# Patient Record
Sex: FEMALE | Race: WHITE | NOT HISPANIC OR LATINO | Employment: OTHER | URBAN - METROPOLITAN AREA
[De-identification: names, ages, dates, MRNs, and addresses within clinical notes are randomized per-mention and may not be internally consistent; named-entity substitution may affect disease eponyms.]

---

## 2018-01-01 ENCOUNTER — APPOINTMENT (OUTPATIENT)
Dept: RADIOLOGY | Facility: MEDICAL CENTER | Age: 78
DRG: 207 | End: 2018-01-01
Attending: INTERNAL MEDICINE
Payer: MEDICARE

## 2018-01-01 ENCOUNTER — PATIENT OUTREACH (OUTPATIENT)
Dept: HEALTH INFORMATION MANAGEMENT | Facility: OTHER | Age: 78
End: 2018-01-01

## 2018-01-01 ENCOUNTER — APPOINTMENT (OUTPATIENT)
Dept: RADIOLOGY | Facility: MEDICAL CENTER | Age: 78
DRG: 207 | End: 2018-01-01
Attending: HOSPITALIST
Payer: MEDICARE

## 2018-01-01 ENCOUNTER — HOSPITAL ENCOUNTER (INPATIENT)
Facility: MEDICAL CENTER | Age: 78
LOS: 4 days | DRG: 987 | End: 2019-01-05
Attending: EMERGENCY MEDICINE | Admitting: HOSPITALIST
Payer: MEDICARE

## 2018-01-01 ENCOUNTER — APPOINTMENT (OUTPATIENT)
Dept: CARDIOLOGY | Facility: MEDICAL CENTER | Age: 78
DRG: 207 | End: 2018-01-01
Attending: INTERNAL MEDICINE
Payer: MEDICARE

## 2018-01-01 ENCOUNTER — APPOINTMENT (OUTPATIENT)
Dept: CARDIOLOGY | Facility: MEDICAL CENTER | Age: 78
DRG: 207 | End: 2018-01-01
Attending: STUDENT IN AN ORGANIZED HEALTH CARE EDUCATION/TRAINING PROGRAM
Payer: MEDICARE

## 2018-01-01 ENCOUNTER — APPOINTMENT (OUTPATIENT)
Dept: CARDIOLOGY | Facility: MEDICAL CENTER | Age: 78
DRG: 207 | End: 2018-01-01
Attending: HOSPITALIST
Payer: MEDICARE

## 2018-01-01 ENCOUNTER — APPOINTMENT (OUTPATIENT)
Dept: RADIOLOGY | Facility: MEDICAL CENTER | Age: 78
DRG: 987 | End: 2018-01-01
Attending: EMERGENCY MEDICINE
Payer: MEDICARE

## 2018-01-01 ENCOUNTER — APPOINTMENT (OUTPATIENT)
Dept: RADIOLOGY | Facility: MEDICAL CENTER | Age: 78
DRG: 207 | End: 2018-01-01
Attending: STUDENT IN AN ORGANIZED HEALTH CARE EDUCATION/TRAINING PROGRAM
Payer: MEDICARE

## 2018-01-01 ENCOUNTER — HOSPITAL ENCOUNTER (OUTPATIENT)
Dept: RADIOLOGY | Facility: MEDICAL CENTER | Age: 78
End: 2018-09-28

## 2018-01-01 ENCOUNTER — APPOINTMENT (OUTPATIENT)
Dept: RADIOLOGY | Facility: MEDICAL CENTER | Age: 78
DRG: 207 | End: 2018-01-01
Attending: RADIOLOGY
Payer: MEDICARE

## 2018-01-01 ENCOUNTER — HOSPITAL ENCOUNTER (INPATIENT)
Facility: MEDICAL CENTER | Age: 78
LOS: 31 days | DRG: 207 | End: 2018-10-29
Attending: EMERGENCY MEDICINE | Admitting: HOSPITALIST
Payer: MEDICARE

## 2018-01-01 VITALS
RESPIRATION RATE: 20 BRPM | HEIGHT: 62 IN | TEMPERATURE: 98 F | SYSTOLIC BLOOD PRESSURE: 120 MMHG | HEART RATE: 90 BPM | OXYGEN SATURATION: 100 % | DIASTOLIC BLOOD PRESSURE: 71 MMHG | BODY MASS INDEX: 21.1 KG/M2 | WEIGHT: 114.64 LBS

## 2018-01-01 DIAGNOSIS — R16.0 LIVER MASSES: ICD-10-CM

## 2018-01-01 DIAGNOSIS — C85.90 RECURRENT LYMPHOMA (HCC): ICD-10-CM

## 2018-01-01 DIAGNOSIS — J90 PLEURAL EFFUSION: ICD-10-CM

## 2018-01-01 DIAGNOSIS — I95.89 OTHER SPECIFIED HYPOTENSION: ICD-10-CM

## 2018-01-01 DIAGNOSIS — I31.39 PERICARDIAL EFFUSION: ICD-10-CM

## 2018-01-01 DIAGNOSIS — R79.89 ELEVATED TROPONIN: ICD-10-CM

## 2018-01-01 DIAGNOSIS — G93.40 ACUTE ENCEPHALOPATHY: ICD-10-CM

## 2018-01-01 DIAGNOSIS — J96.01 ACUTE HYPOXEMIC RESPIRATORY FAILURE (HCC): ICD-10-CM

## 2018-01-01 DIAGNOSIS — J81.0 ACUTE PULMONARY EDEMA (HCC): ICD-10-CM

## 2018-01-01 DIAGNOSIS — R74.01 TRANSAMINITIS: ICD-10-CM

## 2018-01-01 DIAGNOSIS — A41.9 SEPSIS, DUE TO UNSPECIFIED ORGANISM: ICD-10-CM

## 2018-01-01 DIAGNOSIS — J81.1 CHRONIC PULMONARY EDEMA: ICD-10-CM

## 2018-01-01 DIAGNOSIS — J44.1 ACUTE EXACERBATION OF CHRONIC OBSTRUCTIVE PULMONARY DISEASE (COPD) (HCC): ICD-10-CM

## 2018-01-01 DIAGNOSIS — I95.9 HYPOTENSION, UNSPECIFIED HYPOTENSION TYPE: ICD-10-CM

## 2018-01-01 LAB
ABO GROUP BLD: NORMAL
ABO GROUP BLD: NORMAL
ACTION RANGE TRIGGERED IACRT: NO
ACTION RANGE TRIGGERED IACRT: YES
ALBUMIN SERPL BCP-MCNC: 2.7 G/DL (ref 3.2–4.9)
ALBUMIN SERPL BCP-MCNC: 2.8 G/DL (ref 3.2–4.9)
ALBUMIN SERPL BCP-MCNC: 2.8 G/DL (ref 3.2–4.9)
ALBUMIN SERPL BCP-MCNC: 2.9 G/DL (ref 3.2–4.9)
ALBUMIN SERPL BCP-MCNC: 3.1 G/DL (ref 3.2–4.9)
ALBUMIN SERPL BCP-MCNC: 3.3 G/DL (ref 3.2–4.9)
ALBUMIN SERPL BCP-MCNC: 3.4 G/DL (ref 3.2–4.9)
ALBUMIN SERPL BCP-MCNC: 3.8 G/DL (ref 3.2–4.9)
ALBUMIN SERPL-MCNC: 2.14 G/DL (ref 3.75–5.01)
ALBUMIN/GLOB SERPL: 0.9 G/DL
ALBUMIN/GLOB SERPL: 1 G/DL
ALBUMIN/GLOB SERPL: 1.1 G/DL
ALBUMIN/GLOB SERPL: 1.2 G/DL
ALBUMIN/GLOB SERPL: 1.3 G/DL
ALBUMIN/GLOB SERPL: 1.3 G/DL
ALP SERPL-CCNC: 157 U/L (ref 30–99)
ALP SERPL-CCNC: 55 U/L (ref 30–99)
ALP SERPL-CCNC: 56 U/L (ref 30–99)
ALP SERPL-CCNC: 57 U/L (ref 30–99)
ALP SERPL-CCNC: 59 U/L (ref 30–99)
ALP SERPL-CCNC: 62 U/L (ref 30–99)
ALP SERPL-CCNC: 71 U/L (ref 30–99)
ALP SERPL-CCNC: 72 U/L (ref 30–99)
ALPHA1 GLOB SERPL ELPH-MCNC: 0.54 G/DL (ref 0.19–0.46)
ALPHA2 GLOB SERPL ELPH-MCNC: 0.76 G/DL (ref 0.48–1.05)
ALT SERPL-CCNC: 17 U/L (ref 2–50)
ALT SERPL-CCNC: 19 U/L (ref 2–50)
ALT SERPL-CCNC: 32 U/L (ref 2–50)
ALT SERPL-CCNC: 34 U/L (ref 2–50)
ALT SERPL-CCNC: 42 U/L (ref 2–50)
ALT SERPL-CCNC: 59 U/L (ref 2–50)
ALT SERPL-CCNC: 73 U/L (ref 2–50)
ALT SERPL-CCNC: 982 U/L (ref 2–50)
AMMONIA PLAS-SCNC: 51 UMOL/L (ref 11–45)
AMORPH CRY #/AREA URNS HPF: PRESENT /HPF
AMYLASE FLD-CCNC: <10 U/L
ANION GAP SERPL CALC-SCNC: 10 MMOL/L (ref 0–11.9)
ANION GAP SERPL CALC-SCNC: 18 MMOL/L (ref 0–11.9)
ANION GAP SERPL CALC-SCNC: 2 MMOL/L (ref 0–11.9)
ANION GAP SERPL CALC-SCNC: 4 MMOL/L (ref 0–11.9)
ANION GAP SERPL CALC-SCNC: 5 MMOL/L (ref 0–11.9)
ANION GAP SERPL CALC-SCNC: 6 MMOL/L (ref 0–11.9)
ANION GAP SERPL CALC-SCNC: 7 MMOL/L (ref 0–11.9)
ANION GAP SERPL CALC-SCNC: 8 MMOL/L (ref 0–11.9)
ANION GAP SERPL CALC-SCNC: 8 MMOL/L (ref 0–11.9)
ANION GAP SERPL CALC-SCNC: 9 MMOL/L (ref 0–11.9)
ANISOCYTOSIS BLD QL SMEAR: ABNORMAL
APPEARANCE FLD: NORMAL
APPEARANCE FLD: NORMAL
APPEARANCE UR: ABNORMAL
APTT PPP: 25.4 SEC (ref 24.7–36)
APTT PPP: 25.9 SEC (ref 24.7–36)
AST SERPL-CCNC: 1420 U/L (ref 12–45)
AST SERPL-CCNC: 15 U/L (ref 12–45)
AST SERPL-CCNC: 16 U/L (ref 12–45)
AST SERPL-CCNC: 16 U/L (ref 12–45)
AST SERPL-CCNC: 21 U/L (ref 12–45)
AST SERPL-CCNC: 31 U/L (ref 12–45)
AST SERPL-CCNC: 53 U/L (ref 12–45)
AST SERPL-CCNC: 57 U/L (ref 12–45)
B-GLOBULIN SERPL ELPH-MCNC: 0.56 G/DL (ref 0.48–1.1)
BACTERIA #/AREA URNS HPF: NEGATIVE /HPF
BACTERIA BLD CULT: NORMAL
BACTERIA BLD CULT: NORMAL
BACTERIA FLD AEROBE CULT: ABNORMAL
BACTERIA FLD AEROBE CULT: ABNORMAL
BACTERIA FLD AEROBE CULT: NORMAL
BACTERIA FLD AEROBE CULT: NORMAL
BACTERIA SPEC RESP CULT: NORMAL
BACTERIA WND AEROBE CULT: NORMAL
BASE EXCESS BLDA CALC-SCNC: 11 MMOL/L (ref -4–3)
BASE EXCESS BLDA CALC-SCNC: 14 MMOL/L (ref -4–3)
BASE EXCESS BLDA CALC-SCNC: 16 MMOL/L (ref -4–3)
BASE EXCESS BLDA CALC-SCNC: 18 MMOL/L (ref -4–3)
BASE EXCESS BLDA CALC-SCNC: 8 MMOL/L (ref -4–3)
BASE EXCESS BLDA CALC-SCNC: 8 MMOL/L (ref -4–3)
BASE EXCESS BLDA CALC-SCNC: 9 MMOL/L (ref -4–3)
BASE EXCESS BLDA CALC-SCNC: 9 MMOL/L (ref -4–3)
BASOPHILS # BLD AUTO: 0 % (ref 0–1.8)
BASOPHILS # BLD AUTO: 0.1 % (ref 0–1.8)
BASOPHILS # BLD AUTO: 0.2 % (ref 0–1.8)
BASOPHILS # BLD AUTO: 0.3 % (ref 0–1.8)
BASOPHILS # BLD AUTO: 0.3 % (ref 0–1.8)
BASOPHILS # BLD AUTO: 0.6 % (ref 0–1.8)
BASOPHILS # BLD AUTO: 0.9 % (ref 0–1.8)
BASOPHILS # BLD AUTO: 0.9 % (ref 0–1.8)
BASOPHILS # BLD: 0 K/UL (ref 0–0.12)
BASOPHILS # BLD: 0.01 K/UL (ref 0–0.12)
BASOPHILS # BLD: 0.02 K/UL (ref 0–0.12)
BASOPHILS # BLD: 0.02 K/UL (ref 0–0.12)
BASOPHILS # BLD: 0.03 K/UL (ref 0–0.12)
BASOPHILS # BLD: 0.05 K/UL (ref 0–0.12)
BASOPHILS # BLD: 0.06 K/UL (ref 0–0.12)
BASOPHILS # BLD: 0.08 K/UL (ref 0–0.12)
BASOPHILS # BLD: 0.08 K/UL (ref 0–0.12)
BASOPHILS # BLD: 0.11 K/UL (ref 0–0.12)
BILIRUB SERPL-MCNC: 0.3 MG/DL (ref 0.1–1.5)
BILIRUB SERPL-MCNC: 0.4 MG/DL (ref 0.1–1.5)
BILIRUB SERPL-MCNC: 0.5 MG/DL (ref 0.1–1.5)
BILIRUB SERPL-MCNC: 0.5 MG/DL (ref 0.1–1.5)
BILIRUB SERPL-MCNC: 0.9 MG/DL (ref 0.1–1.5)
BILIRUB SERPL-MCNC: 1.1 MG/DL (ref 0.1–1.5)
BILIRUB UR QL STRIP.AUTO: NEGATIVE
BLD GP AB SCN SERPL QL: NORMAL
BNP SERPL-MCNC: 61 PG/ML (ref 0–100)
BNP SERPL-MCNC: 73 PG/ML (ref 0–100)
BODY FLD TYPE: NORMAL
BODY TEMPERATURE: ABNORMAL CENTIGRADE
BODY TEMPERATURE: ABNORMAL DEGREES
BUN SERPL-MCNC: 12 MG/DL (ref 8–22)
BUN SERPL-MCNC: 17 MG/DL (ref 8–22)
BUN SERPL-MCNC: 17 MG/DL (ref 8–22)
BUN SERPL-MCNC: 18 MG/DL (ref 8–22)
BUN SERPL-MCNC: 19 MG/DL (ref 8–22)
BUN SERPL-MCNC: 20 MG/DL (ref 8–22)
BUN SERPL-MCNC: 23 MG/DL (ref 8–22)
BUN SERPL-MCNC: 23 MG/DL (ref 8–22)
BUN SERPL-MCNC: 25 MG/DL (ref 8–22)
BUN SERPL-MCNC: 26 MG/DL (ref 8–22)
BUN SERPL-MCNC: 26 MG/DL (ref 8–22)
BUN SERPL-MCNC: 28 MG/DL (ref 8–22)
BUN SERPL-MCNC: 28 MG/DL (ref 8–22)
BUN SERPL-MCNC: 30 MG/DL (ref 8–22)
BUN SERPL-MCNC: 30 MG/DL (ref 8–22)
BUN SERPL-MCNC: 34 MG/DL (ref 8–22)
BUN SERPL-MCNC: 34 MG/DL (ref 8–22)
BUN SERPL-MCNC: 35 MG/DL (ref 8–22)
BUN SERPL-MCNC: 36 MG/DL (ref 8–22)
BUN SERPL-MCNC: 43 MG/DL (ref 8–22)
BURR CELLS BLD QL SMEAR: NORMAL
CALCIUM SERPL-MCNC: 7.8 MG/DL (ref 8.5–10.5)
CALCIUM SERPL-MCNC: 7.8 MG/DL (ref 8.5–10.5)
CALCIUM SERPL-MCNC: 7.9 MG/DL (ref 8.5–10.5)
CALCIUM SERPL-MCNC: 7.9 MG/DL (ref 8.5–10.5)
CALCIUM SERPL-MCNC: 8 MG/DL (ref 8.5–10.5)
CALCIUM SERPL-MCNC: 8 MG/DL (ref 8.5–10.5)
CALCIUM SERPL-MCNC: 8.1 MG/DL (ref 8.5–10.5)
CALCIUM SERPL-MCNC: 8.1 MG/DL (ref 8.5–10.5)
CALCIUM SERPL-MCNC: 8.2 MG/DL (ref 8.5–10.5)
CALCIUM SERPL-MCNC: 8.4 MG/DL (ref 8.5–10.5)
CALCIUM SERPL-MCNC: 8.4 MG/DL (ref 8.5–10.5)
CALCIUM SERPL-MCNC: 8.7 MG/DL (ref 8.5–10.5)
CALCIUM SERPL-MCNC: 8.8 MG/DL (ref 8.5–10.5)
CALCIUM SERPL-MCNC: 8.8 MG/DL (ref 8.5–10.5)
CALCIUM SERPL-MCNC: 8.9 MG/DL (ref 8.5–10.5)
CALCIUM SERPL-MCNC: 9 MG/DL (ref 8.5–10.5)
CALCIUM SERPL-MCNC: 9.1 MG/DL (ref 8.5–10.5)
CALCIUM SERPL-MCNC: 9.2 MG/DL (ref 8.5–10.5)
CALCIUM SERPL-MCNC: 9.3 MG/DL (ref 8.5–10.5)
CALCIUM SERPL-MCNC: 9.3 MG/DL (ref 8.5–10.5)
CALCIUM SERPL-MCNC: 9.4 MG/DL (ref 8.5–10.5)
CALCIUM SERPL-MCNC: 9.4 MG/DL (ref 8.5–10.5)
CALCIUM SERPL-MCNC: 9.6 MG/DL (ref 8.5–10.5)
CALCIUM SERPL-MCNC: 9.7 MG/DL (ref 8.5–10.5)
CCP IGG SERPL-ACNC: 2 UNITS (ref 0–19)
CHLORIDE SERPL-SCNC: 100 MMOL/L (ref 96–112)
CHLORIDE SERPL-SCNC: 102 MMOL/L (ref 96–112)
CHLORIDE SERPL-SCNC: 102 MMOL/L (ref 96–112)
CHLORIDE SERPL-SCNC: 103 MMOL/L (ref 96–112)
CHLORIDE SERPL-SCNC: 104 MMOL/L (ref 96–112)
CHLORIDE SERPL-SCNC: 105 MMOL/L (ref 96–112)
CHLORIDE SERPL-SCNC: 106 MMOL/L (ref 96–112)
CHLORIDE SERPL-SCNC: 107 MMOL/L (ref 96–112)
CHLORIDE SERPL-SCNC: 108 MMOL/L (ref 96–112)
CHLORIDE SERPL-SCNC: 91 MMOL/L (ref 96–112)
CHLORIDE SERPL-SCNC: 99 MMOL/L (ref 96–112)
CHLORIDE SERPL-SCNC: 99 MMOL/L (ref 96–112)
CHOLEST SERPL-MCNC: 126 MG/DL (ref 100–199)
CK SERPL-CCNC: 20 U/L (ref 0–154)
CK SERPL-CCNC: 62 U/L (ref 0–154)
CO2 BLDA-SCNC: 34 MMOL/L (ref 20–33)
CO2 BLDA-SCNC: 35 MMOL/L (ref 20–33)
CO2 BLDA-SCNC: 36 MMOL/L (ref 20–33)
CO2 BLDA-SCNC: 42 MMOL/L (ref 20–33)
CO2 BLDA-SCNC: 42 MMOL/L (ref 20–33)
CO2 BLDA-SCNC: 47 MMOL/L (ref 20–33)
CO2 SERPL-SCNC: 25 MMOL/L (ref 20–33)
CO2 SERPL-SCNC: 29 MMOL/L (ref 20–33)
CO2 SERPL-SCNC: 30 MMOL/L (ref 20–33)
CO2 SERPL-SCNC: 31 MMOL/L (ref 20–33)
CO2 SERPL-SCNC: 31 MMOL/L (ref 20–33)
CO2 SERPL-SCNC: 32 MMOL/L (ref 20–33)
CO2 SERPL-SCNC: 33 MMOL/L (ref 20–33)
CO2 SERPL-SCNC: 33 MMOL/L (ref 20–33)
CO2 SERPL-SCNC: 34 MMOL/L (ref 20–33)
CO2 SERPL-SCNC: 34 MMOL/L (ref 20–33)
CO2 SERPL-SCNC: 35 MMOL/L (ref 20–33)
CO2 SERPL-SCNC: 36 MMOL/L (ref 20–33)
CO2 SERPL-SCNC: 37 MMOL/L (ref 20–33)
CO2 SERPL-SCNC: 37 MMOL/L (ref 20–33)
CO2 SERPL-SCNC: 38 MMOL/L (ref 20–33)
CO2 SERPL-SCNC: 39 MMOL/L (ref 20–33)
CO2 SERPL-SCNC: 42 MMOL/L (ref 20–33)
COLOR FLD: YELLOW
COLOR FLD: YELLOW
COLOR UR: ABNORMAL
COMMENT 1642: NORMAL
CORTIS SERPL-MCNC: 14.8 UG/DL (ref 0–23)
CREAT SERPL-MCNC: 0.3 MG/DL (ref 0.5–1.4)
CREAT SERPL-MCNC: 0.32 MG/DL (ref 0.5–1.4)
CREAT SERPL-MCNC: 0.33 MG/DL (ref 0.5–1.4)
CREAT SERPL-MCNC: 0.34 MG/DL (ref 0.5–1.4)
CREAT SERPL-MCNC: 0.35 MG/DL (ref 0.5–1.4)
CREAT SERPL-MCNC: 0.36 MG/DL (ref 0.5–1.4)
CREAT SERPL-MCNC: 0.37 MG/DL (ref 0.5–1.4)
CREAT SERPL-MCNC: 0.37 MG/DL (ref 0.5–1.4)
CREAT SERPL-MCNC: 0.38 MG/DL (ref 0.5–1.4)
CREAT SERPL-MCNC: 0.39 MG/DL (ref 0.5–1.4)
CREAT SERPL-MCNC: 0.4 MG/DL (ref 0.5–1.4)
CREAT SERPL-MCNC: 0.41 MG/DL (ref 0.5–1.4)
CREAT SERPL-MCNC: 0.42 MG/DL (ref 0.5–1.4)
CREAT SERPL-MCNC: 0.44 MG/DL (ref 0.5–1.4)
CREAT SERPL-MCNC: 0.49 MG/DL (ref 0.5–1.4)
CREAT SERPL-MCNC: 0.52 MG/DL (ref 0.5–1.4)
CREAT SERPL-MCNC: 0.58 MG/DL (ref 0.5–1.4)
CREAT SERPL-MCNC: 0.62 MG/DL (ref 0.5–1.4)
CREAT SERPL-MCNC: 0.63 MG/DL (ref 0.5–1.4)
CREAT SERPL-MCNC: 0.65 MG/DL (ref 0.5–1.4)
CREAT SERPL-MCNC: 0.72 MG/DL (ref 0.5–1.4)
CREAT SERPL-MCNC: 1.21 MG/DL (ref 0.5–1.4)
CRP SERPL HS-MCNC: 0.7 MG/DL (ref 0–0.75)
CRP SERPL HS-MCNC: 1.76 MG/DL (ref 0–0.75)
CRP SERPL HS-MCNC: 121.4 MG/L (ref 0–7.5)
CRP SERPL HS-MCNC: 47.5 MG/L (ref 0–7.5)
CRYOGLOB SER QL 3D COLD INC: NORMAL
CSF COMMENTS 1658: NORMAL
CSF COMMENTS 1658: NORMAL
CYTOLOGY REG CYTOL: NORMAL
EKG IMPRESSION: NORMAL
EOSINOPHIL # BLD AUTO: 0 K/UL (ref 0–0.51)
EOSINOPHIL # BLD AUTO: 0.01 K/UL (ref 0–0.51)
EOSINOPHIL # BLD AUTO: 0.02 K/UL (ref 0–0.51)
EOSINOPHIL # BLD AUTO: 0.05 K/UL (ref 0–0.51)
EOSINOPHIL # BLD AUTO: 0.06 K/UL (ref 0–0.51)
EOSINOPHIL # BLD AUTO: 0.12 K/UL (ref 0–0.51)
EOSINOPHIL # BLD AUTO: 0.19 K/UL (ref 0–0.51)
EOSINOPHIL # BLD AUTO: 0.21 K/UL (ref 0–0.51)
EOSINOPHIL NFR BLD: 0 % (ref 0–6.9)
EOSINOPHIL NFR BLD: 0.1 % (ref 0–6.9)
EOSINOPHIL NFR BLD: 0.1 % (ref 0–6.9)
EOSINOPHIL NFR BLD: 0.3 % (ref 0–6.9)
EOSINOPHIL NFR BLD: 0.4 % (ref 0–6.9)
EOSINOPHIL NFR BLD: 0.9 % (ref 0–6.9)
EOSINOPHIL NFR BLD: 1.4 % (ref 0–6.9)
EOSINOPHIL NFR BLD: 1.7 % (ref 0–6.9)
EPI CELLS #/AREA URNS HPF: ABNORMAL /HPF
ERYTHROCYTE [DISTWIDTH] IN BLOOD BY AUTOMATED COUNT: 54 FL (ref 35.9–50)
ERYTHROCYTE [DISTWIDTH] IN BLOOD BY AUTOMATED COUNT: 64.6 FL (ref 35.9–50)
ERYTHROCYTE [DISTWIDTH] IN BLOOD BY AUTOMATED COUNT: 65.4 FL (ref 35.9–50)
ERYTHROCYTE [DISTWIDTH] IN BLOOD BY AUTOMATED COUNT: 65.7 FL (ref 35.9–50)
ERYTHROCYTE [DISTWIDTH] IN BLOOD BY AUTOMATED COUNT: 65.7 FL (ref 35.9–50)
ERYTHROCYTE [DISTWIDTH] IN BLOOD BY AUTOMATED COUNT: 65.8 FL (ref 35.9–50)
ERYTHROCYTE [DISTWIDTH] IN BLOOD BY AUTOMATED COUNT: 66.1 FL (ref 35.9–50)
ERYTHROCYTE [DISTWIDTH] IN BLOOD BY AUTOMATED COUNT: 66.5 FL (ref 35.9–50)
ERYTHROCYTE [DISTWIDTH] IN BLOOD BY AUTOMATED COUNT: 66.8 FL (ref 35.9–50)
ERYTHROCYTE [DISTWIDTH] IN BLOOD BY AUTOMATED COUNT: 67 FL (ref 35.9–50)
ERYTHROCYTE [DISTWIDTH] IN BLOOD BY AUTOMATED COUNT: 67.2 FL (ref 35.9–50)
ERYTHROCYTE [DISTWIDTH] IN BLOOD BY AUTOMATED COUNT: 67.3 FL (ref 35.9–50)
ERYTHROCYTE [DISTWIDTH] IN BLOOD BY AUTOMATED COUNT: 67.3 FL (ref 35.9–50)
ERYTHROCYTE [DISTWIDTH] IN BLOOD BY AUTOMATED COUNT: 67.5 FL (ref 35.9–50)
ERYTHROCYTE [DISTWIDTH] IN BLOOD BY AUTOMATED COUNT: 67.7 FL (ref 35.9–50)
ERYTHROCYTE [DISTWIDTH] IN BLOOD BY AUTOMATED COUNT: 67.9 FL (ref 35.9–50)
ERYTHROCYTE [DISTWIDTH] IN BLOOD BY AUTOMATED COUNT: 68.2 FL (ref 35.9–50)
ERYTHROCYTE [DISTWIDTH] IN BLOOD BY AUTOMATED COUNT: 68.7 FL (ref 35.9–50)
ERYTHROCYTE [SEDIMENTATION RATE] IN BLOOD BY WESTERGREN METHOD: 1 MM/HOUR (ref 0–30)
FUNGUS SPEC CULT: NORMAL
FUNGUS SPEC CULT: NORMAL
GAMMA GLOB SERPL ELPH-MCNC: 0.7 G/DL (ref 0.62–1.51)
GLOBULIN SER CALC-MCNC: 2.4 G/DL (ref 1.9–3.5)
GLOBULIN SER CALC-MCNC: 2.5 G/DL (ref 1.9–3.5)
GLOBULIN SER CALC-MCNC: 2.6 G/DL (ref 1.9–3.5)
GLOBULIN SER CALC-MCNC: 2.7 G/DL (ref 1.9–3.5)
GLOBULIN SER CALC-MCNC: 2.8 G/DL (ref 1.9–3.5)
GLOBULIN SER CALC-MCNC: 2.9 G/DL (ref 1.9–3.5)
GLOBULIN SER CALC-MCNC: 2.9 G/DL (ref 1.9–3.5)
GLOBULIN SER CALC-MCNC: 3.3 G/DL (ref 1.9–3.5)
GLUCOSE BLD-MCNC: 100 MG/DL (ref 65–99)
GLUCOSE BLD-MCNC: 102 MG/DL (ref 65–99)
GLUCOSE BLD-MCNC: 102 MG/DL (ref 65–99)
GLUCOSE BLD-MCNC: 103 MG/DL (ref 65–99)
GLUCOSE BLD-MCNC: 106 MG/DL (ref 65–99)
GLUCOSE BLD-MCNC: 109 MG/DL (ref 65–99)
GLUCOSE BLD-MCNC: 112 MG/DL (ref 65–99)
GLUCOSE BLD-MCNC: 114 MG/DL (ref 65–99)
GLUCOSE BLD-MCNC: 115 MG/DL (ref 65–99)
GLUCOSE BLD-MCNC: 116 MG/DL (ref 65–99)
GLUCOSE BLD-MCNC: 117 MG/DL (ref 65–99)
GLUCOSE BLD-MCNC: 117 MG/DL (ref 65–99)
GLUCOSE BLD-MCNC: 120 MG/DL (ref 65–99)
GLUCOSE BLD-MCNC: 121 MG/DL (ref 65–99)
GLUCOSE BLD-MCNC: 122 MG/DL (ref 65–99)
GLUCOSE BLD-MCNC: 124 MG/DL (ref 65–99)
GLUCOSE BLD-MCNC: 128 MG/DL (ref 65–99)
GLUCOSE BLD-MCNC: 128 MG/DL (ref 65–99)
GLUCOSE BLD-MCNC: 129 MG/DL (ref 65–99)
GLUCOSE BLD-MCNC: 130 MG/DL (ref 65–99)
GLUCOSE BLD-MCNC: 130 MG/DL (ref 65–99)
GLUCOSE BLD-MCNC: 131 MG/DL (ref 65–99)
GLUCOSE BLD-MCNC: 132 MG/DL (ref 65–99)
GLUCOSE BLD-MCNC: 135 MG/DL (ref 65–99)
GLUCOSE BLD-MCNC: 135 MG/DL (ref 65–99)
GLUCOSE BLD-MCNC: 138 MG/DL (ref 65–99)
GLUCOSE BLD-MCNC: 148 MG/DL (ref 65–99)
GLUCOSE BLD-MCNC: 152 MG/DL (ref 65–99)
GLUCOSE BLD-MCNC: 155 MG/DL (ref 65–99)
GLUCOSE BLD-MCNC: 155 MG/DL (ref 65–99)
GLUCOSE BLD-MCNC: 157 MG/DL (ref 65–99)
GLUCOSE BLD-MCNC: 159 MG/DL (ref 65–99)
GLUCOSE BLD-MCNC: 160 MG/DL (ref 65–99)
GLUCOSE BLD-MCNC: 160 MG/DL (ref 65–99)
GLUCOSE BLD-MCNC: 178 MG/DL (ref 65–99)
GLUCOSE BLD-MCNC: 72 MG/DL (ref 65–99)
GLUCOSE BLD-MCNC: 84 MG/DL (ref 65–99)
GLUCOSE BLD-MCNC: 88 MG/DL (ref 65–99)
GLUCOSE BLD-MCNC: 95 MG/DL (ref 65–99)
GLUCOSE BLD-MCNC: 97 MG/DL (ref 65–99)
GLUCOSE FLD-MCNC: 110 MG/DL
GLUCOSE FLD-MCNC: 136 MG/DL
GLUCOSE SERPL-MCNC: 103 MG/DL (ref 65–99)
GLUCOSE SERPL-MCNC: 108 MG/DL (ref 65–99)
GLUCOSE SERPL-MCNC: 111 MG/DL (ref 65–99)
GLUCOSE SERPL-MCNC: 115 MG/DL (ref 65–99)
GLUCOSE SERPL-MCNC: 119 MG/DL (ref 65–99)
GLUCOSE SERPL-MCNC: 119 MG/DL (ref 65–99)
GLUCOSE SERPL-MCNC: 120 MG/DL (ref 65–99)
GLUCOSE SERPL-MCNC: 122 MG/DL (ref 65–99)
GLUCOSE SERPL-MCNC: 126 MG/DL (ref 65–99)
GLUCOSE SERPL-MCNC: 127 MG/DL (ref 65–99)
GLUCOSE SERPL-MCNC: 132 MG/DL (ref 65–99)
GLUCOSE SERPL-MCNC: 148 MG/DL (ref 65–99)
GLUCOSE SERPL-MCNC: 151 MG/DL (ref 65–99)
GLUCOSE SERPL-MCNC: 152 MG/DL (ref 65–99)
GLUCOSE SERPL-MCNC: 155 MG/DL (ref 65–99)
GLUCOSE SERPL-MCNC: 158 MG/DL (ref 65–99)
GLUCOSE SERPL-MCNC: 158 MG/DL (ref 65–99)
GLUCOSE SERPL-MCNC: 161 MG/DL (ref 65–99)
GLUCOSE SERPL-MCNC: 172 MG/DL (ref 65–99)
GLUCOSE SERPL-MCNC: 173 MG/DL (ref 65–99)
GLUCOSE SERPL-MCNC: 177 MG/DL (ref 65–99)
GLUCOSE SERPL-MCNC: 196 MG/DL (ref 65–99)
GLUCOSE SERPL-MCNC: 210 MG/DL (ref 65–99)
GLUCOSE SERPL-MCNC: 217 MG/DL (ref 65–99)
GLUCOSE SERPL-MCNC: 86 MG/DL (ref 65–99)
GLUCOSE SERPL-MCNC: 89 MG/DL (ref 65–99)
GLUCOSE UR STRIP.AUTO-MCNC: NEGATIVE MG/DL
GRAM STN SPEC: ABNORMAL
GRAM STN SPEC: NORMAL
HAV IGM SERPL QL IA: NEGATIVE
HBV CORE IGM SER QL: NEGATIVE
HBV SURFACE AG SER QL: NEGATIVE
HCO3 BLDA-SCNC: 32.7 MMOL/L (ref 17–25)
HCO3 BLDA-SCNC: 32.7 MMOL/L (ref 17–25)
HCO3 BLDA-SCNC: 32.8 MMOL/L (ref 17–25)
HCO3 BLDA-SCNC: 32.8 MMOL/L (ref 17–25)
HCO3 BLDA-SCNC: 34 MMOL/L (ref 17–25)
HCO3 BLDA-SCNC: 34.5 MMOL/L (ref 17–25)
HCO3 BLDA-SCNC: 39 MMOL/L (ref 17–25)
HCO3 BLDA-SCNC: 40.2 MMOL/L (ref 17–25)
HCO3 BLDA-SCNC: 41 MMOL/L (ref 17–25)
HCO3 BLDA-SCNC: 45.4 MMOL/L (ref 17–25)
HCT VFR BLD AUTO: 34 % (ref 37–47)
HCT VFR BLD AUTO: 34.8 % (ref 37–47)
HCT VFR BLD AUTO: 36.3 % (ref 37–47)
HCT VFR BLD AUTO: 37.1 % (ref 37–47)
HCT VFR BLD AUTO: 37.5 % (ref 37–47)
HCT VFR BLD AUTO: 37.7 % (ref 37–47)
HCT VFR BLD AUTO: 38.1 % (ref 37–47)
HCT VFR BLD AUTO: 38.4 % (ref 37–47)
HCT VFR BLD AUTO: 38.8 % (ref 37–47)
HCT VFR BLD AUTO: 38.9 % (ref 37–47)
HCT VFR BLD AUTO: 39.3 % (ref 37–47)
HCT VFR BLD AUTO: 39.6 % (ref 37–47)
HCT VFR BLD AUTO: 40 % (ref 37–47)
HCT VFR BLD AUTO: 40.6 % (ref 37–47)
HCT VFR BLD AUTO: 40.7 % (ref 37–47)
HCT VFR BLD AUTO: 41 % (ref 37–47)
HCT VFR BLD AUTO: 41.2 % (ref 37–47)
HCT VFR BLD AUTO: 45.1 % (ref 37–47)
HCV AB SER QL: NEGATIVE
HDLC SERPL-MCNC: 43 MG/DL
HGB BLD-MCNC: 10.5 G/DL (ref 12–16)
HGB BLD-MCNC: 10.9 G/DL (ref 12–16)
HGB BLD-MCNC: 11.4 G/DL (ref 12–16)
HGB BLD-MCNC: 11.5 G/DL (ref 12–16)
HGB BLD-MCNC: 11.7 G/DL (ref 12–16)
HGB BLD-MCNC: 11.7 G/DL (ref 12–16)
HGB BLD-MCNC: 11.9 G/DL (ref 12–16)
HGB BLD-MCNC: 12 G/DL (ref 12–16)
HGB BLD-MCNC: 12 G/DL (ref 12–16)
HGB BLD-MCNC: 12.1 G/DL (ref 12–16)
HGB BLD-MCNC: 12.2 G/DL (ref 12–16)
HGB BLD-MCNC: 12.3 G/DL (ref 12–16)
HGB BLD-MCNC: 12.3 G/DL (ref 12–16)
HGB BLD-MCNC: 12.4 G/DL (ref 12–16)
HGB BLD-MCNC: 12.6 G/DL (ref 12–16)
HGB BLD-MCNC: 12.8 G/DL (ref 12–16)
HGB BLD-MCNC: 13 G/DL (ref 12–16)
HGB BLD-MCNC: 14 G/DL (ref 12–16)
HISTIOCYTES NFR FLD: 14 %
HISTIOCYTES NFR FLD: 35 %
HIV 1+2 AB+HIV1 P24 AG SERPL QL IA: NON REACTIVE
HOROWITZ INDEX BLDA+IHG-RTO: 150 MM[HG]
HOROWITZ INDEX BLDA+IHG-RTO: 183 MM[HG]
HOROWITZ INDEX BLDA+IHG-RTO: 198 MM[HG]
HOROWITZ INDEX BLDA+IHG-RTO: 203 MM[HG]
HOROWITZ INDEX BLDA+IHG-RTO: 207 MM[HG]
HOROWITZ INDEX BLDA+IHG-RTO: 217 MM[HG]
HOROWITZ INDEX BLDA+IHG-RTO: 227 MM[HG]
HOROWITZ INDEX BLDA+IHG-RTO: 41 MM[HG]
HOROWITZ INDEX BLDA+IHG-RTO: 56 MM[HG]
HYALINE CASTS #/AREA URNS LPF: ABNORMAL /LPF
IMM GRANULOCYTES # BLD AUTO: 0.06 K/UL (ref 0–0.11)
IMM GRANULOCYTES # BLD AUTO: 0.08 K/UL (ref 0–0.11)
IMM GRANULOCYTES # BLD AUTO: 0.08 K/UL (ref 0–0.11)
IMM GRANULOCYTES # BLD AUTO: 0.11 K/UL (ref 0–0.11)
IMM GRANULOCYTES # BLD AUTO: 0.13 K/UL (ref 0–0.11)
IMM GRANULOCYTES # BLD AUTO: 0.15 K/UL (ref 0–0.11)
IMM GRANULOCYTES # BLD AUTO: 0.27 K/UL (ref 0–0.11)
IMM GRANULOCYTES # BLD AUTO: 0.31 K/UL (ref 0–0.11)
IMM GRANULOCYTES # BLD AUTO: 0.42 K/UL (ref 0–0.11)
IMM GRANULOCYTES # BLD AUTO: 0.61 K/UL (ref 0–0.11)
IMM GRANULOCYTES NFR BLD AUTO: 0.6 % (ref 0–0.9)
IMM GRANULOCYTES NFR BLD AUTO: 0.8 % (ref 0–0.9)
IMM GRANULOCYTES NFR BLD AUTO: 1 % (ref 0–0.9)
IMM GRANULOCYTES NFR BLD AUTO: 1.2 % (ref 0–0.9)
IMM GRANULOCYTES NFR BLD AUTO: 1.9 % (ref 0–0.9)
IMM GRANULOCYTES NFR BLD AUTO: 1.9 % (ref 0–0.9)
IMM GRANULOCYTES NFR BLD AUTO: 2.4 % (ref 0–0.9)
IMM GRANULOCYTES NFR BLD AUTO: 4.9 % (ref 0–0.9)
INR PPP: 1.4 (ref 0.87–1.13)
INR PPP: 1.99 (ref 0.87–1.13)
INST. QUALIFIED PATIENT IIQPT: YES
INTERPRETATION SERPL IFE-IMP: ABNORMAL
KETONES UR STRIP.AUTO-MCNC: ABNORMAL MG/DL
LACTATE BLD-SCNC: 4.5 MMOL/L (ref 0.5–2)
LDH FLD L TO P-CCNC: 122 U/L
LDH FLD L TO P-CCNC: 82 U/L
LDLC SERPL CALC-MCNC: 69 MG/DL
LEUKOCYTE ESTERASE UR QL STRIP.AUTO: NEGATIVE
LV EJECT FRACT  99904: 55
LV EJECT FRACT  99904: 55
LV EJECT FRACT  99904: 60
LV EJECT FRACT  99904: 70
LV EJECT FRACT MOD 2C 99903: 53.11
LV EJECT FRACT MOD 4C 99902: 61.34
LV EJECT FRACT MOD 4C 99902: 75.66
LV EJECT FRACT MOD BP 99901: 57.65
LYMPHOCYTES # BLD AUTO: 0.65 K/UL (ref 1–4.8)
LYMPHOCYTES # BLD AUTO: 0.66 K/UL (ref 1–4.8)
LYMPHOCYTES # BLD AUTO: 0.69 K/UL (ref 1–4.8)
LYMPHOCYTES # BLD AUTO: 0.88 K/UL (ref 1–4.8)
LYMPHOCYTES # BLD AUTO: 0.93 K/UL (ref 1–4.8)
LYMPHOCYTES # BLD AUTO: 1.04 K/UL (ref 1–4.8)
LYMPHOCYTES # BLD AUTO: 1.06 K/UL (ref 1–4.8)
LYMPHOCYTES # BLD AUTO: 1.1 K/UL (ref 1–4.8)
LYMPHOCYTES # BLD AUTO: 1.13 K/UL (ref 1–4.8)
LYMPHOCYTES # BLD AUTO: 1.22 K/UL (ref 1–4.8)
LYMPHOCYTES # BLD AUTO: 1.22 K/UL (ref 1–4.8)
LYMPHOCYTES # BLD AUTO: 1.29 K/UL (ref 1–4.8)
LYMPHOCYTES # BLD AUTO: 1.37 K/UL (ref 1–4.8)
LYMPHOCYTES # BLD AUTO: 1.76 K/UL (ref 1–4.8)
LYMPHOCYTES NFR BLD: 10.4 % (ref 22–41)
LYMPHOCYTES NFR BLD: 10.4 % (ref 22–41)
LYMPHOCYTES NFR BLD: 13 % (ref 22–41)
LYMPHOCYTES NFR BLD: 4.9 % (ref 22–41)
LYMPHOCYTES NFR BLD: 5.3 % (ref 22–41)
LYMPHOCYTES NFR BLD: 6.7 % (ref 22–41)
LYMPHOCYTES NFR BLD: 6.7 % (ref 22–41)
LYMPHOCYTES NFR BLD: 6.9 % (ref 22–41)
LYMPHOCYTES NFR BLD: 7.9 % (ref 22–41)
LYMPHOCYTES NFR BLD: 8 % (ref 22–41)
LYMPHOCYTES NFR BLD: 8.4 % (ref 22–41)
LYMPHOCYTES NFR BLD: 8.6 % (ref 22–41)
LYMPHOCYTES NFR BLD: 9.5 % (ref 22–41)
LYMPHOCYTES NFR BLD: 9.6 % (ref 22–41)
LYMPHOCYTES NFR FLD: 26 %
LYMPHOCYTES NFR FLD: 43 %
MACROCYTES BLD QL SMEAR: ABNORMAL
MAGNESIUM SERPL-MCNC: 1.7 MG/DL (ref 1.5–2.5)
MAGNESIUM SERPL-MCNC: 1.8 MG/DL (ref 1.5–2.5)
MAGNESIUM SERPL-MCNC: 1.9 MG/DL (ref 1.5–2.5)
MAGNESIUM SERPL-MCNC: 1.9 MG/DL (ref 1.5–2.5)
MAGNESIUM SERPL-MCNC: 2 MG/DL (ref 1.5–2.5)
MAGNESIUM SERPL-MCNC: 2.1 MG/DL (ref 1.5–2.5)
MAGNESIUM SERPL-MCNC: 2.1 MG/DL (ref 1.5–2.5)
MANUAL DIFF BLD: NORMAL
MCH RBC QN AUTO: 27.7 PG (ref 27–33)
MCH RBC QN AUTO: 27.7 PG (ref 27–33)
MCH RBC QN AUTO: 27.9 PG (ref 27–33)
MCH RBC QN AUTO: 27.9 PG (ref 27–33)
MCH RBC QN AUTO: 28 PG (ref 27–33)
MCH RBC QN AUTO: 28 PG (ref 27–33)
MCH RBC QN AUTO: 28.1 PG (ref 27–33)
MCH RBC QN AUTO: 28.1 PG (ref 27–33)
MCH RBC QN AUTO: 28.2 PG (ref 27–33)
MCH RBC QN AUTO: 28.4 PG (ref 27–33)
MCH RBC QN AUTO: 28.6 PG (ref 27–33)
MCH RBC QN AUTO: 28.9 PG (ref 27–33)
MCH RBC QN AUTO: 29 PG (ref 27–33)
MCH RBC QN AUTO: 29.4 PG (ref 27–33)
MCH RBC QN AUTO: 29.7 PG (ref 27–33)
MCH RBC QN AUTO: 30.2 PG (ref 27–33)
MCHC RBC AUTO-ENTMCNC: 29.6 G/DL (ref 33.6–35)
MCHC RBC AUTO-ENTMCNC: 30 G/DL (ref 33.6–35)
MCHC RBC AUTO-ENTMCNC: 30.2 G/DL (ref 33.6–35)
MCHC RBC AUTO-ENTMCNC: 30.4 G/DL (ref 33.6–35)
MCHC RBC AUTO-ENTMCNC: 30.5 G/DL (ref 33.6–35)
MCHC RBC AUTO-ENTMCNC: 30.8 G/DL (ref 33.6–35)
MCHC RBC AUTO-ENTMCNC: 30.9 G/DL (ref 33.6–35)
MCHC RBC AUTO-ENTMCNC: 31 G/DL (ref 33.6–35)
MCHC RBC AUTO-ENTMCNC: 31 G/DL (ref 33.6–35)
MCHC RBC AUTO-ENTMCNC: 31.1 G/DL (ref 33.6–35)
MCHC RBC AUTO-ENTMCNC: 31.2 G/DL (ref 33.6–35)
MCHC RBC AUTO-ENTMCNC: 31.3 G/DL (ref 33.6–35)
MCHC RBC AUTO-ENTMCNC: 31.5 G/DL (ref 33.6–35)
MCHC RBC AUTO-ENTMCNC: 31.6 G/DL (ref 33.6–35)
MCHC RBC AUTO-ENTMCNC: 31.7 G/DL (ref 33.6–35)
MCHC RBC AUTO-ENTMCNC: 32.2 G/DL (ref 33.6–35)
MCHC RBC AUTO-ENTMCNC: 32.3 G/DL (ref 33.6–35)
MCHC RBC AUTO-ENTMCNC: 32.3 G/DL (ref 33.6–35)
MCV RBC AUTO: 88.5 FL (ref 81.4–97.8)
MCV RBC AUTO: 88.9 FL (ref 81.4–97.8)
MCV RBC AUTO: 89.6 FL (ref 81.4–97.8)
MCV RBC AUTO: 89.7 FL (ref 81.4–97.8)
MCV RBC AUTO: 89.9 FL (ref 81.4–97.8)
MCV RBC AUTO: 90.2 FL (ref 81.4–97.8)
MCV RBC AUTO: 90.4 FL (ref 81.4–97.8)
MCV RBC AUTO: 90.4 FL (ref 81.4–97.8)
MCV RBC AUTO: 90.9 FL (ref 81.4–97.8)
MCV RBC AUTO: 91.2 FL (ref 81.4–97.8)
MCV RBC AUTO: 91.6 FL (ref 81.4–97.8)
MCV RBC AUTO: 91.9 FL (ref 81.4–97.8)
MCV RBC AUTO: 92.8 FL (ref 81.4–97.8)
MCV RBC AUTO: 93 FL (ref 81.4–97.8)
MCV RBC AUTO: 93.3 FL (ref 81.4–97.8)
MCV RBC AUTO: 93.8 FL (ref 81.4–97.8)
MCV RBC AUTO: 93.8 FL (ref 81.4–97.8)
MCV RBC AUTO: 97.6 FL (ref 81.4–97.8)
MESOTHL CELL NFR FLD: 10 %
MESOTHL CELL NFR FLD: 2 %
METAMYELOCYTES NFR BLD MANUAL: 0.9 %
MICRO URNS: ABNORMAL
MICROCYTES BLD QL SMEAR: ABNORMAL
MONOCLON BAND OBS SERPL: ABNORMAL
MONOCYTES # BLD AUTO: 0.15 K/UL (ref 0–0.85)
MONOCYTES # BLD AUTO: 0.21 K/UL (ref 0–0.85)
MONOCYTES # BLD AUTO: 0.33 K/UL (ref 0–0.85)
MONOCYTES # BLD AUTO: 0.67 K/UL (ref 0–0.85)
MONOCYTES # BLD AUTO: 0.81 K/UL (ref 0–0.85)
MONOCYTES # BLD AUTO: 0.92 K/UL (ref 0–0.85)
MONOCYTES # BLD AUTO: 0.94 K/UL (ref 0–0.85)
MONOCYTES # BLD AUTO: 0.99 K/UL (ref 0–0.85)
MONOCYTES # BLD AUTO: 1.02 K/UL (ref 0–0.85)
MONOCYTES # BLD AUTO: 1.03 K/UL (ref 0–0.85)
MONOCYTES # BLD AUTO: 1.07 K/UL (ref 0–0.85)
MONOCYTES # BLD AUTO: 1.33 K/UL (ref 0–0.85)
MONOCYTES # BLD AUTO: 1.43 K/UL (ref 0–0.85)
MONOCYTES # BLD AUTO: 1.74 K/UL (ref 0–0.85)
MONOCYTES NFR BLD AUTO: 0.9 % (ref 0–13.4)
MONOCYTES NFR BLD AUTO: 1.7 % (ref 0–13.4)
MONOCYTES NFR BLD AUTO: 10.5 % (ref 0–13.4)
MONOCYTES NFR BLD AUTO: 10.6 % (ref 0–13.4)
MONOCYTES NFR BLD AUTO: 3.5 % (ref 0–13.4)
MONOCYTES NFR BLD AUTO: 3.9 % (ref 0–13.4)
MONOCYTES NFR BLD AUTO: 7 % (ref 0–13.4)
MONOCYTES NFR BLD AUTO: 7.6 % (ref 0–13.4)
MONOCYTES NFR BLD AUTO: 7.8 % (ref 0–13.4)
MONOCYTES NFR BLD AUTO: 8.6 % (ref 0–13.4)
MONOCYTES NFR BLD AUTO: 8.7 % (ref 0–13.4)
MONOCYTES NFR BLD AUTO: 8.8 % (ref 0–13.4)
MONOCYTES NFR BLD AUTO: 9.1 % (ref 0–13.4)
MONOCYTES NFR BLD AUTO: 9.6 % (ref 0–13.4)
MONONUC CELLS NFR FLD: 11 %
MONONUC CELLS NFR FLD: 4 %
MORPHOLOGY BLD-IMP: NORMAL
MUCOUS THREADS #/AREA URNS HPF: ABNORMAL /HPF
MYCOBACTERIUM SPEC CULT: NORMAL
MYCOBACTERIUM SPEC CULT: NORMAL
MYELOCYTES NFR BLD MANUAL: 0.9 %
MYELOCYTES NFR BLD MANUAL: 1.7 %
NEUTROPHILS # BLD AUTO: 10.06 K/UL (ref 2–7.15)
NEUTROPHILS # BLD AUTO: 10.43 K/UL (ref 2–7.15)
NEUTROPHILS # BLD AUTO: 10.53 K/UL (ref 2–7.15)
NEUTROPHILS # BLD AUTO: 10.75 K/UL (ref 2–7.15)
NEUTROPHILS # BLD AUTO: 11.08 K/UL (ref 2–7.15)
NEUTROPHILS # BLD AUTO: 11.28 K/UL (ref 2–7.15)
NEUTROPHILS # BLD AUTO: 11.33 K/UL (ref 2–7.15)
NEUTROPHILS # BLD AUTO: 13.2 K/UL (ref 2–7.15)
NEUTROPHILS # BLD AUTO: 14.72 K/UL (ref 2–7.15)
NEUTROPHILS # BLD AUTO: 14.99 K/UL (ref 2–7.15)
NEUTROPHILS # BLD AUTO: 7.51 K/UL (ref 2–7.15)
NEUTROPHILS # BLD AUTO: 7.68 K/UL (ref 2–7.15)
NEUTROPHILS # BLD AUTO: 8.63 K/UL (ref 2–7.15)
NEUTROPHILS # BLD AUTO: 9.27 K/UL (ref 2–7.15)
NEUTROPHILS NFR BLD: 77.6 % (ref 44–72)
NEUTROPHILS NFR BLD: 78.3 % (ref 44–72)
NEUTROPHILS NFR BLD: 80.2 % (ref 44–72)
NEUTROPHILS NFR BLD: 80.6 % (ref 44–72)
NEUTROPHILS NFR BLD: 81.5 % (ref 44–72)
NEUTROPHILS NFR BLD: 81.6 % (ref 44–72)
NEUTROPHILS NFR BLD: 81.7 % (ref 44–72)
NEUTROPHILS NFR BLD: 82.3 % (ref 44–72)
NEUTROPHILS NFR BLD: 83.5 % (ref 44–72)
NEUTROPHILS NFR BLD: 83.8 % (ref 44–72)
NEUTROPHILS NFR BLD: 85.2 % (ref 44–72)
NEUTROPHILS NFR BLD: 85.3 % (ref 44–72)
NEUTROPHILS NFR BLD: 86.1 % (ref 44–72)
NEUTROPHILS NFR BLD: 88.7 % (ref 44–72)
NEUTROPHILS NFR FLD: 46 %
NEUTROPHILS NFR FLD: 9 %
NEUTS BAND NFR BLD MANUAL: 0.9 % (ref 0–10)
NEUTS BAND NFR BLD MANUAL: 0.9 % (ref 0–10)
NITRITE UR QL STRIP.AUTO: NEGATIVE
NRBC # BLD AUTO: 0 K/UL
NRBC # BLD AUTO: 0.03 K/UL
NRBC BLD-RTO: 0 /100 WBC
NRBC BLD-RTO: 0.2 /100 WBC
NUCLEAR IGG SER QL IA: NORMAL
O2/TOTAL GAS SETTING VFR VENT: 100 %
O2/TOTAL GAS SETTING VFR VENT: 30 %
O2/TOTAL GAS SETTING VFR VENT: 40 %
O2/TOTAL GAS SETTING VFR VENT: 40 %
O2/TOTAL GAS SETTING VFR VENT: 70 %
OVALOCYTES BLD QL SMEAR: NORMAL
PATH REV: NORMAL
PATH REV: NORMAL
PATHOLOGY STUDY: ABNORMAL
PCO2 BLDA: 31.8 MMHG (ref 26–37)
PCO2 BLDA: 38.9 MMHG (ref 26–37)
PCO2 BLDA: 39.9 MMHG (ref 26–37)
PCO2 BLDA: 40.1 MMHG (ref 26–37)
PCO2 BLDA: 42.2 MMHG (ref 26–37)
PCO2 BLDA: 43.5 MMHG (ref 26–37)
PCO2 BLDA: 45.3 MMHG (ref 26–37)
PCO2 BLDA: 61.4 MMHG (ref 26–37)
PCO2 BLDA: 61.6 MMHG (ref 26–37)
PCO2 BLDA: 87.6 MMHG (ref 26–37)
PCO2 TEMP ADJ BLDA: 31.7 MMHG (ref 26–37)
PCO2 TEMP ADJ BLDA: 38.1 MMHG (ref 26–37)
PCO2 TEMP ADJ BLDA: 39.4 MMHG (ref 26–37)
PCO2 TEMP ADJ BLDA: 40.2 MMHG (ref 26–37)
PCO2 TEMP ADJ BLDA: 42.1 MMHG (ref 26–37)
PCO2 TEMP ADJ BLDA: 44 MMHG (ref 26–37)
PCO2 TEMP ADJ BLDA: 46.7 MMHG (ref 26–37)
PCO2 TEMP ADJ BLDA: 59 MMHG (ref 26–37)
PCO2 TEMP ADJ BLDA: 84.9 MMHG (ref 26–37)
PH BLDA: 7.26 [PH] (ref 7.4–7.5)
PH BLDA: 7.44 [PH] (ref 7.4–7.5)
PH BLDA: 7.48 [PH] (ref 7.4–7.5)
PH BLDA: 7.49 [PH] (ref 7.4–7.5)
PH BLDA: 7.5 [PH] (ref 7.4–7.5)
PH BLDA: 7.52 [PH] (ref 7.4–7.5)
PH BLDA: 7.54 [PH] (ref 7.4–7.5)
PH BLDA: 7.55 [PH] (ref 7.4–7.5)
PH BLDA: 7.56 [PH] (ref 7.4–7.5)
PH BLDA: 7.62 [PH] (ref 7.4–7.5)
PH FLD: 8 [PH]
PH FLD: 8 [PH]
PH TEMP ADJ BLDA: 7.27 [PH] (ref 7.4–7.5)
PH TEMP ADJ BLDA: 7.48 [PH] (ref 7.4–7.5)
PH TEMP ADJ BLDA: 7.49 [PH] (ref 7.4–7.5)
PH TEMP ADJ BLDA: 7.5 [PH] (ref 7.4–7.5)
PH TEMP ADJ BLDA: 7.53 [PH] (ref 7.4–7.5)
PH TEMP ADJ BLDA: 7.54 [PH] (ref 7.4–7.5)
PH TEMP ADJ BLDA: 7.54 [PH] (ref 7.4–7.5)
PH TEMP ADJ BLDA: 7.56 [PH] (ref 7.4–7.5)
PH TEMP ADJ BLDA: 7.62 [PH] (ref 7.4–7.5)
PH UR STRIP.AUTO: 5 [PH]
PHOSPHATE SERPL-MCNC: 2.3 MG/DL (ref 2.5–4.5)
PHOSPHATE SERPL-MCNC: 2.3 MG/DL (ref 2.5–4.5)
PHOSPHATE SERPL-MCNC: 2.4 MG/DL (ref 2.5–4.5)
PHOSPHATE SERPL-MCNC: 2.5 MG/DL (ref 2.5–4.5)
PHOSPHATE SERPL-MCNC: 3 MG/DL (ref 2.5–4.5)
PHOSPHATE SERPL-MCNC: 3.3 MG/DL (ref 2.5–4.5)
PHOSPHATE SERPL-MCNC: 3.4 MG/DL (ref 2.5–4.5)
PHOSPHATE SERPL-MCNC: 3.4 MG/DL (ref 2.5–4.5)
PHOSPHATE SERPL-MCNC: <1 MG/DL (ref 2.5–4.5)
PLATELET # BLD AUTO: 189 K/UL (ref 164–446)
PLATELET # BLD AUTO: 195 K/UL (ref 164–446)
PLATELET # BLD AUTO: 196 K/UL (ref 164–446)
PLATELET # BLD AUTO: 197 K/UL (ref 164–446)
PLATELET # BLD AUTO: 213 K/UL (ref 164–446)
PLATELET # BLD AUTO: 236 K/UL (ref 164–446)
PLATELET # BLD AUTO: 237 K/UL (ref 164–446)
PLATELET # BLD AUTO: 243 K/UL (ref 164–446)
PLATELET # BLD AUTO: 243 K/UL (ref 164–446)
PLATELET # BLD AUTO: 258 K/UL (ref 164–446)
PLATELET # BLD AUTO: 298 K/UL (ref 164–446)
PLATELET # BLD AUTO: 339 K/UL (ref 164–446)
PLATELET # BLD AUTO: 339 K/UL (ref 164–446)
PLATELET # BLD AUTO: 380 K/UL (ref 164–446)
PLATELET # BLD AUTO: 382 K/UL (ref 164–446)
PLATELET # BLD AUTO: 415 K/UL (ref 164–446)
PLATELET # BLD AUTO: 448 K/UL (ref 164–446)
PLATELET # BLD AUTO: 462 K/UL (ref 164–446)
PLATELET BLD QL SMEAR: NORMAL
PMV BLD AUTO: 10.8 FL (ref 9–12.9)
PMV BLD AUTO: 10.9 FL (ref 9–12.9)
PMV BLD AUTO: 10.9 FL (ref 9–12.9)
PMV BLD AUTO: 11.1 FL (ref 9–12.9)
PMV BLD AUTO: 11.1 FL (ref 9–12.9)
PMV BLD AUTO: 11.2 FL (ref 9–12.9)
PMV BLD AUTO: 11.2 FL (ref 9–12.9)
PMV BLD AUTO: 11.3 FL (ref 9–12.9)
PMV BLD AUTO: 11.5 FL (ref 9–12.9)
PMV BLD AUTO: 11.5 FL (ref 9–12.9)
PMV BLD AUTO: 11.6 FL (ref 9–12.9)
PMV BLD AUTO: 11.6 FL (ref 9–12.9)
PMV BLD AUTO: 11.7 FL (ref 9–12.9)
PMV BLD AUTO: 11.8 FL (ref 9–12.9)
PMV BLD AUTO: 12.1 FL (ref 9–12.9)
PO2 BLDA: 39 MMHG (ref 64–87)
PO2 BLDA: 41 MMHG (ref 64–87)
PO2 BLDA: 55 MMHG (ref 64–87)
PO2 BLDA: 60 MMHG (ref 64–87)
PO2 BLDA: 61 MMHG (ref 64–87)
PO2 BLDA: 62 MMHG (ref 64–87)
PO2 BLDA: 65 MMHG (ref 64–87)
PO2 BLDA: 65.5 MMHG (ref 64–87)
PO2 BLDA: 68 MMHG (ref 64–87)
PO2 BLDA: 79 MMHG (ref 64–87)
PO2 TEMP ADJ BLDA: 36 MMHG (ref 64–87)
PO2 TEMP ADJ BLDA: 43 MMHG (ref 64–87)
PO2 TEMP ADJ BLDA: 54 MMHG (ref 64–87)
PO2 TEMP ADJ BLDA: 58 MMHG (ref 64–87)
PO2 TEMP ADJ BLDA: 60 MMHG (ref 64–87)
PO2 TEMP ADJ BLDA: 63 MMHG (ref 64–87)
PO2 TEMP ADJ BLDA: 63 MMHG (ref 64–87)
PO2 TEMP ADJ BLDA: 68 MMHG (ref 64–87)
PO2 TEMP ADJ BLDA: 81 MMHG (ref 64–87)
POIKILOCYTOSIS BLD QL SMEAR: NORMAL
POLYCHROMASIA BLD QL SMEAR: NORMAL
POTASSIUM SERPL-SCNC: 3.1 MMOL/L (ref 3.6–5.5)
POTASSIUM SERPL-SCNC: 3.2 MMOL/L (ref 3.6–5.5)
POTASSIUM SERPL-SCNC: 3.2 MMOL/L (ref 3.6–5.5)
POTASSIUM SERPL-SCNC: 3.3 MMOL/L (ref 3.6–5.5)
POTASSIUM SERPL-SCNC: 3.3 MMOL/L (ref 3.6–5.5)
POTASSIUM SERPL-SCNC: 3.4 MMOL/L (ref 3.6–5.5)
POTASSIUM SERPL-SCNC: 3.5 MMOL/L (ref 3.6–5.5)
POTASSIUM SERPL-SCNC: 3.6 MMOL/L (ref 3.6–5.5)
POTASSIUM SERPL-SCNC: 3.7 MMOL/L (ref 3.6–5.5)
POTASSIUM SERPL-SCNC: 3.7 MMOL/L (ref 3.6–5.5)
POTASSIUM SERPL-SCNC: 3.9 MMOL/L (ref 3.6–5.5)
POTASSIUM SERPL-SCNC: 3.9 MMOL/L (ref 3.6–5.5)
POTASSIUM SERPL-SCNC: 4 MMOL/L (ref 3.6–5.5)
POTASSIUM SERPL-SCNC: 4 MMOL/L (ref 3.6–5.5)
POTASSIUM SERPL-SCNC: 4.1 MMOL/L (ref 3.6–5.5)
POTASSIUM SERPL-SCNC: 4.3 MMOL/L (ref 3.6–5.5)
POTASSIUM SERPL-SCNC: 4.4 MMOL/L (ref 3.6–5.5)
POTASSIUM SERPL-SCNC: 4.5 MMOL/L (ref 3.6–5.5)
POTASSIUM SERPL-SCNC: 5.1 MMOL/L (ref 3.6–5.5)
PREALB SERPL-MCNC: 13 MG/DL (ref 18–38)
PREALB SERPL-MCNC: 17 MG/DL (ref 18–38)
PROCALCITONIN SERPL-MCNC: <0.05 NG/ML
PROT FLD-MCNC: 1.7 G/DL
PROT FLD-MCNC: 6 G/DL
PROT SERPL-MCNC: 4.7 G/DL (ref 6–8.3)
PROT SERPL-MCNC: 5.2 G/DL (ref 6–8.2)
PROT SERPL-MCNC: 5.4 G/DL (ref 6–8.2)
PROT SERPL-MCNC: 5.6 G/DL (ref 6–8.2)
PROT SERPL-MCNC: 5.7 G/DL (ref 6–8.2)
PROT SERPL-MCNC: 5.9 G/DL (ref 6–8.2)
PROT SERPL-MCNC: 5.9 G/DL (ref 6–8.2)
PROT SERPL-MCNC: 6.1 G/DL (ref 6–8.2)
PROT SERPL-MCNC: 7.1 G/DL (ref 6–8.2)
PROT UR QL STRIP: 300 MG/DL
PROTHROMBIN TIME: 17.3 SEC (ref 12–14.6)
PROTHROMBIN TIME: 22.7 SEC (ref 12–14.6)
RBC # BLD AUTO: 3.79 M/UL (ref 4.2–5.4)
RBC # BLD AUTO: 3.87 M/UL (ref 4.2–5.4)
RBC # BLD AUTO: 3.87 M/UL (ref 4.2–5.4)
RBC # BLD AUTO: 4.02 M/UL (ref 4.2–5.4)
RBC # BLD AUTO: 4.11 M/UL (ref 4.2–5.4)
RBC # BLD AUTO: 4.14 M/UL (ref 4.2–5.4)
RBC # BLD AUTO: 4.16 M/UL (ref 4.2–5.4)
RBC # BLD AUTO: 4.18 M/UL (ref 4.2–5.4)
RBC # BLD AUTO: 4.19 M/UL (ref 4.2–5.4)
RBC # BLD AUTO: 4.19 M/UL (ref 4.2–5.4)
RBC # BLD AUTO: 4.22 M/UL (ref 4.2–5.4)
RBC # BLD AUTO: 4.29 M/UL (ref 4.2–5.4)
RBC # BLD AUTO: 4.3 M/UL (ref 4.2–5.4)
RBC # BLD AUTO: 4.38 M/UL (ref 4.2–5.4)
RBC # BLD AUTO: 4.5 M/UL (ref 4.2–5.4)
RBC # BLD AUTO: 4.59 M/UL (ref 4.2–5.4)
RBC # BLD AUTO: 4.61 M/UL (ref 4.2–5.4)
RBC # BLD AUTO: 5 M/UL (ref 4.2–5.4)
RBC # FLD: 2000 CELLS/UL
RBC # FLD: <2000 CELLS/UL
RBC # URNS HPF: ABNORMAL /HPF
RBC BLD AUTO: PRESENT
RBC UR QL AUTO: NEGATIVE
RH BLD: NORMAL
RH BLD: NORMAL
RHEUMATOID FACT SER NEPH-ACNC: 64 IU/ML (ref 0–14)
RHODAMINE-AURAMINE STN SPEC: NORMAL
SAO2 % BLDA: 75 % (ref 93–99)
SAO2 % BLDA: 82 % (ref 93–99)
SAO2 % BLDA: 85 % (ref 93–99)
SAO2 % BLDA: 91.1 % (ref 93–99)
SAO2 % BLDA: 92 % (ref 93–99)
SAO2 % BLDA: 94 % (ref 93–99)
SAO2 % BLDA: 94 % (ref 93–99)
SAO2 % BLDA: 95 % (ref 93–99)
SAO2 % BLDA: 95 % (ref 93–99)
SAO2 % BLDA: 96 % (ref 93–99)
SCHISTOCYTES BLD QL SMEAR: NORMAL
SIGNIFICANT IND 70042: ABNORMAL
SIGNIFICANT IND 70042: NORMAL
SITE SITE: ABNORMAL
SITE SITE: NORMAL
SODIUM SERPL-SCNC: 134 MMOL/L (ref 135–145)
SODIUM SERPL-SCNC: 141 MMOL/L (ref 135–145)
SODIUM SERPL-SCNC: 142 MMOL/L (ref 135–145)
SODIUM SERPL-SCNC: 142 MMOL/L (ref 135–145)
SODIUM SERPL-SCNC: 143 MMOL/L (ref 135–145)
SODIUM SERPL-SCNC: 144 MMOL/L (ref 135–145)
SODIUM SERPL-SCNC: 145 MMOL/L (ref 135–145)
SODIUM SERPL-SCNC: 146 MMOL/L (ref 135–145)
SODIUM SERPL-SCNC: 147 MMOL/L (ref 135–145)
SODIUM SERPL-SCNC: 147 MMOL/L (ref 135–145)
SODIUM SERPL-SCNC: 148 MMOL/L (ref 135–145)
SODIUM SERPL-SCNC: 148 MMOL/L (ref 135–145)
SODIUM SERPL-SCNC: 150 MMOL/L (ref 135–145)
SOURCE SOURCE: ABNORMAL
SOURCE SOURCE: NORMAL
SP GR UR STRIP.AUTO: 1.03
SPECIMEN DRAWN FROM PATIENT: ABNORMAL
TARGETS BLD QL SMEAR: NORMAL
TARGETS BLD QL SMEAR: NORMAL
TRIGL SERPL-MCNC: 70 MG/DL (ref 0–149)
TRIGL SERPL-MCNC: 76 MG/DL (ref 0–149)
TRIGL SERPL-MCNC: 80 MG/DL (ref 0–149)
TROPONIN I SERPL-MCNC: 0.02 NG/ML (ref 0–0.04)
TROPONIN I SERPL-MCNC: 0.04 NG/ML (ref 0–0.04)
TROPONIN I SERPL-MCNC: 0.17 NG/ML (ref 0–0.04)
TSH SERPL DL<=0.005 MIU/L-ACNC: 2.59 UIU/ML (ref 0.38–5.33)
UROBILINOGEN UR STRIP.AUTO-MCNC: 1 MG/DL
WBC # BLD AUTO: 10.3 K/UL (ref 4.8–10.8)
WBC # BLD AUTO: 11.7 K/UL (ref 4.8–10.8)
WBC # BLD AUTO: 11.8 K/UL (ref 4.8–10.8)
WBC # BLD AUTO: 12.1 K/UL (ref 4.8–10.8)
WBC # BLD AUTO: 12.5 K/UL (ref 4.8–10.8)
WBC # BLD AUTO: 13.1 K/UL (ref 4.8–10.8)
WBC # BLD AUTO: 13.2 K/UL (ref 4.8–10.8)
WBC # BLD AUTO: 13.2 K/UL (ref 4.8–10.8)
WBC # BLD AUTO: 13.6 K/UL (ref 4.8–10.8)
WBC # BLD AUTO: 13.9 K/UL (ref 4.8–10.8)
WBC # BLD AUTO: 16.5 K/UL (ref 4.8–10.8)
WBC # BLD AUTO: 16.9 K/UL (ref 4.8–10.8)
WBC # BLD AUTO: 17.3 K/UL (ref 4.8–10.8)
WBC # BLD AUTO: 9.2 K/UL (ref 4.8–10.8)
WBC # BLD AUTO: 9.2 K/UL (ref 4.8–10.8)
WBC # BLD AUTO: 9.3 K/UL (ref 4.8–10.8)
WBC # BLD AUTO: 9.4 K/UL (ref 4.8–10.8)
WBC # BLD AUTO: 9.7 K/UL (ref 4.8–10.8)
WBC # FLD: 436 CELLS/UL
WBC # FLD: 803 CELLS/UL
WBC #/AREA URNS HPF: ABNORMAL /HPF

## 2018-01-01 PROCEDURE — A9270 NON-COVERED ITEM OR SERVICE: HCPCS | Performed by: INTERNAL MEDICINE

## 2018-01-01 PROCEDURE — 84478 ASSAY OF TRIGLYCERIDES: CPT

## 2018-01-01 PROCEDURE — 82550 ASSAY OF CK (CPK): CPT

## 2018-01-01 PROCEDURE — 99232 SBSQ HOSP IP/OBS MODERATE 35: CPT | Performed by: INTERNAL MEDICINE

## 2018-01-01 PROCEDURE — 770020 HCHG ROOM/CARE - TELE (206)

## 2018-01-01 PROCEDURE — 97535 SELF CARE MNGMENT TRAINING: CPT

## 2018-01-01 PROCEDURE — 97530 THERAPEUTIC ACTIVITIES: CPT

## 2018-01-01 PROCEDURE — 70491 CT SOFT TISSUE NECK W/DYE: CPT

## 2018-01-01 PROCEDURE — 700101 HCHG RX REV CODE 250: Performed by: INTERNAL MEDICINE

## 2018-01-01 PROCEDURE — 700105 HCHG RX REV CODE 258: Performed by: EMERGENCY MEDICINE

## 2018-01-01 PROCEDURE — A9270 NON-COVERED ITEM OR SERVICE: HCPCS | Performed by: HOSPITALIST

## 2018-01-01 PROCEDURE — 32555 ASPIRATE PLEURA W/ IMAGING: CPT | Mod: LT

## 2018-01-01 PROCEDURE — 83735 ASSAY OF MAGNESIUM: CPT

## 2018-01-01 PROCEDURE — 700111 HCHG RX REV CODE 636 W/ 250 OVERRIDE (IP): Performed by: HOSPITALIST

## 2018-01-01 PROCEDURE — 92612 ENDOSCOPY SWALLOW (FEES) VID: CPT

## 2018-01-01 PROCEDURE — 93308 TTE F-UP OR LMTD: CPT

## 2018-01-01 PROCEDURE — 36600 WITHDRAWAL OF ARTERIAL BLOOD: CPT

## 2018-01-01 PROCEDURE — 80048 BASIC METABOLIC PNL TOTAL CA: CPT

## 2018-01-01 PROCEDURE — 99285 EMERGENCY DEPT VISIT HI MDM: CPT

## 2018-01-01 PROCEDURE — 94640 AIRWAY INHALATION TREATMENT: CPT

## 2018-01-01 PROCEDURE — 84443 ASSAY THYROID STIM HORMONE: CPT

## 2018-01-01 PROCEDURE — 99233 SBSQ HOSP IP/OBS HIGH 50: CPT | Performed by: HOSPITALIST

## 2018-01-01 PROCEDURE — 87086 URINE CULTURE/COLONY COUNT: CPT

## 2018-01-01 PROCEDURE — 36415 COLL VENOUS BLD VENIPUNCTURE: CPT

## 2018-01-01 PROCEDURE — 99152 MOD SED SAME PHYS/QHP 5/>YRS: CPT

## 2018-01-01 PROCEDURE — 700102 HCHG RX REV CODE 250 W/ 637 OVERRIDE(OP): Performed by: HOSPITALIST

## 2018-01-01 PROCEDURE — 85025 COMPLETE CBC W/AUTO DIFF WBC: CPT

## 2018-01-01 PROCEDURE — 71045 X-RAY EXAM CHEST 1 VIEW: CPT

## 2018-01-01 PROCEDURE — 770001 HCHG ROOM/CARE - MED/SURG/GYN PRIV*

## 2018-01-01 PROCEDURE — 99153 MOD SED SAME PHYS/QHP EA: CPT

## 2018-01-01 PROCEDURE — 700111 HCHG RX REV CODE 636 W/ 250 OVERRIDE (IP): Performed by: INTERNAL MEDICINE

## 2018-01-01 PROCEDURE — 87015 SPECIMEN INFECT AGNT CONCNTJ: CPT

## 2018-01-01 PROCEDURE — 37799 UNLISTED PX VASCULAR SURGERY: CPT

## 2018-01-01 PROCEDURE — 87205 SMEAR GRAM STAIN: CPT

## 2018-01-01 PROCEDURE — 99239 HOSP IP/OBS DSCHRG MGMT >30: CPT | Performed by: INTERNAL MEDICINE

## 2018-01-01 PROCEDURE — C1894 INTRO/SHEATH, NON-LASER: HCPCS

## 2018-01-01 PROCEDURE — 87070 CULTURE OTHR SPECIMN AEROBIC: CPT

## 2018-01-01 PROCEDURE — G8979 MOBILITY GOAL STATUS: HCPCS | Mod: CI

## 2018-01-01 PROCEDURE — C1729 CATH, DRAINAGE: HCPCS

## 2018-01-01 PROCEDURE — 700117 HCHG RX CONTRAST REV CODE 255: Performed by: INTERNAL MEDICINE

## 2018-01-01 PROCEDURE — 99291 CRITICAL CARE FIRST HOUR: CPT | Performed by: INTERNAL MEDICINE

## 2018-01-01 PROCEDURE — 700102 HCHG RX REV CODE 250 W/ 637 OVERRIDE(OP): Performed by: INTERNAL MEDICINE

## 2018-01-01 PROCEDURE — 700111 HCHG RX REV CODE 636 W/ 250 OVERRIDE (IP): Performed by: FAMILY MEDICINE

## 2018-01-01 PROCEDURE — 0B9F8ZX DRAINAGE OF RIGHT LOWER LUNG LOBE, VIA NATURAL OR ARTIFICIAL OPENING ENDOSCOPIC, DIAGNOSTIC: ICD-10-PCS | Performed by: INTERNAL MEDICINE

## 2018-01-01 PROCEDURE — 84100 ASSAY OF PHOSPHORUS: CPT

## 2018-01-01 PROCEDURE — 94150 VITAL CAPACITY TEST: CPT

## 2018-01-01 PROCEDURE — 82945 GLUCOSE OTHER FLUID: CPT

## 2018-01-01 PROCEDURE — 93308 TTE F-UP OR LMTD: CPT | Mod: 26 | Performed by: INTERNAL MEDICINE

## 2018-01-01 PROCEDURE — 86141 C-REACTIVE PROTEIN HS: CPT

## 2018-01-01 PROCEDURE — 700111 HCHG RX REV CODE 636 W/ 250 OVERRIDE (IP): Performed by: EMERGENCY MEDICINE

## 2018-01-01 PROCEDURE — 99291 CRITICAL CARE FIRST HOUR: CPT | Mod: 25 | Performed by: INTERNAL MEDICINE

## 2018-01-01 PROCEDURE — 93325 DOPPLER ECHO COLOR FLOW MAPG: CPT

## 2018-01-01 PROCEDURE — 31645 BRNCHSC W/THER ASPIR 1ST: CPT | Mod: 59 | Performed by: INTERNAL MEDICINE

## 2018-01-01 PROCEDURE — 82803 BLOOD GASES ANY COMBINATION: CPT

## 2018-01-01 PROCEDURE — 85027 COMPLETE CBC AUTOMATED: CPT

## 2018-01-01 PROCEDURE — 82962 GLUCOSE BLOOD TEST: CPT

## 2018-01-01 PROCEDURE — 82962 GLUCOSE BLOOD TEST: CPT | Mod: 91

## 2018-01-01 PROCEDURE — 74018 RADEX ABDOMEN 1 VIEW: CPT

## 2018-01-01 PROCEDURE — 700105 HCHG RX REV CODE 258: Performed by: INTERNAL MEDICINE

## 2018-01-01 PROCEDURE — 700117 HCHG RX CONTRAST REV CODE 255: Performed by: HOSPITALIST

## 2018-01-01 PROCEDURE — 84160 ASSAY OF PROTEIN ANY SOURCE: CPT

## 2018-01-01 PROCEDURE — 0BH17EZ INSERTION OF ENDOTRACHEAL AIRWAY INTO TRACHEA, VIA NATURAL OR ARTIFICIAL OPENING: ICD-10-PCS | Performed by: INTERNAL MEDICINE

## 2018-01-01 PROCEDURE — 82140 ASSAY OF AMMONIA: CPT

## 2018-01-01 PROCEDURE — 770006 HCHG ROOM/CARE - MED/SURG/GYN SEMI*

## 2018-01-01 PROCEDURE — 0W993ZZ DRAINAGE OF RIGHT PLEURAL CAVITY, PERCUTANEOUS APPROACH: ICD-10-PCS | Performed by: RADIOLOGY

## 2018-01-01 PROCEDURE — 700101 HCHG RX REV CODE 250: Performed by: HOSPITALIST

## 2018-01-01 PROCEDURE — 94003 VENT MGMT INPAT SUBQ DAY: CPT

## 2018-01-01 PROCEDURE — 770022 HCHG ROOM/CARE - ICU (200)

## 2018-01-01 PROCEDURE — 87206 SMEAR FLUORESCENT/ACID STAI: CPT

## 2018-01-01 PROCEDURE — 85007 BL SMEAR W/DIFF WBC COUNT: CPT

## 2018-01-01 PROCEDURE — 80074 ACUTE HEPATITIS PANEL: CPT

## 2018-01-01 PROCEDURE — 4410569 US-THORACENTESIS PUNCTURE

## 2018-01-01 PROCEDURE — 87116 MYCOBACTERIA CULTURE: CPT

## 2018-01-01 PROCEDURE — 92526 ORAL FUNCTION THERAPY: CPT

## 2018-01-01 PROCEDURE — 32555 ASPIRATE PLEURA W/ IMAGING: CPT | Mod: RT

## 2018-01-01 PROCEDURE — 700101 HCHG RX REV CODE 250

## 2018-01-01 PROCEDURE — 99232 SBSQ HOSP IP/OBS MODERATE 35: CPT | Mod: GC | Performed by: INTERNAL MEDICINE

## 2018-01-01 PROCEDURE — 03HY32Z INSERTION OF MONITORING DEVICE INTO UPPER ARTERY, PERCUTANEOUS APPROACH: ICD-10-PCS | Performed by: INTERNAL MEDICINE

## 2018-01-01 PROCEDURE — 99292 CRITICAL CARE ADDL 30 MIN: CPT | Performed by: INTERNAL MEDICINE

## 2018-01-01 PROCEDURE — 700111 HCHG RX REV CODE 636 W/ 250 OVERRIDE (IP)

## 2018-01-01 PROCEDURE — 304952 HCHG R 2 PADS

## 2018-01-01 PROCEDURE — 3E02340 INTRODUCTION OF INFLUENZA VACCINE INTO MUSCLE, PERCUTANEOUS APPROACH: ICD-10-PCS | Performed by: HOSPITALIST

## 2018-01-01 PROCEDURE — 80500 HCHG CLINICAL PATH CONSULT-LIMITED: CPT

## 2018-01-01 PROCEDURE — G8997 SWALLOW GOAL STATUS: HCPCS | Mod: CK

## 2018-01-01 PROCEDURE — 85652 RBC SED RATE AUTOMATED: CPT

## 2018-01-01 PROCEDURE — 96368 THER/DIAG CONCURRENT INF: CPT

## 2018-01-01 PROCEDURE — 93010 ELECTROCARDIOGRAM REPORT: CPT | Performed by: INTERNAL MEDICINE

## 2018-01-01 PROCEDURE — 85730 THROMBOPLASTIN TIME PARTIAL: CPT

## 2018-01-01 PROCEDURE — 89051 BODY FLUID CELL COUNT: CPT

## 2018-01-01 PROCEDURE — 33010 HCHG PERICARDIOCENTESIS,INITIAL: CPT

## 2018-01-01 PROCEDURE — 83615 LACTATE (LD) (LDH) ENZYME: CPT

## 2018-01-01 PROCEDURE — 700102 HCHG RX REV CODE 250 W/ 637 OVERRIDE(OP)

## 2018-01-01 PROCEDURE — 99221 1ST HOSP IP/OBS SF/LOW 40: CPT | Mod: GC | Performed by: INTERNAL MEDICINE

## 2018-01-01 PROCEDURE — 92610 EVALUATE SWALLOWING FUNCTION: CPT

## 2018-01-01 PROCEDURE — 80053 COMPREHEN METABOLIC PANEL: CPT

## 2018-01-01 PROCEDURE — 97116 GAIT TRAINING THERAPY: CPT

## 2018-01-01 PROCEDURE — C1751 CATH, INF, PER/CENT/MIDLINE: HCPCS

## 2018-01-01 PROCEDURE — 302146: Performed by: HOSPITALIST

## 2018-01-01 PROCEDURE — 82533 TOTAL CORTISOL: CPT

## 2018-01-01 PROCEDURE — 93321 DOPPLER ECHO F-UP/LMTD STD: CPT | Mod: 26 | Performed by: INTERNAL MEDICINE

## 2018-01-01 PROCEDURE — 96375 TX/PRO/DX INJ NEW DRUG ADDON: CPT

## 2018-01-01 PROCEDURE — 700105 HCHG RX REV CODE 258

## 2018-01-01 PROCEDURE — 99232 SBSQ HOSP IP/OBS MODERATE 35: CPT | Performed by: HOSPITALIST

## 2018-01-01 PROCEDURE — 99233 SBSQ HOSP IP/OBS HIGH 50: CPT | Performed by: INTERNAL MEDICINE

## 2018-01-01 PROCEDURE — 82595 ASSAY OF CRYOGLOBULIN: CPT

## 2018-01-01 PROCEDURE — 90471 IMMUNIZATION ADMIN: CPT

## 2018-01-01 PROCEDURE — C1894 INTRO/SHEATH, NON-LASER: HCPCS | Performed by: HOSPITALIST

## 2018-01-01 PROCEDURE — 96374 THER/PROPH/DIAG INJ IV PUSH: CPT

## 2018-01-01 PROCEDURE — 88305 TISSUE EXAM BY PATHOLOGIST: CPT

## 2018-01-01 PROCEDURE — 36620 INSERTION CATHETER ARTERY: CPT | Performed by: INTERNAL MEDICINE

## 2018-01-01 PROCEDURE — 02H633Z INSERTION OF INFUSION DEVICE INTO RIGHT ATRIUM, PERCUTANEOUS APPROACH: ICD-10-PCS | Performed by: INTERNAL MEDICINE

## 2018-01-01 PROCEDURE — 84134 ASSAY OF PREALBUMIN: CPT

## 2018-01-01 PROCEDURE — 99292 CRITICAL CARE ADDL 30 MIN: CPT | Mod: 25 | Performed by: INTERNAL MEDICINE

## 2018-01-01 PROCEDURE — 31624 DX BRONCHOSCOPE/LAVAGE: CPT | Performed by: INTERNAL MEDICINE

## 2018-01-01 PROCEDURE — 36620 INSERTION CATHETER ARTERY: CPT

## 2018-01-01 PROCEDURE — 90662 IIV NO PRSV INCREASED AG IM: CPT | Performed by: HOSPITALIST

## 2018-01-01 PROCEDURE — 94760 N-INVAS EAR/PLS OXIMETRY 1: CPT

## 2018-01-01 PROCEDURE — 87102 FUNGUS ISOLATION CULTURE: CPT

## 2018-01-01 PROCEDURE — 71260 CT THORAX DX C+: CPT

## 2018-01-01 PROCEDURE — 85610 PROTHROMBIN TIME: CPT

## 2018-01-01 PROCEDURE — 86038 ANTINUCLEAR ANTIBODIES: CPT

## 2018-01-01 PROCEDURE — 99233 SBSQ HOSP IP/OBS HIGH 50: CPT | Mod: GC | Performed by: INTERNAL MEDICINE

## 2018-01-01 PROCEDURE — G8996 SWALLOW CURRENT STATUS: HCPCS | Mod: CL

## 2018-01-01 PROCEDURE — G8996 SWALLOW CURRENT STATUS: HCPCS | Mod: CJ

## 2018-01-01 PROCEDURE — 94667 MNPJ CHEST WALL 1ST: CPT

## 2018-01-01 PROCEDURE — 80061 LIPID PANEL: CPT

## 2018-01-01 PROCEDURE — G8978 MOBILITY CURRENT STATUS: HCPCS | Mod: CL

## 2018-01-01 PROCEDURE — 84484 ASSAY OF TROPONIN QUANT: CPT

## 2018-01-01 PROCEDURE — 700111 HCHG RX REV CODE 636 W/ 250 OVERRIDE (IP): Performed by: STUDENT IN AN ORGANIZED HEALTH CARE EDUCATION/TRAINING PROGRAM

## 2018-01-01 PROCEDURE — G0475 HIV COMBINATION ASSAY: HCPCS

## 2018-01-01 PROCEDURE — 302255 BARRIER CREAM MOISTURE BAZA PROTECT (ZINC) 5OZ: Performed by: HOSPITALIST

## 2018-01-01 PROCEDURE — 86140 C-REACTIVE PROTEIN: CPT

## 2018-01-01 PROCEDURE — G8987 SELF CARE CURRENT STATUS: HCPCS | Mod: CJ

## 2018-01-01 PROCEDURE — G8997 SWALLOW GOAL STATUS: HCPCS | Mod: CH

## 2018-01-01 PROCEDURE — 83986 ASSAY PH BODY FLUID NOS: CPT

## 2018-01-01 PROCEDURE — 92950 HEART/LUNG RESUSCITATION CPR: CPT

## 2018-01-01 PROCEDURE — 302082: Performed by: INTERNAL MEDICINE

## 2018-01-01 PROCEDURE — 99221 1ST HOSP IP/OBS SF/LOW 40: CPT | Performed by: PSYCHIATRY & NEUROLOGY

## 2018-01-01 PROCEDURE — 84157 ASSAY OF PROTEIN OTHER: CPT

## 2018-01-01 PROCEDURE — 94668 MNPJ CHEST WALL SBSQ: CPT

## 2018-01-01 PROCEDURE — 71046 X-RAY EXAM CHEST 2 VIEWS: CPT

## 2018-01-01 PROCEDURE — 83880 ASSAY OF NATRIURETIC PEPTIDE: CPT

## 2018-01-01 PROCEDURE — 86850 RBC ANTIBODY SCREEN: CPT

## 2018-01-01 PROCEDURE — G8996 SWALLOW CURRENT STATUS: HCPCS | Mod: CM

## 2018-01-01 PROCEDURE — 93005 ELECTROCARDIOGRAM TRACING: CPT

## 2018-01-01 PROCEDURE — 81001 URINALYSIS AUTO W/SCOPE: CPT

## 2018-01-01 PROCEDURE — 82150 ASSAY OF AMYLASE: CPT

## 2018-01-01 PROCEDURE — 0W9D30Z DRAINAGE OF PERICARDIAL CAVITY WITH DRAINAGE DEVICE, PERCUTANEOUS APPROACH: ICD-10-PCS | Performed by: INTERNAL MEDICINE

## 2018-01-01 PROCEDURE — 304561 HCHG STAT O2

## 2018-01-01 PROCEDURE — 302136 NUTRITION PUMP: Performed by: INTERNAL MEDICINE

## 2018-01-01 PROCEDURE — 83605 ASSAY OF LACTIC ACID: CPT

## 2018-01-01 PROCEDURE — 36556 INSERT NON-TUNNEL CV CATH: CPT | Performed by: INTERNAL MEDICINE

## 2018-01-01 PROCEDURE — 33015 PR INCIS HEART SAC TUBE: CPT | Performed by: INTERNAL MEDICINE

## 2018-01-01 PROCEDURE — 86200 CCP ANTIBODY: CPT

## 2018-01-01 PROCEDURE — 99407 BEHAV CHNG SMOKING > 10 MIN: CPT

## 2018-01-01 PROCEDURE — 99406 BEHAV CHNG SMOKING 3-10 MIN: CPT | Mod: 25 | Performed by: HOSPITALIST

## 2018-01-01 PROCEDURE — 86900 BLOOD TYPING SEROLOGIC ABO: CPT

## 2018-01-01 PROCEDURE — 97110 THERAPEUTIC EXERCISES: CPT

## 2018-01-01 PROCEDURE — 700101 HCHG RX REV CODE 250: Performed by: EMERGENCY MEDICINE

## 2018-01-01 PROCEDURE — 86901 BLOOD TYPING SEROLOGIC RH(D): CPT

## 2018-01-01 PROCEDURE — 88112 CYTOPATH CELL ENHANCE TECH: CPT

## 2018-01-01 PROCEDURE — 93005 ELECTROCARDIOGRAM TRACING: CPT | Performed by: EMERGENCY MEDICINE

## 2018-01-01 PROCEDURE — 93321 DOPPLER ECHO F-UP/LMTD STD: CPT

## 2018-01-01 PROCEDURE — 93306 TTE W/DOPPLER COMPLETE: CPT

## 2018-01-01 PROCEDURE — 97162 PT EVAL MOD COMPLEX 30 MIN: CPT

## 2018-01-01 PROCEDURE — 99223 1ST HOSP IP/OBS HIGH 75: CPT | Mod: 25 | Performed by: HOSPITALIST

## 2018-01-01 PROCEDURE — 302978 HCHG BRONCHOSCOPY-DIAGNOSTIC

## 2018-01-01 PROCEDURE — 32551 INSERTION OF CHEST TUBE: CPT

## 2018-01-01 PROCEDURE — 31500 INSERT EMERGENCY AIRWAY: CPT | Performed by: INTERNAL MEDICINE

## 2018-01-01 PROCEDURE — G8997 SWALLOW GOAL STATUS: HCPCS | Mod: CJ

## 2018-01-01 PROCEDURE — 93005 ELECTROCARDIOGRAM TRACING: CPT | Performed by: HOSPITALIST

## 2018-01-01 PROCEDURE — 93306 TTE W/DOPPLER COMPLETE: CPT | Mod: 26 | Performed by: INTERNAL MEDICINE

## 2018-01-01 PROCEDURE — 93005 ELECTROCARDIOGRAM TRACING: CPT | Performed by: STUDENT IN AN ORGANIZED HEALTH CARE EDUCATION/TRAINING PROGRAM

## 2018-01-01 PROCEDURE — 0W9B3ZZ DRAINAGE OF LEFT PLEURAL CAVITY, PERCUTANEOUS APPROACH: ICD-10-PCS | Performed by: RADIOLOGY

## 2018-01-01 PROCEDURE — 84165 PROTEIN E-PHORESIS SERUM: CPT

## 2018-01-01 PROCEDURE — 93325 DOPPLER ECHO COLOR FLOW MAPG: CPT | Mod: 26 | Performed by: INTERNAL MEDICINE

## 2018-01-01 PROCEDURE — 700105 HCHG RX REV CODE 258: Performed by: HOSPITALIST

## 2018-01-01 PROCEDURE — 99233 SBSQ HOSP IP/OBS HIGH 50: CPT | Performed by: PSYCHIATRY & NEUROLOGY

## 2018-01-01 PROCEDURE — 94002 VENT MGMT INPAT INIT DAY: CPT

## 2018-01-01 PROCEDURE — 31720 CLEARANCE OF AIRWAYS: CPT

## 2018-01-01 PROCEDURE — G8988 SELF CARE GOAL STATUS: HCPCS | Mod: CI

## 2018-01-01 PROCEDURE — B244ZZZ ULTRASONOGRAPHY OF RIGHT HEART: ICD-10-PCS | Performed by: INTERNAL MEDICINE

## 2018-01-01 PROCEDURE — 74177 CT ABD & PELVIS W/CONTRAST: CPT

## 2018-01-01 PROCEDURE — 700117 HCHG RX CONTRAST REV CODE 255: Performed by: EMERGENCY MEDICINE

## 2018-01-01 PROCEDURE — 87040 BLOOD CULTURE FOR BACTERIA: CPT

## 2018-01-01 PROCEDURE — 96365 THER/PROPH/DIAG IV INF INIT: CPT

## 2018-01-01 PROCEDURE — 82803 BLOOD GASES ANY COMBINATION: CPT | Mod: 91

## 2018-01-01 PROCEDURE — 93308 TTE F-UP OR LMTD: CPT | Mod: 26,59 | Performed by: INTERNAL MEDICINE

## 2018-01-01 PROCEDURE — 84145 PROCALCITONIN (PCT): CPT

## 2018-01-01 PROCEDURE — 86431 RHEUMATOID FACTOR QUANT: CPT

## 2018-01-01 PROCEDURE — 5A1955Z RESPIRATORY VENTILATION, GREATER THAN 96 CONSECUTIVE HOURS: ICD-10-PCS | Performed by: INTERNAL MEDICINE

## 2018-01-01 PROCEDURE — 97165 OT EVAL LOW COMPLEX 30 MIN: CPT

## 2018-01-01 PROCEDURE — 36556 INSERT NON-TUNNEL CV CATH: CPT

## 2018-01-01 RX ORDER — MAGNESIUM SULFATE HEPTAHYDRATE 40 MG/ML
2 INJECTION, SOLUTION INTRAVENOUS ONCE
Status: COMPLETED | OUTPATIENT
Start: 2018-01-01 | End: 2018-01-01

## 2018-01-01 RX ORDER — GABAPENTIN 300 MG/1
300 CAPSULE ORAL 2 TIMES DAILY
Status: DISCONTINUED | OUTPATIENT
Start: 2018-01-01 | End: 2018-01-01 | Stop reason: HOSPADM

## 2018-01-01 RX ORDER — FUROSEMIDE 10 MG/ML
40 INJECTION INTRAMUSCULAR; INTRAVENOUS
Status: DISCONTINUED | OUTPATIENT
Start: 2018-01-01 | End: 2018-01-01

## 2018-01-01 RX ORDER — SODIUM CHLORIDE 9 MG/ML
INJECTION, SOLUTION INTRAVENOUS
Status: COMPLETED
Start: 2018-01-01 | End: 2018-01-01

## 2018-01-01 RX ORDER — OXYCODONE HYDROCHLORIDE 5 MG/1
2.5 TABLET ORAL EVERY 4 HOURS PRN
Status: DISCONTINUED | OUTPATIENT
Start: 2018-01-01 | End: 2018-01-01

## 2018-01-01 RX ORDER — POTASSIUM CHLORIDE 20 MEQ/1
20 TABLET, EXTENDED RELEASE ORAL 3 TIMES DAILY
Status: DISCONTINUED | OUTPATIENT
Start: 2018-01-01 | End: 2018-01-01

## 2018-01-01 RX ORDER — OXYCODONE HYDROCHLORIDE 5 MG/1
5 TABLET ORAL EVERY 4 HOURS PRN
Status: DISCONTINUED | OUTPATIENT
Start: 2018-01-01 | End: 2018-01-01

## 2018-01-01 RX ORDER — POTASSIUM CHLORIDE 20 MEQ/1
20 TABLET, EXTENDED RELEASE ORAL 2 TIMES DAILY
Status: DISCONTINUED | OUTPATIENT
Start: 2018-01-01 | End: 2018-01-01

## 2018-01-01 RX ORDER — SODIUM CHLORIDE 9 MG/ML
INJECTION, SOLUTION INTRAVENOUS ONCE
Status: DISCONTINUED | OUTPATIENT
Start: 2018-01-01 | End: 2018-01-01

## 2018-01-01 RX ORDER — SIMVASTATIN 20 MG
20 TABLET ORAL EVERY EVENING
Status: DISCONTINUED | OUTPATIENT
Start: 2018-01-01 | End: 2018-01-01 | Stop reason: HOSPADM

## 2018-01-01 RX ORDER — DEXAMETHASONE 4 MG/1
4 TABLET ORAL EVERY 6 HOURS
Status: DISCONTINUED | OUTPATIENT
Start: 2018-01-01 | End: 2018-01-01

## 2018-01-01 RX ORDER — SODIUM CHLORIDE 450 MG/100ML
INJECTION, SOLUTION INTRAVENOUS CONTINUOUS
Status: DISCONTINUED | OUTPATIENT
Start: 2018-01-01 | End: 2018-01-01

## 2018-01-01 RX ORDER — DEXAMETHASONE 4 MG/1
4 TABLET ORAL EVERY 12 HOURS
Status: DISCONTINUED | OUTPATIENT
Start: 2018-01-01 | End: 2018-01-01

## 2018-01-01 RX ORDER — METHYLPREDNISOLONE SODIUM SUCCINATE 125 MG/2ML
62.5 INJECTION, POWDER, LYOPHILIZED, FOR SOLUTION INTRAMUSCULAR; INTRAVENOUS ONCE
Status: COMPLETED | OUTPATIENT
Start: 2018-01-01 | End: 2018-01-01

## 2018-01-01 RX ORDER — POTASSIUM CHLORIDE 20 MEQ/1
40 TABLET, EXTENDED RELEASE ORAL ONCE
Status: COMPLETED | OUTPATIENT
Start: 2018-01-01 | End: 2018-01-01

## 2018-01-01 RX ORDER — FUROSEMIDE 10 MG/ML
20 INJECTION INTRAMUSCULAR; INTRAVENOUS
Status: DISCONTINUED | OUTPATIENT
Start: 2018-01-01 | End: 2018-01-01

## 2018-01-01 RX ORDER — ACETYLCYSTEINE 200 MG/ML
3 SOLUTION ORAL; RESPIRATORY (INHALATION)
Status: DISCONTINUED | OUTPATIENT
Start: 2018-01-01 | End: 2018-01-01 | Stop reason: ALTCHOICE

## 2018-01-01 RX ORDER — NICOTINE 21 MG/24HR
14 PATCH, TRANSDERMAL 24 HOURS TRANSDERMAL
Status: DISCONTINUED | OUTPATIENT
Start: 2018-01-01 | End: 2018-01-01 | Stop reason: HOSPADM

## 2018-01-01 RX ORDER — TRAZODONE HYDROCHLORIDE 50 MG/1
50 TABLET ORAL
Status: DISCONTINUED | OUTPATIENT
Start: 2018-01-01 | End: 2018-01-01

## 2018-01-01 RX ORDER — POLYVINYL ALCOHOL 14 MG/ML
1 SOLUTION/ DROPS OPHTHALMIC
Status: DISCONTINUED | OUTPATIENT
Start: 2018-01-01 | End: 2018-01-01 | Stop reason: HOSPADM

## 2018-01-01 RX ORDER — AMOXICILLIN 250 MG
2 CAPSULE ORAL 2 TIMES DAILY
Status: DISCONTINUED | OUTPATIENT
Start: 2018-01-01 | End: 2018-01-01

## 2018-01-01 RX ORDER — AMOXICILLIN 250 MG
2 CAPSULE ORAL 2 TIMES DAILY
Status: DISCONTINUED | OUTPATIENT
Start: 2018-01-01 | End: 2018-01-01 | Stop reason: HOSPADM

## 2018-01-01 RX ORDER — POLYETHYLENE GLYCOL 3350 17 G/17G
1 POWDER, FOR SOLUTION ORAL
Status: DISCONTINUED | OUTPATIENT
Start: 2018-01-01 | End: 2018-01-01

## 2018-01-01 RX ORDER — LORAZEPAM 2 MG/ML
2 INJECTION INTRAMUSCULAR ONCE
Status: COMPLETED | OUTPATIENT
Start: 2018-01-01 | End: 2018-01-01

## 2018-01-01 RX ORDER — LEVOTHYROXINE SODIUM 88 UG/1
88 TABLET ORAL
Status: DISCONTINUED | OUTPATIENT
Start: 2018-01-01 | End: 2018-01-01 | Stop reason: HOSPADM

## 2018-01-01 RX ORDER — DEXAMETHASONE SODIUM PHOSPHATE 4 MG/ML
12 INJECTION, SOLUTION INTRA-ARTICULAR; INTRALESIONAL; INTRAMUSCULAR; INTRAVENOUS; SOFT TISSUE EVERY 8 HOURS
Status: COMPLETED | OUTPATIENT
Start: 2018-01-01 | End: 2018-01-01

## 2018-01-01 RX ORDER — BISACODYL 10 MG
10 SUPPOSITORY, RECTAL RECTAL
Status: DISCONTINUED | OUTPATIENT
Start: 2018-01-01 | End: 2018-01-01

## 2018-01-01 RX ORDER — LORAZEPAM 2 MG/ML
0.5 INJECTION INTRAMUSCULAR ONCE
Status: COMPLETED | OUTPATIENT
Start: 2018-01-01 | End: 2018-01-01

## 2018-01-01 RX ORDER — HEPARIN SODIUM 5000 [USP'U]/ML
5000 INJECTION, SOLUTION INTRAVENOUS; SUBCUTANEOUS EVERY 8 HOURS
Status: DISCONTINUED | OUTPATIENT
Start: 2018-01-01 | End: 2018-01-01 | Stop reason: HOSPADM

## 2018-01-01 RX ORDER — FUROSEMIDE 10 MG/ML
20 INJECTION INTRAMUSCULAR; INTRAVENOUS ONCE
Status: COMPLETED | OUTPATIENT
Start: 2018-01-01 | End: 2018-01-01

## 2018-01-01 RX ORDER — IPRATROPIUM BROMIDE AND ALBUTEROL SULFATE 2.5; .5 MG/3ML; MG/3ML
3 SOLUTION RESPIRATORY (INHALATION)
Status: DISCONTINUED | OUTPATIENT
Start: 2018-01-01 | End: 2018-01-01

## 2018-01-01 RX ORDER — FAMOTIDINE 20 MG/1
20 TABLET, FILM COATED ORAL EVERY 12 HOURS
Status: DISCONTINUED | OUTPATIENT
Start: 2018-01-01 | End: 2018-01-01

## 2018-01-01 RX ORDER — PROPOFOL 10 MG/ML
100 INJECTION, EMULSION INTRAVENOUS ONCE
Status: COMPLETED | OUTPATIENT
Start: 2018-01-01 | End: 2018-01-01

## 2018-01-01 RX ORDER — OXYCODONE HYDROCHLORIDE 5 MG/1
5 TABLET ORAL EVERY 4 HOURS PRN
Status: DISCONTINUED | OUTPATIENT
Start: 2018-01-01 | End: 2018-01-01 | Stop reason: HOSPADM

## 2018-01-01 RX ORDER — LIDOCAINE HYDROCHLORIDE 10 MG/ML
INJECTION, SOLUTION EPIDURAL; INFILTRATION; INTRACAUDAL; PERINEURAL
Status: COMPLETED
Start: 2018-01-01 | End: 2018-01-01

## 2018-01-01 RX ORDER — FUROSEMIDE 10 MG/ML
40 INJECTION INTRAMUSCULAR; INTRAVENOUS ONCE
Status: COMPLETED | OUTPATIENT
Start: 2018-01-01 | End: 2018-01-01

## 2018-01-01 RX ORDER — SODIUM CHLORIDE, SODIUM LACTATE, POTASSIUM CHLORIDE, CALCIUM CHLORIDE 600; 310; 30; 20 MG/100ML; MG/100ML; MG/100ML; MG/100ML
INJECTION, SOLUTION INTRAVENOUS
Status: COMPLETED
Start: 2018-01-01 | End: 2018-01-01

## 2018-01-01 RX ORDER — MIDAZOLAM HYDROCHLORIDE 1 MG/ML
.5-2 INJECTION INTRAMUSCULAR; INTRAVENOUS PRN
Status: ACTIVE | OUTPATIENT
Start: 2018-01-01 | End: 2018-01-01

## 2018-01-01 RX ORDER — OXYCODONE HYDROCHLORIDE 5 MG/1
2.5 TABLET ORAL EVERY 4 HOURS PRN
Status: DISCONTINUED | OUTPATIENT
Start: 2018-01-01 | End: 2018-01-01 | Stop reason: HOSPADM

## 2018-01-01 RX ORDER — DEXAMETHASONE SODIUM PHOSPHATE 4 MG/ML
4 INJECTION, SOLUTION INTRA-ARTICULAR; INTRALESIONAL; INTRAMUSCULAR; INTRAVENOUS; SOFT TISSUE EVERY 6 HOURS
Status: DISCONTINUED | OUTPATIENT
Start: 2018-01-01 | End: 2018-01-01

## 2018-01-01 RX ORDER — FUROSEMIDE 20 MG/1
20 TABLET ORAL
Status: DISCONTINUED | OUTPATIENT
Start: 2018-01-01 | End: 2018-01-01

## 2018-01-01 RX ORDER — SODIUM CHLORIDE 9 MG/ML
INJECTION, SOLUTION INTRAVENOUS CONTINUOUS
Status: DISCONTINUED | OUTPATIENT
Start: 2018-01-01 | End: 2018-01-01

## 2018-01-01 RX ORDER — HALOPERIDOL 5 MG/ML
1 INJECTION INTRAMUSCULAR ONCE
Status: COMPLETED | OUTPATIENT
Start: 2018-01-01 | End: 2018-01-01

## 2018-01-01 RX ORDER — LISINOPRIL 5 MG/1
5 TABLET ORAL DAILY
Status: DISCONTINUED | OUTPATIENT
Start: 2018-01-01 | End: 2018-01-01

## 2018-01-01 RX ORDER — POTASSIUM CHLORIDE 20 MEQ/1
20 TABLET, EXTENDED RELEASE ORAL 3 TIMES DAILY
Status: COMPLETED | OUTPATIENT
Start: 2018-01-01 | End: 2018-01-01

## 2018-01-01 RX ORDER — AZITHROMYCIN 500 MG/1
500 INJECTION, POWDER, LYOPHILIZED, FOR SOLUTION INTRAVENOUS ONCE
Status: COMPLETED | OUTPATIENT
Start: 2018-01-01 | End: 2018-01-01

## 2018-01-01 RX ORDER — DEXTROSE MONOHYDRATE 25 G/50ML
25 INJECTION, SOLUTION INTRAVENOUS
Status: DISCONTINUED | OUTPATIENT
Start: 2018-01-01 | End: 2018-01-01

## 2018-01-01 RX ORDER — PHENYLEPHRINE HCL IN 0.9% NACL 0.5 MG/5ML
200 SYRINGE (ML) INTRAVENOUS ONCE
Status: COMPLETED | OUTPATIENT
Start: 2018-01-01 | End: 2018-01-01

## 2018-01-01 RX ORDER — AMLODIPINE BESYLATE 5 MG/1
5 TABLET ORAL DAILY
COMMUNITY

## 2018-01-01 RX ORDER — SODIUM CHLORIDE 9 MG/ML
1000 INJECTION, SOLUTION INTRAVENOUS ONCE
Status: COMPLETED | OUTPATIENT
Start: 2018-01-01 | End: 2018-01-01

## 2018-01-01 RX ORDER — LEVOTHYROXINE SODIUM 88 UG/1
88 TABLET ORAL
Status: DISCONTINUED | OUTPATIENT
Start: 2018-01-01 | End: 2018-01-01

## 2018-01-01 RX ORDER — ACETAMINOPHEN 650 MG/1
650 SUPPOSITORY RECTAL EVERY 4 HOURS PRN
Status: DISCONTINUED | OUTPATIENT
Start: 2018-01-01 | End: 2018-01-01

## 2018-01-01 RX ORDER — POTASSIUM CHLORIDE 20 MEQ/1
20 TABLET, EXTENDED RELEASE ORAL ONCE
Status: COMPLETED | OUTPATIENT
Start: 2018-01-01 | End: 2018-01-01

## 2018-01-01 RX ORDER — CARVEDILOL 3.12 MG/1
3.12 TABLET ORAL 2 TIMES DAILY WITH MEALS
Status: DISCONTINUED | OUTPATIENT
Start: 2018-01-01 | End: 2018-01-01

## 2018-01-01 RX ORDER — POTASSIUM CHLORIDE 7.45 MG/ML
10 INJECTION INTRAVENOUS
Status: COMPLETED | OUTPATIENT
Start: 2018-01-01 | End: 2018-01-01

## 2018-01-01 RX ORDER — ONDANSETRON 2 MG/ML
4 INJECTION INTRAMUSCULAR; INTRAVENOUS EVERY 4 HOURS PRN
Status: DISCONTINUED | OUTPATIENT
Start: 2018-01-01 | End: 2018-01-01 | Stop reason: HOSPADM

## 2018-01-01 RX ORDER — DEXMEDETOMIDINE HYDROCHLORIDE 4 UG/ML
.1-1.5 INJECTION, SOLUTION INTRAVENOUS CONTINUOUS
Status: DISCONTINUED | OUTPATIENT
Start: 2018-01-01 | End: 2018-01-01

## 2018-01-01 RX ORDER — RANITIDINE 150 MG/1
150 TABLET ORAL 2 TIMES DAILY
COMMUNITY

## 2018-01-01 RX ORDER — SODIUM CHLORIDE AND POTASSIUM CHLORIDE 150; 900 MG/100ML; MG/100ML
INJECTION, SOLUTION INTRAVENOUS CONTINUOUS
Status: DISCONTINUED | OUTPATIENT
Start: 2018-01-01 | End: 2018-01-01

## 2018-01-01 RX ORDER — IPRATROPIUM BROMIDE AND ALBUTEROL SULFATE 2.5; .5 MG/3ML; MG/3ML
3 SOLUTION RESPIRATORY (INHALATION)
Status: DISCONTINUED | OUTPATIENT
Start: 2018-01-01 | End: 2018-01-01 | Stop reason: ALTCHOICE

## 2018-01-01 RX ORDER — SIMVASTATIN 20 MG
20 TABLET ORAL EVERY EVENING
COMMUNITY

## 2018-01-01 RX ORDER — SODIUM CHLORIDE, SODIUM LACTATE, POTASSIUM CHLORIDE, CALCIUM CHLORIDE 600; 310; 30; 20 MG/100ML; MG/100ML; MG/100ML; MG/100ML
1000 INJECTION, SOLUTION INTRAVENOUS ONCE
Status: COMPLETED | OUTPATIENT
Start: 2018-01-01 | End: 2018-01-01

## 2018-01-01 RX ORDER — ONDANSETRON 2 MG/ML
4 INJECTION INTRAMUSCULAR; INTRAVENOUS PRN
Status: ACTIVE | OUTPATIENT
Start: 2018-01-01 | End: 2018-01-01

## 2018-01-01 RX ORDER — DIPHENHYDRAMINE HCL 25 MG
25 TABLET ORAL NIGHTLY PRN
Status: DISCONTINUED | OUTPATIENT
Start: 2018-01-01 | End: 2018-01-01

## 2018-01-01 RX ORDER — ALBUTEROL SULFATE 90 UG/1
2 AEROSOL, METERED RESPIRATORY (INHALATION) EVERY 4 HOURS PRN
Status: DISCONTINUED | OUTPATIENT
Start: 2018-01-01 | End: 2018-01-01 | Stop reason: HOSPADM

## 2018-01-01 RX ORDER — ACETAMINOPHEN 325 MG/1
650 TABLET ORAL EVERY 4 HOURS PRN
Status: DISCONTINUED | OUTPATIENT
Start: 2018-01-01 | End: 2018-01-01

## 2018-01-01 RX ORDER — LABETALOL HYDROCHLORIDE 5 MG/ML
10 INJECTION, SOLUTION INTRAVENOUS
Status: DISCONTINUED | OUTPATIENT
Start: 2018-01-01 | End: 2018-01-01 | Stop reason: HOSPADM

## 2018-01-01 RX ORDER — MIDAZOLAM HYDROCHLORIDE 1 MG/ML
5 INJECTION INTRAMUSCULAR; INTRAVENOUS ONCE
Status: COMPLETED | OUTPATIENT
Start: 2018-01-01 | End: 2018-01-01

## 2018-01-01 RX ORDER — POLYETHYLENE GLYCOL 3350 17 G/17G
1 POWDER, FOR SOLUTION ORAL
Status: DISCONTINUED | OUTPATIENT
Start: 2018-01-01 | End: 2018-01-01 | Stop reason: HOSPADM

## 2018-01-01 RX ORDER — HALOPERIDOL 5 MG/ML
1 INJECTION INTRAMUSCULAR EVERY 6 HOURS PRN
Status: DISCONTINUED | OUTPATIENT
Start: 2018-01-01 | End: 2018-01-01 | Stop reason: HOSPADM

## 2018-01-01 RX ORDER — GABAPENTIN 300 MG/1
300 CAPSULE ORAL 2 TIMES DAILY
COMMUNITY

## 2018-01-01 RX ORDER — DIPHENHYDRAMINE HCL 25 MG
25 TABLET ORAL NIGHTLY PRN
Status: DISCONTINUED | OUTPATIENT
Start: 2018-01-01 | End: 2018-01-01 | Stop reason: HOSPADM

## 2018-01-01 RX ORDER — HYDROCHLOROTHIAZIDE 25 MG/1
12.5 TABLET ORAL DAILY
COMMUNITY

## 2018-01-01 RX ORDER — GUAIFENESIN 600 MG/1
600 TABLET, EXTENDED RELEASE ORAL EVERY 12 HOURS
Status: DISCONTINUED | OUTPATIENT
Start: 2018-01-01 | End: 2018-01-01

## 2018-01-01 RX ORDER — LIDOCAINE HYDROCHLORIDE AND EPINEPHRINE BITARTRATE 20; .01 MG/ML; MG/ML
INJECTION, SOLUTION SUBCUTANEOUS
Status: DISPENSED
Start: 2018-01-01 | End: 2018-01-01

## 2018-01-01 RX ORDER — ALBUTEROL SULFATE 90 UG/1
2 AEROSOL, METERED RESPIRATORY (INHALATION)
Status: DISCONTINUED | OUTPATIENT
Start: 2018-01-01 | End: 2018-01-01

## 2018-01-01 RX ORDER — SODIUM CHLORIDE 9 MG/ML
30 INJECTION, SOLUTION INTRAVENOUS
Status: COMPLETED | OUTPATIENT
Start: 2018-01-01 | End: 2018-01-01

## 2018-01-01 RX ORDER — GABAPENTIN 300 MG/1
300 CAPSULE ORAL 2 TIMES DAILY
Status: DISCONTINUED | OUTPATIENT
Start: 2018-01-01 | End: 2018-01-01

## 2018-01-01 RX ORDER — ALBUTEROL SULFATE 90 UG/1
2 AEROSOL, METERED RESPIRATORY (INHALATION) 4 TIMES DAILY
COMMUNITY

## 2018-01-01 RX ORDER — BISACODYL 10 MG
10 SUPPOSITORY, RECTAL RECTAL
Status: DISCONTINUED | OUTPATIENT
Start: 2018-01-01 | End: 2018-01-01 | Stop reason: HOSPADM

## 2018-01-01 RX ORDER — SODIUM CHLORIDE 9 MG/ML
INJECTION, SOLUTION INTRAVENOUS
Status: ACTIVE
Start: 2018-01-01 | End: 2018-01-01

## 2018-01-01 RX ORDER — SIMVASTATIN 20 MG
20 TABLET ORAL EVERY EVENING
Status: DISCONTINUED | OUTPATIENT
Start: 2018-01-01 | End: 2018-01-01

## 2018-01-01 RX ORDER — BUPIVACAINE HYDROCHLORIDE 5 MG/ML
INJECTION, SOLUTION EPIDURAL; INTRACAUDAL
Status: DISPENSED
Start: 2018-01-01 | End: 2018-01-01

## 2018-01-01 RX ORDER — ACETAMINOPHEN 650 MG/1
650 SUPPOSITORY RECTAL EVERY 4 HOURS PRN
Status: DISCONTINUED | OUTPATIENT
Start: 2018-01-01 | End: 2018-01-01 | Stop reason: HOSPADM

## 2018-01-01 RX ORDER — POTASSIUM CHLORIDE 7.45 MG/ML
10 INJECTION INTRAVENOUS
Status: DISPENSED | OUTPATIENT
Start: 2018-01-01 | End: 2018-01-01

## 2018-01-01 RX ORDER — LEVOTHYROXINE SODIUM 88 UG/1
88 TABLET ORAL
COMMUNITY

## 2018-01-01 RX ORDER — METHYLPREDNISOLONE SODIUM SUCCINATE 125 MG/2ML
62.5 INJECTION, POWDER, LYOPHILIZED, FOR SOLUTION INTRAMUSCULAR; INTRAVENOUS EVERY 6 HOURS
Status: DISCONTINUED | OUTPATIENT
Start: 2018-01-01 | End: 2018-01-01

## 2018-01-01 RX ORDER — FLUMAZENIL 0.1 MG/ML
INJECTION INTRAVENOUS
Status: ACTIVE
Start: 2018-01-01 | End: 2018-01-01

## 2018-01-01 RX ORDER — ALBUTEROL SULFATE 90 UG/1
2 AEROSOL, METERED RESPIRATORY (INHALATION) EVERY 4 HOURS PRN
Status: DISCONTINUED | OUTPATIENT
Start: 2018-01-01 | End: 2018-01-01

## 2018-01-01 RX ORDER — METHYLPREDNISOLONE SODIUM SUCCINATE 40 MG/ML
40 INJECTION, POWDER, LYOPHILIZED, FOR SOLUTION INTRAMUSCULAR; INTRAVENOUS ONCE
Status: COMPLETED | OUTPATIENT
Start: 2018-01-01 | End: 2018-01-01

## 2018-01-01 RX ORDER — ROCURONIUM BROMIDE 10 MG/ML
20 INJECTION, SOLUTION INTRAVENOUS ONCE
Status: COMPLETED | OUTPATIENT
Start: 2018-01-01 | End: 2018-01-01

## 2018-01-01 RX ORDER — PHENYLEPHRINE HCL IN 0.9% NACL 0.5 MG/5ML
SYRINGE (ML) INTRAVENOUS
Status: DISCONTINUED
Start: 2018-01-01 | End: 2018-01-01

## 2018-01-01 RX ORDER — HEPARIN SODIUM 5000 [USP'U]/ML
5000 INJECTION, SOLUTION INTRAVENOUS; SUBCUTANEOUS EVERY 8 HOURS
Status: DISCONTINUED | OUTPATIENT
Start: 2018-01-01 | End: 2018-01-01

## 2018-01-01 RX ORDER — MIDODRINE HYDROCHLORIDE 5 MG/1
5 TABLET ORAL EVERY 8 HOURS
Status: DISCONTINUED | OUTPATIENT
Start: 2018-01-01 | End: 2018-01-01

## 2018-01-01 RX ORDER — MIDODRINE HYDROCHLORIDE 5 MG/1
5 TABLET ORAL
Status: DISCONTINUED | OUTPATIENT
Start: 2018-01-01 | End: 2018-01-01

## 2018-01-01 RX ORDER — IPRATROPIUM BROMIDE AND ALBUTEROL SULFATE 2.5; .5 MG/3ML; MG/3ML
3 SOLUTION RESPIRATORY (INHALATION)
Status: DISCONTINUED | OUTPATIENT
Start: 2018-01-01 | End: 2018-01-01 | Stop reason: HOSPADM

## 2018-01-01 RX ORDER — MORPHINE SULFATE 4 MG/ML
4 INJECTION, SOLUTION INTRAMUSCULAR; INTRAVENOUS ONCE
Status: COMPLETED | OUTPATIENT
Start: 2018-01-01 | End: 2018-01-01

## 2018-01-01 RX ORDER — MIDAZOLAM HYDROCHLORIDE 1 MG/ML
INJECTION INTRAMUSCULAR; INTRAVENOUS
Status: COMPLETED
Start: 2018-01-01 | End: 2018-01-01

## 2018-01-01 RX ORDER — ACETAMINOPHEN 325 MG/1
650 TABLET ORAL EVERY 4 HOURS PRN
Status: DISCONTINUED | OUTPATIENT
Start: 2018-01-01 | End: 2018-01-01 | Stop reason: HOSPADM

## 2018-01-01 RX ORDER — POTASSIUM CHLORIDE 29.8 MG/ML
40 INJECTION INTRAVENOUS ONCE
Status: COMPLETED | OUTPATIENT
Start: 2018-01-01 | End: 2018-01-01

## 2018-01-01 RX ORDER — POTASSIUM CHLORIDE 20 MEQ/1
20 TABLET, EXTENDED RELEASE ORAL DAILY
Status: DISCONTINUED | OUTPATIENT
Start: 2018-01-01 | End: 2018-01-01

## 2018-01-01 RX ORDER — ETOMIDATE 2 MG/ML
20 INJECTION INTRAVENOUS ONCE
Status: COMPLETED | OUTPATIENT
Start: 2018-01-01 | End: 2018-01-01

## 2018-01-01 RX ORDER — HEPARIN SODIUM,PORCINE 1000/ML
VIAL (ML) INJECTION
Status: COMPLETED
Start: 2018-01-01 | End: 2018-01-01

## 2018-01-01 RX ORDER — FLUMAZENIL 0.1 MG/ML
0.2 INJECTION INTRAVENOUS ONCE
Status: COMPLETED | OUTPATIENT
Start: 2018-01-01 | End: 2018-01-01

## 2018-01-01 RX ORDER — SODIUM CHLORIDE 9 MG/ML
500 INJECTION, SOLUTION INTRAVENOUS
Status: ACTIVE | OUTPATIENT
Start: 2018-01-01 | End: 2018-01-01

## 2018-01-01 RX ADMIN — HEPARIN SODIUM 5000 UNITS: 5000 INJECTION, SOLUTION INTRAVENOUS; SUBCUTANEOUS at 20:53

## 2018-01-01 RX ADMIN — SIMVASTATIN 20 MG: 20 TABLET, FILM COATED ORAL at 17:51

## 2018-01-01 RX ADMIN — IOHEXOL 100 ML: 350 INJECTION, SOLUTION INTRAVENOUS at 16:10

## 2018-01-01 RX ADMIN — FAMOTIDINE 20 MG: 20 TABLET ORAL at 17:07

## 2018-01-01 RX ADMIN — DEXAMETHASONE 4 MG: 4 TABLET ORAL at 18:04

## 2018-01-01 RX ADMIN — LEVOTHYROXINE SODIUM 88 MCG: 88 TABLET ORAL at 06:02

## 2018-01-01 RX ADMIN — FENTANYL CITRATE 50 MCG: 50 INJECTION, SOLUTION INTRAMUSCULAR; INTRAVENOUS at 16:50

## 2018-01-01 RX ADMIN — FUROSEMIDE 20 MG: 20 TABLET ORAL at 05:10

## 2018-01-01 RX ADMIN — GUAIFENESIN 200 MG: 100 SOLUTION ORAL at 04:59

## 2018-01-01 RX ADMIN — POLYETHYLENE GLYCOL 3350 1 PACKET: 17 POWDER, FOR SOLUTION ORAL at 08:52

## 2018-01-01 RX ADMIN — MIDAZOLAM HYDROCHLORIDE 0.5 MG: 1 INJECTION, SOLUTION INTRAMUSCULAR; INTRAVENOUS at 16:12

## 2018-01-01 RX ADMIN — STANDARDIZED SENNA CONCENTRATE AND DOCUSATE SODIUM 2 TABLET: 8.6; 5 TABLET ORAL at 05:30

## 2018-01-01 RX ADMIN — METRONIDAZOLE 500 MG: 500 INJECTION, SOLUTION INTRAVENOUS at 23:15

## 2018-01-01 RX ADMIN — MAGNESIUM SULFATE HEPTAHYDRATE 2 G: 40 INJECTION, SOLUTION INTRAVENOUS at 07:30

## 2018-01-01 RX ADMIN — SODIUM CHLORIDE: 4.5 INJECTION, SOLUTION INTRAVENOUS at 10:44

## 2018-01-01 RX ADMIN — DIBASIC SODIUM PHOSPHATE, MONOBASIC POTASSIUM PHOSPHATE AND MONOBASIC SODIUM PHOSPHATE 1 TABLET: 852; 155; 130 TABLET ORAL at 01:57

## 2018-01-01 RX ADMIN — PROPOFOL 10 MCG/KG/MIN: 10 INJECTION, EMULSION INTRAVENOUS at 04:09

## 2018-01-01 RX ADMIN — HYDROCORTISONE SODIUM SUCCINATE 50 MG: 100 INJECTION, POWDER, FOR SOLUTION INTRAMUSCULAR; INTRAVENOUS at 16:51

## 2018-01-01 RX ADMIN — FUROSEMIDE 40 MG: 10 INJECTION, SOLUTION INTRAMUSCULAR; INTRAVENOUS at 05:54

## 2018-01-01 RX ADMIN — NICOTINE 14 MG: 14 PATCH, EXTENDED RELEASE TRANSDERMAL at 05:16

## 2018-01-01 RX ADMIN — DIBASIC SODIUM PHOSPHATE, MONOBASIC POTASSIUM PHOSPHATE AND MONOBASIC SODIUM PHOSPHATE 1 TABLET: 852; 155; 130 TABLET ORAL at 17:49

## 2018-01-01 RX ADMIN — LEVOTHYROXINE SODIUM 88 MCG: 88 TABLET ORAL at 05:30

## 2018-01-01 RX ADMIN — ACETAMINOPHEN 650 MG: 325 TABLET, FILM COATED ORAL at 01:35

## 2018-01-01 RX ADMIN — LEVOTHYROXINE SODIUM 88 MCG: 88 TABLET ORAL at 05:36

## 2018-01-01 RX ADMIN — FUROSEMIDE 20 MG: 20 TABLET ORAL at 06:00

## 2018-01-01 RX ADMIN — GABAPENTIN 300 MG: 300 CAPSULE ORAL at 17:09

## 2018-01-01 RX ADMIN — GABAPENTIN 300 MG: 300 CAPSULE ORAL at 17:07

## 2018-01-01 RX ADMIN — POTASSIUM CHLORIDE 20 MEQ: 1500 TABLET, EXTENDED RELEASE ORAL at 05:09

## 2018-01-01 RX ADMIN — DEXAMETHASONE 4 MG: 4 TABLET ORAL at 02:00

## 2018-01-01 RX ADMIN — POTASSIUM CHLORIDE 10 MEQ: 7.46 INJECTION, SOLUTION INTRAVENOUS at 13:04

## 2018-01-01 RX ADMIN — MAGNESIUM SULFATE HEPTAHYDRATE 2 G: 40 INJECTION, SOLUTION INTRAVENOUS at 07:55

## 2018-01-01 RX ADMIN — SIMVASTATIN 20 MG: 20 TABLET, FILM COATED ORAL at 17:44

## 2018-01-01 RX ADMIN — PROPOFOL 25 MCG/KG/MIN: 10 INJECTION, EMULSION INTRAVENOUS at 01:06

## 2018-01-01 RX ADMIN — RACEPINEPHRINE HYDROCHLORIDE 0.5 ML: 11.25 SOLUTION RESPIRATORY (INHALATION) at 13:38

## 2018-01-01 RX ADMIN — LEVOTHYROXINE SODIUM 88 MCG: 88 TABLET ORAL at 05:17

## 2018-01-01 RX ADMIN — DIBASIC SODIUM PHOSPHATE, MONOBASIC POTASSIUM PHOSPHATE AND MONOBASIC SODIUM PHOSPHATE 1 TABLET: 852; 155; 130 TABLET ORAL at 17:39

## 2018-01-01 RX ADMIN — GUAIFENESIN 200 MG: 100 SOLUTION ORAL at 20:12

## 2018-01-01 RX ADMIN — POTASSIUM CHLORIDE 20 MEQ: 1500 TABLET, EXTENDED RELEASE ORAL at 13:04

## 2018-01-01 RX ADMIN — GABAPENTIN 300 MG: 300 CAPSULE ORAL at 04:49

## 2018-01-01 RX ADMIN — FUROSEMIDE 40 MG: 10 INJECTION, SOLUTION INTRAMUSCULAR; INTRAVENOUS at 11:20

## 2018-01-01 RX ADMIN — MIDODRINE HYDROCHLORIDE 5 MG: 5 TABLET ORAL at 07:30

## 2018-01-01 RX ADMIN — ACETAMINOPHEN 650 MG: 325 TABLET, FILM COATED ORAL at 04:22

## 2018-01-01 RX ADMIN — NICOTINE 14 MG: 14 PATCH, EXTENDED RELEASE TRANSDERMAL at 04:58

## 2018-01-01 RX ADMIN — FAMOTIDINE 20 MG: 10 INJECTION, SOLUTION INTRAVENOUS at 18:22

## 2018-01-01 RX ADMIN — NICOTINE 14 MG: 14 PATCH, EXTENDED RELEASE TRANSDERMAL at 04:48

## 2018-01-01 RX ADMIN — HEPARIN SODIUM 5000 UNITS: 5000 INJECTION, SOLUTION INTRAVENOUS; SUBCUTANEOUS at 14:41

## 2018-01-01 RX ADMIN — PHENOL 1 SPRAY: 1.5 LIQUID ORAL at 19:16

## 2018-01-01 RX ADMIN — DEXAMETHASONE 4 MG: 4 TABLET ORAL at 10:47

## 2018-01-01 RX ADMIN — HEPARIN SODIUM 5000 UNITS: 5000 INJECTION, SOLUTION INTRAVENOUS; SUBCUTANEOUS at 04:59

## 2018-01-01 RX ADMIN — HEPARIN SODIUM 5000 UNITS: 5000 INJECTION, SOLUTION INTRAVENOUS; SUBCUTANEOUS at 21:14

## 2018-01-01 RX ADMIN — LEVOTHYROXINE SODIUM 88 MCG: 88 TABLET ORAL at 06:13

## 2018-01-01 RX ADMIN — GABAPENTIN 300 MG: 300 CAPSULE ORAL at 17:57

## 2018-01-01 RX ADMIN — HYDROCORTISONE SODIUM SUCCINATE 50 MG: 100 INJECTION, POWDER, FOR SOLUTION INTRAMUSCULAR; INTRAVENOUS at 05:20

## 2018-01-01 RX ADMIN — SODIUM CHLORIDE 1566 ML: 9 INJECTION, SOLUTION INTRAVENOUS at 22:23

## 2018-01-01 RX ADMIN — DIPHENHYDRAMINE HCL 25 MG: 25 TABLET ORAL at 20:12

## 2018-01-01 RX ADMIN — POLYETHYLENE GLYCOL 3350 1 PACKET: 17 POWDER, FOR SOLUTION ORAL at 11:08

## 2018-01-01 RX ADMIN — POTASSIUM PHOSPHATE, MONOBASIC AND POTASSIUM PHOSPHATE, DIBASIC 30 MMOL: 224; 236 INJECTION, SOLUTION INTRAVENOUS at 08:12

## 2018-01-01 RX ADMIN — PROPOFOL 100 MG: 10 INJECTION, EMULSION INTRAVENOUS at 04:00

## 2018-01-01 RX ADMIN — MIDAZOLAM HYDROCHLORIDE 5 MG: 1 INJECTION INTRAMUSCULAR; INTRAVENOUS at 05:26

## 2018-01-01 RX ADMIN — HEPARIN SODIUM 5000 UNITS: 5000 INJECTION, SOLUTION INTRAVENOUS; SUBCUTANEOUS at 21:45

## 2018-01-01 RX ADMIN — ACETAMINOPHEN 650 MG: 325 TABLET, FILM COATED ORAL at 20:53

## 2018-01-01 RX ADMIN — GABAPENTIN 300 MG: 300 CAPSULE ORAL at 05:09

## 2018-01-01 RX ADMIN — HEPARIN SODIUM 5000 UNITS: 5000 INJECTION, SOLUTION INTRAVENOUS; SUBCUTANEOUS at 05:20

## 2018-01-01 RX ADMIN — FUROSEMIDE 20 MG: 10 INJECTION, SOLUTION INTRAMUSCULAR; INTRAVENOUS at 17:52

## 2018-01-01 RX ADMIN — SIMVASTATIN 20 MG: 20 TABLET, FILM COATED ORAL at 17:34

## 2018-01-01 RX ADMIN — POTASSIUM CHLORIDE 20 MEQ: 1500 TABLET, EXTENDED RELEASE ORAL at 17:14

## 2018-01-01 RX ADMIN — CARVEDILOL 3.12 MG: 3.12 TABLET, FILM COATED ORAL at 18:08

## 2018-01-01 RX ADMIN — LEVOTHYROXINE SODIUM 88 MCG: 88 TABLET ORAL at 05:15

## 2018-01-01 RX ADMIN — GABAPENTIN 300 MG: 300 CAPSULE ORAL at 17:39

## 2018-01-01 RX ADMIN — POTASSIUM CHLORIDE 10 MEQ: 7.46 INJECTION, SOLUTION INTRAVENOUS at 11:58

## 2018-01-01 RX ADMIN — HEPARIN SODIUM 5000 UNITS: 5000 INJECTION, SOLUTION INTRAVENOUS; SUBCUTANEOUS at 07:54

## 2018-01-01 RX ADMIN — MAGNESIUM SULFATE HEPTAHYDRATE 2 G: 40 INJECTION, SOLUTION INTRAVENOUS at 08:17

## 2018-01-01 RX ADMIN — GABAPENTIN 300 MG: 300 CAPSULE ORAL at 05:30

## 2018-01-01 RX ADMIN — DEXAMETHASONE 4 MG: 4 TABLET ORAL at 15:00

## 2018-01-01 RX ADMIN — NICOTINE 14 MG: 14 PATCH, EXTENDED RELEASE TRANSDERMAL at 06:04

## 2018-01-01 RX ADMIN — ACETAMINOPHEN 650 MG: 325 TABLET, FILM COATED ORAL at 15:54

## 2018-01-01 RX ADMIN — GUAIFENESIN 200 MG: 100 SOLUTION ORAL at 17:09

## 2018-01-01 RX ADMIN — PHENOL 1 SPRAY: 1.5 LIQUID ORAL at 11:14

## 2018-01-01 RX ADMIN — STANDARDIZED SENNA CONCENTRATE AND DOCUSATE SODIUM 2 TABLET: 8.6; 5 TABLET ORAL at 21:06

## 2018-01-01 RX ADMIN — AZITHROMYCIN FOR INJECTION INJECTION, POWDER, LYOPHILIZED, FOR SOLUTION 500 MG: 500 INJECTION INTRAVENOUS at 22:15

## 2018-01-01 RX ADMIN — HEPARIN SODIUM 5000 UNITS: 5000 INJECTION, SOLUTION INTRAVENOUS; SUBCUTANEOUS at 15:11

## 2018-01-01 RX ADMIN — HYDROCORTISONE SODIUM SUCCINATE 50 MG: 100 INJECTION, POWDER, FOR SOLUTION INTRAMUSCULAR; INTRAVENOUS at 23:55

## 2018-01-01 RX ADMIN — LEVOTHYROXINE SODIUM 88 MCG: 88 TABLET ORAL at 04:22

## 2018-01-01 RX ADMIN — HEPARIN SODIUM 5000 UNITS: 5000 INJECTION, SOLUTION INTRAVENOUS; SUBCUTANEOUS at 06:27

## 2018-01-01 RX ADMIN — GABAPENTIN 300 MG: 300 CAPSULE ORAL at 04:48

## 2018-01-01 RX ADMIN — NICOTINE 14 MG: 14 PATCH, EXTENDED RELEASE TRANSDERMAL at 05:20

## 2018-01-01 RX ADMIN — HEPARIN SODIUM 5000 UNITS: 5000 INJECTION, SOLUTION INTRAVENOUS; SUBCUTANEOUS at 06:18

## 2018-01-01 RX ADMIN — IOHEXOL 100 ML: 350 INJECTION, SOLUTION INTRAVENOUS at 22:55

## 2018-01-01 RX ADMIN — GABAPENTIN 300 MG: 300 CAPSULE ORAL at 06:05

## 2018-01-01 RX ADMIN — PROPOFOL 25 MCG/KG/MIN: 10 INJECTION, EMULSION INTRAVENOUS at 07:31

## 2018-01-01 RX ADMIN — POTASSIUM BICARBONATE 25 MEQ: 25 TABLET, EFFERVESCENT ORAL at 08:18

## 2018-01-01 RX ADMIN — MIDODRINE HYDROCHLORIDE 5 MG: 5 TABLET ORAL at 21:45

## 2018-01-01 RX ADMIN — FAMOTIDINE 20 MG: 20 TABLET ORAL at 05:30

## 2018-01-01 RX ADMIN — ACETAMINOPHEN 650 MG: 325 TABLET, FILM COATED ORAL at 06:12

## 2018-01-01 RX ADMIN — FUROSEMIDE 40 MG: 10 INJECTION, SOLUTION INTRAMUSCULAR; INTRAVENOUS at 05:17

## 2018-01-01 RX ADMIN — SIMVASTATIN 20 MG: 20 TABLET, FILM COATED ORAL at 21:05

## 2018-01-01 RX ADMIN — PHENOL 1 SPRAY: 1.5 LIQUID ORAL at 14:27

## 2018-01-01 RX ADMIN — HYDROCORTISONE SODIUM SUCCINATE 50 MG: 100 INJECTION, POWDER, FOR SOLUTION INTRAMUSCULAR; INTRAVENOUS at 18:19

## 2018-01-01 RX ADMIN — OXYCODONE HYDROCHLORIDE 2.5 MG: 5 TABLET ORAL at 21:16

## 2018-01-01 RX ADMIN — GABAPENTIN 300 MG: 300 CAPSULE ORAL at 18:19

## 2018-01-01 RX ADMIN — HEPARIN SODIUM 5000 UNITS: 5000 INJECTION, SOLUTION INTRAVENOUS; SUBCUTANEOUS at 21:56

## 2018-01-01 RX ADMIN — HEPARIN SODIUM 5000 UNITS: 5000 INJECTION, SOLUTION INTRAVENOUS; SUBCUTANEOUS at 21:09

## 2018-01-01 RX ADMIN — IPRATROPIUM BROMIDE AND ALBUTEROL SULFATE 3 ML: .5; 3 SOLUTION RESPIRATORY (INHALATION) at 03:33

## 2018-01-01 RX ADMIN — HEPARIN SODIUM 5000 UNITS: 5000 INJECTION, SOLUTION INTRAVENOUS; SUBCUTANEOUS at 12:51

## 2018-01-01 RX ADMIN — NICOTINE 14 MG: 14 PATCH, EXTENDED RELEASE TRANSDERMAL at 06:00

## 2018-01-01 RX ADMIN — HEPARIN SODIUM 5000 UNITS: 5000 INJECTION, SOLUTION INTRAVENOUS; SUBCUTANEOUS at 19:44

## 2018-01-01 RX ADMIN — STANDARDIZED SENNA CONCENTRATE AND DOCUSATE SODIUM 2 TABLET: 8.6; 5 TABLET ORAL at 05:16

## 2018-01-01 RX ADMIN — POTASSIUM CHLORIDE 20 MEQ: 1500 TABLET, EXTENDED RELEASE ORAL at 16:05

## 2018-01-01 RX ADMIN — FUROSEMIDE 20 MG: 20 TABLET ORAL at 05:29

## 2018-01-01 RX ADMIN — INSULIN HUMAN 2 UNITS: 100 INJECTION, SOLUTION PARENTERAL at 11:09

## 2018-01-01 RX ADMIN — GABAPENTIN 300 MG: 300 CAPSULE ORAL at 05:45

## 2018-01-01 RX ADMIN — CEFTRIAXONE SODIUM 2 G: 2 INJECTION, POWDER, FOR SOLUTION INTRAMUSCULAR; INTRAVENOUS at 23:09

## 2018-01-01 RX ADMIN — HEPARIN SODIUM 5000 UNITS: 5000 INJECTION, SOLUTION INTRAVENOUS; SUBCUTANEOUS at 13:17

## 2018-01-01 RX ADMIN — GABAPENTIN 300 MG: 300 CAPSULE ORAL at 05:00

## 2018-01-01 RX ADMIN — POTASSIUM BICARBONATE 25 MEQ: 25 TABLET, EFFERVESCENT ORAL at 17:21

## 2018-01-01 RX ADMIN — FUROSEMIDE 40 MG: 10 INJECTION, SOLUTION INTRAMUSCULAR; INTRAVENOUS at 10:30

## 2018-01-01 RX ADMIN — IPRATROPIUM BROMIDE AND ALBUTEROL SULFATE 3 ML: .5; 3 SOLUTION RESPIRATORY (INHALATION) at 07:53

## 2018-01-01 RX ADMIN — SIMVASTATIN 20 MG: 20 TABLET, FILM COATED ORAL at 17:47

## 2018-01-01 RX ADMIN — SIMVASTATIN 20 MG: 20 TABLET, FILM COATED ORAL at 17:39

## 2018-01-01 RX ADMIN — SIMVASTATIN 20 MG: 20 TABLET, FILM COATED ORAL at 17:48

## 2018-01-01 RX ADMIN — HYDROCORTISONE SODIUM SUCCINATE 50 MG: 100 INJECTION, POWDER, FOR SOLUTION INTRAMUSCULAR; INTRAVENOUS at 04:49

## 2018-01-01 RX ADMIN — GABAPENTIN 300 MG: 300 CAPSULE ORAL at 05:15

## 2018-01-01 RX ADMIN — DEXAMETHASONE 4 MG: 4 TABLET ORAL at 20:22

## 2018-01-01 RX ADMIN — MIDODRINE HYDROCHLORIDE 5 MG: 5 TABLET ORAL at 17:21

## 2018-01-01 RX ADMIN — HEPARIN SODIUM 5000 UNITS: 5000 INJECTION, SOLUTION INTRAVENOUS; SUBCUTANEOUS at 06:37

## 2018-01-01 RX ADMIN — FAMOTIDINE 20 MG: 20 TABLET ORAL at 18:22

## 2018-01-01 RX ADMIN — MIDODRINE HYDROCHLORIDE 5 MG: 5 TABLET ORAL at 11:27

## 2018-01-01 RX ADMIN — MIDODRINE HYDROCHLORIDE 5 MG: 5 TABLET ORAL at 13:45

## 2018-01-01 RX ADMIN — DEXAMETHASONE SODIUM PHOSPHATE 12 MG: 4 INJECTION, SOLUTION INTRAMUSCULAR; INTRAVENOUS at 16:37

## 2018-01-01 RX ADMIN — LEVOTHYROXINE SODIUM 88 MCG: 88 TABLET ORAL at 05:32

## 2018-01-01 RX ADMIN — GABAPENTIN 300 MG: 300 CAPSULE ORAL at 11:54

## 2018-01-01 RX ADMIN — SIMVASTATIN 20 MG: 20 TABLET, FILM COATED ORAL at 18:19

## 2018-01-01 RX ADMIN — SODIUM CHLORIDE, SODIUM LACTATE, POTASSIUM CHLORIDE, CALCIUM CHLORIDE 1000 ML: 600; 310; 30; 20 INJECTION, SOLUTION INTRAVENOUS at 18:26

## 2018-01-01 RX ADMIN — GABAPENTIN 300 MG: 300 CAPSULE ORAL at 06:15

## 2018-01-01 RX ADMIN — LORAZEPAM 0.5 MG: 2 INJECTION INTRAMUSCULAR; INTRAVENOUS at 12:44

## 2018-01-01 RX ADMIN — SIMVASTATIN 20 MG: 20 TABLET, FILM COATED ORAL at 18:22

## 2018-01-01 RX ADMIN — DEXAMETHASONE SODIUM PHOSPHATE 12 MG: 4 INJECTION, SOLUTION INTRAMUSCULAR; INTRAVENOUS at 06:30

## 2018-01-01 RX ADMIN — LIDOCAINE HYDROCHLORIDE 10 ML: 10 INJECTION, SOLUTION EPIDURAL; INFILTRATION; INTRACAUDAL; PERINEURAL at 15:40

## 2018-01-01 RX ADMIN — MORPHINE SULFATE 4 MG: 4 INJECTION INTRAVENOUS at 21:45

## 2018-01-01 RX ADMIN — METHYLPREDNISOLONE SODIUM SUCCINATE 62.5 MG: 125 INJECTION, POWDER, FOR SOLUTION INTRAMUSCULAR; INTRAVENOUS at 13:32

## 2018-01-01 RX ADMIN — NICOTINE 14 MG: 14 PATCH, EXTENDED RELEASE TRANSDERMAL at 04:57

## 2018-01-01 RX ADMIN — SIMVASTATIN 20 MG: 20 TABLET, FILM COATED ORAL at 17:31

## 2018-01-01 RX ADMIN — OXYCODONE HYDROCHLORIDE 5 MG: 5 TABLET ORAL at 03:36

## 2018-01-01 RX ADMIN — IOHEXOL 75 ML: 350 INJECTION, SOLUTION INTRAVENOUS at 15:21

## 2018-01-01 RX ADMIN — HEPARIN SODIUM 5000 UNITS: 5000 INJECTION, SOLUTION INTRAVENOUS; SUBCUTANEOUS at 06:04

## 2018-01-01 RX ADMIN — MIDAZOLAM HYDROCHLORIDE 5 MG: 1 INJECTION, SOLUTION INTRAMUSCULAR; INTRAVENOUS at 05:26

## 2018-01-01 RX ADMIN — PROPOFOL 20 MCG/KG/MIN: 10 INJECTION, EMULSION INTRAVENOUS at 14:14

## 2018-01-01 RX ADMIN — NICOTINE 14 MG: 14 PATCH, EXTENDED RELEASE TRANSDERMAL at 06:12

## 2018-01-01 RX ADMIN — HEPARIN SODIUM 5000 UNITS: 5000 INJECTION, SOLUTION INTRAVENOUS; SUBCUTANEOUS at 05:09

## 2018-01-01 RX ADMIN — SENNOSIDES AND DOCUSATE SODIUM 2 TABLET: 8.6; 5 TABLET ORAL at 17:34

## 2018-01-01 RX ADMIN — ALBUTEROL SULFATE 2.5 MG: 2.5 SOLUTION RESPIRATORY (INHALATION) at 01:30

## 2018-01-01 RX ADMIN — LEVOTHYROXINE SODIUM 88 MCG: 88 TABLET ORAL at 05:16

## 2018-01-01 RX ADMIN — LEVOTHYROXINE SODIUM 88 MCG: 88 TABLET ORAL at 04:59

## 2018-01-01 RX ADMIN — STANDARDIZED SENNA CONCENTRATE AND DOCUSATE SODIUM 2 TABLET: 8.6; 5 TABLET ORAL at 17:07

## 2018-01-01 RX ADMIN — FUROSEMIDE 20 MG: 20 TABLET ORAL at 06:30

## 2018-01-01 RX ADMIN — IPRATROPIUM BROMIDE AND ALBUTEROL SULFATE 3 ML: .5; 3 SOLUTION RESPIRATORY (INHALATION) at 18:04

## 2018-01-01 RX ADMIN — FAMOTIDINE 20 MG: 10 INJECTION, SOLUTION INTRAVENOUS at 07:01

## 2018-01-01 RX ADMIN — HEPARIN SODIUM 5000 UNITS: 5000 INJECTION, SOLUTION INTRAVENOUS; SUBCUTANEOUS at 23:03

## 2018-01-01 RX ADMIN — HEPARIN SODIUM 5000 UNITS: 5000 INJECTION, SOLUTION INTRAVENOUS; SUBCUTANEOUS at 19:52

## 2018-01-01 RX ADMIN — POTASSIUM CHLORIDE 20 MEQ: 1500 TABLET, EXTENDED RELEASE ORAL at 05:28

## 2018-01-01 RX ADMIN — GABAPENTIN 300 MG: 300 CAPSULE ORAL at 18:22

## 2018-01-01 RX ADMIN — GABAPENTIN 300 MG: 300 CAPSULE ORAL at 17:31

## 2018-01-01 RX ADMIN — SIMVASTATIN 20 MG: 20 TABLET, FILM COATED ORAL at 17:25

## 2018-01-01 RX ADMIN — POTASSIUM CHLORIDE 20 MEQ: 1500 TABLET, EXTENDED RELEASE ORAL at 06:02

## 2018-01-01 RX ADMIN — DEXAMETHASONE 4 MG: 4 TABLET ORAL at 21:14

## 2018-01-01 RX ADMIN — DIBASIC SODIUM PHOSPHATE, MONOBASIC POTASSIUM PHOSPHATE AND MONOBASIC SODIUM PHOSPHATE 1 TABLET: 852; 155; 130 TABLET ORAL at 00:00

## 2018-01-01 RX ADMIN — SODIUM CHLORIDE 500 ML: 9 INJECTION, SOLUTION INTRAVENOUS at 09:17

## 2018-01-01 RX ADMIN — METHYLPREDNISOLONE SODIUM SUCCINATE 62.5 MG: 125 INJECTION, POWDER, FOR SOLUTION INTRAMUSCULAR; INTRAVENOUS at 22:22

## 2018-01-01 RX ADMIN — ROCURONIUM BROMIDE 20 MG: 10 INJECTION, SOLUTION INTRAVENOUS at 03:59

## 2018-01-01 RX ADMIN — MIDODRINE HYDROCHLORIDE 5 MG: 5 TABLET ORAL at 15:12

## 2018-01-01 RX ADMIN — GUAIFENESIN 400 MG: 100 SOLUTION ORAL at 05:45

## 2018-01-01 RX ADMIN — HALOPERIDOL LACTATE 1 MG: 5 INJECTION, SOLUTION INTRAMUSCULAR at 17:51

## 2018-01-01 RX ADMIN — PROPOFOL 30 MCG/KG/MIN: 10 INJECTION, EMULSION INTRAVENOUS at 05:44

## 2018-01-01 RX ADMIN — DEXAMETHASONE 4 MG: 4 TABLET ORAL at 10:13

## 2018-01-01 RX ADMIN — GABAPENTIN 300 MG: 300 CAPSULE ORAL at 21:06

## 2018-01-01 RX ADMIN — SIMVASTATIN 20 MG: 20 TABLET, FILM COATED ORAL at 19:58

## 2018-01-01 RX ADMIN — DEXAMETHASONE SODIUM PHOSPHATE 4 MG: 4 INJECTION, SOLUTION INTRAMUSCULAR; INTRAVENOUS at 05:17

## 2018-01-01 RX ADMIN — HYDROCORTISONE SODIUM SUCCINATE 50 MG: 100 INJECTION, POWDER, FOR SOLUTION INTRAMUSCULAR; INTRAVENOUS at 18:22

## 2018-01-01 RX ADMIN — HEPARIN SODIUM 5000 UNITS: 5000 INJECTION, SOLUTION INTRAVENOUS; SUBCUTANEOUS at 22:26

## 2018-01-01 RX ADMIN — GUAIFENESIN 200 MG: 100 SOLUTION ORAL at 19:43

## 2018-01-01 RX ADMIN — CARVEDILOL 3.12 MG: 3.12 TABLET, FILM COATED ORAL at 17:49

## 2018-01-01 RX ADMIN — Medication 200 MCG: at 04:01

## 2018-01-01 RX ADMIN — GABAPENTIN 300 MG: 300 CAPSULE ORAL at 05:20

## 2018-01-01 RX ADMIN — SIMVASTATIN 20 MG: 20 TABLET, FILM COATED ORAL at 17:07

## 2018-01-01 RX ADMIN — DEXAMETHASONE 4 MG: 4 TABLET ORAL at 10:45

## 2018-01-01 RX ADMIN — GUAIFENESIN 400 MG: 100 SOLUTION ORAL at 20:57

## 2018-01-01 RX ADMIN — DIBASIC SODIUM PHOSPHATE, MONOBASIC POTASSIUM PHOSPHATE AND MONOBASIC SODIUM PHOSPHATE 1 TABLET: 852; 155; 130 TABLET ORAL at 17:50

## 2018-01-01 RX ADMIN — FLUMAZENIL 0.2 MG: 0.1 INJECTION, SOLUTION INTRAVENOUS at 18:56

## 2018-01-01 RX ADMIN — HEPARIN SODIUM 5000 UNITS: 5000 INJECTION, SOLUTION INTRAVENOUS; SUBCUTANEOUS at 05:28

## 2018-01-01 RX ADMIN — MAGNESIUM SULFATE HEPTAHYDRATE 2 G: 40 INJECTION, SOLUTION INTRAVENOUS at 08:12

## 2018-01-01 RX ADMIN — NICOTINE 14 MG: 14 PATCH, EXTENDED RELEASE TRANSDERMAL at 05:26

## 2018-01-01 RX ADMIN — ACETAMINOPHEN 650 MG: 325 TABLET, FILM COATED ORAL at 21:13

## 2018-01-01 RX ADMIN — HEPARIN SODIUM 5000 UNITS: 5000 INJECTION, SOLUTION INTRAVENOUS; SUBCUTANEOUS at 22:00

## 2018-01-01 RX ADMIN — HALOPERIDOL LACTATE 1 MG: 5 INJECTION, SOLUTION INTRAMUSCULAR at 15:02

## 2018-01-01 RX ADMIN — GABAPENTIN 300 MG: 300 CAPSULE ORAL at 17:50

## 2018-01-01 RX ADMIN — GABAPENTIN 300 MG: 300 CAPSULE ORAL at 06:40

## 2018-01-01 RX ADMIN — CARVEDILOL 3.12 MG: 3.12 TABLET, FILM COATED ORAL at 05:53

## 2018-01-01 RX ADMIN — DIPHENHYDRAMINE HCL 25 MG: 25 TABLET ORAL at 19:44

## 2018-01-01 RX ADMIN — RACEPINEPHRINE HYDROCHLORIDE 0.5 ML: 11.25 SOLUTION RESPIRATORY (INHALATION) at 22:31

## 2018-01-01 RX ADMIN — POTASSIUM CHLORIDE 40 MEQ: 1500 TABLET, EXTENDED RELEASE ORAL at 15:11

## 2018-01-01 RX ADMIN — POTASSIUM CHLORIDE 20 MEQ: 1500 TABLET, EXTENDED RELEASE ORAL at 21:02

## 2018-01-01 RX ADMIN — HEPARIN SODIUM 5000 UNITS: 5000 INJECTION, SOLUTION INTRAVENOUS; SUBCUTANEOUS at 05:59

## 2018-01-01 RX ADMIN — GABAPENTIN 300 MG: 300 CAPSULE ORAL at 17:11

## 2018-01-01 RX ADMIN — SIMVASTATIN 20 MG: 20 TABLET, FILM COATED ORAL at 17:50

## 2018-01-01 RX ADMIN — FAMOTIDINE 20 MG: 20 TABLET ORAL at 18:19

## 2018-01-01 RX ADMIN — HEPARIN SODIUM: 1000 INJECTION, SOLUTION INTRAVENOUS; SUBCUTANEOUS at 15:40

## 2018-01-01 RX ADMIN — LEVOTHYROXINE SODIUM 88 MCG: 88 TABLET ORAL at 04:56

## 2018-01-01 RX ADMIN — FUROSEMIDE 20 MG: 10 INJECTION, SOLUTION INTRAMUSCULAR; INTRAVENOUS at 21:06

## 2018-01-01 RX ADMIN — DEXAMETHASONE SODIUM PHOSPHATE 4 MG: 4 INJECTION, SOLUTION INTRAMUSCULAR; INTRAVENOUS at 22:07

## 2018-01-01 RX ADMIN — LEVOTHYROXINE SODIUM 88 MCG: 88 TABLET ORAL at 06:00

## 2018-01-01 RX ADMIN — POTASSIUM CHLORIDE 40 MEQ: 29.8 INJECTION, SOLUTION INTRAVENOUS at 10:56

## 2018-01-01 RX ADMIN — LEVOTHYROXINE SODIUM 88 MCG: 88 TABLET ORAL at 06:31

## 2018-01-01 RX ADMIN — LEVOTHYROXINE SODIUM 88 MCG: 88 TABLET ORAL at 05:53

## 2018-01-01 RX ADMIN — STANDARDIZED SENNA CONCENTRATE AND DOCUSATE SODIUM 2 TABLET: 8.6; 5 TABLET ORAL at 04:49

## 2018-01-01 RX ADMIN — NOREPINEPHRINE BITARTRATE 1 MCG/MIN: 1 INJECTION INTRAVENOUS at 11:54

## 2018-01-01 RX ADMIN — NOREPINEPHRINE BITARTRATE 2 MCG/MIN: 1 INJECTION INTRAVENOUS at 06:10

## 2018-01-01 RX ADMIN — POTASSIUM CHLORIDE 10 MEQ: 7.46 INJECTION, SOLUTION INTRAVENOUS at 09:17

## 2018-01-01 RX ADMIN — INSULIN HUMAN 2 UNITS: 100 INJECTION, SOLUTION PARENTERAL at 18:09

## 2018-01-01 RX ADMIN — MIDODRINE HYDROCHLORIDE 5 MG: 5 TABLET ORAL at 22:24

## 2018-01-01 RX ADMIN — PHENOL 1 SPRAY: 1.5 LIQUID ORAL at 04:58

## 2018-01-01 RX ADMIN — SIMVASTATIN 20 MG: 20 TABLET, FILM COATED ORAL at 18:05

## 2018-01-01 RX ADMIN — RACEPINEPHRINE HYDROCHLORIDE 0.5 ML: 11.25 SOLUTION RESPIRATORY (INHALATION) at 18:16

## 2018-01-01 RX ADMIN — PHENOL 1 SPRAY: 1.5 LIQUID ORAL at 16:31

## 2018-01-01 RX ADMIN — POLYVINYL ALCOHOL 1 DROP: 14 SOLUTION/ DROPS OPHTHALMIC at 21:17

## 2018-01-01 RX ADMIN — HEPARIN SODIUM 5000 UNITS: 5000 INJECTION, SOLUTION INTRAVENOUS; SUBCUTANEOUS at 05:45

## 2018-01-01 RX ADMIN — LISINOPRIL 5 MG: 5 TABLET ORAL at 05:17

## 2018-01-01 RX ADMIN — GABAPENTIN 300 MG: 300 CAPSULE ORAL at 04:22

## 2018-01-01 RX ADMIN — ETOMIDATE 20 MG: 2 INJECTION INTRAVENOUS at 03:27

## 2018-01-01 RX ADMIN — SIMVASTATIN 20 MG: 20 TABLET, FILM COATED ORAL at 18:00

## 2018-01-01 RX ADMIN — POTASSIUM CHLORIDE 20 MEQ: 1500 TABLET, EXTENDED RELEASE ORAL at 06:29

## 2018-01-01 RX ADMIN — FAMOTIDINE 20 MG: 20 TABLET ORAL at 17:51

## 2018-01-01 RX ADMIN — PROPOFOL 25 MCG/KG/MIN: 10 INJECTION, EMULSION INTRAVENOUS at 23:55

## 2018-01-01 RX ADMIN — NICOTINE 14 MG: 14 PATCH, EXTENDED RELEASE TRANSDERMAL at 05:15

## 2018-01-01 RX ADMIN — FAMOTIDINE 20 MG: 20 TABLET ORAL at 05:15

## 2018-01-01 RX ADMIN — DEXAMETHASONE 4 MG: 4 TABLET ORAL at 03:03

## 2018-01-01 RX ADMIN — SIMVASTATIN 20 MG: 20 TABLET, FILM COATED ORAL at 17:21

## 2018-01-01 RX ADMIN — MAGNESIUM SULFATE HEPTAHYDRATE 2 G: 40 INJECTION, SOLUTION INTRAVENOUS at 09:13

## 2018-01-01 RX ADMIN — NICOTINE 14 MG: 14 PATCH, EXTENDED RELEASE TRANSDERMAL at 05:11

## 2018-01-01 RX ADMIN — INSULIN HUMAN 2 UNITS: 100 INJECTION, SOLUTION PARENTERAL at 05:13

## 2018-01-01 RX ADMIN — HEPARIN 300 UNITS: 100 SYRINGE at 17:45

## 2018-01-01 RX ADMIN — STANDARDIZED SENNA CONCENTRATE AND DOCUSATE SODIUM 2 TABLET: 8.6; 5 TABLET ORAL at 05:14

## 2018-01-01 RX ADMIN — SIMVASTATIN 20 MG: 20 TABLET, FILM COATED ORAL at 17:57

## 2018-01-01 RX ADMIN — GABAPENTIN 300 MG: 300 CAPSULE ORAL at 05:28

## 2018-01-01 RX ADMIN — PHENOL 1 SPRAY: 1.5 LIQUID ORAL at 14:41

## 2018-01-01 RX ADMIN — HEPARIN SODIUM 5000 UNITS: 5000 INJECTION, SOLUTION INTRAVENOUS; SUBCUTANEOUS at 20:12

## 2018-01-01 RX ADMIN — DEXAMETHASONE SODIUM PHOSPHATE 12 MG: 4 INJECTION, SOLUTION INTRAMUSCULAR; INTRAVENOUS at 22:00

## 2018-01-01 RX ADMIN — LEVOTHYROXINE SODIUM 88 MCG: 88 TABLET ORAL at 05:39

## 2018-01-01 RX ADMIN — LEVOTHYROXINE SODIUM 88 MCG: 88 TABLET ORAL at 04:57

## 2018-01-01 RX ADMIN — DEXMEDETOMIDINE HYDROCHLORIDE 0.2 MCG/KG/HR: 100 INJECTION, SOLUTION INTRAVENOUS at 03:44

## 2018-01-01 RX ADMIN — DIBASIC SODIUM PHOSPHATE, MONOBASIC POTASSIUM PHOSPHATE AND MONOBASIC SODIUM PHOSPHATE 1 TABLET: 852; 155; 130 TABLET ORAL at 05:45

## 2018-01-01 RX ADMIN — POTASSIUM CHLORIDE 20 MEQ: 1500 TABLET, EXTENDED RELEASE ORAL at 17:09

## 2018-01-01 RX ADMIN — ONDANSETRON 4 MG: 2 INJECTION INTRAMUSCULAR; INTRAVENOUS at 13:39

## 2018-01-01 RX ADMIN — LEVOTHYROXINE SODIUM 88 MCG: 88 TABLET ORAL at 04:48

## 2018-01-01 RX ADMIN — FAMOTIDINE 20 MG: 20 TABLET ORAL at 17:21

## 2018-01-01 RX ADMIN — METHYLPREDNISOLONE SODIUM SUCCINATE 62.5 MG: 125 INJECTION, POWDER, FOR SOLUTION INTRAMUSCULAR; INTRAVENOUS at 07:54

## 2018-01-01 RX ADMIN — DEXAMETHASONE SODIUM PHOSPHATE 4 MG: 4 INJECTION, SOLUTION INTRAMUSCULAR; INTRAVENOUS at 00:22

## 2018-01-01 RX ADMIN — HEPARIN SODIUM 5000 UNITS: 5000 INJECTION, SOLUTION INTRAVENOUS; SUBCUTANEOUS at 06:12

## 2018-01-01 RX ADMIN — HEPARIN SODIUM 5000 UNITS: 5000 INJECTION, SOLUTION INTRAVENOUS; SUBCUTANEOUS at 13:05

## 2018-01-01 RX ADMIN — NICOTINE 14 MG: 14 PATCH, EXTENDED RELEASE TRANSDERMAL at 05:39

## 2018-01-01 RX ADMIN — RACEPINEPHRINE HYDROCHLORIDE 0.5 ML: 11.25 SOLUTION RESPIRATORY (INHALATION) at 02:50

## 2018-01-01 RX ADMIN — DEXAMETHASONE SODIUM PHOSPHATE 4 MG: 4 INJECTION, SOLUTION INTRAMUSCULAR; INTRAVENOUS at 05:10

## 2018-01-01 RX ADMIN — RACEPINEPHRINE HYDROCHLORIDE 0.5 ML: 11.25 SOLUTION RESPIRATORY (INHALATION) at 12:18

## 2018-01-01 RX ADMIN — HEPARIN SODIUM 5000 UNITS: 5000 INJECTION, SOLUTION INTRAVENOUS; SUBCUTANEOUS at 14:08

## 2018-01-01 RX ADMIN — HEPARIN SODIUM 5000 UNITS: 5000 INJECTION, SOLUTION INTRAVENOUS; SUBCUTANEOUS at 06:35

## 2018-01-01 RX ADMIN — NICOTINE 14 MG: 14 PATCH, EXTENDED RELEASE TRANSDERMAL at 06:01

## 2018-01-01 RX ADMIN — INSULIN HUMAN 2 UNITS: 100 INJECTION, SOLUTION PARENTERAL at 13:11

## 2018-01-01 RX ADMIN — DEXAMETHASONE 4 MG: 4 TABLET ORAL at 13:40

## 2018-01-01 RX ADMIN — SODIUM CHLORIDE: 9 INJECTION, SOLUTION INTRAVENOUS at 20:00

## 2018-01-01 RX ADMIN — PROPOFOL 15 MCG/KG/MIN: 10 INJECTION, EMULSION INTRAVENOUS at 13:34

## 2018-01-01 RX ADMIN — GABAPENTIN 300 MG: 300 CAPSULE ORAL at 06:00

## 2018-01-01 RX ADMIN — GUAIFENESIN 400 MG: 100 SOLUTION ORAL at 16:04

## 2018-01-01 RX ADMIN — SODIUM CHLORIDE 500 ML: 9 INJECTION, SOLUTION INTRAVENOUS at 18:32

## 2018-01-01 RX ADMIN — METHYLPREDNISOLONE SODIUM SUCCINATE 62.5 MG: 125 INJECTION, POWDER, FOR SOLUTION INTRAMUSCULAR; INTRAVENOUS at 17:46

## 2018-01-01 RX ADMIN — STANDARDIZED SENNA CONCENTRATE AND DOCUSATE SODIUM 2 TABLET: 8.6; 5 TABLET ORAL at 18:22

## 2018-01-01 RX ADMIN — HEPARIN SODIUM 5000 UNITS: 5000 INJECTION, SOLUTION INTRAVENOUS; SUBCUTANEOUS at 05:15

## 2018-01-01 RX ADMIN — FUROSEMIDE 20 MG: 10 INJECTION, SOLUTION INTRAMUSCULAR; INTRAVENOUS at 06:34

## 2018-01-01 RX ADMIN — HEPARIN SODIUM 5000 UNITS: 5000 INJECTION, SOLUTION INTRAVENOUS; SUBCUTANEOUS at 21:11

## 2018-01-01 RX ADMIN — SODIUM CHLORIDE: 9 INJECTION, SOLUTION INTRAVENOUS at 17:30

## 2018-01-01 RX ADMIN — DIPHENHYDRAMINE HCL 25 MG: 25 TABLET ORAL at 20:21

## 2018-01-01 RX ADMIN — GABAPENTIN 300 MG: 300 CAPSULE ORAL at 05:39

## 2018-01-01 RX ADMIN — SENNOSIDES AND DOCUSATE SODIUM 2 TABLET: 8.6; 5 TABLET ORAL at 05:17

## 2018-01-01 RX ADMIN — ACETAMINOPHEN 650 MG: 325 TABLET, FILM COATED ORAL at 20:21

## 2018-01-01 RX ADMIN — LEVOTHYROXINE SODIUM 88 MCG: 88 TABLET ORAL at 06:01

## 2018-01-01 RX ADMIN — HALOPERIDOL LACTATE 1 MG: 5 INJECTION, SOLUTION INTRAMUSCULAR at 14:26

## 2018-01-01 RX ADMIN — HEPARIN SODIUM 5000 UNITS: 5000 INJECTION, SOLUTION INTRAVENOUS; SUBCUTANEOUS at 05:17

## 2018-01-01 RX ADMIN — GABAPENTIN 300 MG: 300 CAPSULE ORAL at 05:16

## 2018-01-01 RX ADMIN — POTASSIUM CHLORIDE 20 MEQ: 1500 TABLET, EXTENDED RELEASE ORAL at 11:23

## 2018-01-01 RX ADMIN — HEPARIN SODIUM 5000 UNITS: 5000 INJECTION, SOLUTION INTRAVENOUS; SUBCUTANEOUS at 15:51

## 2018-01-01 RX ADMIN — GUAIFENESIN 200 MG: 100 SOLUTION ORAL at 06:04

## 2018-01-01 RX ADMIN — SODIUM CHLORIDE 1000 ML: 9 INJECTION, SOLUTION INTRAVENOUS at 15:13

## 2018-01-01 RX ADMIN — INFLUENZA A VIRUS A/MICHIGAN/45/2015 X-275 (H1N1) ANTIGEN (FORMALDEHYDE INACTIVATED), INFLUENZA A VIRUS A/SINGAPORE/INFIMH-16-0019/2016 IVR-186 (H3N2) ANTIGEN (FORMALDEHYDE INACTIVATED), AND INFLUENZA B VIRUS B/MARYLAND/15/2016 BX-69A (A B/COLORADO/6/2017-LIKE VIRUS) ANTIGEN (FORMALDEHYDE INACTIVATED) 0.5 ML: 60; 60; 60 INJECTION, SUSPENSION INTRAMUSCULAR at 10:47

## 2018-01-01 RX ADMIN — POTASSIUM PHOSPHATE, MONOBASIC AND POTASSIUM PHOSPHATE, DIBASIC 15 MMOL: 224; 236 INJECTION, SOLUTION INTRAVENOUS at 07:30

## 2018-01-01 RX ADMIN — DIBASIC SODIUM PHOSPHATE, MONOBASIC POTASSIUM PHOSPHATE AND MONOBASIC SODIUM PHOSPHATE 1 TABLET: 852; 155; 130 TABLET ORAL at 11:29

## 2018-01-01 RX ADMIN — ALBUTEROL SULFATE 2.5 MG: 2.5 SOLUTION RESPIRATORY (INHALATION) at 21:35

## 2018-01-01 RX ADMIN — GABAPENTIN 300 MG: 300 CAPSULE ORAL at 04:59

## 2018-01-01 RX ADMIN — PROPOFOL 20 MCG/KG/MIN: 10 INJECTION, EMULSION INTRAVENOUS at 10:40

## 2018-01-01 RX ADMIN — ALBUTEROL SULFATE 5 MG: 5 SOLUTION RESPIRATORY (INHALATION) at 22:11

## 2018-01-01 RX ADMIN — NICOTINE 14 MG: 14 PATCH, EXTENDED RELEASE TRANSDERMAL at 06:32

## 2018-01-01 RX ADMIN — MIDODRINE HYDROCHLORIDE 5 MG: 5 TABLET ORAL at 05:20

## 2018-01-01 RX ADMIN — HEPARIN SODIUM 5000 UNITS: 5000 INJECTION, SOLUTION INTRAVENOUS; SUBCUTANEOUS at 21:01

## 2018-01-01 RX ADMIN — GUAIFENESIN 200 MG: 100 SOLUTION ORAL at 13:17

## 2018-01-01 RX ADMIN — GUAIFENESIN 200 MG: 100 SOLUTION ORAL at 04:57

## 2018-01-01 RX ADMIN — LORAZEPAM 2 MG: 2 INJECTION INTRAMUSCULAR; INTRAVENOUS at 13:42

## 2018-01-01 RX ADMIN — DEXAMETHASONE SODIUM PHOSPHATE 4 MG: 4 INJECTION, SOLUTION INTRAMUSCULAR; INTRAVENOUS at 18:24

## 2018-01-01 RX ADMIN — HEPARIN SODIUM 5000 UNITS: 5000 INJECTION, SOLUTION INTRAVENOUS; SUBCUTANEOUS at 13:39

## 2018-01-01 RX ADMIN — GABAPENTIN 300 MG: 300 CAPSULE ORAL at 18:03

## 2018-01-01 RX ADMIN — NICOTINE 14 MG: 14 PATCH, EXTENDED RELEASE TRANSDERMAL at 06:28

## 2018-01-01 RX ADMIN — TRAZODONE HYDROCHLORIDE 50 MG: 50 TABLET ORAL at 21:14

## 2018-01-01 RX ADMIN — HEPARIN SODIUM 5000 UNITS: 5000 INJECTION, SOLUTION INTRAVENOUS; SUBCUTANEOUS at 22:24

## 2018-01-01 RX ADMIN — SODIUM CHLORIDE: 4.5 INJECTION, SOLUTION INTRAVENOUS at 23:05

## 2018-01-01 RX ADMIN — FAMOTIDINE 20 MG: 20 TABLET ORAL at 04:49

## 2018-01-01 RX ADMIN — POTASSIUM BICARBONATE 50 MEQ: 25 TABLET, EFFERVESCENT ORAL at 08:43

## 2018-01-01 RX ADMIN — LEVOTHYROXINE SODIUM 88 MCG: 88 TABLET ORAL at 05:25

## 2018-01-01 RX ADMIN — GABAPENTIN 300 MG: 300 CAPSULE ORAL at 17:44

## 2018-01-01 RX ADMIN — HEPARIN SODIUM 5000 UNITS: 5000 INJECTION, SOLUTION INTRAVENOUS; SUBCUTANEOUS at 06:01

## 2018-01-01 RX ADMIN — GABAPENTIN 300 MG: 300 CAPSULE ORAL at 18:05

## 2018-01-01 RX ADMIN — CARVEDILOL 3.12 MG: 3.12 TABLET, FILM COATED ORAL at 05:18

## 2018-01-01 RX ADMIN — MAGNESIUM SULFATE HEPTAHYDRATE 2 G: 40 INJECTION, SOLUTION INTRAVENOUS at 08:23

## 2018-01-01 RX ADMIN — ALBUTEROL SULFATE 2.5 MG: 2.5 SOLUTION RESPIRATORY (INHALATION) at 01:11

## 2018-01-01 RX ADMIN — PROPOFOL 15 MCG/KG/MIN: 10 INJECTION, EMULSION INTRAVENOUS at 15:12

## 2018-01-01 RX ADMIN — POTASSIUM PHOSPHATE, MONOBASIC AND POTASSIUM PHOSPHATE, DIBASIC 15 MMOL: 224; 236 INJECTION, SOLUTION INTRAVENOUS at 14:39

## 2018-01-01 RX ADMIN — HEPARIN SODIUM 5000 UNITS: 5000 INJECTION, SOLUTION INTRAVENOUS; SUBCUTANEOUS at 20:22

## 2018-01-01 RX ADMIN — GABAPENTIN 300 MG: 300 CAPSULE ORAL at 17:47

## 2018-01-01 RX ADMIN — LISINOPRIL 5 MG: 5 TABLET ORAL at 05:53

## 2018-01-01 RX ADMIN — POTASSIUM CHLORIDE 20 MEQ: 1500 TABLET, EXTENDED RELEASE ORAL at 17:13

## 2018-01-01 RX ADMIN — POTASSIUM CHLORIDE AND SODIUM CHLORIDE: 900; 150 INJECTION, SOLUTION INTRAVENOUS at 11:46

## 2018-01-01 RX ADMIN — NICOTINE 14 MG: 14 PATCH, EXTENDED RELEASE TRANSDERMAL at 04:22

## 2018-01-01 RX ADMIN — SIMVASTATIN 20 MG: 20 TABLET, FILM COATED ORAL at 17:09

## 2018-01-01 RX ADMIN — FUROSEMIDE 40 MG: 10 INJECTION, SOLUTION INTRAMUSCULAR; INTRAVENOUS at 05:37

## 2018-01-01 RX ADMIN — NICOTINE 14 MG: 14 PATCH, EXTENDED RELEASE TRANSDERMAL at 05:30

## 2018-01-01 RX ADMIN — PROPOFOL 30 MCG/KG/MIN: 10 INJECTION, EMULSION INTRAVENOUS at 20:57

## 2018-01-01 RX ADMIN — HEPARIN SODIUM 5000 UNITS: 5000 INJECTION, SOLUTION INTRAVENOUS; SUBCUTANEOUS at 17:30

## 2018-01-01 RX ADMIN — HEPARIN SODIUM 5000 UNITS: 5000 INJECTION, SOLUTION INTRAVENOUS; SUBCUTANEOUS at 05:10

## 2018-01-01 RX ADMIN — LEVOTHYROXINE SODIUM 88 MCG: 88 TABLET ORAL at 11:52

## 2018-01-01 RX ADMIN — DEXAMETHASONE 4 MG: 4 TABLET ORAL at 02:57

## 2018-01-01 RX ADMIN — DEXAMETHASONE 4 MG: 4 TABLET ORAL at 13:14

## 2018-01-01 RX ADMIN — RACEPINEPHRINE HYDROCHLORIDE 0.5 ML: 11.25 SOLUTION RESPIRATORY (INHALATION) at 20:15

## 2018-01-01 RX ADMIN — POTASSIUM BICARBONATE 25 MEQ: 25 TABLET, EFFERVESCENT ORAL at 11:59

## 2018-01-01 RX ADMIN — GABAPENTIN 300 MG: 300 CAPSULE ORAL at 06:01

## 2018-01-01 RX ADMIN — FAMOTIDINE 20 MG: 20 TABLET ORAL at 05:20

## 2018-01-01 RX ADMIN — HEPARIN SODIUM 5000 UNITS: 5000 INJECTION, SOLUTION INTRAVENOUS; SUBCUTANEOUS at 21:02

## 2018-01-01 RX ADMIN — FUROSEMIDE 40 MG: 10 INJECTION, SOLUTION INTRAMUSCULAR; INTRAVENOUS at 11:08

## 2018-01-01 RX ADMIN — SODIUM CHLORIDE, POTASSIUM CHLORIDE, SODIUM LACTATE AND CALCIUM CHLORIDE 1000 ML: 600; 310; 30; 20 INJECTION, SOLUTION INTRAVENOUS at 18:26

## 2018-01-01 RX ADMIN — FUROSEMIDE 40 MG: 10 INJECTION, SOLUTION INTRAMUSCULAR; INTRAVENOUS at 11:07

## 2018-01-01 RX ADMIN — STANDARDIZED SENNA CONCENTRATE AND DOCUSATE SODIUM 2 TABLET: 8.6; 5 TABLET ORAL at 05:20

## 2018-01-01 RX ADMIN — HYDROCORTISONE SODIUM SUCCINATE 50 MG: 100 INJECTION, POWDER, FOR SOLUTION INTRAMUSCULAR; INTRAVENOUS at 11:08

## 2018-01-01 RX ADMIN — POTASSIUM CHLORIDE 10 MEQ: 7.46 INJECTION, SOLUTION INTRAVENOUS at 10:38

## 2018-01-01 RX ADMIN — HYDROCORTISONE SODIUM SUCCINATE 50 MG: 100 INJECTION, POWDER, FOR SOLUTION INTRAMUSCULAR; INTRAVENOUS at 14:08

## 2018-01-01 RX ADMIN — PROPOFOL 20 MCG/KG/MIN: 10 INJECTION, EMULSION INTRAVENOUS at 04:50

## 2018-01-01 RX ADMIN — STANDARDIZED SENNA CONCENTRATE AND DOCUSATE SODIUM 2 TABLET: 8.6; 5 TABLET ORAL at 18:19

## 2018-01-01 RX ADMIN — PROPOFOL 15 MCG/KG/MIN: 10 INJECTION, EMULSION INTRAVENOUS at 12:23

## 2018-01-01 RX ADMIN — GABAPENTIN 300 MG: 300 CAPSULE ORAL at 17:13

## 2018-01-01 RX ADMIN — PROPOFOL 15 MCG/KG/MIN: 10 INJECTION, EMULSION INTRAVENOUS at 03:38

## 2018-01-01 RX ADMIN — PROPOFOL 20 MCG/KG/MIN: 10 INJECTION, EMULSION INTRAVENOUS at 19:14

## 2018-01-01 RX ADMIN — FAMOTIDINE 20 MG: 20 TABLET ORAL at 04:57

## 2018-01-01 RX ADMIN — PROPOFOL 20 MCG/KG/MIN: 10 INJECTION, EMULSION INTRAVENOUS at 14:28

## 2018-01-01 RX ADMIN — HEPARIN SODIUM 5000 UNITS: 5000 INJECTION, SOLUTION INTRAVENOUS; SUBCUTANEOUS at 13:14

## 2018-01-01 RX ADMIN — GABAPENTIN 300 MG: 300 CAPSULE ORAL at 19:58

## 2018-01-01 RX ADMIN — LEVOTHYROXINE SODIUM 88 MCG: 88 TABLET ORAL at 06:15

## 2018-01-01 RX ADMIN — GABAPENTIN 300 MG: 300 CAPSULE ORAL at 17:48

## 2018-01-01 RX ADMIN — GABAPENTIN 300 MG: 300 CAPSULE ORAL at 06:12

## 2018-01-01 RX ADMIN — HEPARIN SODIUM 5000 UNITS: 5000 INJECTION, SOLUTION INTRAVENOUS; SUBCUTANEOUS at 12:45

## 2018-01-01 RX ADMIN — FUROSEMIDE 40 MG: 10 INJECTION, SOLUTION INTRAMUSCULAR; INTRAVENOUS at 21:11

## 2018-01-01 RX ADMIN — HALOPERIDOL LACTATE 1 MG: 5 INJECTION, SOLUTION INTRAMUSCULAR at 20:44

## 2018-01-01 RX ADMIN — LEVOTHYROXINE SODIUM 88 MCG: 88 TABLET ORAL at 05:45

## 2018-01-01 RX ADMIN — ACETAMINOPHEN 650 MG: 325 TABLET, FILM COATED ORAL at 21:02

## 2018-01-01 RX ADMIN — RACEPINEPHRINE HYDROCHLORIDE 0.5 ML: 11.25 SOLUTION RESPIRATORY (INHALATION) at 07:37

## 2018-01-01 RX ADMIN — METHYLPREDNISOLONE SODIUM SUCCINATE 40 MG: 40 INJECTION, POWDER, FOR SOLUTION INTRAMUSCULAR; INTRAVENOUS at 20:53

## 2018-01-01 RX ADMIN — GUAIFENESIN 200 MG: 100 SOLUTION ORAL at 11:54

## 2018-01-01 RX ADMIN — SIMVASTATIN 20 MG: 20 TABLET, FILM COATED ORAL at 17:12

## 2018-01-01 RX ADMIN — GABAPENTIN 300 MG: 300 CAPSULE ORAL at 05:26

## 2018-01-01 RX ADMIN — DEXAMETHASONE SODIUM PHOSPHATE 4 MG: 4 INJECTION, SOLUTION INTRAMUSCULAR; INTRAVENOUS at 12:45

## 2018-01-01 RX ADMIN — HEPARIN SODIUM 5000 UNITS: 5000 INJECTION, SOLUTION INTRAVENOUS; SUBCUTANEOUS at 13:49

## 2018-01-01 RX ADMIN — MIDODRINE HYDROCHLORIDE 5 MG: 5 TABLET ORAL at 04:49

## 2018-01-01 RX ADMIN — HYDROCORTISONE SODIUM SUCCINATE 50 MG: 100 INJECTION, POWDER, FOR SOLUTION INTRAMUSCULAR; INTRAVENOUS at 02:04

## 2018-01-01 RX ADMIN — HEPARIN SODIUM 5000 UNITS: 5000 INJECTION, SOLUTION INTRAVENOUS; SUBCUTANEOUS at 04:48

## 2018-01-01 RX ADMIN — GABAPENTIN 300 MG: 300 CAPSULE ORAL at 17:21

## 2018-01-01 ASSESSMENT — PATIENT HEALTH QUESTIONNAIRE - PHQ9
SUM OF ALL RESPONSES TO PHQ9 QUESTIONS 1 AND 2: 0
2. FEELING DOWN, DEPRESSED, IRRITABLE, OR HOPELESS: NOT AT ALL
1. LITTLE INTEREST OR PLEASURE IN DOING THINGS: NOT AT ALL
2. FEELING DOWN, DEPRESSED, IRRITABLE, OR HOPELESS: NOT AT ALL
1. LITTLE INTEREST OR PLEASURE IN DOING THINGS: NOT AT ALL
2. FEELING DOWN, DEPRESSED, IRRITABLE, OR HOPELESS: NOT AT ALL
2. FEELING DOWN, DEPRESSED, IRRITABLE, OR HOPELESS: NOT AT ALL
1. LITTLE INTEREST OR PLEASURE IN DOING THINGS: NOT AT ALL
SUM OF ALL RESPONSES TO PHQ9 QUESTIONS 1 AND 2: 0
SUM OF ALL RESPONSES TO PHQ9 QUESTIONS 1 AND 2: 0
2. FEELING DOWN, DEPRESSED, IRRITABLE, OR HOPELESS: NOT AT ALL
SUM OF ALL RESPONSES TO PHQ9 QUESTIONS 1 AND 2: 0
1. LITTLE INTEREST OR PLEASURE IN DOING THINGS: NOT AT ALL
SUM OF ALL RESPONSES TO PHQ9 QUESTIONS 1 AND 2: 0
SUM OF ALL RESPONSES TO PHQ9 QUESTIONS 1 AND 2: 0

## 2018-01-01 ASSESSMENT — ENCOUNTER SYMPTOMS
BLURRED VISION: 0
FATIGUE: 1
SHORTNESS OF BREATH: 1
HEADACHES: 0
DOUBLE VISION: 0
ENDOCRINE NEGATIVE: 1
BLURRED VISION: 0
DEPRESSION: 0
FEVER: 0
STRIDOR: 0
PHOTOPHOBIA: 0
CARDIOVASCULAR NEGATIVE: 1
TREMORS: 0
FEVER: 0
DIZZINESS: 0
EYE ITCHING: 0
DIARRHEA: 0
SORE THROAT: 1
NERVOUS/ANXIOUS: 1
FOCAL WEAKNESS: 0
FEVER: 0
COLOR CHANGE: 0
NAUSEA: 0
CHILLS: 0
PALPITATIONS: 0
HEADACHES: 0
PALPITATIONS: 0
RESPIRATORY NEGATIVE: 1
DEPRESSION: 0
SEIZURES: 0
PALPITATIONS: 0
FOCAL WEAKNESS: 0
FOCAL WEAKNESS: 0
SHORTNESS OF BREATH: 1
MYALGIAS: 0
ABDOMINAL PAIN: 0
HEMATOLOGIC/LYMPHATIC NEGATIVE: 1
VOMITING: 0
SPUTUM PRODUCTION: 0
DIAPHORESIS: 0
ABDOMINAL PAIN: 0
FACIAL SWELLING: 0
DEPRESSION: 0
ACTIVITY CHANGE: 0
WEAKNESS: 1
MYALGIAS: 0
COUGH: 1
SHORTNESS OF BREATH: 0
SHORTNESS OF BREATH: 1
SHORTNESS OF BREATH: 0
FEVER: 0
APNEA: 0
COUGH: 0
ABDOMINAL PAIN: 0
DIARRHEA: 0
ABDOMINAL PAIN: 0
HEADACHES: 0
PALPITATIONS: 0
SHORTNESS OF BREATH: 0
CHOKING: 0
EYE PAIN: 0
PHOTOPHOBIA: 0
EYE DISCHARGE: 0
FEVER: 0
SHORTNESS OF BREATH: 0
COUGH: 1
DEPRESSION: 0
PSYCHIATRIC NEGATIVE: 1
VOMITING: 0
CHILLS: 0
TINGLING: 0
HEADACHES: 0
WHEEZING: 0
COUGH: 0
WEAKNESS: 1
NERVOUS/ANXIOUS: 1
CHILLS: 0
PALPITATIONS: 0
WHEEZING: 1
ORTHOPNEA: 1
SINUS PRESSURE: 0
BLOOD IN STOOL: 0
ALLERGIC/IMMUNOLOGIC NEGATIVE: 1
SPUTUM PRODUCTION: 1
DOUBLE VISION: 0
WEAKNESS: 1
PSYCHIATRIC NEGATIVE: 1
ABDOMINAL PAIN: 0
SORE THROAT: 0
ABDOMINAL PAIN: 0
LOSS OF CONSCIOUSNESS: 0
MYALGIAS: 0
PALPITATIONS: 0
PSYCHIATRIC NEGATIVE: 1
PALPITATIONS: 0
COUGH: 0
SPUTUM PRODUCTION: 1
VOMITING: 0
GASTROINTESTINAL NEGATIVE: 1
FEVER: 0
WHEEZING: 1
SHORTNESS OF BREATH: 0
SHORTNESS OF BREATH: 1
DIARRHEA: 0
NAUSEA: 0
NAUSEA: 0
COUGH: 0
CHEST TIGHTNESS: 0
ARTHRALGIAS: 0
FEVER: 0
NAUSEA: 0
SHORTNESS OF BREATH: 0
STRIDOR: 0
POLYDIPSIA: 0
PALPITATIONS: 0
MUSCULOSKELETAL NEGATIVE: 1
HALLUCINATIONS: 0
PND: 0
NAUSEA: 0
PALPITATIONS: 0
SORE THROAT: 0
NAUSEA: 0
CHILLS: 0
CHILLS: 0
HEADACHES: 0
EYE PAIN: 0
VOMITING: 0
SHORTNESS OF BREATH: 0
SHORTNESS OF BREATH: 0
HEMATOLOGIC/LYMPHATIC NEGATIVE: 1
COUGH: 0
FOCAL WEAKNESS: 0
EYE DISCHARGE: 0
TROUBLE SWALLOWING: 1
PALPITATIONS: 0
STRIDOR: 0
SHORTNESS OF BREATH: 0
NERVOUS/ANXIOUS: 0
HEADACHES: 0
EYE DISCHARGE: 0
EYE PAIN: 0
EYE REDNESS: 0
NAUSEA: 0
VOICE CHANGE: 1
SPEECH DIFFICULTY: 1
DIAPHORESIS: 0
EYE ITCHING: 0
STRIDOR: 1
ABDOMINAL PAIN: 0
GASTROINTESTINAL NEGATIVE: 1
HYPERACTIVE: 0
CHILLS: 0
CHILLS: 0
PSYCHIATRIC NEGATIVE: 1
DIZZINESS: 0
NERVOUS/ANXIOUS: 1
CONSTITUTIONAL NEGATIVE: 1
SEIZURES: 0
SPUTUM PRODUCTION: 1
FALLS: 0
WHEEZING: 0
ABDOMINAL PAIN: 0
NEUROLOGICAL NEGATIVE: 1
HEADACHES: 0
LOSS OF CONSCIOUSNESS: 0
STRIDOR: 0
STRIDOR: 0
PHOTOPHOBIA: 0
CONSTIPATION: 0
ENDOCRINE NEGATIVE: 1
EYE ITCHING: 0
BACK PAIN: 0
COUGH: 1
LOSS OF CONSCIOUSNESS: 0
FEVER: 0
CHEST TIGHTNESS: 0
FATIGUE: 0
SHORTNESS OF BREATH: 1
DIZZINESS: 0
SORE THROAT: 0
VOMITING: 0
PSYCHIATRIC NEGATIVE: 1
FEVER: 0
WEAKNESS: 1
HEADACHES: 0
SORE THROAT: 0
ABDOMINAL PAIN: 0
SHORTNESS OF BREATH: 0
STRIDOR: 0
MYALGIAS: 0
FEVER: 0
CONSTITUTIONAL NEGATIVE: 1
VOMITING: 0
TROUBLE SWALLOWING: 0
CHILLS: 0
FEVER: 0
CHILLS: 0
FALLS: 0
FOCAL WEAKNESS: 0
VOMITING: 0
HEADACHES: 0
COUGH: 0
COUGH: 0
SORE THROAT: 1
SHORTNESS OF BREATH: 0
CHILLS: 0
NAUSEA: 0
NERVOUS/ANXIOUS: 0
CONSTITUTIONAL NEGATIVE: 1
CHILLS: 0
NAUSEA: 0
ORTHOPNEA: 0
ORTHOPNEA: 0
NECK PAIN: 0
ACTIVITY CHANGE: 1
SPEECH DIFFICULTY: 1
SPUTUM PRODUCTION: 1
FALLS: 0
ADENOPATHY: 0
TROUBLE SWALLOWING: 1
APPETITE CHANGE: 0
ABDOMINAL DISTENTION: 0

## 2018-01-01 ASSESSMENT — COGNITIVE AND FUNCTIONAL STATUS - GENERAL
DAILY ACTIVITIY SCORE: 17
DRESSING REGULAR UPPER BODY CLOTHING: A LITTLE
CLIMB 3 TO 5 STEPS WITH RAILING: A LOT
TURNING FROM BACK TO SIDE WHILE IN FLAT BAD: A LITTLE
DRESSING REGULAR LOWER BODY CLOTHING: A LITTLE
HELP NEEDED FOR BATHING: A LITTLE
DAILY ACTIVITIY SCORE: 16
SUGGESTED CMS G CODE MODIFIER DAILY ACTIVITY: CJ
SUGGESTED CMS G CODE MODIFIER DAILY ACTIVITY: CK
DRESSING REGULAR LOWER BODY CLOTHING: A LITTLE
MOVING TO AND FROM BED TO CHAIR: UNABLE
SUGGESTED CMS G CODE MODIFIER MOBILITY: CL
TURNING FROM BACK TO SIDE WHILE IN FLAT BAD: A LOT
TOILETING: A LITTLE
SUGGESTED CMS G CODE MODIFIER MOBILITY: CL
DAILY ACTIVITIY SCORE: 19
DAILY ACTIVITIY SCORE: 20
CLIMB 3 TO 5 STEPS WITH RAILING: A LOT
STANDING UP FROM CHAIR USING ARMS: A LITTLE
HELP NEEDED FOR BATHING: A LITTLE
MOBILITY SCORE: 11
MOBILITY SCORE: 11
MOVING FROM LYING ON BACK TO SITTING ON SIDE OF FLAT BED: UNABLE
WALKING IN HOSPITAL ROOM: A LOT
HELP NEEDED FOR BATHING: A LITTLE
WALKING IN HOSPITAL ROOM: A LITTLE
CLIMB 3 TO 5 STEPS WITH RAILING: A LOT
MOVING TO AND FROM BED TO CHAIR: A LOT
DAILY ACTIVITIY SCORE: 19
SUGGESTED CMS G CODE MODIFIER DAILY ACTIVITY: CK
HELP NEEDED FOR BATHING: A LITTLE
TOILETING: A LITTLE
PERSONAL GROOMING: A LITTLE
DRESSING REGULAR UPPER BODY CLOTHING: A LITTLE
STANDING UP FROM CHAIR USING ARMS: A LITTLE
TURNING FROM BACK TO SIDE WHILE IN FLAT BAD: UNABLE
DRESSING REGULAR LOWER BODY CLOTHING: A LITTLE
PERSONAL GROOMING: A LITTLE
TOILETING: A LITTLE
SUGGESTED CMS G CODE MODIFIER MOBILITY: CL
STANDING UP FROM CHAIR USING ARMS: A LITTLE
SUGGESTED CMS G CODE MODIFIER MOBILITY: CL
SUGGESTED CMS G CODE MODIFIER DAILY ACTIVITY: CK
HELP NEEDED FOR BATHING: A LOT
MOVING FROM LYING ON BACK TO SITTING ON SIDE OF FLAT BED: UNABLE
DRESSING REGULAR UPPER BODY CLOTHING: A LITTLE
CLIMB 3 TO 5 STEPS WITH RAILING: A LOT
WALKING IN HOSPITAL ROOM: A LITTLE
DRESSING REGULAR UPPER BODY CLOTHING: A LITTLE
DRESSING REGULAR LOWER BODY CLOTHING: A LITTLE
SUGGESTED CMS G CODE MODIFIER MOBILITY: CL
STANDING UP FROM CHAIR USING ARMS: A LOT
SUGGESTED CMS G CODE MODIFIER DAILY ACTIVITY: CK
DRESSING REGULAR UPPER BODY CLOTHING: A LITTLE
DRESSING REGULAR UPPER BODY CLOTHING: A LITTLE
DRESSING REGULAR LOWER BODY CLOTHING: A LOT
DAILY ACTIVITIY SCORE: 19
TOILETING: A LITTLE
CLIMB 3 TO 5 STEPS WITH RAILING: A LOT
MOVING TO AND FROM BED TO CHAIR: UNABLE
STANDING UP FROM CHAIR USING ARMS: A LITTLE
TURNING FROM BACK TO SIDE WHILE IN FLAT BAD: UNABLE
SUGGESTED CMS G CODE MODIFIER DAILY ACTIVITY: CK
PERSONAL GROOMING: A LITTLE
MOBILITY SCORE: 12
PERSONAL GROOMING: A LITTLE
HELP NEEDED FOR BATHING: A LITTLE
MOVING TO AND FROM BED TO CHAIR: A LOT
MOBILITY SCORE: 14
MOVING TO AND FROM BED TO CHAIR: UNABLE
DRESSING REGULAR LOWER BODY CLOTHING: A LITTLE
WALKING IN HOSPITAL ROOM: A LITTLE
MOBILITY SCORE: 14
WALKING IN HOSPITAL ROOM: A LITTLE
PERSONAL GROOMING: A LITTLE
SUGGESTED CMS G CODE MODIFIER DAILY ACTIVITY: CK
DRESSING REGULAR UPPER BODY CLOTHING: A LITTLE
DRESSING REGULAR LOWER BODY CLOTHING: A LITTLE
HELP NEEDED FOR BATHING: A LITTLE
TOILETING: A LITTLE
EATING MEALS: TOTAL
TOILETING: A LITTLE
MOVING FROM LYING ON BACK TO SITTING ON SIDE OF FLAT BED: UNABLE
MOVING FROM LYING ON BACK TO SITTING ON SIDE OF FLAT BED: A LOT
TOILETING: A LITTLE
PERSONAL GROOMING: A LITTLE
DAILY ACTIVITIY SCORE: 19
MOVING FROM LYING ON BACK TO SITTING ON SIDE OF FLAT BED: UNABLE

## 2018-01-01 ASSESSMENT — LIFESTYLE VARIABLES
DO YOU DRINK ALCOHOL: YES
HAVE YOU EVER FELT YOU SHOULD CUT DOWN ON YOUR DRINKING: NO
ON A TYPICAL DAY WHEN YOU DRINK ALCOHOL HOW MANY DRINKS DO YOU HAVE: 2
EVER_SMOKED: YES
EVER HAD A DRINK FIRST THING IN THE MORNING TO STEADY YOUR NERVES TO GET RID OF A HANGOVER: NO
HAVE PEOPLE ANNOYED YOU BY CRITICIZING YOUR DRINKING: NO
CONSUMPTION TOTAL: POSITIVE
DO YOU DRINK ALCOHOL: NO
EVER_SMOKED: YES
EVER_SMOKED: YES
TOTAL SCORE: 0
HOW MANY TIMES IN THE PAST YEAR HAVE YOU HAD 5 OR MORE DRINKS IN A DAY: 0
TOTAL SCORE: 0
EVER FELT BAD OR GUILTY ABOUT YOUR DRINKING: NO
ALCOHOL_USE: NO
SUBSTANCE_ABUSE: 0
AVERAGE NUMBER OF DAYS PER WEEK YOU HAVE A DRINK CONTAINING ALCOHOL: 7
TOTAL SCORE: 0

## 2018-01-01 ASSESSMENT — PAIN SCALES - GENERAL
PAINLEVEL_OUTOF10: 0
PAINLEVEL_OUTOF10: 3
PAINLEVEL_OUTOF10: 0
PAINLEVEL_OUTOF10: 5
PAINLEVEL_OUTOF10: 0
PAINLEVEL_OUTOF10: 4
PAINLEVEL_OUTOF10: 0
PAINLEVEL_OUTOF10: 1
PAINLEVEL_OUTOF10: 0

## 2018-01-01 ASSESSMENT — COPD QUESTIONNAIRES
DURING THE PAST 4 WEEKS HOW MUCH DID YOU FEEL SHORT OF BREATH: SOME OF THE TIME
HAVE YOU SMOKED AT LEAST 100 CIGARETTES IN YOUR ENTIRE LIFE: YES
DURING THE PAST 4 WEEKS HOW MUCH DID YOU FEEL SHORT OF BREATH: SOME OF THE TIME
HAVE YOU SMOKED AT LEAST 100 CIGARETTES IN YOUR ENTIRE LIFE: YES
DO YOU EVER COUGH UP ANY MUCUS OR PHLEGM?: NO/ONLY WITH OCCASIONAL COLDS OR INFECTIONS
COPD SCREENING SCORE: 7
COPD SCREENING SCORE: 6
DO YOU EVER COUGH UP ANY MUCUS OR PHLEGM?: YES, A FEW DAYS A WEEK OR MONTH

## 2018-01-01 ASSESSMENT — GAIT ASSESSMENTS
ASSISTIVE DEVICE: FRONT WHEEL WALKER
ASSISTIVE DEVICE: FRONT WHEEL WALKER
DISTANCE (FEET): 65
DISTANCE (FEET): 5
GAIT LEVEL OF ASSIST: CONTACT GUARD ASSIST
DEVIATION: STEP TO;DECREASED BASE OF SUPPORT;SHUFFLED GAIT;BRADYKINETIC
DEVIATION: STEP TO;BRADYKINETIC;SHUFFLED GAIT;OTHER (COMMENT)
ASSISTIVE DEVICE: OTHER (COMMENTS)
DISTANCE (FEET): 3
DEVIATION: SHUFFLED GAIT;BRADYKINETIC;OTHER (COMMENT)
GAIT LEVEL OF ASSIST: CONTACT GUARD ASSIST
DEVIATION: STEP TO;DECREASED BASE OF SUPPORT;SHUFFLED GAIT;BRADYKINETIC
DISTANCE (FEET): 15
GAIT LEVEL OF ASSIST: CONTACT GUARD ASSIST
GAIT LEVEL OF ASSIST: CONTACT GUARD ASSIST

## 2018-01-01 ASSESSMENT — PULMONARY FUNCTION TESTS
FVC: .6
FVC: 427
FVC: .8
FVC: .7
FVC: .6
FVC: .7
FVC: 339

## 2018-01-01 ASSESSMENT — ACTIVITIES OF DAILY LIVING (ADL): TOILETING: INDEPENDENT

## 2018-09-28 PROBLEM — Z72.0 TOBACCO ABUSE: Status: ACTIVE | Noted: 2018-01-01

## 2018-09-28 PROBLEM — R09.02 HYPOXIA: Status: ACTIVE | Noted: 2018-01-01

## 2018-09-28 PROBLEM — E03.9 HYPOTHYROIDISM: Status: ACTIVE | Noted: 2018-01-01

## 2018-09-28 PROBLEM — J81.1 PULMONARY EDEMA: Status: ACTIVE | Noted: 2018-01-01

## 2018-09-28 PROBLEM — I10 HTN (HYPERTENSION): Status: ACTIVE | Noted: 2018-01-01

## 2018-09-29 PROBLEM — J96.01 ACUTE HYPOXEMIC RESPIRATORY FAILURE (HCC): Status: ACTIVE | Noted: 2018-01-01

## 2018-09-29 PROBLEM — E87.6 HYPOKALEMIA: Status: ACTIVE | Noted: 2018-01-01

## 2018-09-29 NOTE — H&P
Hospital Medicine History & Physical Note    Date of Service  9/28/2018    Primary Care Physician  Jay Sanchez M.D.    Consultants  None    Code Status  Full    Chief Complaint  Chief Complaint   Patient presents with   • Shortness of Breath     3 weeks on and off   • Peripheral Edema     started 3 weeks ago in her feet and is now up to her thighs       History of Presenting Illness  78 y.o. female who presented on 9/28/2018 in transfer from outside hospital for shortness of breath with hypoxia.  The patient presented to a hospital in [facility] with complaints of 1 month or shortness of breath associated with bipedal edema.  The patient states that she was seen by her primary care physician who reasonably placed her on a inhaler because of her history of tobacco abuse and increase her home hydrochlorothiazide.  Despite this, the patient has had no improvement.  Today she had worsening shortness of breath and at the outside facility, she was noted to be hypoxic at 84% on room air.  The patient also reports dyspnea on exertion and orthopnea.  Her recent medical history is significant for a GI bleed status post EGD with clipping of a bleeding ulcer on September 11 although I do not have any records to confirm this.    Additional workup at the outside facility included a chest x-ray which showed prominent densities at the lung bases, WBC 10.3 hemoglobin 12.7 hematocrit 40.6 platelet 266 sodium 140 potassium 3.9 toward 103 CO2 30 BUN 20 creatinine 0.5 glucose 102.  She is transferred to our facility for higher level of care.      Review of Systems  Review of Systems   Constitutional: Negative for chills and fever.   HENT: Negative for congestion and sore throat.    Eyes: Negative for photophobia.   Respiratory: Positive for shortness of breath. Negative for cough and wheezing.    Cardiovascular: Positive for orthopnea and leg swelling. Negative for chest pain and palpitations.   Gastrointestinal: Negative for  "abdominal pain, diarrhea, nausea and vomiting.   Genitourinary: Negative for dysuria.   Musculoskeletal: Negative for myalgias.   Skin: Negative.    Neurological: Negative for dizziness, tingling, focal weakness and headaches.   Psychiatric/Behavioral: Negative for depression and suicidal ideas.       Past Medical History  Past Medical History:   Diagnosis Date   • Arthritis    • Cancer (HCC)     Nonhodgkins lymphoma     • Hypertension    • Unspecified disorder of thyroid     hypothyroid       Surgical History  Past Surgical History:   Procedure Laterality Date   • CERVICAL FUSION POSTERIOR  10/2/2009    Performed by RICARDO IYRE at SURGERY [facility] ORS   • CERVICAL LAMINECTOMY POSTERIOR  10/2/2009    Performed by RICARDO IYER at SURGERY [facility] ORS   • GYN SURGERY      Hysterectomy     • OTHER      colonoscopy and endoscopy       Family History  Maternal grandmother with breast cancer, mother with \"bad heart\" who had an MI    Social History  Social History   Substance Use Topics   • Smoking status: Current Every Day Smoker     Types: Cigarettes   • Smokeless tobacco: Never Used   • Alcohol use Yes      Comment: occ       Allergies  No Known Allergies    Medications  No current facility-administered medications on file prior to encounter.      No current outpatient prescriptions on file prior to encounter.       Physical Exam  Hemodynamics  Temp (24hrs), Av.8 °C (98.2 °F), Min:36.8 °C (98.2 °F), Max:36.8 °C (98.2 °F)   Temperature: 36.8 °C (98.2 °F)  Pulse  Av  Min: 77  Max: 95    Blood Pressure : 137/99, NIBP: 154/80      Respiratory      Respiration: 18, Pulse Oximetry: 98 %        RUL Breath Sounds: Absent, RML Breath Sounds: Absent, RLL Breath Sounds: Absent, LORI Breath Sounds: Diminished, LLL Breath Sounds: Diminished    Physical Exam   Constitutional: She is oriented to person, place, and time. No distress.   Elderly, frail   HENT:   Head: Normocephalic and atraumatic.   Right " Ear: External ear normal.   Left Ear: External ear normal.   Eyes: EOM are normal. Right eye exhibits no discharge. Left eye exhibits no discharge.   Neck: Neck supple. No JVD present.   Cardiovascular: Normal rate, regular rhythm and normal heart sounds.    Pulmonary/Chest: No respiratory distress. She exhibits no tenderness.   Increased work of breathing, very poor air movement and diminished breath sounds diffusely, mild crackles heard at bases   Abdominal: Soft. Bowel sounds are normal. She exhibits no distension. There is no tenderness.   Musculoskeletal: Normal range of motion. She exhibits edema (2-3+ pitting to mid shin).   Neurological: She is alert and oriented to person, place, and time. No cranial nerve deficit.   Skin: Skin is warm and dry. She is not diaphoretic. No erythema.   Psychiatric: She has a normal mood and affect. Her behavior is normal.   Nursing note and vitals reviewed.    Capillary refill less than 3 seconds, distal pulses intact    Laboratory:  Recent Labs      09/28/18 2022   WBC  9.4   RBC  4.19*   HEMOGLOBIN  12.3   HEMATOCRIT  38.9   MCV  92.8   MCH  29.4   MCHC  31.6*   RDW  65.4*   PLATELETCT  236   MPV  11.3     Recent Labs      09/28/18 2022   SODIUM  141   POTASSIUM  5.1   CHLORIDE  103   CO2  29   GLUCOSE  111*   BUN  19   CREATININE  0.63   CALCIUM  9.1     Recent Labs      09/28/18 2022   ALTSGPT  17   ASTSGOT  31   ALKPHOSPHAT  57   TBILIRUBIN  1.1   GLUCOSE  111*     Recent Labs      09/28/18 2022   APTT  25.9   INR  1.40*             Lab Results   Component Value Date    TROPONINI 0.04 09/28/2018       Imaging  No results found.      Assessment/Plan:  Anticipate that patient will need greater than 2 midnights for management of the discussed medical issues.    * Pulmonary edema   Assessment & Plan    Chest x-ray today compared to last year shows vastly different findings by my read.  Her heart is secured by significant pulmonary edema however I suspect underlying  undiagnosed congestive heart failure in light of her symptoms of dyspnea on exertion, orthopnea, and clinical evidence of worsening peripheral edema.  I will check an echocardiogram to confirm.  At this point, unclear if this is systolic or diastolic dysfunction but in light of her smoking history and likely COPD, right heart failure would be higher on the differentials.  I will place the patient on IV Lasix and will monitor strict intake and output, we will watch her on the telemetry floor, BNP has been ordered and is pending.  She will be on fluid and sodium restrictions.  If her kidneys tolerate diuretics, we can be more aggressive and increase the dose.  I have also added an ACE inhibitor and a beta-blocker but this can be held if her blood pressure drops in order to allow for more room for diuresis.        Hypoxia   Assessment & Plan    Likely multifactorial, possible underlying COPD in light of her long-standing smoking history and the fact that she continues to smoke.  Additionally, she may also have undiagnosed CHF.  Treatment as noted above for pulmonary edema, placed on respiratory therapy protocol and supplemental O2.  Tobacco cessation has been discussed.        HTN (hypertension)   Assessment & Plan    Holding home hydrochlorothiazide to allow more room for aggressive diuresis.  Monitor blood pressures and adjust home medications if needed        Hypothyroidism   Assessment & Plan    This is chronic and stable, continue home Synthroid.        Tobacco abuse   Assessment & Plan    This patient continues to smoke cigarettes. 3 minutes were spent counseling the patient in cessation techniques. Patient understands smoking increases risk factors for stroke and death. Patient has been unsuccessful in the past with quitting and is not interested at this point.  The benefits of stopping were presented and nicotine replacement has been ordered to assist with withdrawal from nicotine during hospitalization and we  will continue to encourage cessation and discuss other supportive resources including community groups and prescription medications.            Prophylaxis: Sequential compression devices for DVT prophylaxis, no PPI indicated, bowel protocol as needed    I spent a total of 35 minutes of non face to face time performing additional research, reviewing medical records from transferring facility, discussing plan of care with other healthcare providers. Start time: 9:15PM. End time: 9:50PM.

## 2018-09-29 NOTE — PROGRESS NOTES
When giving oral meds, pt began to choke and had a difficult time swallowing. Pt had previously passed her swallow eval in the ER last night. Call Dr. Stearns and received orders to keep her NPO until speech comes evaluates.

## 2018-09-29 NOTE — ED TRIAGE NOTES
[facility], tx from [place]  Chief Complaint   Patient presents with   • Shortness of Breath     3 weeks on and off   • Peripheral Edema     started 3 weeks ago in her feet and is now up to her thighs     Pt has pitting edema from her feet to her upper outer thighs, has been getting worse over the last 3 weeks.  Pt has been on O2 at 4L, but does not normally wear oxygen at home.  No breath sounds on the R side, per report pt has a pleural effusion and pericardial effusion.  Pt is resting comfortably in bed, denies any pain.  Chart up for ERP.

## 2018-09-29 NOTE — ED NOTES
Dysphagia screening preformed on patient. Pt able to maintain oral secretions. Pt given meal box and water per admitting MD coulter.

## 2018-09-29 NOTE — PROGRESS NOTES
Bedside report received. Pt A&Ox4. No complaints of pain. SOB on exertion. 6L via NC. VSS. POC discussed with pt. Pt verbalizes understanding. Call light and belongings within reach. Bed locked and in lowest position. Alarm and fall precautions in place.

## 2018-09-29 NOTE — CARE PLAN
Problem: Safety  Goal: Will remain free from injury  Outcome: PROGRESSING AS EXPECTED  Bed locked in lowest position. Bed alarm on. Treaded socks in use. Call light and belongings within reach. Patient educated to call for assistance. Pt verbalized understanding. Hourly rounding in place.     Problem: Knowledge Deficit  Goal: Knowledge of disease process/condition, treatment plan, diagnostic tests, and medications will improve  Outcome: PROGRESSING AS EXPECTED  Discussed POC and medications with patient, pt verbalized understanding.

## 2018-09-29 NOTE — PROGRESS NOTES
Pt was found having SOB and decreasing oxygen saturation. Pt was sat up and had her 02 increased from 4L to 7L where her 02 saturation stabilized . Called Dr. Cruz to update her on the increase of oxygen. Asked her about the patients troponin levels and no new orders per Dr. Received orders for Respiratory Protocol. Will continue to monitor.

## 2018-09-29 NOTE — ED NOTES
Report received from Pilo MAGDALENO. Pt resting in bed, no signs of distress. Whiteboard updated. Call light within reach.

## 2018-09-29 NOTE — PROGRESS NOTES
"[facility] Hospitalist Progress Note    Date of Service: 2018    Chief Complaint  78 y.o. female admitted 2018 with shortness of breath. She was transferred from an outside facility for SOB and hypoxia. Not on home O2.  Denies h/o CHF, lung disease but was recently given an albuterol inhaler. Also reports long h/o smoking, but has reduced amount significantly.     Interval Problem Update  Feeling \"a lot better\" than on admission, \"can breathe again.\"  LE edema starting to improve. No acute overnight events.    Consultants/Specialty  None    Disposition  Pending medical clearance.         Review of Systems   Constitutional: Positive for malaise/fatigue. Negative for chills and fever.   HENT: Negative for congestion.    Respiratory: Positive for shortness of breath and wheezing. Negative for cough.    Cardiovascular: Negative for chest pain, palpitations and leg swelling.   Gastrointestinal: Negative for abdominal pain, nausea and vomiting.   Genitourinary: Negative for dysuria.   Musculoskeletal: Negative for falls.   Neurological: Positive for weakness. Negative for focal weakness, loss of consciousness and headaches.   Psychiatric/Behavioral: Negative.    All other systems reviewed and are negative.     Physical Exam  Laboratory/Imaging   Hemodynamics  Temp (24hrs), Av.4 °C (97.5 °F), Min:36.2 °C (97.1 °F), Max:36.8 °C (98.2 °F)   Temperature: 36.2 °C (97.2 °F)  Pulse  Av.5  Min: 77  Max: 107 Heart Rate (Monitored): 95  Blood Pressure : 137/81, NIBP: 154/102      Respiratory      Respiration: 18, Pulse Oximetry: 97 %, O2 Daily Delivery Respiratory : OxyMask     Given By:: Mouthpiece, Work Of Breathing / Effort: Mild  RUL Breath Sounds: Clear, RML Breath Sounds: Diminished, RLL Breath Sounds: Diminished, LORI Breath Sounds: Clear, LLL Breath Sounds: Diminished    Fluids    Intake/Output Summary (Last 24 hours) at 18 0774  Last data filed at 18 2310   Gross per 24 hour   Intake               "  0 ml   Output              150 ml   Net             -150 ml       Nutrition  Orders Placed This Encounter   Procedures   • Diet NPO     Standing Status:   Standing     Number of Occurrences:   1     Order Specific Question:   Restrict to:     Answer:   Strict [1]     Physical Exam   Constitutional: She is oriented to person, place, and time. She appears well-developed. No distress. Nasal cannula in place.   HENT:   Head: Normocephalic.   Mouth/Throat: Oropharynx is clear and moist.   Eyes: Pupils are equal, round, and reactive to light. EOM are normal. No scleral icterus.   Neck: Normal range of motion. Neck supple. No tracheal deviation present.   Cardiovascular: Normal rate, regular rhythm and intact distal pulses.    Pulmonary/Chest: Effort normal. No respiratory distress. She has wheezes.   Abdominal: Soft. Bowel sounds are normal. She exhibits no distension. There is no tenderness.   Musculoskeletal: She exhibits edema (3+ but improved from yesterday).   Neurological: She is alert and oriented to person, place, and time.   Skin: Skin is warm and dry. She is not diaphoretic.   Psychiatric: She has a normal mood and affect. Her behavior is normal.   Vitals reviewed.      Recent Labs      09/28/18 2022 09/29/18   0529   WBC  9.4  9.3   RBC  4.19*  4.16*   HEMOGLOBIN  12.3  11.7*   HEMATOCRIT  38.9  38.8   MCV  92.8  93.3   MCH  29.4  28.1   MCHC  31.6*  30.2*   RDW  65.4*  66.5*   PLATELETCT  236  243   MPV  11.3  10.9     Recent Labs      09/28/18 2022 09/29/18   0529   SODIUM  141  144   POTASSIUM  5.1  3.4*   CHLORIDE  103  105   CO2  29  31   GLUCOSE  111*  148*   BUN  19  20   CREATININE  0.63  0.65   CALCIUM  9.1  8.7     Recent Labs      09/28/18 2022   APTT  25.9   INR  1.40*     Recent Labs      09/28/18 2022   BNPBTYPENAT  61              Assessment/Plan     * Pulmonary edema- (present on admission)   Assessment & Plan    Significant pulmonary edema on CXR from the outside facility (I  personally reviewed) denies h/o CHF but has been having worsening LE edema. Concern for underlying, undiagnosed right heart failure especially given her likely COPD.  - lasix 40mg IV daily  - monitor I&Os, daily weights  - echo pending  - starting ACEi and beta blocker  - 2 view CXR pending        Acute hypoxemic respiratory failure (HCC)- (present on admission)   Assessment & Plan    Likely multifactorial, possible underlying COPD given her long-standing smoking history and concern for right heart failure.  - RT to follow, wean O2 as able  - will benefit from outpatient PFTs  - echo pending        Hypokalemia   Assessment & Plan    K low at 3.4, was 5.1 on admission  - monitor and replete, has good renal function  - magnesium level normal        HTN (hypertension)- (present on admission)   Assessment & Plan    Normotensive to borderline low.  - low dose coreg and lisinopril, with holding parameters        Hypothyroidism- (present on admission)   Assessment & Plan    TSH normal at 2.5  - continue home synthroid        Tobacco abuse- (present on admission)   Assessment & Plan    Longstanding tobacco use history and continues to smoke. Has cut down to 1-2 cigs/daily but doesn't want to quit at this time.   - declined nicotine replacement          Quality-Core Measures

## 2018-09-29 NOTE — ASSESSMENT & PLAN NOTE
Smoking cessation discussed during admit and no interest in cessation per prior note  Continue encouragement  Nicotine patch prn

## 2018-09-29 NOTE — PROGRESS NOTES
Received report from RASTA Carmichael (ER). Pt is A&O x 4. Pt transported via gurney with ACLS RN and zoll monitor. Pt arrived to unit, tele box on patient, confirmed with monitor room. Pt was slid from gurney to bed, tolerated well. Pt oriented to unit and call light, verbalized understanding. Fall precautions in place. Call light and bedside table within reach, bed locked in lowest position with controls on. Assessment completed. Will continue to monitor.

## 2018-09-29 NOTE — ED NOTES
[place] removed and lasix administered (see MAR).   Pt educated to call RN for needs. Call light within reach. Family bedside.

## 2018-09-29 NOTE — ED PROVIDER NOTES
"ED Provider Note    CHIEF COMPLAINT  Chief Complaint   Patient presents with   • Shortness of Breath     3 weeks on and off   • Peripheral Edema     started 3 weeks ago in her feet and is now up to her thighs       HPI  Erin Quigley is a 78 y.o. female who presents with difficulty breathing x 1 month on and sathya.  Seen PMD wihtout improvement.  States that her \"water pill\" dose was increased without improvement.    States that she has worsening orthopnea and paroxysmal nocturnal dyspnea.  Worsening shortness of breath with exertion.  No chest pain.  No nausea or vomiting.  No fevers.  No cough.    Does not use chronic home oxygen.  He was found to have pulse ox of 84% at the outside hospital on room air.      \She was evaluated at an emergency department at [facility] and transferred here for further evaluation.  Denies prior history of cardiovascular disease/heart failure.  Denies liver failure renal failure history.  Has unspecified thyroid disorder as well.    No abdominal pain, nausea, vomiting.  No recent illness or fevers.  Has a history of smoking.       Incidentally, last week had Endoscopy done in [place]. Concerns for bleeding ulcers which were clipped.  The patient is not on anticoagulation.  Denies active bloody stool or anticoagulation use.  REVIEW OF SYSTEMS  See HPI for further details. All other systems are negative.     PAST MEDICAL HISTORY   has a past medical history of Arthritis; Cancer (HCC); Hypertension; and Unspecified disorder of thyroid.    SOCIAL HISTORY  Social History     Social History Main Topics   • Smoking status: Current Every Day Smoker     Types: Cigarettes   • Smokeless tobacco: Never Used   • Alcohol use Yes      Comment: occ   • Drug use: No   • Sexual activity: Not on file       SURGICAL HISTORY   has a past surgical history that includes gyn surgery; other; cervical fusion posterior (10/2/2009); and cervical laminectomy posterior (10/2/2009).    CURRENT " "MEDICATIONS  Home Medications     Reviewed by Analisa Dowling R.N. (Registered Nurse) on 09/29/18 at 0207  Med List Status: Partial   Medication Last Dose Status   levothyroxine (SYNTHROID) 88 MCG Tab 9/28/2018 Active   NON SPECIFIED 9/28/2018 Active   oxymetazoline, icn,  0.025% (AFRIN) 9/28/2018 Active                ALLERGIES  No Known Allergies    PHYSICAL EXAM  VITAL SIGNS: /99   Pulse 95   Temp 36.8 °C (98.2 °F)   Resp 18   Ht 1.575 m (5' 2\")   Wt 73 kg (161 lb)   SpO2 98%   BMI 29.45 kg/m²   Pulse ox interpretation: I interpret this pulse ox as normal.  Constitutional: Alert in no apparent distress.  HENT: No signs of trauma, Bilateral external ears normal, Nose normal.   Eyes: Pupils are equal and reactive, Conjunctiva normal, Non-icteric.   Neck: Normal range of motion, No tenderness, Supple, No stridor.   Cardiovascular: Regular rate and rhythm, no murmurs.   Thorax & Lungs:   Diminished breath sounds bilaterally to the bases, No respiratory distress, No wheezing, No chest tenderness.   Abdomen: Bowel sounds normal, Soft, No tenderness, No masses, No pulsatile masses. No peritoneal signs.  Skin: Warm, Dry, No erythema, No rash.   Back: No bony tenderness, No CVA tenderness.   Extremities: Intact distal pulses,   Bilateral 2+ pitting edema, No tenderness, No cyanosis  Musculoskeletal: Good range of motion in all major joints. No tenderness to palpation or major deformities noted.   Neurologic: Alert , Normal motor function and gait, Normal sensory function, No focal deficits noted.   Psychiatric: Affect normal, Judgment normal, Mood normal.       DIAGNOSTIC STUDIES / PROCEDURES    EKG    9/28/2018 at 1900  Normal sinus rhythm at 96  P-R, QRS, QTC within normal limits  Low-voltage throughout  No ST elevations or depressions  Flattened T waves  No prior EKG for comparison    LABS  Labs Reviewed   CBC WITH DIFFERENTIAL - Abnormal; Notable for the following:        Result Value    RBC 4.19 (*)     " MCHC 31.6 (*)     RDW 65.4 (*)     Neutrophils-Polys 81.70 (*)     Lymphocytes 13.00 (*)     Neutrophils (Absolute) 7.68 (*)     All other components within normal limits    Narrative:     Indicate which anticoagulants the patient is on:->UNKNOWN   COMP METABOLIC PANEL - Abnormal; Notable for the following:     Glucose 111 (*)     All other components within normal limits    Narrative:     Indicate which anticoagulants the patient is on:->UNKNOWN   PROTHROMBIN TIME - Abnormal; Notable for the following:     PT 17.3 (*)     INR 1.40 (*)     All other components within normal limits    Narrative:     Indicate which anticoagulants the patient is on:->UNKNOWN   BASIC METABOLIC PANEL - Abnormal; Notable for the following:     Potassium 3.4 (*)     Glucose 148 (*)     All other components within normal limits   CBC WITHOUT DIFFERENTIAL - Abnormal; Notable for the following:     RBC 4.16 (*)     Hemoglobin 11.7 (*)     MCHC 30.2 (*)     RDW 66.5 (*)     All other components within normal limits   BTYPE NATRIURETIC PEPTIDE    Narrative:     Indicate which anticoagulants the patient is on:->UNKNOWN   TROPONIN    Narrative:     Indicate which anticoagulants the patient is on:->UNKNOWN   MAGNESIUM    Narrative:     Indicate which anticoagulants the patient is on:->UNKNOWN   APTT    Narrative:     Indicate which anticoagulants the patient is on:->UNKNOWN   ESTIMATED GFR    Narrative:     Indicate which anticoagulants the patient is on:->UNKNOWN   DIFFERENTIAL MANUAL    Narrative:     Indicate which anticoagulants the patient is on:->UNKNOWN   PERIPHERAL SMEAR REVIEW    Narrative:     Indicate which anticoagulants the patient is on:->UNKNOWN   MORPHOLOGY    Narrative:     Indicate which anticoagulants the patient is on:->UNKNOWN   TSH   ESTIMATED GFR   URINALYSIS       RADIOLOGY  OUTSIDE IMAGES-DX CHEST   Final Result      OUTSIDE IMAGES-DX CHEST   Final Result      EC-ECHOCARDIOGRAM COMPLETE W/O CONT    (Results Pending)          COURSE & MEDICAL DECISION MAKING  Pertinent Labs & Imaging studies reviewed. (See chart for details)  78 y.o. Female presenting with worsening diffuse edema and shortness of breath with PND and exertional dyspnea.  She was transferred here for further evaluation given significant pulmonary edema and signs of pericardial effusion on bedside ultrasound study that was performed at the outside hospital.  She denies chest pain.  No prior history of CHF or ACS.  It is an active smoker.  Does have a history of hypothyroid disease and is on levothyroxine.  TSH is unremarkable.  No evidence of myxedema.    No calf tenderness or erythema.  Swelling is bilateral to lower extremities with pitting edema.  More suggestive of a systemic disease process such as CHF rather than DVT.     No history of liver failure or kidney disease.  Liver enzymes and creatinine are unremarkable.  BNP is also unremarkable the patient does have significant pulmonary edema bilaterally on the outside x-rays especially when compared to prior x-ray from 2017.  Troponin is unremarkable.  Patient is without chest pain.  Strongly suspect that the patient does have CHF.  She was started on Lasix here.  To be admitted to the hospitalist service given her hypoxia to 84% on room air, pulmonary edema, and concerns of possible pericardial effusion.  Has an enlarged cardiac silhouette on chest x-ray.  May be secondary to effusion versus CHF.    The total critical care time on this patient is 35 minutes, resuscitating patient, speaking with admitting physician, and deciphering test results. This 35 minutes is exclusive of separately billable procedures.      Spoke with Dr. Cruz  from the hospitalist service who agrees to the admission.      FINAL IMPRESSION  Pulmonary edema   CHF    Hypoxia      Electronically signed by: Davonte Bellamy, 9/28/2018 7:56 PM

## 2018-09-29 NOTE — ED NOTES
Report given to Analisa RN bedside. Pt taken to Tele on [facility]. Chart and belongings with patient. No acute signs distress present.

## 2018-09-29 NOTE — PROGRESS NOTES
2 RN skin check with Guadalupe MAGDALENO and Uche MAGDALENO.     Skin assessment completed.  Pt has a small skin tear on right forearm. Adhesive bandages have been ordered. Pt has two old blisters that are scabbing on left foot. Pt states the blisters were caused by her sandals. Pillows in use for positioning and to float heels. Silicone oxygen tubing in use. Frequent linen changes to ensure patient stays dry. Hourly rounding in place.

## 2018-09-29 NOTE — THERAPY
"Speech Language Therapy Clinical Swallow Evaluation completed.  Functional Status: Pt seen on this date for clinical swallow evaluation. Pt A&Ox4 and agreeable to evaluation. Per RN, pt passed RN dysphagia screen, however, had episode of choking/coughing with oral meds this morning. PO trials of single ice chips, NTL via cup sip, purees, soft solids, thin liquids via cup sip, and solids were presented to pt and audible gulping and belching noted with thin liquids, however, no further overt s/sx of aspiration noted with other textures. Per pt report, it was easier to swallow NTL than thin liquids. Upon palpation, laryngeal elevation noted to be weak and volitional cough noted to be strong. At this time, would recommend that pt be placed on dysphagia III/NTL diet with implementation of swallowing strategies (slow feeding rate, small bites/sips, monitor during meals). Pt reporting event of coughing/choking with wine within the last week or two and reports difficulty with swallowing pills at times. Would recommend that oral medication be float in apple sauce at this time. Dysphagia and role of SLP education provided to pt and she verbalized understanding. SLP to follow.      Recommendations - Diet:  Dysphagia III/NTL                          Strategies: Monitor during meals, No Straws and Head of Bed at 90 Degrees                          Medication Administration:  Float in apple sauce  Plan of Care: Will benefit from Speech Therapy 3 times per week  Post-Acute Therapy: Discharge to home with outpatient or home health for additional skilled therapy services.    See \"Rehab Therapy-Acute\" Patient Summary Report for complete documentation.       "

## 2018-09-29 NOTE — ED NOTES
Unable to complete med rec at this time.  Ok per Pt to discuss medications with visitor/s present  Allergies reviewed  No ABX in the last 30 days       Pt spouse is going to bring in pt inhaler for verification.   Will update med rec when inhaler verified.

## 2018-09-30 NOTE — CARE PLAN
Problem: Safety  Goal: Will remain free from falls  Outcome: PROGRESSING AS EXPECTED  Safety measures in place     Problem: Urinary Elimination:  Goal: Ability to reestablish a normal urinary elimination pattern will improve  Outcome: PROGRESSING AS EXPECTED  Pt will have adequate output this shift

## 2018-09-30 NOTE — PROGRESS NOTES
[facility] Hospitalist Progress Note    Date of Service: 2018    Chief Complaint  78 y.o. female admitted 2018 with shortness of breath. She was transferred from an outside facility for SOB and hypoxia. Not on home O2.  Denies h/o CHF, lung disease but was recently given an albuterol inhaler. Also reports long h/o smoking, but has reduced amount significantly.     Interval Problem Update  Still with SOB but slowly improving. LE edema greatly improved today.  at the bedside, states that she has no appetite. No acute overnight events.    Consultants/Specialty  None    Disposition  Pending medical clearance.         Review of Systems   Constitutional: Positive for malaise/fatigue. Negative for chills and fever.   HENT: Negative for congestion.    Respiratory: Positive for shortness of breath and wheezing. Negative for cough.    Cardiovascular: Negative for chest pain, palpitations and leg swelling.   Gastrointestinal: Negative for abdominal pain, nausea and vomiting.   Genitourinary: Negative for dysuria.   Musculoskeletal: Negative for falls.   Neurological: Positive for weakness. Negative for focal weakness, loss of consciousness and headaches.   Psychiatric/Behavioral: Negative.    All other systems reviewed and are negative.     Physical Exam  Laboratory/Imaging   Hemodynamics  Temp (24hrs), Av.7 °C (98.1 °F), Min:36.3 °C (97.3 °F), Max:37.1 °C (98.7 °F)   Temperature: 36.3 °C (97.3 °F)  Pulse  Av.4  Min: 77  Max: 107    Blood Pressure : 119/69      Respiratory      Respiration: 18, Pulse Oximetry: 95 %, O2 Daily Delivery Respiratory : Silicone Nasal Cannula     Given By:: Mouthpiece, Work Of Breathing / Effort: Mild  RUL Breath Sounds: Clear, RML Breath Sounds: Diminished, RLL Breath Sounds: Diminished, LORI Breath Sounds: Clear, LLL Breath Sounds: Diminished    Fluids    Intake/Output Summary (Last 24 hours) at 18 4108  Last data filed at 18 0621   Gross per 24 hour   Intake                 0 ml   Output             1225 ml   Net            -1225 ml       Nutrition  Orders Placed This Encounter   Procedures   • Diet Order Regular (monitor x3 during meals)     Standing Status:   Standing     Number of Occurrences:   1     Order Specific Question:   Diet:     Answer:   Regular [1]     Comments:   monitor x3 during meals     Order Specific Question:   Texture/Fiber modifications:     Answer:   Dysphagia 3(Mechanical Soft)specify fluid consistency(question 6) [3]     Order Specific Question:   Consistency/Fluid modifications:     Answer:   Nectar Thick [2]     Physical Exam   Constitutional: She is oriented to person, place, and time. She appears well-developed. No distress. Nasal cannula in place.   HENT:   Head: Normocephalic.   Mouth/Throat: Oropharynx is clear and moist.   Eyes: Pupils are equal, round, and reactive to light. EOM are normal. No scleral icterus.   Neck: Normal range of motion. Neck supple. No tracheal deviation present.   Cardiovascular: Normal rate, regular rhythm and intact distal pulses.    Pulmonary/Chest: Effort normal. No respiratory distress. She has no wheezes. She has rales.   Diminished breath sounds at the bases   Abdominal: Soft. She exhibits no distension. There is no tenderness.   Musculoskeletal: She exhibits edema (1+, significantly improved from yesterday).   Neurological: She is alert and oriented to person, place, and time.   Skin: Skin is warm and dry. She is not diaphoretic.   Psychiatric: She has a normal mood and affect. Her behavior is normal.   Vitals reviewed.      Recent Labs      09/28/18 2022 09/29/18 0529  09/30/18   0323   WBC  9.4  9.3  9.2   RBC  4.19*  4.16*  4.22   HEMOGLOBIN  12.3  11.7*  12.2   HEMATOCRIT  38.9  38.8  41.2   MCV  92.8  93.3  97.6   MCH  29.4  28.1  28.9   MCHC  31.6*  30.2*  29.6*   RDW  65.4*  66.5*  68.7*   PLATELETCT  236  243  237   MPV  11.3  10.9  10.9     Recent Labs      09/28/18 2022 09/29/18   0529   09/30/18   0323   SODIUM  141  144  144   POTASSIUM  5.1  3.4*  3.7   CHLORIDE  103  105  103   CO2  29  31  36*   GLUCOSE  111*  148*  119*   BUN  19  20  19   CREATININE  0.63  0.65  0.72   CALCIUM  9.1  8.7  9.0     Recent Labs      09/28/18 2022   APTT  25.9   INR  1.40*     Recent Labs      09/28/18 2022 09/29/18   0529   BNPBTYPENAT  61  73              Assessment/Plan     * Pulmonary edema- (present on admission)   Assessment & Plan    Significant pulmonary edema and effusion on CXR from the outside facility (I personally reviewed) denies h/o CHF but has been having worsening LE edema for several weeks. 2 View CXR performed 9/29 showed significant fluid. Concern for underlying, undiagnosed right heart failure. Net negative 1.6L since admission.  - lasix 40mg IV daily  - monitor I&Os, daily weights  - echo pending  - starting ACEi and beta blocker  - thoracentesis with fluid studies ordered        Acute hypoxemic respiratory failure (HCC)- (present on admission)   Assessment & Plan    Likely multifactorial, possible underlying COPD given her long-standing smoking history and concern for right heart failure. Has been able to wean O2 from 6L to 2.5L but still with significant SOB and KUMAR  - RT to follow, wean O2 as able  - will benefit from outpatient PFTs  - echo pending        Hypokalemia   Assessment & Plan    K low at 3.4, was 5.1 on admission  - monitor and replete, has good renal function  - magnesium level normal        HTN (hypertension)- (present on admission)   Assessment & Plan    Normotensive   - tolerating low dose coreg and lisinopril, with holding parameters        Hypothyroidism- (present on admission)   Assessment & Plan    TSH normal at 2.5  - continue home synthroid        Tobacco abuse- (present on admission)   Assessment & Plan    Longstanding tobacco use history and continues to smoke. Has cut down to 1-2 cigs/daily but doesn't want to quit at this time.   - declined nicotine  replacement          Quality-Core Measures

## 2018-09-30 NOTE — PROGRESS NOTES
Pt is alert and oriented x4. Pt declines pain or shortness of breath with resting, but she does have KUMAR. Pt has requested to be allowed to rest this evening as she is just extremely tired from the day. Care plan discussed with no questions at this time.

## 2018-09-30 NOTE — PROGRESS NOTES
Pt went from 7L to 4L on oxygen. Went to xray. Diet was changed to dysphagia III nectar thick.  at bedside,  brought in home meds. Discussed plan of care with  and patient.

## 2018-09-30 NOTE — PROGRESS NOTES
Assumed care at 0715. Patient is AXO 4. PT is S.R.on the monitor. Pt resting comfortably, call light in reach, bed locked in lowest position.

## 2018-09-30 NOTE — RESPIRATORY CARE
COPD EDUCATION by COPD CLINICAL EDUCATOR  9/30/2018  at  1:30 PM by Lois Abrams     Patient interviewed by COPD education team.  Patient unable to participate in full program.  Short intervention has been conducted.  A comprehensive packet including information about COPD, treatments, and smoking cessation left at bedside. Pt sleepy and will review and call.

## 2018-10-01 PROBLEM — G93.40 ACUTE ENCEPHALOPATHY: Status: ACTIVE | Noted: 2018-01-01

## 2018-10-01 PROBLEM — Z85.72 HISTORY OF NON-HODGKIN'S LYMPHOMA: Status: ACTIVE | Noted: 2018-01-01

## 2018-10-01 PROBLEM — J90 PLEURAL EFFUSION: Status: ACTIVE | Noted: 2018-01-01

## 2018-10-01 PROBLEM — I31.39 PERICARDIAL EFFUSION: Status: ACTIVE | Noted: 2018-01-01

## 2018-10-01 NOTE — PROGRESS NOTES
Received pt from tele8 at 0345am. RN and RT at bedside. Pt on 4L oxy mask. Vitals remain stable and pt not in distress. ICU doctor notified, and waiting for bedside assessment. Will continue to monitor patient closely.

## 2018-10-01 NOTE — PROCEDURES
Name of the Procedure:   Pericardiocentesis    Supervision of moderate sedation     Indication: Large pericardial effusion with tamponade        Procedure:   Patient was brought to the cardiac catheterization lab after obtaining informed consent for the procedure and discussing the risks and benefits of the procedure.  Under aseptic precautions after draping the patient with sterile covers, the subxiphoid region was anesthetized with 2% lidocaine. Under ultrasound and fluoroscopic guidance, a micropuncture needle was introduced into the pericardial space without difficulty and 5 fluid was aspirated. We then introduced the micropuncture sheath into pericardial space. We then used a 0.035 inch wire to enter the pericardial space. We then dilated the track with a 8 Fr dilator and subsequently exchanged that for a 8 Fr pigtail drain that was used to drain 1000 cc of bloody pericardial fluid. We sutured the drain in place and connected the drain to a negative suction. Patient was then transferred to the floors in a stable condition.     The pericardial fluid was sent to the pathology and microbiology lab.     Impression  Large Pericardial effusion with Cardiac Tamponade     Recommendations  Post pericardiocentesis care of the pericardial drain  Await lab results    Leave drain in situ with heparinized saline flushes periodically to prevent clogging     Sedation time: I supervised moderate sedation over a trained, independent nursing staff, total sedation time is 29 mins.     Elliot Kwok  Interventional cardiologist  Cell: [phone]

## 2018-10-01 NOTE — CARE PLAN
Problem: Safety  Goal: Will remain free from injury  Outcome: PROGRESSING AS EXPECTED  Patient has been assessed and has remained free from injury     Problem: Urinary Elimination:  Goal: Ability to reestablish a normal urinary elimination pattern will improve    Intervention: Assess and monitor for signs and symptoms of urinary retention  Patient has been assessed and monitor for signs and symptoms of urinary retention and patient shows no sign of retention.

## 2018-10-01 NOTE — PROGRESS NOTES
[facility] Hospitalist Progress Note    Date of Service: 10/1/2018    Chief Complaint  78 y.o. Female from [employer] admitted 2018 with shortness of breath and peripheral edema.    Interval Problem Update  Remains with acute respiratory failure  Thoracentesis planned  Large pericardial effusion on echo  Needs pericardiocentesis  10/1 0322 pCO2:87.6  10/1 serum CO2:38  Decreased and tight lung sounds  Consulted cardiology      Consultants/Specialty  Pulmonary  Cardiology    Disposition  Monitor in ICU        Review of Systems   Unable to perform ROS: Mental acuity      Physical Exam  Laboratory/Imaging   Hemodynamics  Temp (24hrs), Av.1 °C (97 °F), Min:35.7 °C (96.3 °F), Max:36.3 °C (97.3 °F)   Temperature: (!) 35.7 °C (96.3 °F), Monitored Temp: 37.1 °C (98.8 °F)  Pulse  Av.8  Min: 60  Max: 107 Heart Rate (Monitored): 96  Blood Pressure : 133/79, NIBP: 100/57      Respiratory      Respiration: (!) 23, Pulse Oximetry: 93 %, O2 Daily Delivery Respiratory : Highflow Nasal Cannula     Work Of Breathing / Effort: Mild;Moderate  RUL Breath Sounds: Clear, RML Breath Sounds: Diminished, RLL Breath Sounds: Diminished, LORI Breath Sounds: Clear, LLL Breath Sounds: Diminished    Fluids    Intake/Output Summary (Last 24 hours) at 10/01/18 2121  Last data filed at 10/01/18 2000   Gross per 24 hour   Intake                0 ml   Output              250 ml   Net             -250 ml       Nutrition  Orders Placed This Encounter   Procedures   • Diet Order Regular (monitor x3 during meals)     Standing Status:   Standing     Number of Occurrences:   1     Order Specific Question:   Diet:     Answer:   Regular [1]     Comments:   monitor x3 during meals     Order Specific Question:   Texture/Fiber modifications:     Answer:   Dysphagia 3(Mechanical Soft)specify fluid consistency(question 6) [3]     Order Specific Question:   Consistency/Fluid modifications:     Answer:   Nectar Thick [2]     Physical Exam    Constitutional: She appears well-developed. No distress.   HENT:   Head: Normocephalic and atraumatic.   Nose: Nose normal.   Mouth/Throat: No oropharyngeal exudate.   Eyes: Conjunctivae and EOM are normal. Right eye exhibits no discharge. Left eye exhibits no discharge.   Neck: No tracheal deviation present.   Cardiovascular: Normal rate, regular rhythm, normal heart sounds and intact distal pulses.  Exam reveals no friction rub.    Pulses:       Radial pulses are 2+ on the right side, and 2+ on the left side.        Dorsalis pedis pulses are 2+ on the right side, and 2+ on the left side.   Pulmonary/Chest: Effort normal. No stridor. No respiratory distress. She has rales.   Abdominal: Soft. Bowel sounds are normal. She exhibits no distension. There is no tenderness.   Musculoskeletal: She exhibits edema (1+ bilateral lower legs).   Neurological: She is alert. She is disoriented. No cranial nerve deficit.   Skin: She is not diaphoretic. No erythema.   Psychiatric: Her speech is delayed. She is slowed. Cognition and memory are impaired.   Vitals reviewed.      Recent Labs      09/29/18   0529  09/30/18   0323   WBC  9.3  9.2   RBC  4.16*  4.22   HEMOGLOBIN  11.7*  12.2   HEMATOCRIT  38.8  41.2   MCV  93.3  97.6   MCH  28.1  28.9   MCHC  30.2*  29.6*   RDW  66.5*  68.7*   PLATELETCT  243  237   MPV  10.9  10.9     Recent Labs      09/29/18   0529  09/30/18   0323  10/01/18   0156   SODIUM  144  144  144   POTASSIUM  3.4*  3.7  3.6   CHLORIDE  105  103  100   CO2  31  36*  38*   GLUCOSE  148*  119*  127*   BUN  20  19  19   CREATININE  0.65  0.72  0.62   CALCIUM  8.7  9.0  9.4         Recent Labs      09/29/18   0529   BNPBTYPENAT  73     Recent Labs      10/01/18   0304   TRIGLYCERIDE  70   HDL  43   LDL  69          Assessment/Plan     * Acute hypoxemic respiratory failure (HCC)- (present on admission)   Assessment & Plan    Likely multifactorial, possible underlying COPD given her long-standing smoking history  and concern for right heart failure. Has been able to wean O2 from 6L to 2.5L  IV lasix for pulmonary edema, monitor I/Os  Respiratory protocol  Watch hypercapnia  Pulmonary consulting        Pleural effusion   Assessment & Plan    Thoracentesis 10/1  F/U CXR w/o pneumothorax  F/u on cultures, histology, pathology of effusion  Monitor CXR  Pulmonary consulting and discussed with Dr Arnold        Pericardial effusion   Assessment & Plan    Cardiology consulted, discussed with Dr Lepe  Pericardiocentesis performed  Await eval.  Check ESR, GENTRY, CRP, cultures  Normal TSH  Infection, vs inflammatory vs malignancy  Echo reviewed  F/U on CXR        Pulmonary edema- (present on admission)   Assessment & Plan    Concern of heart failure.  Echocardiogram pending  ACE-I, monitor labs  Lasix, strict I/O's        HTN (hypertension)- (present on admission)   Assessment & Plan    Monitor vitals  Continue carvedilol 3.125mg BID, lisinopril 10mg, lasix 40mg  Outpatient she was on amlodipine 5mg and HCTZ 25mg daily        Hypokalemia   Assessment & Plan    Replete Mg  Replace K as needed  Monitor BMP        Tobacco abuse- (present on admission)   Assessment & Plan    Smoking cessation discussed during admit and no interest in cessation per prior note  Continue encouragement  Nicotine patch prn        Hypothyroidism- (present on admission)   Assessment & Plan    TSH normal at 2.5  Levothyroxine 88mcg for ongoing active treatment          Quality-Core Measures   Reviewed items::  Labs reviewed, Medications reviewed and Radiology images reviewed  Almonte catheter::  No Almonte  DVT prophylaxis - mechanical:  SCDs

## 2018-10-01 NOTE — CODE DOCUMENTATION
to bedside, request for RT to do ABG on istat.  consulting , Intensivist.    Pt respiratory pattern changed from labored tachypnea with pursed lip  To slower rate (rr15) with moan and accessory muscle recruitment.

## 2018-10-01 NOTE — PROGRESS NOTES
Critical Care Progress Note    Date of admission  9/28/2018    Chief Complaint  78 y.o. female admitted 9/28/2018 with shortness of breath and edema.    Hospital Course     This lady was admitted with shortness of breath and peripheral edema.  She was diuresed.  She was found to have a large pericardial effusion and a right pleural effusion.    Interval Problem Update  Reviewed last 24 hour events:       -pericardial effusion   -10 L mask - to HFNC later in the day   -COPD   -SR   -tap right pleural effusion   -replete K and Mg   -resume gabapentin and statin      Review of Systems  Review of Systems   Constitutional: Negative for chills, diaphoresis and fever.   HENT: Negative for ear discharge, ear pain, facial swelling, nosebleeds, sinus pressure, sore throat and trouble swallowing.    Eyes: Negative for pain, discharge and itching.   Respiratory: Positive for cough and shortness of breath. Negative for chest tightness and stridor.    Cardiovascular: Positive for leg swelling.   Gastrointestinal: Negative for abdominal pain, diarrhea, nausea and vomiting.   Endocrine: Negative for cold intolerance, heat intolerance and polydipsia.   Genitourinary: Negative for dysuria, frequency, hematuria and urgency.   Musculoskeletal: Negative for arthralgias, myalgias and neck pain.   Skin: Negative for color change, pallor and rash.   Allergic/Immunologic: Negative for environmental allergies and food allergies.   Neurological: Negative for tremors, seizures, syncope and headaches.   Hematological: Negative for adenopathy.   Psychiatric/Behavioral: Negative for hallucinations and self-injury. The patient is not hyperactive.         Vital Signs for last 24 hours   Temp:  [36.2 °C (97.1 °F)-36.7 °C (98 °F)] 36.3 °C (97.3 °F)  Pulse:  [64-96] 96  Resp:  [11-18] 18  BP: (119-149)/(69-86) 133/79    Hemodynamic parameters for last 24 hours       Vent Settings for last 24 hours       Physical Exam   Physical Exam   Constitutional:  She is oriented to person, place, and time.   HENT:   Head: Normocephalic and atraumatic.   Right Ear: External ear normal.   Left Ear: External ear normal.   Nose: Nose normal.   Mouth/Throat: Oropharynx is clear and moist.   Eyes: Pupils are equal, round, and reactive to light. Conjunctivae are normal. Right eye exhibits no discharge. Left eye exhibits no discharge. No scleral icterus.   Neck: Normal range of motion. Neck supple. No JVD present. No tracheal deviation present.   Cardiovascular: Intact distal pulses.  Exam reveals no gallop and no friction rub.    Sinus rhythm   Pulmonary/Chest: No stridor. She has no wheezes. She has rales (Scattered crackles bilaterally).   Abdominal: Soft. Bowel sounds are normal. She exhibits no distension. There is no tenderness. There is no rebound.   Musculoskeletal: She exhibits edema (1+ edema in her ankles). She exhibits no tenderness or deformity.   No clubbing or cyanosis   Neurological: She is alert and oriented to person, place, and time. No cranial nerve deficit. Coordination normal.   No focal weakness   Skin: Skin is warm and dry. No rash noted. She is not diaphoretic. No erythema. No pallor.       Medications  Current Facility-Administered Medications   Medication Dose Route Frequency Provider Last Rate Last Dose   • guaiFENesin LA (MUCINEX) tablet 600 mg  600 mg Oral Q12HRS Hazel Cruz M.D.   Stopped at 10/01/18 0600   • ipratropium-albuterol (DUONEB) nebulizer solution  3 mL Nebulization Q4HRS WHILE AWAKE Constantin Self M.D.       • acetylcysteine (MUCOMYST) 20 % solution 3 mL  3 mL Inhalation Q4HRS WHILE AWAKE Constantin Self M.D.       • ipratropium-albuterol (DUONEB) nebulizer solution  3 mL Nebulization Q4H PRN (RT) Hazel Cruz M.D.   3 mL at 10/01/18 0333   • methylPREDNISolone sod succ (SOLU-MEDROL) 125 MG injection 62.5 mg  62.5 mg Intravenous Q6HRS Constantin Self M.D.       • heparin injection 5,000 Units  5,000 Units Subcutaneous Q8HRS  Constantin Self M.D.       • labetalol (NORMODYNE,TRANDATE) injection 10 mg  10 mg Intravenous Q30 MIN PRN Constantin Self M.D.       • Respiratory Care per Protocol   Nebulization Continuous RT Hazel Cruz M.D.       • Influenza Vaccine High-Dose pf injection 0.5 mL  0.5 mL Intramuscular Once PRN Rula Teresa M.D.       • albuterol inhaler 2 Puff  2 Puff Inhalation Q4HRS PRN Hazel Cruz M.D.       • albuterol (PROVENTIL) 2.5mg/0.5ml nebulizer solution 2.5 mg  2.5 mg Nebulization Q4H PRN (RT) Hazel Cruz M.D.   2.5 mg at 09/29/18 2135   • levothyroxine (SYNTHROID) tablet 88 mcg  88 mcg Oral AM ES Hazel Cruz M.D.   88 mcg at 09/30/18 0517   • senna-docusate (PERICOLACE or SENOKOT S) 8.6-50 MG per tablet 2 Tab  2 Tab Oral BID Hazel Cruz M.D.   Stopped at 10/01/18 0600    And   • polyethylene glycol/lytes (MIRALAX) PACKET 1 Packet  1 Packet Oral QDAY PRN Hazel Cruz M.D.        And   • magnesium hydroxide (MILK OF MAGNESIA) suspension 30 mL  30 mL Oral QDAY PRN Hazel Cruz M.D.        And   • bisacodyl (DULCOLAX) suppository 10 mg  10 mg Rectal QDAY PRN Hazel Cruz M.D.       • lisinopril (PRINIVIL) 10 MG tablet 5 mg  5 mg Oral DAILY Hazel Cruz M.D.   Stopped at 10/01/18 0600   • carvedilol (COREG) tablet 3.125 mg  3.125 mg Oral BID WITH MEALS Hazel Cruz M.D.   3.125 mg at 09/30/18 1808   • furosemide (LASIX) injection 40 mg  40 mg Intravenous Q DAY Hazel Cruz M.D.   40 mg at 10/01/18 0537   • nicotine (NICODERM) 14 MG/24HR 14 mg  14 mg Transdermal Daily-0600 Hazel Cruz M.D.   Stopped at 10/01/18 0600    And   • nicotine polacrilex (NICORETTE) 2 MG piece 2 mg  2 mg Oral Q HOUR PRN Hazel Cruz M.D.           Fluids    Intake/Output Summary (Last 24 hours) at 10/01/18 0636  Last data filed at 09/30/18 1400   Gross per 24 hour   Intake                0 ml   Output              225 ml   Net             -225 ml       Laboratory  Recent Results (from the  past 48 hour(s))   CBC WITHOUT DIFFERENTIAL    Collection Time: 18  3:23 AM   Result Value Ref Range    WBC 9.2 4.8 - 10.8 K/uL    RBC 4.22 4.20 - 5.40 M/uL    Hemoglobin 12.2 12.0 - 16.0 g/dL    Hematocrit 41.2 37.0 - 47.0 %    MCV 97.6 81.4 - 97.8 fL    MCH 28.9 27.0 - 33.0 pg    MCHC 29.6 (L) 33.6 - 35.0 g/dL    RDW 68.7 (H) 35.9 - 50.0 fL    Platelet Count 237 164 - 446 K/uL    MPV 10.9 9.0 - 12.9 fL   BASIC METABOLIC PANEL    Collection Time: 18  3:23 AM   Result Value Ref Range    Sodium 144 135 - 145 mmol/L    Potassium 3.7 3.6 - 5.5 mmol/L    Chloride 103 96 - 112 mmol/L    Co2 36 (H) 20 - 33 mmol/L    Glucose 119 (H) 65 - 99 mg/dL    Bun 19 8 - 22 mg/dL    Creatinine 0.72 0.50 - 1.40 mg/dL    Calcium 9.0 8.5 - 10.5 mg/dL    Anion Gap 5.0 0.0 - 11.9   ESTIMATED GFR    Collection Time: 18  3:23 AM   Result Value Ref Range    GFR If African American >60 >60 mL/min/1.73 m 2    GFR If Non African American >60 >60 mL/min/1.73 m 2   BASIC METABOLIC PANEL    Collection Time: 10/01/18  1:56 AM   Result Value Ref Range    Sodium 144 135 - 145 mmol/L    Potassium 3.6 3.6 - 5.5 mmol/L    Chloride 100 96 - 112 mmol/L    Co2 38 (H) 20 - 33 mmol/L    Glucose 127 (H) 65 - 99 mg/dL    Bun 19 8 - 22 mg/dL    Creatinine 0.62 0.50 - 1.40 mg/dL    Calcium 9.4 8.5 - 10.5 mg/dL    Anion Gap 6.0 0.0 - 11.9   MAGNESIUM    Collection Time: 10/01/18  1:56 AM   Result Value Ref Range    Magnesium 1.8 1.5 - 2.5 mg/dL   ESTIMATED GFR    Collection Time: 10/01/18  1:56 AM   Result Value Ref Range    GFR If African American >60 >60 mL/min/1.73 m 2    GFR If Non African American >60 >60 mL/min/1.73 m 2   EKG    Collection Time: 10/01/18  2:53 AM   Result Value Ref Range    Report       [facility]    Test Date:  2018-10-01  Pt Name:    AVEL COLLAZO            Department: 183  MRN:        [id number]                      Room:       23  Gender:     Female                       Technician: MELISSA  :         [id number]                   Requested By:JUAN CARLOS AGUILA  Order #:    [id number]                    Reading MD:    Measurements  Intervals                                Axis  Rate:       80                           P:  OR:                                      QRS:        141  QRSD:       46                           T:  QT:         572  QTc:        660    Interpretive Statements  ACCELERATED JUNCTIONAL RHYTHM  RIGHT AXIS DEVIATION  LOW VOLTAGE, EXTREMITY AND PRECORDIAL LEADS  NONSPECIFIC T ABNORMALITIES, LATERAL LEADS  PROLONGED QT INTERVAL  LEAD(S) aVF WERE NOT USED FOR MORPHOLOGY ANALYSIS  Compared to ECG 09/28/2018 19:00:08  Accelerated junctional rhythm now present  Right-axis deviation now present  Prolonged QT interval now present  Sinus rhythm no long er present  Left posterior fascicular block no longer present  Myocardial infarct finding no longer present  T-wave abnormality still present     TROPONIN    Collection Time: 10/01/18  3:04 AM   Result Value Ref Range    Troponin I 0.02 0.00 - 0.04 ng/mL   LIPID PROFILE    Collection Time: 10/01/18  3:04 AM   Result Value Ref Range    Cholesterol,Tot 126 100 - 199 mg/dL    Triglycerides 70 0 - 149 mg/dL    HDL 43 >=40 mg/dL    LDL 69 <100 mg/dL   ISTAT ARTERIAL BLOOD GAS    Collection Time: 10/01/18  3:22 AM   Result Value Ref Range    Ph 7.256 (LL) 7.400 - 7.500    Pco2 87.6 (HH) 26.0 - 37.0 mmHg    Po2 61 (L) 64 - 87 mmHg    Tco2 42 (HH) 20 - 33 mmol/L    S02 85 (L) 93 - 99 %    Hco3 39.0 (H) 17.0 - 25.0 mmol/L    BE 8 (H) -4 - 3 mmol/L    Body Temp 97.3 F degrees    O2 Therapy 30 %    iPF Ratio 203     Ph Temp Janee 7.266 (LL) 7.400 - 7.500    Pco2 Temp Co 84.9 (HH) 26.0 - 37.0 mmHg    Po2 Temp Cor 58 (L) 64 - 87 mmHg    Specimen Arterial     Action Range Triggered YES     Inst. Qualified Patient YES        Imaging  X-Ray:  I have personally reviewed the images and compared with prior images. and My impression is: Right lower lobe greater than left lower lobe  opacification    Assessment/Plan  * Acute hypoxemic respiratory failure (HCC)- (present on admission)   Assessment & Plan    Significant supplemental oxygen requirements  On HFNC  High risk for requiring intubation and mechanical ventilatory support        Pleural effusion   Assessment & Plan    S/P right thoracentesis with 1.7 L of fluid removed on 10/1  Transudative effusion        Pericardial effusion   Assessment & Plan    Pericardiocentesis done on 10/1  1 L of bloody fluid removed  Query malignant  Pericardial fluid studies pending        Pulmonary edema- (present on admission)   Assessment & Plan    Improved  Stop diuresis        History of non-Hodgkin's lymphoma   Assessment & Plan    Diagnosed in 2000  Query recurrent disease        HTN (hypertension)- (present on admission)   Assessment & Plan    Hold home amlodipine for now        Hypokalemia   Assessment & Plan    Replete potassium        Tobacco abuse- (present on admission)   Assessment & Plan    Nicotine replacement protocol        Hypothyroidism- (present on admission)   Assessment & Plan    Continue Synthroid, 88 mcg daily             VTE:  Contraindicated  Ulcer: Not Indicated  Lines: None    I have performed a physical exam and reviewed and updated ROS and Plan today (10/1/2018). In review of yesterday's note (9/30/2018), there are no changes except as documented above.     Critically ill in ICU.  She is on significant supplemental oxygen and is at high risk for requiring intubation mechanical ventilatory support.  I have assessed and reassessed her respiratory status, blood pressure, hemodynamics and cardiovascular status.  She is at increased risk for worsening respiratory and cardiovascular system dysfunction.    Discussed patient condition and risk of morbidity and/or mortality with Hospitalist, Family, RN, RT, Pharmacy, Charge nurse / hot rounds and QA team  The patient remains critically ill.  Critical care time = 80 minutes in directly  providing and coordinating critical care and extensive data review.  This time is in addition to Dr. Self earlier today.  No time overlap and excludes procedures.    High risk of deterioration and worsening vital organ dysfunction and death without the above critical care interventions.    Jarvis Arnold MD  Pulmonary and Critical Care Medicine

## 2018-10-01 NOTE — PROGRESS NOTES
Pt is alert and oriented x4. Pt had BM that was loose, per pt she has had issues with Diarrhea for a while now. Pts pulse ox is stable at this time with no complaints of shortness of breath. Will continue to monitor pt. Call bell in reach.

## 2018-10-01 NOTE — CARE PLAN
Problem: Urinary Elimination:  Goal: Ability to reestablish a normal urinary elimination pattern will improve  Outcome: PROGRESSING AS EXPECTED  adequate output this shift     Problem: Mobility  Goal: Risk for activity intolerance will decrease  Outcome: PROGRESSING SLOWER THAN EXPECTED  Pt will be agreeable to attempt BSC this shift

## 2018-10-01 NOTE — CONSULTS
Cardiology Consult    Requested by Dr. Ortiz      CC: Pericardial Effusion    HPI:  78 year old female with past medical history non-Hodgkins (treated with chemo), Hypothyroidism, hypertension was transferred from [place] after she presented with significant shortness of breath and peripheral edema.    Patient was pretty lethargic at the time of my assessment. History obtained from   - He stated that the patient began developing progressive worsening of her peripheral edema over the past 6 weeks. She followed-up with her primary care provider who increased her hydrochlorothiazide however  states they noticed no change in her edema. She subsequently developed worsening shortness of breath for which she was started on albuterol which helped some but not too much. As her symptoms worsened, she developed orthopnea and PND.     - She was being evaluated by ?surgery in [place] for follow-up of her recent EGD/Colonoscopy. However due to concerns of her cardiopulmonary symptoms the recommended she be seen in their ED. ER physician found patient to have significant pulmonary edema along with possible pericardial effusion therefore she was transferred to [facility] for higher level of care.     Once at [facility], patient was aggressively diuresed with IV lasix. Early this morning, a rapid response was called after patient developed 5/10 chest pressure. Echo done confirmed presence of large pericardial effusion. CXR significant for right pleural effusion. She has undergone right-sided thoracentesis with removal of 1.7L. She reports her symptoms are unchanged since thoracentesis.       MEDICATIONS:  Per Med List:   1) Ranitidine 150 mg BID  2) Zocar 20 mg   3) Amlodipine 5 mg  4) Hydrochlorothiazide 12.5 mg  5) Albuterol inhaler  6) Levothyroxine 88 mcg      Current Facility-Administered Medications:   •  guaiFENesin LA (MUCINEX) tablet 600 mg, 600 mg, Oral, Q12HRS, Hazel Cruz M.D., Stopped at 10/01/18 0600  •   ipratropium-albuterol (DUONEB) nebulizer solution, 3 mL, Nebulization, Q4H PRN (RT), Hazel Cruz M.D., 3 mL at 10/01/18 0333  •  methylPREDNISolone sod succ (SOLU-MEDROL) 125 MG injection 62.5 mg, 62.5 mg, Intravenous, Q6HRS, Constantin Self M.D., 62.5 mg at 10/01/18 1332  •  labetalol (NORMODYNE,TRANDATE) injection 10 mg, 10 mg, Intravenous, Q30 MIN PRN, Constantin Self M.D.  •  SODIUM CHLORIDE 0.9 % IV SOLN, , , , , Stopped at 10/01/18 0915  •  MD Alert...ICU Electrolyte Replacement per Pharmacy, , Other, pharmacy to dose, Jarvis Arnold M.D.  •  gabapentin (NEURONTIN) capsule 300 mg, 300 mg, Oral, BID, Jarvis Arnold M.D.  •  simvastatin (ZOCOR) tablet 20 mg, 20 mg, Oral, Q EVENING, Jarvis Arnold M.D.  •  potassium chloride SA (Kdur) tablet 20 mEq, 20 mEq, Oral, TID, Jarvis Arnold M.D., Stopped at 10/01/18 1515  •  heparin pf injection 300 Units, 300 Units, Intracatheter, Q5H, Elliot Kwok M.D.  •  Respiratory Care per Protocol, , Nebulization, Continuous RT, Hazel Cruz M.D.  •  Influenza Vaccine High-Dose pf injection 0.5 mL, 0.5 mL, Intramuscular, Once PRN, Rula Teresa M.D.  •  albuterol inhaler 2 Puff, 2 Puff, Inhalation, Q4HRS PRN, Hazel Cruz M.D.  •  albuterol (PROVENTIL) 2.5mg/0.5ml nebulizer solution 2.5 mg, 2.5 mg, Nebulization, Q4H PRN (RT), Hazel Cruz M.D., 2.5 mg at 09/29/18 2135  •  levothyroxine (SYNTHROID) tablet 88 mcg, 88 mcg, Oral, AM ES, Hazel Cruz M.D., Stopped at 10/01/18 0600  •  nicotine (NICODERM) 14 MG/24HR 14 mg, 14 mg, Transdermal, Daily-0600, Stopped at 10/01/18 0600 **AND** Nicotine Replacement Patient Education Materials, , , Once **AND** nicotine polacrilex (NICORETTE) 2 MG piece 2 mg, 2 mg, Oral, Q HOUR PRN, Hazel Cruz M.D.    ALLERGIES: has No Known Allergies.     SURGERIES:  Ms. Quigley   has a past surgical history that includes gyn surgery; other; cervical fusion posterior (10/2/2009); and  "cervical laminectomy posterior (10/2/2009).     MEDICAL ILLNESSES:   has a past medical history of Arthritis; Cancer (HCC); Hypertension; and Unspecified disorder of thyroid.   - Patient's  states patient had Non-hodgkins lymphoma about 20 years (treated with chemo, he said CHOP therapy followed by methotrexate).   - Recently had mammogram, EGD and colonoscopy in [place]    SOCIAL HISTORY:  reports that she has been smoking Cigarettes.  She has never used smokeless tobacco. She reports that she drinks alcohol. She reports that she does not use drugs. Smoking about 1/4 packs per day    FAMILY HISTORY:  Mother had an MI in his 60s      ROS: unable to obtain due to patient's medical condition. She is too lethargic to participate.        PHYSICAL EXAM:    /79   Pulse 71   Temp 36.1 °C (97 °F)   Resp 19   Ht 1.575 m (5' 2\")   Wt 79.1 kg (174 lb 6.1 oz)   SpO2 95%   Breastfeeding? No   BMI 31.90 kg/m²    General: Tachypneic, looks critically ill. Lethargic  HEENT: Normocephalic, atraumatic. Oxymask 6-8L  Cardiovascular: Normal heart rate, Normal rhythm. Distant heart sounds. Unable to appreciate on murmurs or rubs.    Lungs: tachypneic. Decreased breath sounds at bilateral bases.   Abdomen: Soft, No tenderness  Lower limbs: no pitting edema   Neurologic: Alert & oriented x 2, Normal motor function, No focal deficits noted,     Other systems also examined, grossly normal.       Lab Results   Component Value Date/Time    CHOLSTRLTOT 126 10/01/2018 03:04 AM    LDL 69 10/01/2018 03:04 AM    HDL 43 10/01/2018 03:04 AM    TRIGLYCERIDE 70 10/01/2018 03:04 AM       Lab Results   Component Value Date/Time    SODIUM 144 10/01/2018 01:56 AM    POTASSIUM 3.6 10/01/2018 01:56 AM    CHLORIDE 100 10/01/2018 01:56 AM    CO2 38 (H) 10/01/2018 01:56 AM    GLUCOSE 127 (H) 10/01/2018 01:56 AM    BUN 19 10/01/2018 01:56 AM    CREATININE 0.62 10/01/2018 01:56 AM     Lab Results   Component Value Date/Time    ALKPHOSPHAT 57 " 09/28/2018 08:22 PM    ASTSGOT 31 09/28/2018 08:22 PM    ALTSGPT 17 09/28/2018 08:22 PM    TBILIRUBIN 1.1 09/28/2018 08:22 PM      Lab Results   Component Value Date/Time    BNPBTYPENAT 73 09/29/2018 05:29 AM       Patient Active Problem List    Diagnosis Date Noted   • Pulmonary edema 09/28/2018     Priority: High   • Acute hypoxemic respiratory failure (HCC) 09/28/2018     Priority: High   • Tobacco abuse 09/28/2018     Priority: Low   • Hypokalemia 09/29/2018   • Hypothyroidism 09/28/2018   • HTN (hypertension) 09/28/2018         Assessment and Plan  Pericardial Effusion with Tamponade  - Patient presented with large pericardial effusion. Underwent drainage today. 1000 cc of bloody pericardial fluid was obtained. Pigtail drain in place. Fluid has been sent for analysis. Will follow-up results  - Etiology of effusion: idiopathic vs infectious (although no history of infectious symptoms) vs. Malignancy (h/o non-hodgkin's lymphoma, active smoker). Fluids have been sent for analysis. Will follow-up results  - Fluid analysis of pleural effusion pending.   - Hold lasix; gentle fluids for preload. Monitor I&Os.   - D/c coreg 3.125 mg twice a day, lisinopril 40 mg daily  - Repeat echo pending      Pleural Effusion  - S/p thoracentesis. 1.7 liters have been removed.  - Follow-up on fluid analysis      Pulmonary Edema  - Has been diuresed significantly. Currently with mild contraction alkalosis  - BNP not significantly elevated; troponins not significantly elevated  - For the pericardial effusion, she will need preload therefore holding further diuresis.   - Monitor I&Os

## 2018-10-01 NOTE — CONSULTS
"Critical Care/Pulmonary Consultation    Date of service: 10/1/2018    Consulting Physician: Jarvis Ortiz D.O.    Chief Complaint: Shortness of breath    History of Present Illness: Erin Quigley is a 78 y.o. female with a past medical history of COPD, presented to the hospital complaining of worsening numbness of breath and started treatment for possible COPD exacerbation and an evaluation for heart failure.  She was transferred to ICU  by rapid response due to worsening acute hypercarbic and hypoxemic respiratory failure accompanied by altered mental status.    ROS: Unable to obtain because patient is obtunded    No current facility-administered medications on file prior to encounter.      No current outpatient prescriptions on file prior to encounter.       Social History   Substance Use Topics   • Smoking status: Current Every Day Smoker     Types: Cigarettes   • Smokeless tobacco: Never Used   • Alcohol use Yes      Comment: occ        Past Medical History:   Diagnosis Date   • Arthritis    • Cancer (HCC)     Nonhodgkins lymphoma  2000   • Hypertension    • Unspecified disorder of thyroid     hypothyroid       Past Surgical History:   Procedure Laterality Date   • CERVICAL FUSION POSTERIOR  10/2/2009    Performed by RICARDO IYER at SUR[facility] ORS   • CERVICAL LAMINECTOMY POSTERIOR  10/2/2009    Performed by RICARDO IYER at SUR[facility]ERY [facility] ORS   • GYN SURGERY      Hysterectomy  2003   • OTHER      colonoscopy and endoscopy       Allergies: Patient has no known allergies.    History reviewed. No pertinent family history.    Vitals:    09/28/18 1904 09/28/18 1911 09/28/18 1930 09/28/18 2100   Height: 1.575 m (5' 2\")      Weight: 73 kg (161 lb)      Weight % change since last entry.: 0 %      BP:  137/99     Pulse:  77 95 89   BMI (Calculated): 29.45      Resp:  (!) 24 18 20   Temp:  36.8 °C (98.2 °F)      09/28/18 2200 09/28/18 2310 09/29/18 0103 09/29/18 0113   Height:       Weight:     "   Weight % change since last entry.:       BP:  159/90     Pulse: 95   (!) 107   BMI (Calculated):       Resp: (!) 23  (!) 22 20   Temp:  36.2 °C (97.1 °F)      09/29/18 0440 09/29/18 0747 09/29/18 0927 09/29/18 1128   Height:       Weight:   77.6 kg (171 lb 1.2 oz)    Weight % change since last entry.:   6.26 %    BP: 137/81 106/59  109/59   Pulse: 92 82  87   BMI (Calculated):       Resp: 18 18 18   Temp: 36.2 °C (97.2 °F) 36.7 °C (98.1 °F)  36.8 °C (98.3 °F)    09/29/18 1536 09/29/18 2040 09/29/18 2136 09/30/18 0025   Height:       Weight:  78.2 kg (172 lb 6.4 oz)     Weight % change since last entry.:  0.77 %     BP: 113/69 102/67  111/68   Pulse: 98 87 90 81   BMI (Calculated):       Resp: 18 16 16 16   Temp: 36.8 °C (98.2 °F) 37.1 °C (98.7 °F)  36.6 °C (97.9 °F)    09/30/18 0500 09/30/18 0710 09/30/18 1145 09/30/18 1613   Height:       Weight:       Weight % change since last entry.:       BP: 126/77 119/69 124/78 124/73   Pulse: 83 88 89 86   BMI (Calculated):       Resp: 16 18 18 18   Temp: 36.7 °C (98 °F) 36.3 °C (97.3 °F) 36.4 °C (97.5 °F) 36.6 °C (97.8 °F)    09/30/18 2020 10/01/18 0015 10/01/18 0109 10/01/18 0246   Height:       Weight: 79.1 kg (174 lb 6.1 oz)      Weight % change since last entry.: 1.15 %      BP: 120/72 149/86  133/79   Pulse: 87 87 83 84   BMI (Calculated):       Resp: 18 17 17 18   Temp: 36.7 °C (98 °F) 36.2 °C (97.1 °F)      10/01/18 0251 10/01/18 0253 10/01/18 0302 10/01/18 0313   Height:       Weight:       Weight % change since last entry.:       BP:  134/79  133/79   Pulse:  88 74 87   BMI (Calculated):       Resp:   15 15   Temp: 36.3 °C (97.3 °F)   36.3 °C (97.3 °F)    10/01/18 0400 10/01/18 0500   Height:     Weight:     Weight % change since last entry.:     BP:     Pulse: 64 80   BMI (Calculated):     Resp: (!) 11 13   Temp: 36.3 °C (97.3 °F)        Physical Examination  General: Average built  Neuro/Psych: Obtunded, CN II-XII grossly within normal limits, opens her eyes  to sternal rub and withdraws all extremities to pain  HEENT: Head is normocephalic, atraumatic, pupils 3-4 mm bilaterally reactive to light  CVS: S1-S2 within diminished, no murmurs, rubs or gallops, 1+ bilateral lower extremities edema  Respiratory: Diminished breath sounds bilaterally on anterior and lateral chest, trachea is midline, symmetric expansion of the chest with respirations  Abdomen/: Abdomen soft, nontender, nondistended  Extremities: No bony deformities, joint swelling, joint erythema  Skin: No skin rashes, bruises, jaundice    Recent Labs      09/28/18 2022 09/29/18 0529 09/30/18 0323   WBC  9.4  9.3  9.2   NEUTSPOLYS  81.70*   --    --    LYMPHOCYTES  13.00*   --    --    MONOCYTES  3.50   --    --    EOSINOPHILS  0.00   --    --    BASOPHILS  0.90   --    --    ASTSGOT  31   --    --    ALTSGPT  17   --    --    ALKPHOSPHAT  57   --    --    TBILIRUBIN  1.1   --    --      Recent Labs      09/28/18   2022  09/29/18   0529  09/30/18   0323  10/01/18   0156   SODIUM  141  144  144  144   POTASSIUM  5.1  3.4*  3.7  3.6   CHLORIDE  103  105  103  100   CO2  29  31  36*  38*   BUN  19  20  19  19   CREATININE  0.63  0.65  0.72  0.62   MAGNESIUM  1.9   --    --   1.8   CALCIUM  9.1  8.7  9.0  9.4     Recent Labs      09/28/18   2022  09/29/18   0529  09/30/18   0323  10/01/18   0156   ALTSGPT  17   --    --    --    ASTSGOT  31   --    --    --    ALKPHOSPHAT  57   --    --    --    TBILIRUBIN  1.1   --    --    --    GLUCOSE  111*  148*  119*  127*     Recent Labs      10/01/18   0322   ISTATAPH  7.256*   ISTATAPCO2  87.6*   ISTATAPO2  61*   ISTATATCO2  42*   JSTISIO2RYJ  85*   ISTATARTHCO3  39.0*   ISTATARTBE  8*   ISTATTEMP  97.3 F   ISTATFIO2  30   ISTATSPEC  Arterial   ISTATAPHTC  7.266*   ICXPEGZQ2XR  58*     DX-CHEST-PORTABLE (1 VIEW)   Final Result         1.  Pulmonary edema and/or infiltrates are identified, which are stable since the prior exam.   2.  Layering right pleural effusion,  stable   3.  Cardiomegaly   4.  Atherosclerosis      DX-CHEST-2 VIEWS   Final Result      1.  Probable bilateral atelectasis      2.  Probable right pleural effusion which may be significant in size      3.  Limited assessment on one view portable radiography      OUTSIDE IMAGES-DX CHEST   Final Result      OUTSIDE IMAGES-DX CHEST   Final Result      EC-ECHOCARDIOGRAM COMPLETE W/O CONT    (Results Pending)   US-THORACENTESIS PUNCTURE RIGHT    (Results Pending)       Assessment & Plan       1.  Acute hypoxemic and hypercarbic respiratory failure   -Acute exacerbation of COPD  -?  Underlying heart failure  -Complete short course of systemic steroids  -Moderate right pleural effusion > IR chest tube placement is pending  -2Echo is pending  -Diuretics  -Left lower lobe atelectasis  -Mucolytics and bronchodilators  -May need BiPAP versus mechanical ventilation  -Follow-up on ABG    2.  Acute encephalopathy   -Due to hypercarbia  -Ammonia is pending  -Frequent neurologic checks    3. Hypothyroidism  -Continue with Synthroid    4.  Hypertension  -Coreg, lisinopril  -Labetalol PRN with parameters    5.  ICU prophylaxis  -Heparin subcu    Family meeting: Not available at bedside    Critical care time: I spent 45 minutes personally providing critical care services including formulating plan of care.  This time was exclusive to this patient and does not include time spent in procedures.

## 2018-10-01 NOTE — PROGRESS NOTES
Called by RN to bedside for change in respiratory status.  Patient known to me from admission, presented with pulmonary edema/effusion, hypoxia and peripheral edema.  Etiology unknown but right sided heart failure suspected in light of long standing tobacco abuse.  ECHO ordered on 9/28 not yet done.  Patient aggressive diuresed with much improvement in peripheral edema however CXR still with edema/effusion and by my read no significant improvement.  Thoracentesis was planned for today.  ABG obtained shows pH 7.25, CO2 87 and HCO3 39.  Patient examined at bedside, tachypneic and using accessory muscles.  Will plan to transfer to ICU for BIPAP.  Pulmonary aware.

## 2018-10-01 NOTE — PROGRESS NOTES
Went up to patients room and Dr Silverio consented the patient. He performed the right thoracentesis.  Vitals were monitored by floor nurse. Removed 1700 ml and sent 1000 ml to lab.  Patient tolerated procedure well and was in stable condition when I left.  Ordered chest xray.

## 2018-10-01 NOTE — PROGRESS NOTES
"Rapid response called due to pt stating \"i feel like someone is sitting on my chest\". Rated Chest pressure/pain at 5/10. Noted that pts pulse ox was 95% yet she was having difficulty breathing. Pts breathing declined during rapid resulting in transfer to ICU, report given. Attempted to call  but phone just continued to ring. Belongings and meds tx with pt.   "

## 2018-10-01 NOTE — CARE PLAN
Problem: Respiratory:  Goal: Respiratory status will improve    Intervention: Administer and titrate oxygen therapy  Attempted to titrate oxygen down, patient desats into the 80's with movement on 1 LPM. Placed back at 2 LPM      Problem: Knowledge Deficit  Goal: Knowledge of disease process/condition, treatment plan, diagnostic tests, and medications will improve  Patient and spouse updated on POC. All questions answered at this time.    Problem: Mobility  Goal: Risk for activity intolerance will decrease   Patient with activity intolerance. Patient sleepy, declining to get up for meals at this time. Will continue education.

## 2018-10-02 PROBLEM — E83.42 HYPOMAGNESEMIA: Status: ACTIVE | Noted: 2018-01-01

## 2018-10-02 PROBLEM — I95.9 HYPOTENSION, UNSPECIFIED: Status: ACTIVE | Noted: 2018-01-01

## 2018-10-02 PROBLEM — R13.10 DYSPHAGIA: Status: ACTIVE | Noted: 2018-01-01

## 2018-10-02 PROBLEM — E87.0 HYPERNATREMIA: Status: ACTIVE | Noted: 2018-01-01

## 2018-10-02 NOTE — ASSESSMENT & PLAN NOTE
Patient previously on tube feeds, speech therapy following, recommend tube feeds with risks of oral nutrition including aspiration, pneumonia, respiratory depression and death were discussed with patient's  and he accepts the risk and patient is on a modified dysphagia diet.  May need prolonged time to recover to regain swallow function

## 2018-10-02 NOTE — PROGRESS NOTES
[facility] Hospitalist Progress Note    Date of Service: 10/2/2018    Chief Complaint  78 y.o. Female from [facility] admitted 2018 with shortness of breath and peripheral edema.    Interval Problem Update    Pericardiocentesis with drain left in place 1 L out  Intubated at 3am  Prop 15  Does not follow  Moves all extremities  SBP  on levo at 1  Low UO  PEEP 10 60%   Na 150 K 3.3         Consultants/Specialty  Pulmonary  Cardiology    Disposition  Monitor in ICU        Review of Systems   Unable to perform ROS: Intubated      Physical Exam  Laboratory/Imaging   Hemodynamics  Temp (24hrs), Av.9 °C (96.7 °F), Min:35.7 °C (96.3 °F), Max:36.1 °C (97 °F)   Temperature: (!) 35.7 °C (96.3 °F), Monitored Temp: 37.4 °C (99.3 °F)  Pulse  Av  Min: 60  Max: 115 Heart Rate (Monitored): 79  NIBP: 113/52      Respiratory  Crum Vent Mode: APRV, Rate (breaths/min): 18, PEEP/CPAP: 10, PEEP/CPAP: 10, FiO2: 60, P Peak (PIP): 19, P MEAN: 141   Respiration: (!) 11, Pulse Oximetry: 100 %, O2 Daily Delivery Respiratory : Highflow Nasal Cannula     Work Of Breathing / Effort: Vented  RUL Breath Sounds: Diminished, RML Breath Sounds: Diminished, RLL Breath Sounds: Crackles, LORI Breath Sounds: Diminished, LLL Breath Sounds: Crackles    Fluids    Intake/Output Summary (Last 24 hours) at 10/02/18 0956  Last data filed at 10/02/18 0600   Gross per 24 hour   Intake              450 ml   Output             1150 ml   Net             -700 ml       Nutrition  Orders Placed This Encounter   Procedures   • Diet NPO     Standing Status:   Standing     Number of Occurrences:   1     Order Specific Question:   Type:     Answer:   Now [1]     Order Specific Question:   Restrict to:     Answer:   Strict [1]     Physical Exam   Constitutional: She appears well-developed and well-nourished.   HENT:   Head: Normocephalic and atraumatic.   Right Ear: External ear normal.   Left Ear: External ear normal.   Mouth/Throat: No oropharyngeal  exudate.   Eyes: Conjunctivae are normal. Right eye exhibits no discharge. Left eye exhibits no discharge. No scleral icterus.   Neck: Neck supple. No JVD present. No tracheal deviation present.   Cardiovascular: Normal rate and regular rhythm.  Exam reveals no gallop and no friction rub.    No murmur heard.  Pericardial drain in place   Pulmonary/Chest: Effort normal. No stridor. No respiratory distress. She has decreased breath sounds. She has no wheezes. She has rales. She exhibits no tenderness.   Intubated   Abdominal: Soft. Bowel sounds are normal. She exhibits no distension. There is no tenderness. There is no rebound.   Musculoskeletal: She exhibits edema. She exhibits no tenderness.   Neurological: No cranial nerve deficit. She exhibits normal muscle tone.   Sedated   Skin: Skin is warm and dry. She is not diaphoretic. No cyanosis or erythema. Nails show no clubbing.   Psychiatric:   Sedated   Nursing note and vitals reviewed.      Recent Labs      09/30/18   0323  10/02/18   0516   WBC  9.2  10.3   RBC  4.22  4.18*   HEMOGLOBIN  12.2  12.4   HEMATOCRIT  41.2  38.4   MCV  97.6  91.9   MCH  28.9  29.7   MCHC  29.6*  32.3*   RDW  68.7*  65.7*   PLATELETCT  237  213   MPV  10.9  11.2     Recent Labs      09/30/18   0323  10/01/18   0156  10/02/18   0516   SODIUM  144  144  150*   POTASSIUM  3.7  3.6  3.3*   CHLORIDE  103  100  105   CO2  36*  38*  38*   GLUCOSE  119*  127*  161*   BUN  19  19  26*   CREATININE  0.72  0.62  0.52   CALCIUM  9.0  9.4  8.0*             Recent Labs      10/01/18   0304   TRIGLYCERIDE  70   HDL  43   LDL  69          Assessment/Plan     * Acute hypoxemic respiratory failure (HCC)- (present on admission)   Assessment & Plan    Respiratory and management per critical care discussed with Dr. Arnold  Appears intravascular volume depleted she will be started on half NS given hypernatremia        Pleural effusion   Assessment & Plan    Thoracentesis 10/1  F/U CXR w/o  pneumothorax  F/u on cultures, histology, pathology of effusion  Monitor CXR  Pulmonary consulting and discussed with Dr Arnold        Pericardial effusion   Assessment & Plan    Cardiology following discussed with Dr. Lepe  Status post pericardial drain placement  Follow-up on serologies and cytology especially given history of lymphoma        Pulmonary edema- (present on admission)   Assessment & Plan    EF 70%        History of non-Hodgkin's lymphoma   Assessment & Plan    Follow-up on fluid cytology        HTN (hypertension)- (present on admission)   Assessment & Plan    Currently on pressors        Dysphagia   Assessment & Plan    Start tube feedings        Hypernatremia   Assessment & Plan    Start half NS and monitor levels            Hypotension, unspecified   Assessment & Plan    Likely related to sedation  Wean off norepinephrine as tolerated        Acute encephalopathy   Assessment & Plan    Metabolic        Hypokalemia   Assessment & Plan    Replete and monitor        Tobacco abuse- (present on admission)   Assessment & Plan    nicoderm      Hypothyroidism- (present on admission)   Assessment & Plan    Continue levothyroxine          Quality-Core Measures   Reviewed items::  Labs reviewed, Medications reviewed and Radiology images reviewed  [place] catheter::  Unconscious / Sedated Patient on a Ventilator  Central line in place:  Shock  DVT prophylaxis pharmacological::  Contraindicated - High bleeding risk  DVT prophylaxis - mechanical:  SCDs  Ulcer Prophylaxis::  Yes

## 2018-10-02 NOTE — PROCEDURES
Intubation  Date/Time: 10/2/2018 3:44 AM  Performed by: VANCE JONES  Authorized by: VANCE JONES     Consent:     Consent obtained:  Verbal    Consent given by:  Spouse    Risks discussed:  Aspiration, bleeding, hypoxia, pneumothorax, death, laryngeal injury and dental trauma    Alternatives discussed:  Alternative treatment and delayed treatment  Pre-procedure details:     Patient status:  Awake    Mallampati score:  III    Pretreatment medications:  None    Paralytics:  Rocuronium  Procedure details:     Preoxygenation:  Bag valve mask    CPR in progress: no      Intubation method:  Oral    Oral intubation technique:  Fiber optic    Laryngoscope type:  GlideScope    Laryngoscope blade:  Mac 3    Tube size (mm):  8.0    Tube type:  Cuffed    Number of attempts:  1    Ventilation between attempts: no      Cricoid pressure: no      Tube visualized through cords: yes    Placement assessment:     ETT to lip:  22 cm    Tube secured with:  ETT muñoz    Breath sounds:  Equal    Placement verification: chest rise, condensation, CXR verification, direct visualization and ETCO2 detector    Post-procedure details:     Patient tolerance of procedure:  Tolerated well, no immediate complications  Comments:      Pending chest xray

## 2018-10-02 NOTE — ASSESSMENT & PLAN NOTE
Intubated 10/2  All appropriate ventilator bundles have been initiated  Continue vent support  SBT as tolerated

## 2018-10-02 NOTE — ASSESSMENT & PLAN NOTE
Cardiology signed off  Status post pericardial drain placement  Drain removed on 10/3/2018  Cytology negative fluid cultures negative  GENTRY negative RA factor positive CCP negative    off steroids .. Ineffective and stopped    Etiology is unclear and patient may eventually need pericardial biopsy for definitive diagnosis -> surgery is aware however at this moment patient wants to be transferred to [facility] for regional referral center for second opinion.  Transfer center notified and working on this transfer.  Unable to transfer patient as it is a lateral transfer at this time.  cytology results negative for malignancy

## 2018-10-02 NOTE — PROGRESS NOTES
Patient seen for hypoxia.  She had altered mental status.  On high flow nasal cannula at 100% fio2.  PO2 in 40s on abg.  Reviewed chart.  Per bedside us no significant tamponade, no right heart strain.  Bilateral congestion of lungs.  Moderate EF.  Drain in place for pericardial effusion.  Intubated.  Placed on mechanical ventilator.  Set ventilator settings, attempted sedation with precedex, failed, changed to propofol. Patient had hypotension and required central line for pressors.  Line placed.  Chest xray adequately placed et tube and central line.  Reviewed chart in depth.  Discussed multiple times with family. Post intubation significant secretions per et tube. Decided to do bronchoscopy, see note.  Mild to moderate secretions, BAL from RLL.  Does not appear to have a significant pneumonia.  Pending bal, if culture negative no need to start antibiotics.  No fevers currently.  If increasing fevers or wbc then will need broad spectrum antibiotics.  procalcitonine and blood cultures ordered.  KEEP map > 65, on levophed.  Hold on further diuresis for now, monitor I/O.   Shock likely cardiogenic, but may also have component of sepsis as above.    Patient is critically ill. The patient had severe hypoxic respiratory failure and was on fio2 100% per high flow nasal cannula.  Required intubation for mechanical ventilation.  Then required precedex, of which changed to propofol for better effect and levophed for MAP > 65.  If untreated there is a high chance of deterioration and eventually death. The critical that has been undertaken is medically complex. There has been no overlap in critical care time. Critical Care Time not including procedures: 36 minutes

## 2018-10-02 NOTE — PROGRESS NOTES
Critical Care Progress Note    Date of admission  9/28/2018    Chief Complaint  78 y.o. female admitted 9/28/2018 with shortness of breath and edema.    Hospital Course     This lady was admitted with shortness of breath and peripheral edema.  She was diuresed.  She was found to have a large pericardial effusion and a right pleural effusion.    Interval Problem Update  Reviewed last 24 hour events:       -intubated 10/2   -SR   -pericardial drain in place   -replete K and Mg   -add water   -prop 15   -moves all 4   -does not follow   -NE 1   -place cortrak and start TF   -vent 1   -PEEP 10  60%   -start fluids      Review of Systems  Review of Systems   Unable to perform ROS: Acuity of condition        Vital Signs for last 24 hours   Temp:  [35.7 °C (96.3 °F)-36.1 °C (97 °F)] 35.7 °C (96.3 °F)  Pulse:  [] 82  Resp:  [10-30] 19    Hemodynamic parameters for last 24 hours       Vent Settings for last 24 hours  Laurens Vent Mode: APVCMV  Rate (breaths/min):  [18] 18  PEEP/CPAP:  [10] 10  FiO2:  [100] 100  P Peak (PIP):  [24] 24  P MEAN:  [14] 14    Physical Exam   Physical Exam   Constitutional:   Sedated on ventilator   HENT:   Head: Normocephalic and atraumatic.   Right Ear: External ear normal.   Left Ear: External ear normal.   Nose: Nose normal.   Mouth/Throat: Oropharynx is clear and moist.   Eyes: Pupils are equal, round, and reactive to light. Conjunctivae are normal. Right eye exhibits no discharge. Left eye exhibits no discharge. No scleral icterus.   Neck: Normal range of motion. Neck supple. No JVD present. No tracheal deviation present.   Cardiovascular: Intact distal pulses.  Exam reveals no gallop and no friction rub.    Sinus rhythm   Pulmonary/Chest: She has no wheezes. She has rales (Few crackles at the bases).   Abdominal: Soft. She exhibits no distension. There is no tenderness. There is no rebound.   Musculoskeletal: She exhibits no tenderness or deformity.   No clubbing or cyanosis    Neurological:   Sedated on propofol   Skin: Skin is warm and dry. No rash noted. She is not diaphoretic. No erythema. No pallor.       Medications  Current Facility-Administered Medications   Medication Dose Route Frequency Provider Last Rate Last Dose   • Respiratory Care per Protocol   Nebulization Continuous RT Alvaro Perez M.D.       • famotidine (PEPCID) tablet 20 mg  20 mg Oral Q12HRS Alvaro Perez M.D.        Or   • famotidine (PEPCID) injection 20 mg  20 mg Intravenous Q12HRS Alvaro Perez M.D.       • senna-docusate (PERICOLACE or SENOKOT S) 8.6-50 MG per tablet 2 Tab  2 Tab Oral BID Alvaro Perez M.D.   Stopped at 10/02/18 0600    And   • polyethylene glycol/lytes (MIRALAX) PACKET 1 Packet  1 Packet Oral QDAY PRN Alvaro Perez M.D.        And   • magnesium hydroxide (MILK OF MAGNESIA) suspension 30 mL  30 mL Oral QDAY PRN Alvaro Perez M.D.        And   • bisacodyl (DULCOLAX) suppository 10 mg  10 mg Rectal QDAY PRN Alvaro Perez M.D.       • MD Alert...ICU Electrolyte Replacement per Pharmacy   Other pharmacy to dose Alvaro Perez M.D.       • propofol (DIPRIVAN) injection  0-80 mcg/kg/min Intravenous Continuous Alvaro Perez M.D. 7.1 mL/hr at 10/02/18 0524 15 mcg/kg/min at 10/02/18 0524   • norepinephrine (LEVOPHED) 8 mg in  mL Infusion  0-30 mcg/min Intravenous Continuous Alvaro Perez M.D. 3.8 mL/hr at 10/02/18 0610 2 mcg/min at 10/02/18 0610   • guaiFENesin LA (MUCINEX) tablet 600 mg  600 mg Oral Q12HRS Hazel Cruz M.D.   Stopped at 10/01/18 0600   • ipratropium-albuterol (DUONEB) nebulizer solution  3 mL Nebulization Q4H PRN (RT) Hazel Cruz M.D.   3 mL at 10/01/18 0333   • labetalol (NORMODYNE,TRANDATE) injection 10 mg  10 mg Intravenous Q30 MIN PRN Constantin Self M.D.       • gabapentin (NEURONTIN) capsule 300 mg  300 mg Oral BID Jarvis Arnold M.D.   Stopped at 10/01/18 1800   • simvastatin (ZOCOR) tablet 20 mg  20 mg Oral Q EVENING Jarvis Arnold M.D.    Stopped at 10/01/18 1800   • potassium chloride SA (Kdur) tablet 20 mEq  20 mEq Oral TID Jarvis Arnold M.D.   Stopped at 10/01/18 1515   • heparin pf injection 300 Units  300 Units Intracatheter Q5H Elliot Kwok M.D.   Stopped at 10/01/18 2130   • Influenza Vaccine High-Dose pf injection 0.5 mL  0.5 mL Intramuscular Once PRN Rual Teresa M.D.       • albuterol inhaler 2 Puff  2 Puff Inhalation Q4HRS PRN Hazel Cruz M.D.       • albuterol (PROVENTIL) 2.5mg/0.5ml nebulizer solution 2.5 mg  2.5 mg Nebulization Q4H PRN (RT) Hazel Cruz M.D.   2.5 mg at 09/29/18 2135   • levothyroxine (SYNTHROID) tablet 88 mcg  88 mcg Oral AM ES Hazel Cruz M.D.   Stopped at 10/01/18 0600   • nicotine (NICODERM) 14 MG/24HR 14 mg  14 mg Transdermal Daily-0600 Hazel Cruz M.D.   Stopped at 10/01/18 0600    And   • nicotine polacrilex (NICORETTE) 2 MG piece 2 mg  2 mg Oral Q HOUR PRN Hazel Cruz M.D.           Fluids    Intake/Output Summary (Last 24 hours) at 10/02/18 0644  Last data filed at 10/02/18 0200   Gross per 24 hour   Intake              450 ml   Output              800 ml   Net             -350 ml       Laboratory  Recent Results (from the past 48 hour(s))   BASIC METABOLIC PANEL    Collection Time: 10/01/18  1:56 AM   Result Value Ref Range    Sodium 144 135 - 145 mmol/L    Potassium 3.6 3.6 - 5.5 mmol/L    Chloride 100 96 - 112 mmol/L    Co2 38 (H) 20 - 33 mmol/L    Glucose 127 (H) 65 - 99 mg/dL    Bun 19 8 - 22 mg/dL    Creatinine 0.62 0.50 - 1.40 mg/dL    Calcium 9.4 8.5 - 10.5 mg/dL    Anion Gap 6.0 0.0 - 11.9   MAGNESIUM    Collection Time: 10/01/18  1:56 AM   Result Value Ref Range    Magnesium 1.8 1.5 - 2.5 mg/dL   ESTIMATED GFR    Collection Time: 10/01/18  1:56 AM   Result Value Ref Range    GFR If African American >60 >60 mL/min/1.73 m 2    GFR If Non African American >60 >60 mL/min/1.73 m 2   EKG    Collection Time: 10/01/18  2:53 AM   Result Value Ref Range    Report        [facility]    Test Date:  2018-10-01  Pt Name:    AVEL COLLAZO            Department: 183  MRN:        [id number]                      Room:       T620  Gender:     Female                       Technician: MELISSA  :        [id number]                   Requested By:JUAN CARLOS AGUILA  Order #:    [id number]                    Reading MD: Fox Winkler MD    Measurements  Intervals                                Axis  Rate:       80                           P:  IA:                                      QRS:        141  QRSD:       46                           T:  QT:         572  QTc:        660    Interpretive Statements  SINUS RHYTHM  RIGHT AXIS DEVIATION  LOW VOLTAGE, EXTREMITY AND PRECORDIAL LEADS  NONSPECIFIC T ABNORMALITIES, LATERAL LEADS  PROLONGED QT INTERVAL  Compared to ECG 2018 19:00:08  NO SIGNIFICANT CHANGES  Electronically Signed On 10-1-2018 11:36:06 PDT by Fox Winkler MD     TROPONIN    Collection Time: 10/01/18  3:04 AM   Result Value Ref Range    Troponin I 0.02 0.00 - 0.04 ng/mL   LIPID PROFILE    Collection Time: 10/01/18  3:04 AM   Result Value Ref Range    Cholesterol,Tot 126 100 - 199 mg/dL    Triglycerides 70 0 - 149 mg/dL    HDL 43 >=40 mg/dL    LDL 69 <100 mg/dL   ISTAT ARTERIAL BLOOD GAS    Collection Time: 10/01/18  3:22 AM   Result Value Ref Range    Ph 7.256 (LL) 7.400 - 7.500    Pco2 87.6 (HH) 26.0 - 37.0 mmHg    Po2 61 (L) 64 - 87 mmHg    Tco2 42 (HH) 20 - 33 mmol/L    S02 85 (L) 93 - 99 %    Hco3 39.0 (H) 17.0 - 25.0 mmol/L    BE 8 (H) -4 - 3 mmol/L    Body Temp 97.3 F degrees    O2 Therapy 30 %    iPF Ratio 203     Ph Temp Janee 7.266 (LL) 7.400 - 7.500    Pco2 Temp Co 84.9 (HH) 26.0 - 37.0 mmHg    Po2 Temp Cor 58 (L) 64 - 87 mmHg    Specimen Arterial     Action Range Triggered YES     Inst. Qualified Patient YES    EC-ECHOCARDIOGRAM COMPLETE W/O CONT    Collection Time: 10/01/18 10:17 AM   Result Value Ref Range    Eject.Frac. MOD 4C 75.66     Left Ventrical  Ejection Fraction 70    FLUID GLUCOSE    Collection Time: 10/01/18 11:10 AM   Result Value Ref Range    Glucose, Fluid 136 mg/dL    Fluid Type Pericardial    FLUID LDH    Collection Time: 10/01/18 11:10 AM   Result Value Ref Range    Body Fluid  U/L   FLUID CELL COUNT    Collection Time: 10/01/18 11:10 AM   Result Value Ref Range    Fluid Type Thoracentesis     Color-Body Fluid Yellow     Character-Body Fluid Hazy     Total RBC Count 2000 cells/uL    Total  cells/uL    Polys 46 %    Lymphs 26 %    Mononuclear Cells - Fluid 4 %    Mesothelial Cells - CSF 10 %    Fluid Histiocyte 14 %    Comments see below    Fluid pH    Collection Time: 10/01/18 11:10 AM   Result Value Ref Range    Ph Misc 8.00     Fluid Type Pericardial    GRAM STAIN    Collection Time: 10/01/18 11:10 AM   Result Value Ref Range    Significant Indicator .     Source BF     Site Paracentesis     Gram Stain Result Few WBCs.  No organisms seen.      CYTOLOGY    Collection Time: 10/01/18 11:46 AM   Result Value Ref Range    Cytology Reg See Path Report    ISTAT ARTERIAL BLOOD GAS    Collection Time: 10/01/18  2:46 PM   Result Value Ref Range    Ph 7.476 7.400 - 7.500    Pco2 61.4 (HH) 26.0 - 37.0 mmHg    Po2 39 (LL) 64 - 87 mmHg    Tco2 47 (HH) 20 - 33 mmol/L    S02 75 (L) 93 - 99 %    Hco3 45.4 (H) 17.0 - 25.0 mmol/L    BE 18 (H) -4 - 3 mmol/L    Body Temp 96.9 F degrees    O2 Therapy 70 %    iPF Ratio 56     Ph Temp Janee 7.491 7.400 - 7.500    Pco2 Temp Co 59.0 (HH) 26.0 - 37.0 mmHg    Po2 Temp Cor 36 (LL) 64 - 87 mmHg    Specimen Arterial     Action Range Triggered YES     Inst. Qualified Patient YES    FLUID TOTAL PROTEIN    Collection Time: 10/01/18  3:50 PM   Result Value Ref Range    Total Protein Fluid 6.0 g/dL    Fluid Type Pericardial    GRAM STAIN    Collection Time: 10/01/18  3:50 PM   Result Value Ref Range    Significant Indicator .     Source WND     Site Pericardial Effusion     Gram Stain Result Rare WBCs.  No organisms  seen.      VIANNEY ARTERIAL BLOOD GAS    Collection Time: 10/02/18  3:04 AM   Result Value Ref Range    Ph 7.556 (H) 7.400 - 7.500    Pco2 45.3 (H) 26.0 - 37.0 mmHg    Po2 41 (LL) 64 - 87 mmHg    Tco2 42 (HH) 20 - 33 mmol/L    S02 82 (L) 93 - 99 %    Hco3 40.2 (H) 17.0 - 25.0 mmol/L    BE 16 (H) -4 - 3 mmol/L    Body Temp 37.7 C degrees    O2 Therapy 100 %    iPF Ratio 41     Ph Temp Janee 7.545 (H) 7.400 - 7.500    Pco2 Temp Co 46.7 (H) 26.0 - 37.0 mmHg    Po2 Temp Cor 43 (LL) 64 - 87 mmHg    Specimen Arterial     Action Range Triggered YES     Inst. Qualified Patient YES    CBC WITH DIFFERENTIAL    Collection Time: 10/02/18  5:16 AM   Result Value Ref Range    WBC 10.3 4.8 - 10.8 K/uL    RBC 4.18 (L) 4.20 - 5.40 M/uL    Hemoglobin 12.4 12.0 - 16.0 g/dL    Hematocrit 38.4 37.0 - 47.0 %    MCV 91.9 81.4 - 97.8 fL    MCH 29.7 27.0 - 33.0 pg    MCHC 32.3 (L) 33.6 - 35.0 g/dL    RDW 65.7 (H) 35.9 - 50.0 fL    Platelet Count 213 164 - 446 K/uL    MPV 11.2 9.0 - 12.9 fL    Neutrophils-Polys 83.50 (H) 44.00 - 72.00 %    Lymphocytes 6.70 (L) 22.00 - 41.00 %    Monocytes 9.10 0.00 - 13.40 %    Eosinophils 0.00 0.00 - 6.90 %    Basophils 0.10 0.00 - 1.80 %    Immature Granulocytes 0.60 0.00 - 0.90 %    Nucleated RBC 0.00 /100 WBC    Neutrophils (Absolute) 8.63 (H) 2.00 - 7.15 K/uL    Lymphs (Absolute) 0.69 (L) 1.00 - 4.80 K/uL    Monos (Absolute) 0.94 (H) 0.00 - 0.85 K/uL    Eos (Absolute) 0.00 0.00 - 0.51 K/uL    Baso (Absolute) 0.01 0.00 - 0.12 K/uL    Immature Granulocytes (abs) 0.06 0.00 - 0.11 K/uL    NRBC (Absolute) 0.00 K/uL   BASIC METABOLIC PANEL    Collection Time: 10/02/18  5:16 AM   Result Value Ref Range    Sodium 150 (H) 135 - 145 mmol/L    Potassium 3.3 (L) 3.6 - 5.5 mmol/L    Chloride 105 96 - 112 mmol/L    Co2 38 (H) 20 - 33 mmol/L    Glucose 161 (H) 65 - 99 mg/dL    Bun 26 (H) 8 - 22 mg/dL    Creatinine 0.52 0.50 - 1.40 mg/dL    Calcium 8.0 (L) 8.5 - 10.5 mg/dL    Anion Gap 7.0 0.0 - 11.9   MAGNESIUM     Collection Time: 10/02/18  5:16 AM   Result Value Ref Range    Magnesium 1.7 1.5 - 2.5 mg/dL   AMMONIA    Collection Time: 10/02/18  5:16 AM   Result Value Ref Range    Ammonia 51 (H) 11 - 45 umol/L   CRP HIGH SENSITIVE (CARDIAC)    Collection Time: 10/02/18  5:16 AM   Result Value Ref Range    C Reactive Protein High Sensitive 47.5 (H) 0.0 - 7.5 mg/L   ESTIMATED GFR    Collection Time: 10/02/18  5:16 AM   Result Value Ref Range    GFR If African American >60 >60 mL/min/1.73 m 2    GFR If Non African American >60 >60 mL/min/1.73 m 2       Imaging  X-Ray:  I have personally reviewed the images and compared with prior images. and My impression is: Bibasilar opacities    Assessment/Plan  * Acute hypoxemic respiratory failure (HCC)- (present on admission)   Assessment & Plan    Intubated on 10/2  Full ventilator support  All appropriate ventilator bundles have been initiated        Pleural effusion   Assessment & Plan    S/P right thoracentesis with 1.7 L of fluid removed on 10/1  Transudative effusion        Pericardial effusion   Assessment & Plan    Pericardiocentesis done on 10/1  1 L of bloody fluid removed  Pericardial drain in place - 375 mL drained last night  Query malignant  Pericardial fluid studies are pending        Pulmonary edema- (present on admission)   Assessment & Plan    Resolved after diuresis        Hypotension   Assessment & Plan    Undifferentiated hypotension  Hydrate with intravenous fluids  Titrating norepinephrine to keep MAP > 65        History of non-Hodgkin's lymphoma   Assessment & Plan    Diagnosed in 2000  Received chemotherapy in [place] and has been in remission per the         HTN (hypertension)- (present on admission)   Assessment & Plan    Now on vasopressor support        Dysphagia   Assessment & Plan    Place cortrak and start enteral tube feedings        Hypernatremia   Assessment & Plan    Start free water  Follow sodium        Hypomagnesemia   Assessment & Plan     Replete magnesium        Hypokalemia   Assessment & Plan    Replete potassium        Tobacco abuse- (present on admission)   Assessment & Plan    Nicotine replacement protocol        Hypothyroidism- (present on admission)   Assessment & Plan    Continue Synthroid, 88 mcg daily             VTE:  Contraindicated  Ulcer: H2 Antagonist and Not Indicated  Lines: Central Line  Ongoing indication addressed    I have performed a physical exam and reviewed and updated ROS and Plan today (10/2/2018). In review of yesterday's note (10/1/2018), there are no changes except as documented above.     I have assessed and reassessed her respiratory status with ventilator adjustments, airway mechanics, ventilator waveforms, blood pressure, hemodynamics, cardiovascular status with titration of norepinephrine and neurologic status with titration of propofol.  She is at increased risk for worsening respiratory and cardiovascular system dysfunction.    Discussed patient condition and risk of morbidity and/or mortality with Hospitalist, Family, RN, RT, Pharmacy, Charge nurse / hot rounds and QA team  The patient remains critically ill.  Critical care time = 100 minutes in directly providing and coordinating critical care and extensive data review.  This time is in addition to Dr. Perez earlier today.  No time overlap and excludes procedures.    High risk of deterioration and worsening vital organ dysfunction and death without the above critical care interventions.    Jarvis Arnold MD  Pulmonary and Critical Care Medicine

## 2018-10-02 NOTE — PROCEDURES
DATE OF OPERATION: 10/2/2018     PREOPERATIVE DIAGNOSIS: secretions and respiratory failure     POSTOPERATIVE DIAGNOSIS: secretions and hypoxic respiratory failure     PROCEDURE PERFORMED: Fiberoptic bronchoscopy with bronchoalveolar lavage    SURGEON: Alvaro Perez M.D.    ANESTHESIA: Intravenous sedation, analgesia, and pharmacologic restraint     INDICATIONS: The patient is a 78 y.o. female with acute respiratory failure.     FINDINGS: secretions, mild to moderate bilateral lower lobes and main right and left airways.    SPECIMEN: Bronchoalveolar lavage for quantitative cultures    PROCEDURE: Following informed consent, the patient was properly identified and optimally positioned in bed. She was preoxygenated with 100% oxygen and placed on a regular ventilatory rate. Intravenous sedation, analgesia, and pharmacologic restraint was administered.    The fiberoptic bronchoscope was advanced through the indwelling endotracheal tube.  The upper airways were suctioned. The airways were systematically and sequentially inspected and lavaged. The pooled effluent was collected in a sterile trap and submitted for quantitative cultures.    60 ml flushed into RLL anterior and lateral segements, 25 ml suctioned.  Mildly Cloudy specimen.  Suctioned secretions in right and left lower lobes and right main and left main.     The patient tolerated the procedure well. There were no apparent complications.    ____________________________________   Alvaro Perez M.D.    DD: 10/2/2018  6:48 AM

## 2018-10-02 NOTE — PROGRESS NOTES
[facility] Cardiology Follow-up Consult Note  Date of note:    10/2/2018      Consulting Physician: Néstor Guillen M.D.    Patient ID:  Name:   Erin Quigley     YOB: 1940  Age:   78 y.o.  female   MRN:   [id number]    Chief Complaint   Patient presents with   • Shortness of Breath     3 weeks on and off   • Peripheral Edema     started 3 weeks ago in her feet and is now up to her thighs       Interim Events:  - Overnight, hypoxic and hypotensive requiring intubation and central line for levophed  - Pericardial drain in place  - CXR: right-sided pulmonary congestion      ROS  - unable to obtain as patient is intubated and sedated    Past medical, surgical, social, and family history reviewed and unchanged from admission except as noted in assessment and plan.    Medications: Reviewed in MAR  Current Facility-Administered Medications   Medication Dose Frequency Provider Last Rate Last Dose   • Respiratory Care per Protocol   Continuous RT Alvaro Perez M.D.       • MD Alert...ICU Electrolyte Replacement per Pharmacy   pharmacy to dose Alvaro Perez M.D.       • propofol (DIPRIVAN) injection  0-80 mcg/kg/min Continuous Alvaro Perez M.D. 7.1 mL/hr at 10/02/18 0830 15 mcg/kg/min at 10/02/18 0830   • norepinephrine (LEVOPHED) 8 mg in  mL Infusion  0-30 mcg/min Continuous Alvaro Perez M.D. 3.8 mL/hr at 10/02/18 0729 2 mcg/min at 10/02/18 0729   • Pharmacy Consult: Enteral tube feeding - review meds/change route/product selection   PRN Néstor Guillen M.D.       • potassium chloride in water (KCL) ivpb **Administer in ICU only** 40 mEq  40 mEq Once Jarvis Arnold M.D. 25 mL/hr at 10/02/18 1056 40 mEq at 10/02/18 1056   • 1/2 NS infusion   Continuous Jarvis Arnold M.D. 83 mL/hr at 10/02/18 1044     • guaiFENesin (ROBITUSSIN) 100 MG/5ML solution 400 mg  20 mL Q8HRS Néstor Guillen M.D.       • famotidine (PEPCID) tablet 20 mg  20 mg Q12HRS Néstor Guillen M.D.     "    Or   • famotidine (PEPCID) injection 20 mg  20 mg Q12HRS Néstor Guillen M.D.       • gabapentin (NEURONTIN) capsule 300 mg  300 mg BID Néstor Guillen M.D.       • levothyroxine (SYNTHROID) tablet 88 mcg  88 mcg AM ES Néstor Guillen M.D.       • senna-docusate (PERICOLACE or SENOKOT S) 8.6-50 MG per tablet 2 Tab  2 Tab BID Néstor Guillen M.D.        And   • polyethylene glycol/lytes (MIRALAX) PACKET 1 Packet  1 Packet QDAY PRN Néstor Guillen M.D.        And   • magnesium hydroxide (MILK OF MAGNESIA) suspension 30 mL  30 mL QDAY PRN Néstor Guillen M.D.        And   • bisacodyl (DULCOLAX) suppository 10 mg  10 mg QDAY PRN Néstor Guillen M.D.       • potassium chloride SA (Kdur) tablet 20 mEq  20 mEq TID Néstor Guillen M.D.       • simvastatin (ZOCOR) tablet 20 mg  20 mg Q EVENING Néstor Guillen M.D.       • ipratropium-albuterol (DUONEB) nebulizer solution  3 mL Q4H PRN (RT) Hazel Cruz M.D.   3 mL at 10/01/18 0333   • labetalol (NORMODYNE,TRANDATE) injection 10 mg  10 mg Q30 MIN PRN Constantin Self M.D.       • heparin pf injection 300 Units  300 Units Q5H Elliot Kwok M.D.   Stopped at 10/01/18 2130   • Influenza Vaccine High-Dose pf injection 0.5 mL  0.5 mL Once PRN Rula Teresa M.D.       • albuterol inhaler 2 Puff  2 Puff Q4HRS PRN Hazel Cruz M.D.       • albuterol (PROVENTIL) 2.5mg/0.5ml nebulizer solution 2.5 mg  2.5 mg Q4H PRN (RT) Hazel Cruz M.D.   2.5 mg at 09/29/18 2135   • nicotine (NICODERM) 14 MG/24HR 14 mg  14 mg Daily-0600 Hazel Cruz M.D.   Stopped at 10/01/18 0600    And   • nicotine polacrilex (NICORETTE) 2 MG piece 2 mg  2 mg Q HOUR PRN Hazel Cruz M.D.         Last reviewed on 9/29/2018  4:47 PM by Jaylin Haynes, T  No Known Allergies    Physical Exam  Body mass index is 29.84 kg/m². Blood pressure 133/79, pulse 84, temperature (!) 35.7 °C (96.3 °F), resp. rate (!) 11, height 1.575 m (5' 2\"), weight " 74 kg (163 lb 2.3 oz), SpO2 100 %, not currently breastfeeding. Vitals:    10/02/18 0723 10/02/18 0738 10/02/18 0800 10/02/18 1012   BP:       Pulse:       Resp:       Temp:       SpO2: 100% 100%  100%   Weight:   74 kg (163 lb 2.3 oz)    Height:        Oxygen Therapy:  Pulse Oximetry: 100 %, O2 (LPM): 50, FiO2%: 100 %, O2 Delivery: High Flow Nasal Cannula    General: Sedated and intubated. Appears ill.   HEENT: Normocephalic, Atraumatic. ET tube in place. Right subclavian in place  Cardiovascular: Normal heart rate, Normal rhythm. S1+, S2+   Pericardial drain in place  Lungs: Coarse breath sounds. Mechanically ventilated   Abdomen:  Soft, No tenderness  Skin: No erythema, No rash  Lower limbs: trace pedal edema  Neurologic: Intubated and sedated, moving all limbs.   Other systems also examined, grossly normal.       Labs (personally reviewed and notable for):   Recent Results (from the past 24 hour(s))   CYTOLOGY    Collection Time: 10/01/18 11:46 AM   Result Value Ref Range    Cytology Reg See Path Report    ISTAT ARTERIAL BLOOD GAS    Collection Time: 10/01/18  2:46 PM   Result Value Ref Range    Ph 7.476 7.400 - 7.500    Pco2 61.4 (HH) 26.0 - 37.0 mmHg    Po2 39 (LL) 64 - 87 mmHg    Tco2 47 (HH) 20 - 33 mmol/L    S02 75 (L) 93 - 99 %    Hco3 45.4 (H) 17.0 - 25.0 mmol/L    BE 18 (H) -4 - 3 mmol/L    Body Temp 96.9 F degrees    O2 Therapy 70 %    iPF Ratio 56     Ph Temp Janee 7.491 7.400 - 7.500    Pco2 Temp Co 59.0 (HH) 26.0 - 37.0 mmHg    Po2 Temp Cor 36 (LL) 64 - 87 mmHg    Specimen Arterial     Action Range Triggered YES     Inst. Qualified Patient YES    FLUID TOTAL PROTEIN    Collection Time: 10/01/18  3:50 PM   Result Value Ref Range    Total Protein Fluid 6.0 g/dL    Fluid Type Pericardial    GRAM STAIN    Collection Time: 10/01/18  3:50 PM   Result Value Ref Range    Significant Indicator .     Source WND     Site Pericardial Effusion     Gram Stain Result Rare WBCs.  No organisms seen.      ISTAT  ARTERIAL BLOOD GAS    Collection Time: 10/02/18  3:04 AM   Result Value Ref Range    Ph 7.556 (H) 7.400 - 7.500    Pco2 45.3 (H) 26.0 - 37.0 mmHg    Po2 41 (LL) 64 - 87 mmHg    Tco2 42 (HH) 20 - 33 mmol/L    S02 82 (L) 93 - 99 %    Hco3 40.2 (H) 17.0 - 25.0 mmol/L    BE 16 (H) -4 - 3 mmol/L    Body Temp 37.7 C degrees    O2 Therapy 100 %    iPF Ratio 41     Ph Temp Janee 7.545 (H) 7.400 - 7.500    Pco2 Temp Co 46.7 (H) 26.0 - 37.0 mmHg    Po2 Temp Cor 43 (LL) 64 - 87 mmHg    Specimen Arterial     Action Range Triggered YES     Inst. Qualified Patient YES    CBC WITH DIFFERENTIAL    Collection Time: 10/02/18  5:16 AM   Result Value Ref Range    WBC 10.3 4.8 - 10.8 K/uL    RBC 4.18 (L) 4.20 - 5.40 M/uL    Hemoglobin 12.4 12.0 - 16.0 g/dL    Hematocrit 38.4 37.0 - 47.0 %    MCV 91.9 81.4 - 97.8 fL    MCH 29.7 27.0 - 33.0 pg    MCHC 32.3 (L) 33.6 - 35.0 g/dL    RDW 65.7 (H) 35.9 - 50.0 fL    Platelet Count 213 164 - 446 K/uL    MPV 11.2 9.0 - 12.9 fL    Neutrophils-Polys 83.50 (H) 44.00 - 72.00 %    Lymphocytes 6.70 (L) 22.00 - 41.00 %    Monocytes 9.10 0.00 - 13.40 %    Eosinophils 0.00 0.00 - 6.90 %    Basophils 0.10 0.00 - 1.80 %    Immature Granulocytes 0.60 0.00 - 0.90 %    Nucleated RBC 0.00 /100 WBC    Neutrophils (Absolute) 8.63 (H) 2.00 - 7.15 K/uL    Lymphs (Absolute) 0.69 (L) 1.00 - 4.80 K/uL    Monos (Absolute) 0.94 (H) 0.00 - 0.85 K/uL    Eos (Absolute) 0.00 0.00 - 0.51 K/uL    Baso (Absolute) 0.01 0.00 - 0.12 K/uL    Immature Granulocytes (abs) 0.06 0.00 - 0.11 K/uL    NRBC (Absolute) 0.00 K/uL   BASIC METABOLIC PANEL    Collection Time: 10/02/18  5:16 AM   Result Value Ref Range    Sodium 150 (H) 135 - 145 mmol/L    Potassium 3.3 (L) 3.6 - 5.5 mmol/L    Chloride 105 96 - 112 mmol/L    Co2 38 (H) 20 - 33 mmol/L    Glucose 161 (H) 65 - 99 mg/dL    Bun 26 (H) 8 - 22 mg/dL    Creatinine 0.52 0.50 - 1.40 mg/dL    Calcium 8.0 (L) 8.5 - 10.5 mg/dL    Anion Gap 7.0 0.0 - 11.9   MAGNESIUM    Collection Time:  10/02/18  5:16 AM   Result Value Ref Range    Magnesium 1.7 1.5 - 2.5 mg/dL   AMMONIA    Collection Time: 10/02/18  5:16 AM   Result Value Ref Range    Ammonia 51 (H) 11 - 45 umol/L   CRP HIGH SENSITIVE (CARDIAC)    Collection Time: 10/02/18  5:16 AM   Result Value Ref Range    C Reactive Protein High Sensitive 47.5 (H) 0.0 - 7.5 mg/L   ESTIMATED GFR    Collection Time: 10/02/18  5:16 AM   Result Value Ref Range    GFR If African American >60 >60 mL/min/1.73 m 2    GFR If Non African American >60 >60 mL/min/1.73 m 2       Cardiac Imaging and Procedures Review:    EKG and telemetry tracings personally reviewed    Impression and Medical Decision Making:  Principal Problem:    Acute hypoxemic respiratory failure (HCC) POA: Yes  Active Problems:    Pulmonary edema POA: Yes    Pericardial effusion POA: Unknown    Pleural effusion POA: Unknown    HTN (hypertension) POA: Yes    History of non-Hodgkin's lymphoma POA: Unknown      Overview: Nonhodgkins lymphoma  2000    Hypothyroidism POA: Yes    Tobacco abuse POA: Yes    Hypokalemia POA: No    Acute encephalopathy POA: Unknown    Hypotension, unspecified POA: Unknown  Resolved Problems:    * No resolved hospital problems. *        Assessment and Plan  Pericardial Effusion with Tamponade  - Patient presented with large pericardial effusion. EKG with very low voltage on admission. Underwent drainage 10/1. 1000 cc of bloody pericardial fluid was obtained. Pigtail drain in place. Pericardial fluid analysis: bloody ? Traumatic. Cultures pending although unlikely to be positive. Cytology also pending  - Repeat echo: pericardial effusion successfully drained   - D/c coreg 3.125 mg twice a day, lisinopril 40 mg daily  - IV fluids for preload; intravascularly dry although she has some pulmonary congestion. Also hypernatremic now. Recommend 1/2 NS at 100 cc/hr.  - Will re-check EKG      Pleural Effusion  - S/p thoracentesis. 1.7 liters have been removed.  - Cytology pending.       Pulmonary Edema  - Has been diuresed significantly.   - BNP not significantly elevated; troponins not significantly elevated  - Significant pulmonary congestion on x-ray however she is intravascularly dry. Recommend IV fluids (1/2 NS at 100/chr)   - Monitor I&Os    Hypernatremia  - Intravascularly dry. Start IV fluids; recommend 1/2 NS at 100 cc/hr  - Monitor with BMP    It is my pleasure to participate in the care of Ms. Quigley.  Please do not hesitate to contact me with questions or concerns.

## 2018-10-02 NOTE — CARE PLAN
Problem: Respiratory:  Goal: Respiratory status will improve  Outcome: PROGRESSING SLOWER THAN EXPECTED  Wean oxygen as tolerates    Problem: Fluid Volume:  Goal: Will maintain balanced intake and output  Outcome: PROGRESSING AS EXPECTED  monitor thoracentesis site, document fluid

## 2018-10-02 NOTE — ASSESSMENT & PLAN NOTE
Patient is currently euvolemic and no signs of worsening Of pleural effusion or pericardial effusion  Discontinue diuretics    Last Thoracentesis on left , done on 10/13    Right side 10/15 tolerated very well and feeling better currently with decreased requirement of oxygen  No signs of infection or malignancy from fluid analysis

## 2018-10-02 NOTE — PROCEDURES
Central Line Insertion  Date/Time: 10/2/2018 6:43 AM  Performed by: VANCE JONES  Authorized by: VANCE JONES     Consent:     Consent obtained:  Verbal    Consent given by:  Spouse    Risks discussed:  Arterial puncture, bleeding, infection, incorrect placement, nerve damage and pneumothorax    Alternatives discussed:  Delayed treatment and no treatment  Pre-procedure details:     Skin preparation:  2% chlorhexidine  Sedation:     Sedation type:  Moderate (conscious) sedation  Anesthesia:     Anesthesia method:  Local infiltration    Local anesthetic:  Lidocaine 1% w/o epi  Procedure details:     Location:  R subclavian    Patient position:  Trendelenburg    Procedural supplies:  Triple lumen    Catheter size:  8 Fr    Landmarks identified: yes      Ultrasound guidance: yes      Number of attempts:  1    Successful placement: yes    Post-procedure details:     Post-procedure:  Dressing applied and line sutured    Assessment:  Blood return through all ports, no pneumothorax on x-ray, placement verified by x-ray and free fluid flow    Patient tolerance of procedure:  Tolerated well, no immediate complications

## 2018-10-02 NOTE — DIETARY
"Nutrition Support Assessment     Day 4 of admit.  Erin Quigley is a 78 y.o. female with admitting DX of Hypoxia     Current problem list:  1. VDRF  2. S/p pericardiocentesis 10/1  3. SOB and edema PTA  4. Recent GIB     Assessment:  Estimated Nutritional Needs based on:   Height: 157.5 cm (5' 2\")  Weight: 74 kg (163 lb 2.3 oz)  Ideal Body Weight: 49.9 kg (110 lb)  Percent Ideal Body Weight: 148.3  Body mass index is 29.84 kg/m².    Calculation/Equation: REE per MSJ x1.2 = 1416 kcal/day; RMR per PSU (vent L/min 5.6, T max/24 hours 37.7) = 1389 kcal/day  Total Calories / day: 1300 - 1500 (Calories / k - 20)  Total Grams Protein / day: 89 (Grams Protein / k.2)     Evaluation:   1. Pt is intubated and unable to take PO diet.  2. Await Cortrak placement for initiation of TF.  3. Labs: Na 150, K+ 3.3, glu 161, BUN 26, Ca 8.0, CRP 47.5, ammonia 51  4. Meds: 1/2 NS @ 83 ml/hr, electrolyte replacement, levophed @ 2 mcg/min, propofol @ 15 mcg/kg/min (7.1 ml/hr = 187 kcal/day), bowel meds   5. Last BM   6. Standard TF formula is appropriate.     Malnutrition Risk: Negative nutritional admit screen     Recommendations/Plan:  1. Start Replete with Fiber @ 25 ml/hr and advance per protocol to goal rate (with and without propofol) 60 ml/hr to provide 1440 kcal (+/-Kcal from propofol), 90 grams protein, and 1210 ml free water per day.  2. Fluids per MD.    RD following.                 "

## 2018-10-02 NOTE — PROGRESS NOTES
Cardiology paged regarding heparin flush of pericardial drain not being with in our nursing scope because of lack of competency training. Dr. Gallego informed that we are not able to complete the order. MD stated that the cardiologist can look at it in the morning. No new orders received.

## 2018-10-02 NOTE — ASSESSMENT & PLAN NOTE
Pericardiocentesis done on 10/1 - 1 L of bloody fluid was removed  Pericardial drain removed on 10/3  Cytology negative for malignancy  CRP is markedly elevated  Rheumatoid factor positive, GENTRY negative, HIV negative, hepatitis panel negative  She needs a thoracic surgery consultation for pericardial biopsy when improved  Taper empiric hydrocortisone, 50 mg IV every 12 hours

## 2018-10-02 NOTE — PROGRESS NOTES
Dr. Sumit Guillen notified of patient's low urine output and red/sediment consistency , patient is on 1/2 ns at 83 ml/hr, and tube feeds were initiated just now.     Free water flushes added by Dr. Sumit Guillen.

## 2018-10-03 PROBLEM — E83.39 HYPOPHOSPHATEMIA: Status: ACTIVE | Noted: 2018-01-01

## 2018-10-03 NOTE — CARE PLAN
Problem: Ventilation Defect:  Goal: Ability to achieve and maintain unassisted ventilation or tolerate decreased levels of ventilator support  Adult Ventilation Update    Total Vent Days: 2    Patient Lines/Drains/Airways Status    Active Airway     Name: Placement date: Placement time: Site: Days:    Airway ETT 8.0 10/02/18   0338      1              In the last 24 hours, the patient tolerated SBT for 1 on settings of 5/8.          Rapid Shallow Breathing Index (RR/VT): 98 (10/03/18 1524)  Plateau Pressure (Q Shift): 18 (10/03/18 1042)  Static Compliance (ml / cm H2O): 64.3 (10/03/18 1421)    Patient failed trials because of Barriers to Wean: FiO2 >60% or PEEP >10 CM H2O (10/03/18 0635)  Barriers to SBT Weaning Trial Stopped due to:: Pt weaned for 1 hour and returned to rest settings per protocol (10/03/18 1524)  Length of Weaning Trial Length of Weaning Trial (Hours): 1 hour (10/03/18 1524)  Cough: Productive (10/03/18 1421)  Sputum Amount: Small (10/03/18 1421)  Sputum Color: Tan (10/03/18 1421)  Sputum Consistency: Thin;Thick (10/03/18 1421)    Mobility  Level of Mobility: Level IV (10/03/18 0800)  Activity Performed: Unable to mobilize (10/02/18 2000)  Time Activity Tolerated: 5 min (09/29/18 0800)  Distance Per Occurrence (ft.): 1 feet (09/28/18 2300)  Assistance / Tolerance: Assistance of Two or More (10/03/18 0800)  Pt Calls for Assistance: No (10/03/18 0800)  Staff Present for Mobilization: RN;CNA (09/30/18 2000)  Gait: Unable to Ambulate (10/01/18 0400)  Assistive Devices: Rails;Hand held assist (09/29/18 0800)  Reason Not Mobilized: Bed rest (Until 0300 10/3) (10/02/18 2000)  Mobilization Comments:  (intubation within the last 24 hours) (10/02/18 2000)    Events/Summary/Plan: Placed back on rest settings (10/03/18 1524)

## 2018-10-03 NOTE — PROGRESS NOTES
[facility] Cardiology Follow-up Consult Note  Date of note:    10/2/2018      Consulting Physician: Néstor Guillen M.D.    Patient ID:  Name:   Erin Quigley     YOB: 1940  Age:   78 y.o.  female   MRN:   [id number]    Chief Complaint   Patient presents with   • Shortness of Breath     3 weeks on and off   • Peripheral Edema     started 3 weeks ago in her feet and is now up to her thighs       Interim Events:  - Overnight, arterial line placed. Was briefly off of levophed however this has since been restarted for hypotension  - Pericardial drain removed this morning  - CXR: significant right sided congestion  - Pericardial drain in place  - CXR: right-sided pulmonary congestion      ROS  - unable to obtain as patient is intubated and sedated    Past medical, surgical, social, and family history reviewed and unchanged from admission except as noted in assessment and plan.    Medications: Reviewed in MAR  Current Facility-Administered Medications   Medication Dose Frequency Provider Last Rate Last Dose   • potassium phosphates 30 mmol in D5W 500 mL ivpb  30 mmol Once Jarvis Arnold M.D. 83.3 mL/hr at 10/03/18 0812 30 mmol at 10/03/18 0812    Followed by   • potassium phosphates 15 mmol in D5W 500 mL ivpb  15 mmol Once Jarvis Arnold M.D.       • Respiratory Care per Protocol   Continuous RT Alvaro Perez M.D.       • MD Alert...ICU Electrolyte Replacement per Pharmacy   pharmacy to dose Alvaro Perez M.D.       • propofol (DIPRIVAN) injection  0-80 mcg/kg/min Continuous Alvaro Perez M.D. 9.5 mL/hr at 10/03/18 1040 20 mcg/kg/min at 10/03/18 1040   • norepinephrine (LEVOPHED) 8 mg in  mL Infusion  0-30 mcg/min Continuous Alvaro Perez M.D. 1.9 mL/hr at 10/03/18 0909 1 mcg/min at 10/03/18 0909   • Pharmacy Consult: Enteral tube feeding - review meds/change route/product selection   PRN Néstor Guillen M.D.       • guaiFENesin (ROBITUSSIN) 100 MG/5ML solution 400 mg  20  "mL Q8HRS Néstor Guillen M.D.   400 mg at 10/03/18 0545   • famotidine (PEPCID) tablet 20 mg  20 mg Q12HRS Néstor Guillen M.D.   20 mg at 10/03/18 0530    Or   • famotidine (PEPCID) injection 20 mg  20 mg Q12HRS Néstor Guillen M.D.   20 mg at 10/02/18 1822   • gabapentin (NEURONTIN) capsule 300 mg  300 mg BID Néstor Guillen M.D.   300 mg at 10/03/18 0530   • levothyroxine (SYNTHROID) tablet 88 mcg  88 mcg AM ES Néstor Guillen M.D.   88 mcg at 10/03/18 0532   • senna-docusate (PERICOLACE or SENOKOT S) 8.6-50 MG per tablet 2 Tab  2 Tab BID Néstor Guillen M.D.   2 Tab at 10/03/18 0530    And   • polyethylene glycol/lytes (MIRALAX) PACKET 1 Packet  1 Packet QDAY PRN Néstor Guillen M.D.        And   • magnesium hydroxide (MILK OF MAGNESIA) suspension 30 mL  30 mL QDAY PRN Néstor Guillen M.D.        And   • bisacodyl (DULCOLAX) suppository 10 mg  10 mg QDAY PRN Néstor Guillen M.D.       • simvastatin (ZOCOR) tablet 20 mg  20 mg Q EVENING Néstor Guillen M.D.   20 mg at 10/02/18 1822   • ipratropium-albuterol (DUONEB) nebulizer solution  3 mL Q2HRS PRN (RT) Jarvis Arnold M.D.       • labetalol (NORMODYNE,TRANDATE) injection 10 mg  10 mg Q30 MIN PRN Constantin Self M.D.       • heparin pf injection 300 Units  300 Units Q5H Elliot Kwok M.D.   Stopped at 10/01/18 2130   • Influenza Vaccine High-Dose pf injection 0.5 mL  0.5 mL Once PRN Rula K Teresa, M.D.       • nicotine (NICODERM) 14 MG/24HR 14 mg  14 mg Daily-0600 Hazel Cruz M.D.   14 mg at 10/03/18 0530    And   • nicotine polacrilex (NICORETTE) 2 MG piece 2 mg  2 mg Q HOUR PRN Hazel Cruz M.D.         Last reviewed on 9/29/2018  4:47 PM by Jaylin Haynes, T  No Known Allergies    Physical Exam  Body mass index is 29.84 kg/m². Blood pressure 133/79, pulse 77, temperature 36.9 °C (98.4 °F), resp. rate (!) 0, height 1.575 m (5' 2\"), weight 74 kg (163 lb 2.3 oz), SpO2 100 %, not " currently breastfeeding.   Vitals:    10/03/18 0900 10/03/18 1000 10/03/18 1006 10/03/18 1100   BP:       Pulse: 80 72 74 77   Resp: (!) 0 (!) 0 (!) 0 (!) 0   Temp:  36.9 °C (98.4 °F)     SpO2: 96% 99% 99% 100%   Weight:       Height:        Oxygen Therapy:  Pulse Oximetry: 100 %, FiO2%: 50 %, O2 Delivery: Ventilator    General: Sedated and intubated. Appears ill.   HEENT: Normocephalic, Atraumatic. ET tube in place. Right subclavian in place  Cardiovascular: Normal heart rate, Normal rhythm. S1+, S2+ (  Pericardial drain removed  Lungs: Coarse breath sounds. Mechanically ventilated   Abdomen:  Soft, No tenderness  Skin: No erythema, No rash  Lower limbs: trace pedal edema (significantly improved since admission)  Neurologic: Intubated and sedated, moving all limbs.   Other systems also examined, grossly normal.       Labs (personally reviewed and notable for):   Recent Results (from the past 24 hour(s))   EKG    Collection Time: 10/02/18 12:59 PM   Result Value Ref Range    Report       [facility]    Test Date:  2018-10-02  Pt Name:    AVEL COLLAZO            Department: 161  MRN:        [id number]                      Room:       T620  Gender:     Female                       Technician: [facility]  :        [id number]                   Requested By:BRENDEN VALENZUELA  Order #:    [id number]                    Reading MD: Nayan Nielson MD    Measurements  Intervals                                Axis  Rate:       74                           P:          52  NE:         192                          QRS:        133  QRSD:       94                           T:          51  QT:         408  QTc:        453    Interpretive Statements  SINUS RHYTHM  PROBABLE ANTEROSEPTAL INFARCT, OLD  LOW VOLTAGE THROUGHOUT  Compared to ECG 10/01/2018 02:53:43  Myocardial infarct finding now present  Right-axis deviation no longer present  T-wave abnormality no longer present  Prolonged QT interval no longer present    Electronically  Signed On 10-2-2018 17:20:09 PDT by Nayan Nielson MD      ISTAT ARTERIAL BLOOD GAS    Collection Time: 10/02/18  6:40 PM   Result Value Ref Range    Ph 7.620 (H) 7.400 - 7.500    Pco2 31.8 26.0 - 37.0 mmHg    Po2 60 (L) 64 - 87 mmHg    Tco2 34 (H) 20 - 33 mmol/L    S02 95 93 - 99 %    Hco3 32.7 (H) 17.0 - 25.0 mmol/L    BE 11 (H) -4 - 3 mmol/L    Body Temp 36.9 C degrees    O2 Therapy 40 %    iPF Ratio 150     Ph Temp Janee 7.622 (H) 7.400 - 7.500    Pco2 Temp Co 31.7 26.0 - 37.0 mmHg    Po2 Temp Cor 60 (L) 64 - 87 mmHg    Specimen Arterial     Action Range Triggered NO     Inst. Qualified Patient YES    MAGNESIUM    Collection Time: 10/03/18  4:00 AM   Result Value Ref Range    Magnesium 1.9 1.5 - 2.5 mg/dL   CBC with Differential    Collection Time: 10/03/18  4:00 AM   Result Value Ref Range    WBC 13.2 (H) 4.8 - 10.8 K/uL    RBC 4.59 4.20 - 5.40 M/uL    Hemoglobin 12.8 12.0 - 16.0 g/dL    Hematocrit 40.6 37.0 - 47.0 %    MCV 88.5 81.4 - 97.8 fL    MCH 27.9 27.0 - 33.0 pg    MCHC 31.5 (L) 33.6 - 35.0 g/dL    RDW 66.1 (H) 35.9 - 50.0 fL    Platelet Count 195 164 - 446 K/uL    MPV 11.1 9.0 - 12.9 fL    Neutrophils-Polys 83.80 (H) 44.00 - 72.00 %    Lymphocytes 8.00 (L) 22.00 - 41.00 %    Monocytes 7.00 0.00 - 13.40 %    Eosinophils 0.40 0.00 - 6.90 %    Basophils 0.20 0.00 - 1.80 %    Immature Granulocytes 0.60 0.00 - 0.90 %    Nucleated RBC 0.00 /100 WBC    Neutrophils (Absolute) 11.08 (H) 2.00 - 7.15 K/uL    Lymphs (Absolute) 1.06 1.00 - 4.80 K/uL    Monos (Absolute) 0.92 (H) 0.00 - 0.85 K/uL    Eos (Absolute) 0.05 0.00 - 0.51 K/uL    Baso (Absolute) 0.02 0.00 - 0.12 K/uL    Immature Granulocytes (abs) 0.08 0.00 - 0.11 K/uL    NRBC (Absolute) 0.00 K/uL   Basic Metabolic Panel (BMP)    Collection Time: 10/03/18  4:00 AM   Result Value Ref Range    Sodium 143 135 - 145 mmol/L    Potassium 3.1 (L) 3.6 - 5.5 mmol/L    Chloride 104 96 - 112 mmol/L    Co2 34 (H) 20 - 33 mmol/L    Glucose 172 (H) 65 - 99 mg/dL    Bun 30  (H) 8 - 22 mg/dL    Creatinine 0.58 0.50 - 1.40 mg/dL    Calcium 8.0 (L) 8.5 - 10.5 mg/dL    Anion Gap 5.0 0.0 - 11.9   Phosphorus    Collection Time: 10/03/18  4:00 AM   Result Value Ref Range    Phosphorus <1.0 (LL) 2.5 - 4.5 mg/dL   PROCALCITONIN    Collection Time: 10/03/18  4:00 AM   Result Value Ref Range    Procalcitonin <0.05 <0.25 ng/mL   ESTIMATED GFR    Collection Time: 10/03/18  4:00 AM   Result Value Ref Range    GFR If African American >60 >60 mL/min/1.73 m 2    GFR If Non African American >60 >60 mL/min/1.73 m 2   ISTAT ARTERIAL BLOOD GAS    Collection Time: 10/03/18  4:02 AM   Result Value Ref Range    Ph 7.550 (H) 7.400 - 7.500    Pco2 38.9 (H) 26.0 - 37.0 mmHg    Po2 55 (L) 64 - 87 mmHg    Tco2 35 (H) 20 - 33 mmol/L    S02 92 (L) 93 - 99 %    Hco3 34.0 (H) 17.0 - 25.0 mmol/L    BE 11 (H) -4 - 3 mmol/L    Body Temp 97.8 F degrees    O2 Therapy 30 %    iPF Ratio 183     Ph Temp Janee 7.557 (H) 7.400 - 7.500    Pco2 Temp Co 38.1 (H) 26.0 - 37.0 mmHg    Po2 Temp Cor 54 (L) 64 - 87 mmHg    Specimen Arterial     Action Range Triggered NO     Inst. Qualified Patient YES        Cardiac Imaging and Procedures Review:    EKG and telemetry tracings personally reviewed    Impression and Medical Decision Making:  Principal Problem:    Acute hypoxemic respiratory failure (HCC) POA: Yes  Active Problems:    Pulmonary edema POA: Yes    Pericardial effusion POA: Unknown    Pleural effusion POA: Unknown    HTN (hypertension) POA: Yes    History of non-Hodgkin's lymphoma POA: Unknown      Overview: Nonhodgkins lymphoma  2000    Hypotension POA: Unknown    Hypothyroidism POA: Yes    Tobacco abuse POA: Yes    Hypokalemia POA: No    Hypomagnesemia POA: Unknown    Acute encephalopathy POA: Unknown    Hypernatremia POA: Unknown    Dysphagia POA: Unknown  Resolved Problems:    * No resolved hospital problems. *        Assessment and Plan  Pericardial Effusion with Tamponade  - Patient presented with large pericardial  effusion. EKG with very low voltage on admission. Underwent drainage 10/1. 1000 cc of bloody pericardial fluid was obtained. Pericardial drain removed. Preliminary cultures are negative. Cytology also pending. Will follow-up with the lab.   - Repeat echo: pericardial effusion successfully drained   - D/c coreg 3.125 mg twice a day, lisinopril 40 mg daily  - IV fluids for preload; intravascularly dry although she has some pulmonary congestion. Hypernatremia improved with 1/2 NS; will recommend continued IV fluids with NS for preload. CVP 6 this morning (still intravascularly dry)       Pleural Effusion  - S/p thoracentesis. 1.7 liters have been removed.  - Cytology pending.        Pulmonary Edema  - Has been diuresed significantly.   - BNP not significantly elevated; troponins not significantly elevated  - Significant pulmonary congestion on x-ray however she is intravascularly dry. Recommend IV fluids (NS)  - Monitor I&Os    Hypernatremia  - Improved with 1/2 NS. Continue NS with free water flushes  - Monitor with BMP    It is my pleasure to participate in the care of Ms. Quigley.  Please do not hesitate to contact me with questions or concerns. Will continue to follow

## 2018-10-03 NOTE — ASSESSMENT & PLAN NOTE
Undifferentiated hypotension  Improved  Remains off vasopressors  Cortisol level is low -Taper hydrocortisone, 50 mg IV every 12 hours  Continue midodrine, 5 mg 3 times daily

## 2018-10-03 NOTE — PROGRESS NOTES
Monitor Summary  Sinus Rhythm with occasional PVCs 60-90s  .20/.08/.40     12 Hour Chart Check    2 RN Skin Check

## 2018-10-03 NOTE — PROGRESS NOTES
Xray shows central line terminating in atrium. Dr. NUGENT paged, orders given to go ahead and use central line.

## 2018-10-03 NOTE — THERAPY
Speech therapy contact note:     Patient required intubation and is currently not appropriate for dysphagia therapy. SLP services placed on hold and will await new orders once medically cleared. Thank you.

## 2018-10-03 NOTE — PROGRESS NOTES
Bedside report completed with RASTA Lucas. Lines and drips verified. Propfol documentation corrected, see MAR. Patient currently resting in bed with purposeful movements. Chart reviewed. Bed in low position.

## 2018-10-03 NOTE — PROCEDURES
Date of service:  10/2/2018    Title:  Arterial catheter placement    Indication: Hypotension requiring vasopressor support.  Needs arterial catheter for continuous blood pressure monitoring.    Narrative:    The right wrist was prepped with chlorhexidine and draped in the usual sterile fashion.  A 20 gauge arterial catheter was placed into the right radial artery using the technique described by Shawn without difficulty or apparent complication.  The line was sutured into place and a sterile dressing was placed over the line.  An outstanding arterial waveform is present on the monitor.  The patient tolerated the procedure quite nicely.  No complications were apparent.      Jarvis Arnold MD  Pulmonary and Critical Care Medicine

## 2018-10-03 NOTE — CARE PLAN
Problem: Safety  Goal: Will remain free from injury  Outcome: PROGRESSING AS EXPECTED  Patient is in restraints, restraints assessed q2 hrs (see flowsheet), bed alarm on, hourly rounding in place.     Problem: Skin Integrity  Goal: Risk for impaired skin integrity will decrease  Outcome: PROGRESSING AS EXPECTED  Patient on fully inflated waffle mattress, q2 hour turns in place, mepilex in place, heel float boots in use.

## 2018-10-03 NOTE — PROGRESS NOTES
2 RN skin assessment completed with RASTA Maya. Patient has loose fragile skin. Closed old wound on left great big toe. Heels are red but blanching

## 2018-10-03 NOTE — PROGRESS NOTES
Primary RN and other CICU RNs attempted to collect blood culture. All RNs unsuccessful. Phlebotomy called, stated they would come and try to draw cultures.

## 2018-10-03 NOTE — PROGRESS NOTES
[facility] Hospitalist Progress Note    Date of Service: 10/3/2018    Chief Complaint  78 y.o. Female from [facility] admitted 2018 with shortness of breath and peripheral edema.    Interval Problem Update    Prop 20  Follows off sedation  SR 60-90  SBP   Back on NE at 1mcg  CVP 6  Tmax 36.7  TF goal  Drain removed by cardiology  K 3.1  Phos <1  Mg 1.9  VD#2 PEEP 10/40%                    Consultants/Specialty  Pulmonary  Cardiology    Disposition  Monitor in ICU        Review of Systems   Unable to perform ROS: Intubated      Physical Exam  Laboratory/Imaging   Hemodynamics  Temp (24hrs), Av.7 °C (98.1 °F), Min:36.4 °C (97.5 °F), Max:37.2 °C (99 °F)   Temperature: 36.8 °C (98.2 °F), Monitored Temp: 37 °C (98.6 °F)  Pulse  Av.7  Min: 60  Max: 115 Heart Rate (Monitored): 80  Arterial BP: (!) 89/42, NIBP: (!) 82/44 CVP (mm Hg): (!) 300 MM HG    Respiratory  Crum Vent Mode: APVCMV, Rate (breaths/min): 14, PEEP/CPAP: 10, PEEP/CPAP: 10, FiO2: 30, P Peak (PIP): 19, P MEAN: 14   Respiration: (!) 0, Pulse Oximetry: 96 %     Work Of Breathing / Effort: Vented  RUL Breath Sounds: Crackles, RML Breath Sounds: Crackles, RLL Breath Sounds: Diminished, LORI Breath Sounds: Crackles, LLL Breath Sounds: Diminished    Fluids    Intake/Output Summary (Last 24 hours) at 10/03/18 0928  Last data filed at 10/03/18 0800   Gross per 24 hour   Intake          3315.14 ml   Output             1110 ml   Net          2205.14 ml       Nutrition  Orders Placed This Encounter   Procedures   • Diet NPO     Standing Status:   Standing     Number of Occurrences:   1     Order Specific Question:   Type:     Answer:   Now [1]     Order Specific Question:   Restrict to:     Answer:   Strict [1]     Physical Exam   Constitutional: She appears well-developed and well-nourished.   HENT:   Head: Normocephalic and atraumatic.   Right Ear: External ear normal.   Left Ear: External ear normal.   Mouth/Throat: No oropharyngeal exudate.    cortrak in place   Eyes: Pupils are equal, round, and reactive to light. Right eye exhibits no discharge. Left eye exhibits no discharge. No scleral icterus.   Neck: Neck supple. No JVD present. No tracheal deviation present.   Cardiovascular: Normal rate and regular rhythm.  Exam reveals no gallop and no friction rub.    No murmur heard.  Pulmonary/Chest: Effort normal. No respiratory distress. She has decreased breath sounds. She has no wheezes. She has no rhonchi. She has no rales. She exhibits no tenderness and no crepitus.   Intubated   Abdominal: Soft. Bowel sounds are normal. She exhibits no distension. There is no tenderness. There is no rebound and no guarding.   Musculoskeletal: She exhibits edema. She exhibits no tenderness.   Neurological: No cranial nerve deficit. She exhibits normal muscle tone.   Sedated, no focal deficits noted   Skin: Skin is warm and dry. No rash noted. She is not diaphoretic. No cyanosis or erythema. Nails show no clubbing.   Psychiatric:   Sedated   Nursing note and vitals reviewed.      Recent Labs      10/02/18   0516  10/03/18   0400   WBC  10.3  13.2*   RBC  4.18*  4.59   HEMOGLOBIN  12.4  12.8   HEMATOCRIT  38.4  40.6   MCV  91.9  88.5   MCH  29.7  27.9   MCHC  32.3*  31.5*   RDW  65.7*  66.1*   PLATELETCT  213  195   MPV  11.2  11.1     Recent Labs      10/01/18   0156  10/02/18   0516  10/03/18   0400   SODIUM  144  150*  143   POTASSIUM  3.6  3.3*  3.1*   CHLORIDE  100  105  104   CO2  38*  38*  34*   GLUCOSE  127*  161*  172*   BUN  19  26*  30*   CREATININE  0.62  0.52  0.58   CALCIUM  9.4  8.0*  8.0*             Recent Labs      10/01/18   0304   TRIGLYCERIDE  70   HDL  43   LDL  69          Assessment/Plan     * Acute hypoxemic respiratory failure (HCC)- (present on admission)   Assessment & Plan    Respiratory and management per critical care discussed with Dr. Arnold  SBT as tolerated          Pleural effusion   Assessment & Plan    Thoracentesis  10/1  Monitor chest x-ray          Pericardial effusion   Assessment & Plan    Cardiology following discussed with Dr. Lepe  Status post pericardial drain placement  Drain removed on 10/3/2018  Cytology negative fluid cultures negative  Follow-up on autoimmune serologies  Consider colchicine /nsaids will defer to cardiology        Pulmonary edema- (present on admission)   Assessment & Plan    Following pericardiocentesis  Echo with normal EF        Hypotension   Assessment & Plan    Improved pressor requirements wean off norepinephrine as tolerated        History of non-Hodgkin's lymphoma   Assessment & Plan    Hx of         HTN (hypertension)- (present on admission)   Assessment & Plan    Currently on pressors        Dysphagia   Assessment & Plan    Continue tube feeding        Hypernatremia   Assessment & Plan    Resolved  Continue free water flushes and monitor            Acute encephalopathy   Assessment & Plan    Metabolic        Hypophosphatemia   Assessment & Plan    IV K-Phos and monitor        Hypomagnesemia   Assessment & Plan    Replete with mag sulfate        Hypokalemia   Assessment & Plan     replete with K-Phos   monitor electrolytes        Hypothyroidism- (present on admission)   Assessment & Plan    Continue levothyroxine          Quality-Core Measures   Reviewed items::  Labs reviewed, Medications reviewed and Radiology images reviewed  [place] catheter::  Unconscious / Sedated Patient on a Ventilator  Central line in place:  Shock  DVT prophylaxis pharmacological::  Contraindicated - High bleeding risk  DVT prophylaxis - mechanical:  SCDs  Ulcer Prophylaxis::  Yes

## 2018-10-03 NOTE — CARE PLAN
Problem: Safety  Goal: Will remain free from falls  Outcome: PROGRESSING AS EXPECTED    Intervention: Assess risk factors for falls  Fall risk factors assessed  Intervention: Implement fall precautions   10/02/18 1186   OTHER   Environmental Precautions Treaded Slipper Socks on Patient;Report Given to Other Health Care Providers Regarding Fall Risk;Bed in Low Position;Communication Sign for Patients & Families   Chair/Bed Strip Alarm Exception-Mechanically Ventilated AND on a Continuous Drip of Versed, Ativan, Propofol, or Precedex AND has a David Scale of 4 (Calm & Cooperative)   Bed Alarm Yes - Alarm On         Problem: Infection  Goal: Will remain free from infection    Intervention: Assess signs and symptoms of infection  Assessed signs and symptoms of infection  Intervention: Implement standard precautions and perform hand washing before and after patient contact  Strict hand hygiene and standard precautions implemented  Intervention: Give CDC handouts for infection prevention (infection prevention/hand washing, disease specific, and device specific) and document in Education  Patient verbally educated, no evidence of learning.  Intervention: Assess for removal of potential routes of infection, such as IV, central line, intra-arterial or urinary catheters  Assessed for removal of potential routes of infection

## 2018-10-03 NOTE — CARE PLAN
Problem: Ventilation Defect:  Goal: Ability to achieve and maintain unassisted ventilation or tolerate decreased levels of ventilator support    Intervention: Support and monitor invasive and noninvasive mechanical ventilation  Adult Ventilation Update    Total Vent Days: 1    Patient Lines/Drains/Airways Status    Active Airway     Name: Placement date: Placement time: Site: Days:    Airway ETT 8.0 10/02/18   0338      less than 1              In the last 24 hours, the patient tolerated SBT for  on settings of .    APV 18, 310. +10, 50%             Plateau Pressure (Q Shift): 18 (10/02/18 0723)  Static Compliance (ml / cm H2O): 30.8 (10/02/18 1430)    Patient failed trials because of Barriers to Wean: FiO2 >60% or PEEP >10 CM H2O (10/02/18 0723)  Barriers to SBT    Length of Weaning Trial       day post trache.  The patient is currently in  wean according to protocol.       ASV Inspiratory Pressures  % Spontaneous     Sputum/Suction   Cough:  (pt did not require suction at this time) (10/02/18 1430)  Sputum Amount: Moderate (10/02/18 1200)  Sputum Color: Yellow (10/02/18 1200)  Sputum Consistency: Thick (10/02/18 1200)    Mobility  Level of Mobility: Level IV (10/02/18 0800)  Activity Performed: Unable to mobilize (10/02/18 0800)  Time Activity Tolerated: 5 min (09/29/18 0800)  Distance Per Occurrence (ft.): 1 feet (09/28/18 2300)  Assistance / Tolerance: Assistance of Two or More (09/30/18 2000)  Pt Calls for Assistance: No (10/02/18 0800)  Staff Present for Mobilization: RN;CNA (09/30/18 2000)  Gait: Unable to Ambulate (10/01/18 0400)  Assistive Devices: Rails;Hand held assist (09/29/18 0800)  Reason Not Mobilized: Bed rest (10/02/18 0800)  Mobilization Comments: intubated within the last 24 hrs (10/02/18 0800)    Events/Summary/Plan: vent check (10/02/18 1430)

## 2018-10-03 NOTE — PROGRESS NOTES
Critical Care Progress Note    Date of admission  9/28/2018    Chief Complaint  78 y.o. female admitted 9/28/2018 with shortness of breath and edema.    Hospital Course     This lady was admitted with shortness of breath and peripheral edema.  She was diuresed.  She was found to have a large pericardial effusion and a right pleural effusion.    Interval Problem Update  Reviewed last 24 hour events:       -replete K and PO4   -prop 10   -SR   -follows   -NE 1   -CVP 6   -TF at 50   -vent 2   -PEEP 10 - decrease 8   -stop 0.45% NS      Review of Systems  Review of Systems   Unable to perform ROS: Acuity of condition        Vital Signs for last 24 hours   Temp:  [36.4 °C (97.5 °F)-37.4 °C (99.3 °F)] 36.5 °C (97.7 °F)  Pulse:  [68-97] 86  Resp:  [9-24] 16    Hemodynamic parameters for last 24 hours  CVP:  [5 MM HG-64 MM HG] 5 MM HG    Vent Settings for last 24 hours  Crum Vent Mode: APVCMV  Rate (breaths/min):  [14-18] 14  PEEP/CPAP:  [10] 10  FiO2:  [] 30  P Peak (PIP):  [19-23] 19  P MEAN:  [13-14] 14    Physical Exam   Physical Exam   Constitutional:   Sedated on ventilator   HENT:   Head: Normocephalic.   Right Ear: External ear normal.   Left Ear: External ear normal.   Mouth/Throat: No oropharyngeal exudate.   Eyes: Pupils are equal, round, and reactive to light. Conjunctivae are normal. Right eye exhibits no discharge. Left eye exhibits no discharge. No scleral icterus.   Neck: Neck supple. No JVD present. No tracheal deviation present.   Cardiovascular: Intact distal pulses.  Exam reveals no gallop and no friction rub.    Sinus rhythm   Pulmonary/Chest: She has no wheezes. She has rales (Scattered crackles at the bases).   Abdominal: Soft. Bowel sounds are normal. She exhibits no distension. There is no tenderness. There is no guarding.   Tolerating enteral tube feedings   Musculoskeletal: She exhibits no tenderness or deformity.   No clubbing or cyanosis   Neurological:   Sedated.  Arouses.  Moves all  4 extremities.   Skin: Skin is warm and dry. No rash noted. She is not diaphoretic. No erythema. No pallor.       Medications  Current Facility-Administered Medications   Medication Dose Route Frequency Provider Last Rate Last Dose   • Respiratory Care per Protocol   Nebulization Continuous RT Alvaro Perez M.D.       • MD Alert...ICU Electrolyte Replacement per Pharmacy   Other pharmacy to dose Alvaro Perez M.D.       • propofol (DIPRIVAN) injection  0-80 mcg/kg/min Intravenous Continuous Alvaro Perez M.D. 11.9 mL/hr at 10/03/18 0555 25 mcg/kg/min at 10/03/18 0555   • norepinephrine (LEVOPHED) 8 mg in  mL Infusion  0-30 mcg/min Intravenous Continuous Alvaro Perez M.D. 0.9 mL/hr at 10/03/18 0100 0.5 mcg/min at 10/03/18 0100   • Pharmacy Consult: Enteral tube feeding - review meds/change route/product selection   Other PRN Néstor Guillen M.D.       • 1/2 NS infusion   Intravenous Continuous Jarvis Arnold M.D. 83 mL/hr at 10/02/18 2305     • guaiFENesin (ROBITUSSIN) 100 MG/5ML solution 400 mg  20 mL Per NG Tube Q8HRS Néstor Guillen M.D.   400 mg at 10/03/18 0545   • famotidine (PEPCID) tablet 20 mg  20 mg Per NG Tube Q12HRS Néstor Guillen M.D.   20 mg at 10/03/18 0530    Or   • famotidine (PEPCID) injection 20 mg  20 mg Intravenous Q12HRS Néstor Guillen M.D.   20 mg at 10/02/18 1822   • gabapentin (NEURONTIN) capsule 300 mg  300 mg Per NG Tube BID Néstor Guillen M.D.   300 mg at 10/03/18 0530   • levothyroxine (SYNTHROID) tablet 88 mcg  88 mcg Per NG Tube AM ES Néstor Guillen M.D.   88 mcg at 10/03/18 0532   • senna-docusate (PERICOLACE or SENOKOT S) 8.6-50 MG per tablet 2 Tab  2 Tab Per NG Tube BID Néstor Guillen M.D.   2 Tab at 10/03/18 0530    And   • polyethylene glycol/lytes (MIRALAX) PACKET 1 Packet  1 Packet Per NG Tube QDAY PRN Néstor Guillen M.D.        And   • magnesium hydroxide (MILK OF MAGNESIA) suspension 30 mL  30 mL Per NG Tube QDAY  PRN Néstor Guillen M.D.        And   • bisacodyl (DULCOLAX) suppository 10 mg  10 mg Rectal QDAY PRN Néstor Guillen M.D.       • simvastatin (ZOCOR) tablet 20 mg  20 mg Per NG Tube Q EVENING Néstor Guillen M.D.   20 mg at 10/02/18 1822   • ipratropium-albuterol (DUONEB) nebulizer solution  3 mL Nebulization Q2HRS PRN (RT) Jarvis Arnold M.D.       • labetalol (NORMODYNE,TRANDATE) injection 10 mg  10 mg Intravenous Q30 MIN PRN Constantin Self M.D.       • heparin pf injection 300 Units  300 Units Intracatheter Q5H Elliot Kwok M.D.   Stopped at 10/01/18 2130   • Influenza Vaccine High-Dose pf injection 0.5 mL  0.5 mL Intramuscular Once PRN Rula Teresa M.D.       • nicotine (NICODERM) 14 MG/24HR 14 mg  14 mg Transdermal Daily-0600 Hazel Cruz M.D.   14 mg at 10/03/18 0530    And   • nicotine polacrilex (NICORETTE) 2 MG piece 2 mg  2 mg Oral Q HOUR PRN Hazel Cruz M.D.           Fluids    Intake/Output Summary (Last 24 hours) at 10/03/18 0652  Last data filed at 10/03/18 0600   Gross per 24 hour   Intake          3027.77 ml   Output              940 ml   Net          2087.77 ml       Laboratory  Recent Results (from the past 48 hour(s))   EC-ECHOCARDIOGRAM COMPLETE W/O CONT    Collection Time: 10/01/18 10:17 AM   Result Value Ref Range    Eject.Frac. MOD 4C 75.66     Left Ventrical Ejection Fraction 70    FLUID GLUCOSE    Collection Time: 10/01/18 11:10 AM   Result Value Ref Range    Glucose, Fluid 136 mg/dL    Fluid Type Pericardial    FLUID LDH    Collection Time: 10/01/18 11:10 AM   Result Value Ref Range    Body Fluid  U/L   FLUID CELL COUNT    Collection Time: 10/01/18 11:10 AM   Result Value Ref Range    Fluid Type Thoracentesis     Color-Body Fluid Yellow     Character-Body Fluid Hazy     Total RBC Count 2000 cells/uL    Total  cells/uL    Polys 46 %    Lymphs 26 %    Mononuclear Cells - Fluid 4 %    Mesothelial Cells - CSF 10 %    Fluid Histiocyte  14 %    Comments see below    Fluid pH    Collection Time: 10/01/18 11:10 AM   Result Value Ref Range    Ph Misc 8.00     Fluid Type Pericardial    FLUID CULTURE W/GRAM STAIN    Collection Time: 10/01/18 11:10 AM   Result Value Ref Range    Significant Indicator NEG     Source BF     Site Paracentesis     Culture Result Bdf No growth at 24 hours.     Gram Stain Result Few WBCs.  No organisms seen.      GRAM STAIN    Collection Time: 10/01/18 11:10 AM   Result Value Ref Range    Significant Indicator .     Source BF     Site Paracentesis     Gram Stain Result Few WBCs.  No organisms seen.      CYTOLOGY    Collection Time: 10/01/18 11:46 AM   Result Value Ref Range    Cytology Reg See Path Report    ISTAT ARTERIAL BLOOD GAS    Collection Time: 10/01/18  2:46 PM   Result Value Ref Range    Ph 7.476 7.400 - 7.500    Pco2 61.4 (HH) 26.0 - 37.0 mmHg    Po2 39 (LL) 64 - 87 mmHg    Tco2 47 (HH) 20 - 33 mmol/L    S02 75 (L) 93 - 99 %    Hco3 45.4 (H) 17.0 - 25.0 mmol/L    BE 18 (H) -4 - 3 mmol/L    Body Temp 96.9 F degrees    O2 Therapy 70 %    iPF Ratio 56     Ph Temp Janee 7.491 7.400 - 7.500    Pco2 Temp Co 59.0 (HH) 26.0 - 37.0 mmHg    Po2 Temp Cor 36 (LL) 64 - 87 mmHg    Specimen Arterial     Action Range Triggered YES     Inst. Qualified Patient YES    CULTURE WOUND W/ GRAM STAIN    Collection Time: 10/01/18  3:50 PM   Result Value Ref Range    Significant Indicator NEG     Source WND     Site Pericardial Effusion     Culture Result Wound No growth at 24 hours.     Gram Stain Result Rare WBCs.  No organisms seen.      FLUID TOTAL PROTEIN    Collection Time: 10/01/18  3:50 PM   Result Value Ref Range    Total Protein Fluid 6.0 g/dL    Fluid Type Pericardial    GRAM STAIN    Collection Time: 10/01/18  3:50 PM   Result Value Ref Range    Significant Indicator .     Source WND     Site Pericardial Effusion     Gram Stain Result Rare WBCs.  No organisms seen.      ISTAT ARTERIAL BLOOD GAS    Collection Time: 10/02/18  3:04  AM   Result Value Ref Range    Ph 7.556 (H) 7.400 - 7.500    Pco2 45.3 (H) 26.0 - 37.0 mmHg    Po2 41 (LL) 64 - 87 mmHg    Tco2 42 (HH) 20 - 33 mmol/L    S02 82 (L) 93 - 99 %    Hco3 40.2 (H) 17.0 - 25.0 mmol/L    BE 16 (H) -4 - 3 mmol/L    Body Temp 37.7 C degrees    O2 Therapy 100 %    iPF Ratio 41     Ph Temp Janee 7.545 (H) 7.400 - 7.500    Pco2 Temp Co 46.7 (H) 26.0 - 37.0 mmHg    Po2 Temp Cor 43 (LL) 64 - 87 mmHg    Specimen Arterial     Action Range Triggered YES     Inst. Qualified Patient YES    CBC WITH DIFFERENTIAL    Collection Time: 10/02/18  5:16 AM   Result Value Ref Range    WBC 10.3 4.8 - 10.8 K/uL    RBC 4.18 (L) 4.20 - 5.40 M/uL    Hemoglobin 12.4 12.0 - 16.0 g/dL    Hematocrit 38.4 37.0 - 47.0 %    MCV 91.9 81.4 - 97.8 fL    MCH 29.7 27.0 - 33.0 pg    MCHC 32.3 (L) 33.6 - 35.0 g/dL    RDW 65.7 (H) 35.9 - 50.0 fL    Platelet Count 213 164 - 446 K/uL    MPV 11.2 9.0 - 12.9 fL    Neutrophils-Polys 83.50 (H) 44.00 - 72.00 %    Lymphocytes 6.70 (L) 22.00 - 41.00 %    Monocytes 9.10 0.00 - 13.40 %    Eosinophils 0.00 0.00 - 6.90 %    Basophils 0.10 0.00 - 1.80 %    Immature Granulocytes 0.60 0.00 - 0.90 %    Nucleated RBC 0.00 /100 WBC    Neutrophils (Absolute) 8.63 (H) 2.00 - 7.15 K/uL    Lymphs (Absolute) 0.69 (L) 1.00 - 4.80 K/uL    Monos (Absolute) 0.94 (H) 0.00 - 0.85 K/uL    Eos (Absolute) 0.00 0.00 - 0.51 K/uL    Baso (Absolute) 0.01 0.00 - 0.12 K/uL    Immature Granulocytes (abs) 0.06 0.00 - 0.11 K/uL    NRBC (Absolute) 0.00 K/uL   BASIC METABOLIC PANEL    Collection Time: 10/02/18  5:16 AM   Result Value Ref Range    Sodium 150 (H) 135 - 145 mmol/L    Potassium 3.3 (L) 3.6 - 5.5 mmol/L    Chloride 105 96 - 112 mmol/L    Co2 38 (H) 20 - 33 mmol/L    Glucose 161 (H) 65 - 99 mg/dL    Bun 26 (H) 8 - 22 mg/dL    Creatinine 0.52 0.50 - 1.40 mg/dL    Calcium 8.0 (L) 8.5 - 10.5 mg/dL    Anion Gap 7.0 0.0 - 11.9   MAGNESIUM    Collection Time: 10/02/18  5:16 AM   Result Value Ref Range    Magnesium 1.7  1.5 - 2.5 mg/dL   AMMONIA    Collection Time: 10/02/18  5:16 AM   Result Value Ref Range    Ammonia 51 (H) 11 - 45 umol/L   WESTERGREN SED RATE    Collection Time: 10/02/18  5:16 AM   Result Value Ref Range    Sed Rate Westergren 1 0 - 30 mm/hour   CRP HIGH SENSITIVE (CARDIAC)    Collection Time: 10/02/18  5:16 AM   Result Value Ref Range    C Reactive Protein High Sensitive 47.5 (H) 0.0 - 7.5 mg/L   ESTIMATED GFR    Collection Time: 10/02/18  5:16 AM   Result Value Ref Range    GFR If African American >60 >60 mL/min/1.73 m 2    GFR If Non African American >60 >60 mL/min/1.73 m 2   EKG    Collection Time: 10/02/18 12:59 PM   Result Value Ref Range    Report       [facility]    Test Date:  2018-10-02  Pt Name:    AVEL COLLAZO            Department: [facility]  MRN:        [id number]                      Room:       [facility]  Gender:     Female                       Technician: [facility]  :        [id number]                   Requested By:BRENDEN VALENZUELA  Order #:    [id number]                    Reading MD: Nayan Nielson MD    Measurements  Intervals                                Axis  Rate:       74                           P:          52  MT:         192                          QRS:        133  QRSD:       94                           T:          51  QT:         408  QTc:        453    Interpretive Statements  SINUS RHYTHM  PROBABLE ANTEROSEPTAL INFARCT, OLD  LOW VOLTAGE THROUGHOUT  Compared to ECG 10/01/2018 02:53:43  Myocardial infarct finding now present  Right-axis deviation no longer present  T-wave abnormality no longer present  Prolonged QT interval no longer present    Electronically Signed On 10-2-2018 17:20:09 PDT by Nayan Nielson MD      ISTAT ARTERIAL BLOOD GAS    Collection Time: 10/02/18  6:40 PM   Result Value Ref Range    Ph 7.620 (H) 7.400 - 7.500    Pco2 31.8 26.0 - 37.0 mmHg    Po2 60 (L) 64 - 87 mmHg    Tco2 34 (H) 20 - 33 mmol/L    S02 95 93 - 99 %    Hco3 32.7 (H) 17.0 - 25.0 mmol/L    BE 11  (H) -4 - 3 mmol/L    Body Temp 36.9 C degrees    O2 Therapy 40 %    iPF Ratio 150     Ph Temp Janee 7.622 (H) 7.400 - 7.500    Pco2 Temp Co 31.7 26.0 - 37.0 mmHg    Po2 Temp Cor 60 (L) 64 - 87 mmHg    Specimen Arterial     Action Range Triggered NO     Inst. Qualified Patient YES    MAGNESIUM    Collection Time: 10/03/18  4:00 AM   Result Value Ref Range    Magnesium 1.9 1.5 - 2.5 mg/dL   CBC with Differential    Collection Time: 10/03/18  4:00 AM   Result Value Ref Range    WBC 13.2 (H) 4.8 - 10.8 K/uL    RBC 4.59 4.20 - 5.40 M/uL    Hemoglobin 12.8 12.0 - 16.0 g/dL    Hematocrit 40.6 37.0 - 47.0 %    MCV 88.5 81.4 - 97.8 fL    MCH 27.9 27.0 - 33.0 pg    MCHC 31.5 (L) 33.6 - 35.0 g/dL    RDW 66.1 (H) 35.9 - 50.0 fL    Platelet Count 195 164 - 446 K/uL    MPV 11.1 9.0 - 12.9 fL    Neutrophils-Polys 83.80 (H) 44.00 - 72.00 %    Lymphocytes 8.00 (L) 22.00 - 41.00 %    Monocytes 7.00 0.00 - 13.40 %    Eosinophils 0.40 0.00 - 6.90 %    Basophils 0.20 0.00 - 1.80 %    Immature Granulocytes 0.60 0.00 - 0.90 %    Nucleated RBC 0.00 /100 WBC    Neutrophils (Absolute) 11.08 (H) 2.00 - 7.15 K/uL    Lymphs (Absolute) 1.06 1.00 - 4.80 K/uL    Monos (Absolute) 0.92 (H) 0.00 - 0.85 K/uL    Eos (Absolute) 0.05 0.00 - 0.51 K/uL    Baso (Absolute) 0.02 0.00 - 0.12 K/uL    Immature Granulocytes (abs) 0.08 0.00 - 0.11 K/uL    NRBC (Absolute) 0.00 K/uL   Basic Metabolic Panel (BMP)    Collection Time: 10/03/18  4:00 AM   Result Value Ref Range    Sodium 143 135 - 145 mmol/L    Potassium 3.1 (L) 3.6 - 5.5 mmol/L    Chloride 104 96 - 112 mmol/L    Co2 34 (H) 20 - 33 mmol/L    Glucose 172 (H) 65 - 99 mg/dL    Bun 30 (H) 8 - 22 mg/dL    Creatinine 0.58 0.50 - 1.40 mg/dL    Calcium 8.0 (L) 8.5 - 10.5 mg/dL    Anion Gap 5.0 0.0 - 11.9   Phosphorus    Collection Time: 10/03/18  4:00 AM   Result Value Ref Range    Phosphorus <1.0 (LL) 2.5 - 4.5 mg/dL   PROCALCITONIN    Collection Time: 10/03/18  4:00 AM   Result Value Ref Range    Procalcitonin  <0.05 <0.25 ng/mL   ESTIMATED GFR    Collection Time: 10/03/18  4:00 AM   Result Value Ref Range    GFR If African American >60 >60 mL/min/1.73 m 2    GFR If Non African American >60 >60 mL/min/1.73 m 2   ISTAT ARTERIAL BLOOD GAS    Collection Time: 10/03/18  4:02 AM   Result Value Ref Range    Ph 7.550 (H) 7.400 - 7.500    Pco2 38.9 (H) 26.0 - 37.0 mmHg    Po2 55 (L) 64 - 87 mmHg    Tco2 35 (H) 20 - 33 mmol/L    S02 92 (L) 93 - 99 %    Hco3 34.0 (H) 17.0 - 25.0 mmol/L    BE 11 (H) -4 - 3 mmol/L    Body Temp 97.8 F degrees    O2 Therapy 30 %    iPF Ratio 183     Ph Temp Janee 7.557 (H) 7.400 - 7.500    Pco2 Temp Co 38.1 (H) 26.0 - 37.0 mmHg    Po2 Temp Cor 54 (L) 64 - 87 mmHg    Specimen Arterial     Action Range Triggered NO     Inst. Qualified Patient YES        Imaging  X-Ray:  I have personally reviewed the images and compared with prior images. and My impression is: Improved left lower lobe and unchanged right lower lobe opacities    Assessment/Plan  * Acute hypoxemic respiratory failure (HCC)- (present on admission)   Assessment & Plan    Intubated 10/2  Continue vent support  Decrease PEEP to 8  SBT as tolerated  All appropriate ventilator bundles have been initiated        Pleural effusion   Assessment & Plan    S/P right thoracentesis with 1.7 L of fluid removed on 10/1  Transudative effusion        Pericardial effusion   Assessment & Plan    Pericardiocentesis done on 10/1  1 L of bloody fluid removed  Pericardial drain removed on 10/3  Cytology negative for malignancy        Pulmonary edema- (present on admission)   Assessment & Plan    Improved        Hypotension   Assessment & Plan    Undifferentiated hypotension  Titrating norepinephrine to keep MAP > 65        History of non-Hodgkin's lymphoma   Assessment & Plan    Diagnosed in 2000  Received chemotherapy in [place] and has been in remission per the         HTN (hypertension)- (present on admission)   Assessment & Plan    Titrating  vasopressor support  Resume home antihypertensives when clinically appropriate        Dysphagia   Assessment & Plan    Continue enteral tube feedings        Hypernatremia   Assessment & Plan    Improved with free water  Continue free water, 200 mL every 8 hours        Hypophosphatemia   Assessment & Plan    Replete phosphorus        Hypomagnesemia   Assessment & Plan    Replete magnesium        Hypokalemia   Assessment & Plan    Replete potassium        Tobacco abuse- (present on admission)   Assessment & Plan    Nicotine replacement protocol        Hypothyroidism- (present on admission)   Assessment & Plan    Continue Synthroid, 88 mcg daily             VTE:  Heparin and Contraindicated  Ulcer: H2 Antagonist  Lines: Central Line  Ongoing indication addressed    I have performed a physical exam and reviewed and updated ROS and Plan today (10/3/2018). In review of yesterday's note (10/2/2018), there are no changes except as documented above.     I have assessed and reassessed her respiratory status with ventilator adjustments, ventilator waveforms, airway mechanics, blood pressure, hemodynamics, cardiovascular status with titration of norepinephrine and neurologic status with titration of propofol.  She is at increased risk for worsening respiratory and cardiovascular system dysfunction.    Discussed patient condition and risk of morbidity and/or mortality with Hospitalist, Family, RN, RT, Pharmacy, Charge nurse / hot rounds and QA team  The patient remains critically ill.  Critical care time = 90 minutes in directly providing and coordinating critical care and extensive data review.  No time overlap and excludes procedures.    High risk of deterioration and worsening vital organ dysfunction and death without the above critical care interventions.    Jarvis Arnold MD  Pulmonary and Critical Care Medicine

## 2018-10-04 NOTE — PROGRESS NOTES
[facility] Hospitalist Progress Note    Date of Service: 10/4/2018    Chief Complaint  78 y.o. Female from [facility] admitted 2018 with shortness of breath and peripheral edema.    Interval Problem Update      Tmax 37.2  TF at goal  10 sec  run of PSVT  Levophed at 1  VD#3 SBT for 90 mins                               Consultants/Specialty  Pulmonary  Cardiology    Disposition  Monitor in ICU        Review of Systems   Unable to perform ROS: Intubated      Physical Exam  Laboratory/Imaging   Hemodynamics  Temp (24hrs), Av.8 °C (98.3 °F), Min:36.6 °C (97.9 °F), Max:37 °C (98.6 °F)   Temperature: 37 °C (98.6 °F), Monitored Temp: 37 °C (98.6 °F)  Pulse  Av.7  Min: 60  Max: 115 Heart Rate (Monitored): 76  Arterial BP: 105/54, NIBP: (!) 88/51 CVP (mm Hg): 6 MM HG    Respiratory  Crum Vent Mode: APVCMV, Rate (breaths/min): 14, PEEP/CPAP: 8, PEEP/CPAP: 8, FiO2: 30, P Peak (PIP): 14, P MEAN: 9.4   Respiration: 14, Pulse Oximetry: 95 %     Work Of Breathing / Effort: Vented  RUL Breath Sounds: Clear;Diminished, RML Breath Sounds: Diminished, RLL Breath Sounds: Diminished, LORI Breath Sounds: Clear;Diminished, LLL Breath Sounds: Diminished    Fluids    Intake/Output Summary (Last 24 hours) at 10/04/18 0959  Last data filed at 10/04/18 0800   Gross per 24 hour   Intake          4632.67 ml   Output             2765 ml   Net          1867.67 ml       Nutrition  Orders Placed This Encounter   Procedures   • Diet NPO     Standing Status:   Standing     Number of Occurrences:   1     Order Specific Question:   Type:     Answer:   Now [1]     Order Specific Question:   Restrict to:     Answer:   Strict [1]     Physical Exam   Constitutional: She appears well-developed and well-nourished.   HENT:   Head: Normocephalic and atraumatic.   Mouth/Throat: Oropharynx is clear and moist. No oropharyngeal exudate.   cortrak in place   Eyes: Conjunctivae are normal. Right eye exhibits no discharge. Left eye exhibits no  discharge. No scleral icterus.   Neck: Neck supple. No JVD present. No tracheal deviation present.   Cardiovascular: Normal rate and regular rhythm.  Exam reveals no gallop and no friction rub.    No murmur heard.  Pulmonary/Chest: Effort normal. No stridor. No respiratory distress. She has decreased breath sounds in the right lower field and the left lower field. She has no wheezes. She has no rhonchi. She has no rales. She exhibits no bony tenderness and no deformity.   Intubated   Abdominal: Soft. Bowel sounds are normal. She exhibits no distension. There is no tenderness. There is no rebound.   Musculoskeletal: She exhibits edema. She exhibits no tenderness.   Neurological: She is alert. No cranial nerve deficit. She exhibits normal muscle tone.   Nods appropriately  Follows command   Skin: Skin is warm and dry. No rash noted. She is not diaphoretic. No cyanosis or erythema. Nails show no clubbing.   Psychiatric: She has a normal mood and affect.   Nursing note and vitals reviewed.      Recent Labs      10/02/18   0516  10/03/18   0400  10/04/18   0343   WBC  10.3  13.2*  13.1*   RBC  4.18*  4.59  4.61   HEMOGLOBIN  12.4  12.8  13.0   HEMATOCRIT  38.4  40.6  41.0   MCV  91.9  88.5  88.9   MCH  29.7  27.9  28.2   MCHC  32.3*  31.5*  31.7*   RDW  65.7*  66.1*  67.7*   PLATELETCT  213  195  196   MPV  11.2  11.1  11.3     Recent Labs      10/02/18   0516  10/03/18   0400  10/04/18   0343   SODIUM  150*  143  146*   POTASSIUM  3.3*  3.1*  3.3*   CHLORIDE  105  104  106   CO2  38*  34*  35*   GLUCOSE  161*  172*  158*   BUN  26*  30*  19   CREATININE  0.52  0.58  0.38*   CALCIUM  8.0*  8.0*  7.8*             Recent Labs      10/03/18   1130  10/04/18   0343   TRIGLYCERIDE  76  80          Assessment/Plan     * Acute hypoxemic respiratory failure (HCC)- (present on admission)   Assessment & Plan    Respiratory and management per critical care discussed with Dr. Arnold  Continue SBT as tolerated  RT protocol           Pleural effusion   Assessment & Plan    Status post thoracentesis on October 1          Pericardial effusion   Assessment & Plan    Cardiology following discussed with Dr. Lepe  Status post pericardial drain placement  Drain removed on 10/3/2018  Cytology negative fluid cultures negative  Autoimmune serologies pending  Discussed with cardiology they feel that patient will likely require pericardial window with biopsies when more clinically stable        Pulmonary edema- (present on admission)   Assessment & Plan    Following pericardiocentesis  Echo with normal EF        Hypotension   Assessment & Plan    On low-dose norepinephrine  Start Midodrin  Check cortisol level        History of non-Hodgkin's lymphoma   Assessment & Plan    Hx of         Dysphagia   Assessment & Plan    Continue tube feeding        Hypernatremia   Assessment & Plan    Increase free water flushes            Acute encephalopathy   Assessment & Plan    Improved        Hypokalemia   Assessment & Plan    Hypomagnesemia    Replete and monitor        Tobacco abuse- (present on admission)   Assessment & Plan    Continue NicoDerm and  patient on cessation post extubation        Hypothyroidism- (present on admission)   Assessment & Plan    Continue levothyroxine          Quality-Core Measures   Reviewed items::  Labs reviewed, Medications reviewed and Radiology images reviewed  [place] catheter::  Unconscious / Sedated Patient on a Ventilator  Central line in place:  Shock  DVT prophylaxis pharmacological::  Heparin  DVT prophylaxis - mechanical:  SCDs  Ulcer Prophylaxis::  Yes

## 2018-10-04 NOTE — PROGRESS NOTES
Critical Care Progress Note    Date of admission  9/28/2018    Chief Complaint  78 y.o. female admitted 9/28/2018 with shortness of breath and edema.    Hospital Course     This lady was admitted with shortness of breath and peripheral edema.  She was diuresed.  She was found to have a large pericardial effusion and a right pleural effusion.    Interval Problem Update  Reviewed last 24 hour events:       -PSVT last night   -NE 1   -prop 20   -SR   -TF at 60 (goal)   -replete K and PO4   -vent 3   -, NIF -25, RSBI 66      Review of Systems  Review of Systems   Unable to perform ROS: Acuity of condition        Vital Signs for last 24 hours   Temp:  [36.6 °C (97.9 °F)-36.9 °C (98.4 °F)] 36.9 °C (98.4 °F)  Pulse:  [70-90] 77  Resp:  [0-15] 3    Hemodynamic parameters for last 24 hours  CVP:  [5 MM HG-300 MM HG] 6 MM HG    Vent Settings for last 24 hours  Crum Vent Mode: APVCMV  Rate (breaths/min):  [14] 14  PEEP/CPAP:  [8-10] 8  FiO2:  [30] 30  P Peak (PIP):  [14-22] 14  P MEAN:  [9.1-11] 9.4    Physical Exam   Physical Exam   Constitutional:   Sedated on ventilator   HENT:   Head: Normocephalic and atraumatic.   Right Ear: External ear normal.   Left Ear: External ear normal.   Mouth/Throat: Oropharynx is clear and moist.   Eyes: Pupils are equal, round, and reactive to light. Conjunctivae are normal. Right eye exhibits no discharge. Left eye exhibits no discharge. No scleral icterus.   Neck: Normal range of motion. Neck supple. No JVD present. No tracheal deviation present.   Cardiovascular: Intact distal pulses.  Exam reveals no gallop and no friction rub.    Sinus rhythm   Pulmonary/Chest: She has no wheezes. She has rales (Few crackles bilaterally at the bases).   Abdominal: Soft. Bowel sounds are normal. She exhibits no distension. There is no tenderness. There is no rebound.   Tolerating enteral tube feedings   Musculoskeletal: She exhibits no tenderness or deformity.   No clubbing or cyanosis    Neurological:   Sedated.  Arouses.  Moves all 4 extremities.   Skin: Skin is warm and dry. She is not diaphoretic. No erythema. No pallor.       Medications  Current Facility-Administered Medications   Medication Dose Route Frequency Provider Last Rate Last Dose   • potassium bicarbonate (KLYTE) effervescent tablet 25 mEq  25 mEq Feeding Tube TID Jarvis Arnold M.D.       • magnesium sulfate IVPB premix 2 g  2 g Intravenous Once Jarvis Arnold M.D.       • Respiratory Care per Protocol   Nebulization Continuous RT Alvaro Perez M.D.       • MD Alert...ICU Electrolyte Replacement per Pharmacy   Other pharmacy to dose Alvaro Perez M.D.       • propofol (DIPRIVAN) injection  0-80 mcg/kg/min Intravenous Continuous Alvaro Perez M.D. 9.5 mL/hr at 10/04/18 0450 20 mcg/kg/min at 10/04/18 0450   • norepinephrine (LEVOPHED) 8 mg in  mL Infusion  0-30 mcg/min Intravenous Continuous Alvaro Perez M.D. 3.8 mL/hr at 10/04/18 0623 2 mcg/min at 10/04/18 0623   • Pharmacy Consult: Enteral tube feeding - review meds/change route/product selection   Other PRN Néstor Guillen M.D.       • famotidine (PEPCID) tablet 20 mg  20 mg Per NG Tube Q12HRS Néstor Guillen M.D.   20 mg at 10/04/18 0457    Or   • famotidine (PEPCID) injection 20 mg  20 mg Intravenous Q12HRS Néstor Guillen M.D.   20 mg at 10/02/18 1822   • gabapentin (NEURONTIN) capsule 300 mg  300 mg Per NG Tube BID Néstor Guillen M.D.   300 mg at 10/04/18 0500   • levothyroxine (SYNTHROID) tablet 88 mcg  88 mcg Per NG Tube AM ES Néstor Guillen M.D.   88 mcg at 10/04/18 0457   • senna-docusate (PERICOLACE or SENOKOT S) 8.6-50 MG per tablet 2 Tab  2 Tab Per NG Tube BID Néstor Guillen M.D.   Stopped at 10/03/18 1800    And   • polyethylene glycol/lytes (MIRALAX) PACKET 1 Packet  1 Packet Per NG Tube QDAY PRN Néstor Guillen M.D.        And   • magnesium hydroxide (MILK OF MAGNESIA) suspension 30 mL  30 mL Per NG  Tube QDAY PRN Néstor Guillen M.D.        And   • bisacodyl (DULCOLAX) suppository 10 mg  10 mg Rectal QDAY PRN Néstor Guillen M.D.       • simvastatin (ZOCOR) tablet 20 mg  20 mg Per NG Tube Q EVENING Néstor Guillen M.D.   20 mg at 10/03/18 1751   • ipratropium-albuterol (DUONEB) nebulizer solution  3 mL Nebulization Q2HRS PRN (RT) Jarvis Arnold M.D.       • labetalol (NORMODYNE,TRANDATE) injection 10 mg  10 mg Intravenous Q30 MIN PRN Constantin Self M.D.       • Influenza Vaccine High-Dose pf injection 0.5 mL  0.5 mL Intramuscular Once PRN Rula Teresa M.D.       • nicotine (NICODERM) 14 MG/24HR 14 mg  14 mg Transdermal Daily-0600 Hazel Cruz M.D.   14 mg at 10/04/18 0457    And   • nicotine polacrilex (NICORETTE) 2 MG piece 2 mg  2 mg Oral Q HOUR PRN Hazel Cruz M.D.           Fluids    Intake/Output Summary (Last 24 hours) at 10/04/18 0658  Last data filed at 10/04/18 0600   Gross per 24 hour   Intake           4605.1 ml   Output             2705 ml   Net           1900.1 ml       Laboratory  Recent Results (from the past 48 hour(s))   EKG    Collection Time: 10/02/18 12:59 PM   Result Value Ref Range    Report       [facility]    Test Date:  2018-10-02  Pt Name:    AVEL COLLAZO            Department: 161  MRN:        [id number]                      Room:       20  Gender:     Female                       Technician: Health system  :        [id number]                   Requested By:BRENDEN VALENZUELA  Order #:    [id number]                    Reading MD: Nayan Nielson MD    Measurements  Intervals                                Axis  Rate:       74                           P:          52  TN:         192                          QRS:        133  QRSD:       94                           T:          51  QT:         408  QTc:        453    Interpretive Statements  SINUS RHYTHM  PROBABLE ANTEROSEPTAL INFARCT, OLD  LOW VOLTAGE THROUGHOUT  Compared to ECG 10/01/2018  02:53:43  Myocardial infarct finding now present  Right-axis deviation no longer present  T-wave abnormality no longer present  Prolonged QT interval no longer present    Electronically Signed On 10-2-2018 17:20:09 PDT by Nayan Nielson MD      ISTAT ARTERIAL BLOOD GAS    Collection Time: 10/02/18  6:40 PM   Result Value Ref Range    Ph 7.620 (H) 7.400 - 7.500    Pco2 31.8 26.0 - 37.0 mmHg    Po2 60 (L) 64 - 87 mmHg    Tco2 34 (H) 20 - 33 mmol/L    S02 95 93 - 99 %    Hco3 32.7 (H) 17.0 - 25.0 mmol/L    BE 11 (H) -4 - 3 mmol/L    Body Temp 36.9 C degrees    O2 Therapy 40 %    iPF Ratio 150     Ph Temp Janee 7.622 (H) 7.400 - 7.500    Pco2 Temp Co 31.7 26.0 - 37.0 mmHg    Po2 Temp Cor 60 (L) 64 - 87 mmHg    Specimen Arterial     Action Range Triggered NO     Inst. Qualified Patient YES    MAGNESIUM    Collection Time: 10/03/18  4:00 AM   Result Value Ref Range    Magnesium 1.9 1.5 - 2.5 mg/dL   CBC with Differential    Collection Time: 10/03/18  4:00 AM   Result Value Ref Range    WBC 13.2 (H) 4.8 - 10.8 K/uL    RBC 4.59 4.20 - 5.40 M/uL    Hemoglobin 12.8 12.0 - 16.0 g/dL    Hematocrit 40.6 37.0 - 47.0 %    MCV 88.5 81.4 - 97.8 fL    MCH 27.9 27.0 - 33.0 pg    MCHC 31.5 (L) 33.6 - 35.0 g/dL    RDW 66.1 (H) 35.9 - 50.0 fL    Platelet Count 195 164 - 446 K/uL    MPV 11.1 9.0 - 12.9 fL    Neutrophils-Polys 83.80 (H) 44.00 - 72.00 %    Lymphocytes 8.00 (L) 22.00 - 41.00 %    Monocytes 7.00 0.00 - 13.40 %    Eosinophils 0.40 0.00 - 6.90 %    Basophils 0.20 0.00 - 1.80 %    Immature Granulocytes 0.60 0.00 - 0.90 %    Nucleated RBC 0.00 /100 WBC    Neutrophils (Absolute) 11.08 (H) 2.00 - 7.15 K/uL    Lymphs (Absolute) 1.06 1.00 - 4.80 K/uL    Monos (Absolute) 0.92 (H) 0.00 - 0.85 K/uL    Eos (Absolute) 0.05 0.00 - 0.51 K/uL    Baso (Absolute) 0.02 0.00 - 0.12 K/uL    Immature Granulocytes (abs) 0.08 0.00 - 0.11 K/uL    NRBC (Absolute) 0.00 K/uL   Basic Metabolic Panel (BMP)    Collection Time: 10/03/18  4:00 AM   Result  Value Ref Range    Sodium 143 135 - 145 mmol/L    Potassium 3.1 (L) 3.6 - 5.5 mmol/L    Chloride 104 96 - 112 mmol/L    Co2 34 (H) 20 - 33 mmol/L    Glucose 172 (H) 65 - 99 mg/dL    Bun 30 (H) 8 - 22 mg/dL    Creatinine 0.58 0.50 - 1.40 mg/dL    Calcium 8.0 (L) 8.5 - 10.5 mg/dL    Anion Gap 5.0 0.0 - 11.9   Phosphorus    Collection Time: 10/03/18  4:00 AM   Result Value Ref Range    Phosphorus <1.0 (LL) 2.5 - 4.5 mg/dL   PROCALCITONIN    Collection Time: 10/03/18  4:00 AM   Result Value Ref Range    Procalcitonin <0.05 <0.25 ng/mL   ESTIMATED GFR    Collection Time: 10/03/18  4:00 AM   Result Value Ref Range    GFR If African American >60 >60 mL/min/1.73 m 2    GFR If Non African American >60 >60 mL/min/1.73 m 2   ISTAT ARTERIAL BLOOD GAS    Collection Time: 10/03/18  4:02 AM   Result Value Ref Range    Ph 7.550 (H) 7.400 - 7.500    Pco2 38.9 (H) 26.0 - 37.0 mmHg    Po2 55 (L) 64 - 87 mmHg    Tco2 35 (H) 20 - 33 mmol/L    S02 92 (L) 93 - 99 %    Hco3 34.0 (H) 17.0 - 25.0 mmol/L    BE 11 (H) -4 - 3 mmol/L    Body Temp 97.8 F degrees    O2 Therapy 30 %    iPF Ratio 183     Ph Temp Janee 7.557 (H) 7.400 - 7.500    Pco2 Temp Co 38.1 (H) 26.0 - 37.0 mmHg    Po2 Temp Cor 54 (L) 64 - 87 mmHg    Specimen Arterial     Action Range Triggered NO     Inst. Qualified Patient YES    Triglycerides Starting now and then Every 3 Days    Collection Time: 10/03/18 11:30 AM   Result Value Ref Range    Triglycerides 76 0 - 149 mg/dL   MAGNESIUM    Collection Time: 10/04/18  3:43 AM   Result Value Ref Range    Magnesium 1.8 1.5 - 2.5 mg/dL   CBC with Differential    Collection Time: 10/04/18  3:43 AM   Result Value Ref Range    WBC 13.1 (H) 4.8 - 10.8 K/uL    RBC 4.61 4.20 - 5.40 M/uL    Hemoglobin 13.0 12.0 - 16.0 g/dL    Hematocrit 41.0 37.0 - 47.0 %    MCV 88.9 81.4 - 97.8 fL    MCH 28.2 27.0 - 33.0 pg    MCHC 31.7 (L) 33.6 - 35.0 g/dL    RDW 67.7 (H) 35.9 - 50.0 fL    Platelet Count 196 164 - 446 K/uL    MPV 11.3 9.0 - 12.9 fL     Nucleated RBC 0.00 /100 WBC    NRBC (Absolute) 0.00 K/uL    Neutrophils-Polys 82.30 (H) 44.00 - 72.00 %    Lymphocytes 8.40 (L) 22.00 - 41.00 %    Monocytes 7.60 0.00 - 13.40 %    Eosinophils 0.90 0.00 - 6.90 %    Basophils 0.20 0.00 - 1.80 %    Immature Granulocytes 0.60 0.00 - 0.90 %    Lymphs (Absolute) 1.10 1.00 - 4.80 K/uL    Monos (Absolute) 0.99 (H) 0.00 - 0.85 K/uL    Eos (Absolute) 0.12 0.00 - 0.51 K/uL    Baso (Absolute) 0.02 0.00 - 0.12 K/uL    Immature Granulocytes (abs) 0.08 0.00 - 0.11 K/uL    Neutrophils (Absolute) 10.75 (H) 2.00 - 7.15 K/uL    Anisocytosis 1+     Macrocytosis 1+     Microcytosis 1+    Basic Metabolic Panel (BMP)    Collection Time: 10/04/18  3:43 AM   Result Value Ref Range    Sodium 146 (H) 135 - 145 mmol/L    Potassium 3.3 (L) 3.6 - 5.5 mmol/L    Chloride 106 96 - 112 mmol/L    Co2 35 (H) 20 - 33 mmol/L    Glucose 158 (H) 65 - 99 mg/dL    Bun 19 8 - 22 mg/dL    Creatinine 0.38 (L) 0.50 - 1.40 mg/dL    Calcium 7.8 (L) 8.5 - 10.5 mg/dL    Anion Gap 5.0 0.0 - 11.9   Phosphorus    Collection Time: 10/04/18  3:43 AM   Result Value Ref Range    Phosphorus 2.5 2.5 - 4.5 mg/dL   TRIGLYCERIDE    Collection Time: 10/04/18  3:43 AM   Result Value Ref Range    Triglycerides 80 0 - 149 mg/dL   ESTIMATED GFR    Collection Time: 10/04/18  3:43 AM   Result Value Ref Range    GFR If African American >60 >60 mL/min/1.73 m 2    GFR If Non African American >60 >60 mL/min/1.73 m 2   PERIPHERAL SMEAR REVIEW    Collection Time: 10/04/18  3:43 AM   Result Value Ref Range    Peripheral Smear Review see below    PLATELET ESTIMATE    Collection Time: 10/04/18  3:43 AM   Result Value Ref Range    Plt Estimation Normal    MORPHOLOGY    Collection Time: 10/04/18  3:43 AM   Result Value Ref Range    RBC Morphology Present     Polychromia 1+     Poikilocytosis 1+     Ovalocytes 1+    DIFFERENTIAL COMMENT    Collection Time: 10/04/18  3:43 AM   Result Value Ref Range    Comments-Diff see below    ISTAT ARTERIAL  BLOOD GAS    Collection Time: 10/04/18  4:22 AM   Result Value Ref Range    Ph 7.543 (H) 7.400 - 7.500    Pco2 40.1 (H) 26.0 - 37.0 mmHg    Po2 62 (L) 64 - 87 mmHg    Tco2 36 (H) 20 - 33 mmol/L    S02 94 93 - 99 %    Hco3 34.5 (H) 17.0 - 25.0 mmol/L    BE 11 (H) -4 - 3 mmol/L    Body Temp 37.1 C degrees    O2 Therapy 30 %    iPF Ratio 207     Ph Temp Janee 7.542 (H) 7.400 - 7.500    Pco2 Temp Co 40.2 (H) 26.0 - 37.0 mmHg    Po2 Temp Cor 63 (L) 64 - 87 mmHg    Specimen Arterial     Action Range Triggered NO     Inst. Qualified Patient YES        Imaging  X-Ray:  I have personally reviewed the images and compared with prior images. and My impression is: Increased left lower lobe opacification and unchanged right lower lobe opacification    Assessment/Plan  * Acute hypoxemic respiratory failure (HCC)- (present on admission)   Assessment & Plan    Intubated 10/2  Continue vent support  SBT as tolerated  All appropriate ventilator bundles have been initiated        Pleural effusion   Assessment & Plan    S/P right thoracentesis with 1.7 L of fluid removed on 10/1  Transudative effusion        Pericardial effusion   Assessment & Plan    Pericardiocentesis done on 10/1 - 1 L of bloody fluid was removed  Pericardial drain removed on 10/3  Cytology negative for malignancy        Pulmonary edema- (present on admission)   Assessment & Plan    Improved after diuresis        Hypotension   Assessment & Plan    Undifferentiated hypotension  Titrating norepinephrine to keep MAP > 65  Check cortisol level  Start midodrine, 5 mg 3 times daily        History of non-Hodgkin's lymphoma   Assessment & Plan    Diagnosed in 2000  Received chemotherapy in [place] and has been in remission per the         HTN (hypertension)- (present on admission)   Assessment & Plan    Titrating norepinephrine for hypotension        Dysphagia   Assessment & Plan    Continue enteral tube feedings        Hypernatremia   Assessment & Plan    Improved  with free water  Increase free water, 200 mL every 6 hours        Hypophosphatemia   Assessment & Plan    Improved with phosphorus supplements        Hypomagnesemia   Assessment & Plan    Replete magnesium        Hypokalemia   Assessment & Plan    Replete potassium        Tobacco abuse- (present on admission)   Assessment & Plan    Nicotine replacement protocol        Hypothyroidism- (present on admission)   Assessment & Plan    Continue Synthroid, 88 mcg daily             VTE:  Heparin  Ulcer: H2 Antagonist  Lines: Central Line  Ongoing indication addressed    I have performed a physical exam and reviewed and updated ROS and Plan today (10/4/2018). In review of yesterday's note (10/3/2018), there are no changes except as documented above.     I have assessed and reassessed her respiratory status with spontaneous breathing trials and ventilator adjustments, airway mechanics, ventilator waveforms, blood pressure, hemodynamics, cardiovascular status with titration of norepinephrine and neurologic status with titration of propofol.  She is at increased risk for worsening respiratory and cardiovascular system dysfunction.    Discussed patient condition and risk of morbidity and/or mortality with Hospitalist, Family, RN, RT, Pharmacy, Charge nurse / hot rounds and QA team  The patient remains critically ill.  Critical care time = 32 minutes in directly providing and coordinating critical care and extensive data review.  No time overlap and excludes procedures.    High risk of deterioration and worsening vital organ dysfunction and death without the above critical care interventions.    Jarvis Arnold MD  Pulmonary and Critical Care Medicine

## 2018-10-04 NOTE — RESPIRATORY CARE
Adult Ventilation Update    Total Vent Days: 3    Patient Lines/Drains/Airways Status    Active Airway     Name: Placement date: Placement time: Site: Days:    Airway ETT 8.0 10/02/18   0338      3              In the last 24 hours, the patient tolerated SBT for 1 hour on settings of 5/8.    Rapid Shallow Breathing Index (RR/VT): 98 (10/03/18 1524)  Plateau Pressure (Q Shift): 18 (10/03/18 1911)  Static Compliance (ml / cm H2O): 36 (10/04/18 0254)    Barriers to SBT Weaning Trial Stopped due to:: Pt weaned for 1 hour and returned to rest settings per protocol (10/03/18 1524)  Length of Weaning Trial Length of Weaning Trial (Hours): 1 hour (10/03/18 1524)    Weaning as tolerated.

## 2018-10-04 NOTE — PROCEDURES
"Arterial Line Insertion  Date/Time: 10/4/2018 5:45 AM  Performed by: VANCE JONES  Authorized by: VANCE JONES   Consent: Written consent obtained.  Risks and benefits: risks, benefits and alternatives were discussed  Consent given by: spouse  Procedure consent: procedure consent matches procedure scheduled  Relevant documents: relevant documents present and verified  Test results: test results available and properly labeled  Site marked: the operative site was marked  Imaging studies: imaging studies available  Required items: required blood products, implants, devices, and special equipment available  Patient identity confirmed: arm band, hospital-assigned identification number and anonymous protocol, patient vented/unresponsive  Time out: Immediately prior to procedure a \"time out\" was called to verify the correct patient, procedure, equipment, support staff and site/side marked as required.  Preparation: Patient was prepped and draped in the usual sterile fashion.  Indications: multiple ABGs and hemodynamic monitoring  Location: left radial  Anesthesia: local infiltration    Anesthesia:  Local Anesthetic: lidocaine 1% without epinephrine  Anesthetic total: 3 mL    Sedation:  Patient sedated: yes  Sedatives: see MAR for details  Analgesia: see MAR for details  Vitals: Vital signs were monitored during sedation.  Darrell's test normal: yes  Seldinger technique: Seldinger technique used  Number of attempts: 1  Post-procedure: line sutured and dressing applied  Post-procedure CMS: normal and unchanged  Patient tolerance: Patient tolerated the procedure well with no immediate complications              "

## 2018-10-04 NOTE — CARE PLAN
Problem: Venous Thromboembolism (VTW)/Deep Vein Thrombosis (DVT) Prevention:  Goal: Patient will participate in Venous Thrombosis (VTE)/Deep Vein Thrombosis (DVT)Prevention Measures  Pt has SCD's on throughout shift. Pt receiving prophylactic Heparin    Problem: Skin Integrity  Goal: Risk for impaired skin integrity will decrease    Intervention: Implement precautions to protect skin integrity in collaboration with the interdisciplinary team  Pt placed on waffle cushion and has heel float boots on. Pt turned Q2 hours. Barrier Paste applied

## 2018-10-04 NOTE — PROGRESS NOTES
12 hour chart check     Monitor Summary:   SR 70-90s with PACs and an episode of PSVT lasting < 10 seconds and a rate of 170.  0.20/0.10/0.40

## 2018-10-04 NOTE — CARE PLAN
Problem: Urinary Elimination:  Goal: Ability to reestablish a normal urinary elimination pattern will improve    Intervention: Evaluate need to continue indwelling urinary catheter  Catheter still needed for accurate I/Os      Problem: Skin Integrity  Goal: Risk for impaired skin integrity will decrease    Intervention: Implement precautions to protect skin integrity in collaboration with the interdisciplinary team  Patient turned q2h, barrier cream applied to sacrum for protection from incontinent stool, restraints removed q2 and skin assessed.       Problem: Pain Management  Goal: Pain level will decrease to patient's comfort goal    Intervention: Follow pain managment plan developed in collaboration with patient and Interdisciplinary Team  No signs of pain at this time

## 2018-10-04 NOTE — RESPIRATORY CARE
Ventilator Weaning Update    Patient is on vent day 3.  SBT was tolerated for a minimum of 1 hours on settings of 5/8.    Wean parameters for this SBT were:  #FVC / Vital Capacity (liters) : 339 (10/04/18 1600)  NIF (cm H2O) : -20 (10/04/18 1600)  Rapid Shallow Breathing Index (RR/VT): 104 (10/04/18 1600)  RR (bpm): 29 (10/04/18 1600)  Spontaneous VE: 8 (10/04/18 1600)  Spontaneous VT: 261 (10/04/18 1600)    Barriers to Wean: FiO2 >60% or PEEP >10 CM H2O  Weaning Trial Stopped due to:: RSBI >105 (Rapid Shallow Breathing Index)

## 2018-10-04 NOTE — CARE PLAN
Problem: Nutritional:  Goal: Nutrition support tolerated and meeting greater than 85% of estimated needs  Outcome: MET Date Met: 10/04/18

## 2018-10-04 NOTE — PROGRESS NOTES
12 hour chart check    Monitor Summary:    SR 70's-80's with self limiting run of wide complex tachycardia     0.16/0.08/0.38

## 2018-10-04 NOTE — RESPIRATORY CARE
Ventilator Weaning Update    Patient is on vent day 3.  SBT was tolerated for a minimum of 1.5 hours hours on settings of 5/8.    Wean parameters for this SBT were:  #FVC / Vital Capacity (liters) : 427 (10/04/18 0826)  NIF (cm H2O) : -25 (10/04/18 0826)  Rapid Shallow Breathing Index (RR/VT): 66 (10/04/18 0826)  RR (bpm): 16 (10/04/18 0826)  Spontaneous VE: (!) 3.3 (10/04/18 0826)  Spontaneous VT: 214 (10/04/18 0826)    Barriers to Wean: FiO2 >60% or PEEP >10 CM H2O  Weaning Trial Stopped due to:: Pt weaned for 1 hour and returned to rest settings per protocol

## 2018-10-04 NOTE — PROGRESS NOTES
Pt went into a wide complex tachycardia. Rate was fast and regular. Patient became hypotensive. Levo titrated. Dr. Zuniga notified. New orders received.

## 2018-10-04 NOTE — CARE PLAN
Problem: Fluid Volume:  Goal: Will maintain balanced intake and output  Outcome: PROGRESSING AS EXPECTED    Intervention: Monitor, educate, and encourage compliance with therapeutic intake of liquids  Completed. CVP in place. Fluids administered accordingly       Problem: Urinary Elimination:  Goal: Ability to reestablish a normal urinary elimination pattern will improve  Outcome: PROGRESSING AS EXPECTED    Intervention: Evaluate need to continue indwelling urinary catheter  Completed. Pt critically ill   Intervention: Assess and monitor for signs and symptoms of urinary retention  Completed   Intervention: Record post-void residual volumes  Completed

## 2018-10-05 PROBLEM — G93.40 ACUTE ENCEPHALOPATHY: Status: RESOLVED | Noted: 2018-01-01 | Resolved: 2018-01-01

## 2018-10-05 NOTE — PROGRESS NOTES
[facility] Cardiology Follow-up Consult Note  Date of note:    10/4/2018      Consulting Physician: Néstor Guillen M.D.    Patient ID:  Name:   Erin Quigley     YOB: 1940  Age:   78 y.o.  female   MRN:   [id number]    Chief Complaint   Patient presents with   • Shortness of Breath     3 weeks on and off   • Peripheral Edema     started 3 weeks ago in her feet and is now up to her thighs       Interim Events:  - Overnight, no overnight events. Remains intubated.   - tele: HR 70s-90s with occas PVCs  - Reviewed AM CXR: significant effusion; now starting to show on left side.   - Will get echo to evaluate for re-accumulation of pericardial fluid      ROS  - Unable to obtain as patient is intubated and sedated    Past medical, surgical, social, and family history reviewed and unchanged from admission except as noted in assessment and plan.    Medications: Reviewed in MAR  Current Facility-Administered Medications   Medication Dose Frequency Provider Last Rate Last Dose   • hydrocortisone sodium succinate PF (SOLU-CORTEF) 100 MG injection 50 mg  50 mg Q6HRS Néstor Guillen M.D.   50 mg at 10/05/18 1408   • midodrine (PROAMATINE) tablet 5 mg  5 mg Q8HRS Jarvis Arnold M.D.       • heparin injection 5,000 Units  5,000 Units Q8HRS Néstor Guillen M.D.   5,000 Units at 10/05/18 1408   • acetaminophen (TYLENOL) tablet 650 mg  650 mg Q4HRS PRN Jarvis Arnold M.D.   650 mg at 10/04/18 2102    Or   • acetaminophen (TYLENOL) suppository 650 mg  650 mg Q4HRS PRN Jarvis Arnold M.D.       • Respiratory Care per Protocol   Continuous RT Alvaro Perez M.D.       • MD Alert...ICU Electrolyte Replacement per Pharmacy   pharmacy to dose Alvaro Perez M.D.       • propofol (DIPRIVAN) injection  0-80 mcg/kg/min Continuous Alvaro Perez M.D. 7.1 mL/hr at 10/05/18 1223 15 mcg/kg/min at 10/05/18 1223   • norepinephrine (LEVOPHED) 8 mg in  mL Infusion  0-30 mcg/min Continuous  "Jarvis Arnold M.D. 1.9 mL/hr at 10/05/18 0800 1 mcg/min at 10/05/18 0800   • Pharmacy Consult: Enteral tube feeding - review meds/change route/product selection   PRN Néstor Guillen M.D.       • famotidine (PEPCID) tablet 20 mg  20 mg Q12HRS Néstor Guillen M.D.   20 mg at 10/05/18 0515   • gabapentin (NEURONTIN) capsule 300 mg  300 mg BID Néstor Guillen M.D.   300 mg at 10/05/18 0515   • levothyroxine (SYNTHROID) tablet 88 mcg  88 mcg AM ES Néstor Guillen M.D.   88 mcg at 10/05/18 0515   • senna-docusate (PERICOLACE or SENOKOT S) 8.6-50 MG per tablet 2 Tab  2 Tab BID Néstor Guillen M.D.   2 Tab at 10/05/18 0514    And   • polyethylene glycol/lytes (MIRALAX) PACKET 1 Packet  1 Packet QDAY PRN Néstor Guillen M.D.        And   • magnesium hydroxide (MILK OF MAGNESIA) suspension 30 mL  30 mL QDAY PRN Néstor Guillen M.D.        And   • bisacodyl (DULCOLAX) suppository 10 mg  10 mg QDAY PRN Néstor Guillen M.D.       • simvastatin (ZOCOR) tablet 20 mg  20 mg Q EVENING Néstor Guillen M.D.   20 mg at 10/04/18 1721   • ipratropium-albuterol (DUONEB) nebulizer solution  3 mL Q2HRS PRN (RT) Jarvis Arnold M.D.       • labetalol (NORMODYNE,TRANDATE) injection 10 mg  10 mg Q30 MIN PRN Constanitn Self M.D.       • Influenza Vaccine High-Dose pf injection 0.5 mL  0.5 mL Once PRN Rula Teresa M.D.       • nicotine (NICODERM) 14 MG/24HR 14 mg  14 mg Daily-0600 Hazel Cruz M.D.   14 mg at 10/05/18 0515    And   • nicotine polacrilex (NICORETTE) 2 MG piece 2 mg  2 mg Q HOUR PRN Hazel Cruz M.D.         Last reviewed on 9/29/2018  4:47 PM by Jaylin Haynes, T  No Known Allergies    Physical Exam  Body mass index is 30.28 kg/m². Blood pressure 133/79, pulse 80, temperature 37.2 °C (99 °F), resp. rate 15, height 1.575 m (5' 2\"), weight 75.1 kg (165 lb 9.1 oz), SpO2 95 %, not currently breastfeeding.   Vitals:    10/05/18 1200 10/05/18 1215 10/05/18 " 1230 10/05/18 1449   BP:       Pulse: 76 82 80    Resp: (!) 0 19 15    Temp: 37.2 °C (99 °F)      SpO2: 96% 94% 95% 95%   Weight:       Height:        Oxygen Therapy:  Pulse Oximetry: 95 %, FiO2%: 40 %, O2 Delivery: Ventilator    General: Sedated and intubated. Appears ill.   HEENT: Normocephalic, Atraumatic. ET tube in place. Right subclavian in place  Cardiovascular: Normal heart rate, Normal rhythm. S1+, S2+ Unable to appreciate rub  Lungs: Coarse breath sounds. Mechanically ventilated   Abdomen:  Soft, No tenderness  Skin: No erythema, No rash  Lower limbs: trace pedal edema (significantly improved since admission)  Neurologic: Intubated and sedated, moving all limbs.   Other systems also examined, grossly normal.       Labs (personally reviewed and notable for):   Recent Results (from the past 24 hour(s))   MAGNESIUM    Collection Time: 10/05/18  3:32 AM   Result Value Ref Range    Magnesium 1.8 1.5 - 2.5 mg/dL   CBC with Differential    Collection Time: 10/05/18  3:32 AM   Result Value Ref Range    WBC 13.6 (H) 4.8 - 10.8 K/uL    RBC 4.14 (L) 4.20 - 5.40 M/uL    Hemoglobin 12.0 12.0 - 16.0 g/dL    Hematocrit 37.1 37.0 - 47.0 %    MCV 89.6 81.4 - 97.8 fL    MCH 29.0 27.0 - 33.0 pg    MCHC 32.3 (L) 33.6 - 35.0 g/dL    RDW 68.2 (H) 35.9 - 50.0 fL    Platelet Count 189 164 - 446 K/uL    MPV 12.1 9.0 - 12.9 fL    Neutrophils-Polys 77.60 (H) 44.00 - 72.00 %    Lymphocytes 9.50 (L) 22.00 - 41.00 %    Monocytes 10.50 0.00 - 13.40 %    Eosinophils 1.40 0.00 - 6.90 %    Basophils 0.20 0.00 - 1.80 %    Immature Granulocytes 0.80 0.00 - 0.90 %    Nucleated RBC 0.00 /100 WBC    Neutrophils (Absolute) 10.53 (H) 2.00 - 7.15 K/uL    Lymphs (Absolute) 1.29 1.00 - 4.80 K/uL    Monos (Absolute) 1.43 (H) 0.00 - 0.85 K/uL    Eos (Absolute) 0.19 0.00 - 0.51 K/uL    Baso (Absolute) 0.03 0.00 - 0.12 K/uL    Immature Granulocytes (abs) 0.11 0.00 - 0.11 K/uL    NRBC (Absolute) 0.00 K/uL   Basic Metabolic Panel (BMP)    Collection Time:  10/05/18  3:32 AM   Result Value Ref Range    Sodium 145 135 - 145 mmol/L    Potassium 4.1 3.6 - 5.5 mmol/L    Chloride 108 96 - 112 mmol/L    Co2 33 20 - 33 mmol/L    Glucose 151 (H) 65 - 99 mg/dL    Bun 17 8 - 22 mg/dL    Creatinine 0.37 (L) 0.50 - 1.40 mg/dL    Calcium 7.8 (L) 8.5 - 10.5 mg/dL    Anion Gap 4.0 0.0 - 11.9   Phosphorus    Collection Time: 10/05/18  3:32 AM   Result Value Ref Range    Phosphorus 2.3 (L) 2.5 - 4.5 mg/dL   CORTISOL    Collection Time: 10/05/18  3:32 AM   Result Value Ref Range    Cortisol 14.8 0.0 - 23.0 ug/dL   CRP HIGH SENSITIVE (CARDIAC)    Collection Time: 10/05/18  3:32 AM   Result Value Ref Range    C Reactive Protein High Sensitive 121.4 (H) 0.0 - 7.5 mg/L   ESTIMATED GFR    Collection Time: 10/05/18  3:32 AM   Result Value Ref Range    GFR If African American >60 >60 mL/min/1.73 m 2    GFR If Non African American >60 >60 mL/min/1.73 m 2   ISTAT ARTERIAL BLOOD GAS    Collection Time: 10/05/18  4:34 AM   Result Value Ref Range    Ph 7.485 7.400 - 7.500    Pco2 43.5 (H) 26.0 - 37.0 mmHg    Po2 79 64 - 87 mmHg    Tco2 34 (H) 20 - 33 mmol/L    S02 96 93 - 99 %    Hco3 32.7 (H) 17.0 - 25.0 mmol/L    BE 8 (H) -4 - 3 mmol/L    Body Temp 37.3 C degrees    O2 Therapy 40 %    iPF Ratio 198     Ph Temp Janee 7.480 7.400 - 7.500    Pco2 Temp Co 44.0 (H) 26.0 - 37.0 mmHg    Po2 Temp Cor 81 64 - 87 mmHg    Specimen Arterial     Action Range Triggered NO     Inst. Qualified Patient YES    HEPATITIS PANEL ACUTE(4 COMPONENTS)    Collection Time: 10/05/18  2:45 PM   Result Value Ref Range    Hepatitis B Surface Antigen Negative Negative    Hepatitis C Antibody Negative Negative    Hepatitis B Cors Ab,IgM Negative Negative    Hepatitis A Virus Ab, IgM Negative Negative   HIV AG/AB COMBO ASSAY SCREENING    Collection Time: 10/05/18  2:45 PM   Result Value Ref Range    HIV Ag/Ab Combo Assay Non Reactive Non Reactive       Cardiac Imaging and Procedures Review:    EKG and telemetry tracings  personally reviewed    Impression and Medical Decision Making:  Principal Problem:    Acute hypoxemic respiratory failure (HCC) POA: Yes  Active Problems:    Pulmonary edema POA: Yes    Pericardial effusion POA: Unknown    Pleural effusion POA: Unknown    HTN (hypertension) POA: Yes    History of non-Hodgkin's lymphoma POA: Unknown      Overview: Nonhodgkins lymphoma  2000    Hypotension POA: Unknown    Hypothyroidism POA: Yes    Tobacco abuse POA: Yes    Hypokalemia POA: No    Hypomagnesemia POA: Unknown    Hypophosphatemia POA: Unknown    Hypernatremia POA: Unknown    Dysphagia POA: Unknown  Resolved Problems:    Acute encephalopathy POA: Unknown        Assessment and Plan  Pericardial Effusion with Tamponade  - Resolved,   - Patient presented with large pericardial effusion. EKG with very low voltage on admission. Underwent drainage 10/1. 1000 cc of bloody pericardial fluid was obtained. Pericardial drain removed. Preliminary cultures are negative  - Etiology not clear, probably traumatic (as it was blood). Cytology negative for malignant cells. Unlikely to be infectious. May need to consider pericardial biopsy to determine the etiology  - Will repeat echo to evaluate for re-accumulation of pericardial fluid  - D/c coreg 3.125 mg twice a day, lisinopril 40 mg daily. Hold outpatient medications        Pleural Effusion  - S/p thoracentesis. 1.7 liters have been removed.   - Reviewed AM CXR: appears fluid is re-accumulating       Pulmonary Edema  - Has been diuresed significantly.   - BNP not significantly elevated; troponins not significantly elevated  - Significant pulmonary congestion on x-ray however she is intravascularly dry. Has been treated with IV fluids. Continue gentle fluids.   - Monitor I&Os    Hypernatremia  - Improved with 1/2 NS. Continue NS with free water flushes  - Monitor with BMP    It is my pleasure to participate in the care of Ms. Quigley.  Please do not hesitate to contact me with  questions or concerns. Will continue to follow

## 2018-10-05 NOTE — PROGRESS NOTES
Critical Care Progress Note    Date of admission  9/28/2018    Chief Complaint  78 y.o. female admitted 9/28/2018 with shortness of breath and edema.    Hospital Course     This lady was admitted with shortness of breath and peripheral edema.  She was diuresed.  She was found to have a large pericardial effusion and a right pleural effusion.    Interval Problem Update  Reviewed last 24 hour events:       -SR   -NE at 1   -NS at 50   -PEEP 8   -, NIF -14, RSBI 106   -K, PO4 and Mg   -start hydrocortisone      Review of Systems  Review of Systems   Unable to perform ROS: Acuity of condition        Vital Signs for last 24 hours   Temp:  [37 °C (98.6 °F)-37.5 °C (99.5 °F)] 37.5 °C (99.5 °F)  Pulse:  [73-91] 80  Resp:  [0-25] 11    Hemodynamic parameters for last 24 hours  CVP:  [6 MM HG-300 MM HG] 300 MM HG    Vent Settings for last 24 hours  Crum Vent Mode: APVCMV  Rate (breaths/min):  [14] 14  PEEP/CPAP:  [8] 8  FiO2:  [30-40] 40  P Peak (PIP):  [14-23] 21  P MEAN:  [9.4-12] 11    Physical Exam   Physical Exam   Constitutional:   Sedated on ventilator   HENT:   Head: Normocephalic.   Right Ear: External ear normal.   Left Ear: External ear normal.   Mouth/Throat: No oropharyngeal exudate.   Eyes: Pupils are equal, round, and reactive to light. Conjunctivae are normal. Right eye exhibits no discharge. Left eye exhibits no discharge. No scleral icterus.   Neck: Neck supple. No JVD present. No tracheal deviation present.   Cardiovascular: Intact distal pulses.  Exam reveals no gallop and no friction rub.    Sinus rhythm   Pulmonary/Chest: She has no wheezes. She has rales (Fine bibasilar crackles).   Abdominal: Soft. Bowel sounds are normal. She exhibits no distension. There is no tenderness. There is no guarding.   Tolerating enteral tube feedings   Musculoskeletal: She exhibits no tenderness or deformity.   No clubbing or cyanosis   Neurological:   Sedated.  Arouses.  Moves all 4 extremities.   Skin: Skin is  warm and dry. No rash noted. She is not diaphoretic.       Medications  Current Facility-Administered Medications   Medication Dose Route Frequency Provider Last Rate Last Dose   • heparin injection 5,000 Units  5,000 Units Subcutaneous Q8HRS Néstor Guillen M.D.   5,000 Units at 10/05/18 0515   • midodrine (PROAMATINE) tablet 5 mg  5 mg Oral TID WITH MEALS Néstor Guillen M.D.   5 mg at 10/04/18 1721   • acetaminophen (TYLENOL) tablet 650 mg  650 mg Feeding Tube Q4HRS PRN Jarvis Arnold M.D.   650 mg at 10/04/18 2102    Or   • acetaminophen (TYLENOL) suppository 650 mg  650 mg Rectal Q4HRS PRN Jarvis Arnold M.D.       • Respiratory Care per Protocol   Nebulization Continuous RT Alvaro Perez M.D.       • MD Alert...ICU Electrolyte Replacement per Pharmacy   Other pharmacy to dose Alvaro Perez M.D.       • propofol (DIPRIVAN) injection  0-80 mcg/kg/min Intravenous Continuous Alvaro Perez M.D. 9.5 mL/hr at 10/05/18 0343 20 mcg/kg/min at 10/05/18 0343   • norepinephrine (LEVOPHED) 8 mg in  mL Infusion  0-30 mcg/min Intravenous Continuous Jarvis Arnold M.D. 3.8 mL/hr at 10/05/18 0338 2 mcg/min at 10/05/18 0338   • Pharmacy Consult: Enteral tube feeding - review meds/change route/product selection   Other PRN Néstor Guillen M.D.       • famotidine (PEPCID) tablet 20 mg  20 mg Per NG Tube Q12HRS Néstor Guillen M.D.   20 mg at 10/05/18 0515   • gabapentin (NEURONTIN) capsule 300 mg  300 mg Per NG Tube BID Néstor Guillen M.D.   300 mg at 10/05/18 0515   • levothyroxine (SYNTHROID) tablet 88 mcg  88 mcg Per NG Tube AM ES Néstor Guillen M.D.   88 mcg at 10/05/18 0515   • senna-docusate (PERICOLACE or SENOKOT S) 8.6-50 MG per tablet 2 Tab  2 Tab Per NG Tube BID Néstor Guillen M.D.   2 Tab at 10/05/18 0514    And   • polyethylene glycol/lytes (MIRALAX) PACKET 1 Packet  1 Packet Per NG Tube QDAY PRN Néstor Guillen M.D.        And   • magnesium  hydroxide (MILK OF MAGNESIA) suspension 30 mL  30 mL Per NG Tube QDAY PRN Néstor Guillen M.D.        And   • bisacodyl (DULCOLAX) suppository 10 mg  10 mg Rectal QDAY PRN Néstor Guillen M.D.       • simvastatin (ZOCOR) tablet 20 mg  20 mg Per NG Tube Q EVENING Néstor Guillen M.D.   20 mg at 10/04/18 1721   • ipratropium-albuterol (DUONEB) nebulizer solution  3 mL Nebulization Q2HRS PRN (RT) Jarvis Arnold M.D.       • labetalol (NORMODYNE,TRANDATE) injection 10 mg  10 mg Intravenous Q30 MIN PRN Constantin Self M.D.       • Influenza Vaccine High-Dose pf injection 0.5 mL  0.5 mL Intramuscular Once PRN Rula Teresa M.D.       • nicotine (NICODERM) 14 MG/24HR 14 mg  14 mg Transdermal Daily-0600 Hazel Cruz M.D.   14 mg at 10/05/18 0515    And   • nicotine polacrilex (NICORETTE) 2 MG piece 2 mg  2 mg Oral Q HOUR PRN Hazel Cruz M.D.           Fluids    Intake/Output Summary (Last 24 hours) at 10/05/18 0528  Last data filed at 10/05/18 0400   Gross per 24 hour   Intake          2037.68 ml   Output             2615 ml   Net          -577.32 ml       Laboratory  Recent Results (from the past 48 hour(s))   Triglycerides Starting now and then Every 3 Days    Collection Time: 10/03/18 11:30 AM   Result Value Ref Range    Triglycerides 76 0 - 149 mg/dL   BLOOD CULTURE    Collection Time: 10/03/18 11:52 AM   Result Value Ref Range    Significant Indicator NEG     Source BLD     Site Peripheral     Blood Culture       No Growth    Note: Blood cultures are incubated for 5 days and  are monitored continuously.Positive blood cultures  are called to the RN and reported as soon as  they are identified.     BLOOD CULTURE    Collection Time: 10/03/18 11:52 AM   Result Value Ref Range    Significant Indicator NEG     Source BLD     Site Peripheral     Blood Culture       No Growth    Note: Blood cultures are incubated for 5 days and  are monitored continuously.Positive blood cultures  are called  to the RN and reported as soon as  they are identified.     MAGNESIUM    Collection Time: 10/04/18  3:43 AM   Result Value Ref Range    Magnesium 1.8 1.5 - 2.5 mg/dL   CBC with Differential    Collection Time: 10/04/18  3:43 AM   Result Value Ref Range    WBC 13.1 (H) 4.8 - 10.8 K/uL    RBC 4.61 4.20 - 5.40 M/uL    Hemoglobin 13.0 12.0 - 16.0 g/dL    Hematocrit 41.0 37.0 - 47.0 %    MCV 88.9 81.4 - 97.8 fL    MCH 28.2 27.0 - 33.0 pg    MCHC 31.7 (L) 33.6 - 35.0 g/dL    RDW 67.7 (H) 35.9 - 50.0 fL    Platelet Count 196 164 - 446 K/uL    MPV 11.3 9.0 - 12.9 fL    Nucleated RBC 0.00 /100 WBC    NRBC (Absolute) 0.00 K/uL    Neutrophils-Polys 82.30 (H) 44.00 - 72.00 %    Lymphocytes 8.40 (L) 22.00 - 41.00 %    Monocytes 7.60 0.00 - 13.40 %    Eosinophils 0.90 0.00 - 6.90 %    Basophils 0.20 0.00 - 1.80 %    Immature Granulocytes 0.60 0.00 - 0.90 %    Lymphs (Absolute) 1.10 1.00 - 4.80 K/uL    Monos (Absolute) 0.99 (H) 0.00 - 0.85 K/uL    Eos (Absolute) 0.12 0.00 - 0.51 K/uL    Baso (Absolute) 0.02 0.00 - 0.12 K/uL    Immature Granulocytes (abs) 0.08 0.00 - 0.11 K/uL    Neutrophils (Absolute) 10.75 (H) 2.00 - 7.15 K/uL    Anisocytosis 1+     Macrocytosis 1+     Microcytosis 1+    Basic Metabolic Panel (BMP)    Collection Time: 10/04/18  3:43 AM   Result Value Ref Range    Sodium 146 (H) 135 - 145 mmol/L    Potassium 3.3 (L) 3.6 - 5.5 mmol/L    Chloride 106 96 - 112 mmol/L    Co2 35 (H) 20 - 33 mmol/L    Glucose 158 (H) 65 - 99 mg/dL    Bun 19 8 - 22 mg/dL    Creatinine 0.38 (L) 0.50 - 1.40 mg/dL    Calcium 7.8 (L) 8.5 - 10.5 mg/dL    Anion Gap 5.0 0.0 - 11.9   Phosphorus    Collection Time: 10/04/18  3:43 AM   Result Value Ref Range    Phosphorus 2.5 2.5 - 4.5 mg/dL   TRIGLYCERIDE    Collection Time: 10/04/18  3:43 AM   Result Value Ref Range    Triglycerides 80 0 - 149 mg/dL   ESTIMATED GFR    Collection Time: 10/04/18  3:43 AM   Result Value Ref Range    GFR If African American >60 >60 mL/min/1.73 m 2    GFR If Non  African American >60 >60 mL/min/1.73 m 2   PERIPHERAL SMEAR REVIEW    Collection Time: 10/04/18  3:43 AM   Result Value Ref Range    Peripheral Smear Review see below    PLATELET ESTIMATE    Collection Time: 10/04/18  3:43 AM   Result Value Ref Range    Plt Estimation Normal    MORPHOLOGY    Collection Time: 10/04/18  3:43 AM   Result Value Ref Range    RBC Morphology Present     Polychromia 1+     Poikilocytosis 1+     Ovalocytes 1+    DIFFERENTIAL COMMENT    Collection Time: 10/04/18  3:43 AM   Result Value Ref Range    Comments-Diff see below    ISTAT ARTERIAL BLOOD GAS    Collection Time: 10/04/18  4:22 AM   Result Value Ref Range    Ph 7.543 (H) 7.400 - 7.500    Pco2 40.1 (H) 26.0 - 37.0 mmHg    Po2 62 (L) 64 - 87 mmHg    Tco2 36 (H) 20 - 33 mmol/L    S02 94 93 - 99 %    Hco3 34.5 (H) 17.0 - 25.0 mmol/L    BE 11 (H) -4 - 3 mmol/L    Body Temp 37.1 C degrees    O2 Therapy 30 %    iPF Ratio 207     Ph Temp Janee 7.542 (H) 7.400 - 7.500    Pco2 Temp Co 40.2 (H) 26.0 - 37.0 mmHg    Po2 Temp Cor 63 (L) 64 - 87 mmHg    Specimen Arterial     Action Range Triggered NO     Inst. Qualified Patient YES    CBC with Differential    Collection Time: 10/05/18  3:32 AM   Result Value Ref Range    WBC 13.6 (H) 4.8 - 10.8 K/uL    RBC 4.14 (L) 4.20 - 5.40 M/uL    Hemoglobin 12.0 12.0 - 16.0 g/dL    Hematocrit 37.1 37.0 - 47.0 %    MCV 89.6 81.4 - 97.8 fL    MCH 29.0 27.0 - 33.0 pg    MCHC 32.3 (L) 33.6 - 35.0 g/dL    RDW 68.2 (H) 35.9 - 50.0 fL    Platelet Count 189 164 - 446 K/uL    MPV 12.1 9.0 - 12.9 fL    Neutrophils-Polys 77.60 (H) 44.00 - 72.00 %    Lymphocytes 9.50 (L) 22.00 - 41.00 %    Monocytes 10.50 0.00 - 13.40 %    Eosinophils 1.40 0.00 - 6.90 %    Basophils 0.20 0.00 - 1.80 %    Immature Granulocytes 0.80 0.00 - 0.90 %    Nucleated RBC 0.00 /100 WBC    Neutrophils (Absolute) 10.53 (H) 2.00 - 7.15 K/uL    Lymphs (Absolute) 1.29 1.00 - 4.80 K/uL    Monos (Absolute) 1.43 (H) 0.00 - 0.85 K/uL    Eos (Absolute) 0.19 0.00  - 0.51 K/uL    Baso (Absolute) 0.03 0.00 - 0.12 K/uL    Immature Granulocytes (abs) 0.11 0.00 - 0.11 K/uL    NRBC (Absolute) 0.00 K/uL   CORTISOL    Collection Time: 10/05/18  3:32 AM   Result Value Ref Range    Cortisol 14.8 0.0 - 23.0 ug/dL   ISTAT ARTERIAL BLOOD GAS    Collection Time: 10/05/18  4:34 AM   Result Value Ref Range    Ph 7.485 7.400 - 7.500    Pco2 43.5 (H) 26.0 - 37.0 mmHg    Po2 79 64 - 87 mmHg    Tco2 34 (H) 20 - 33 mmol/L    S02 96 93 - 99 %    Hco3 32.7 (H) 17.0 - 25.0 mmol/L    BE 8 (H) -4 - 3 mmol/L    Body Temp 37.3 C degrees    O2 Therapy 40 %    iPF Ratio 198     Ph Temp Janee 7.480 7.400 - 7.500    Pco2 Temp Co 44.0 (H) 26.0 - 37.0 mmHg    Po2 Temp Cor 81 64 - 87 mmHg    Specimen Arterial     Action Range Triggered NO     Inst. Qualified Patient YES        Imaging  X-Ray:  I have personally reviewed the images and compared with prior images. and My impression is: Persistent bibasilar opacities    Assessment/Plan  * Acute hypoxemic respiratory failure (HCC)- (present on admission)   Assessment & Plan    Intubated 10/2  All appropriate ventilator bundles have been initiated  Continue vent support  SBT as tolerated        Pleural effusion   Assessment & Plan    S/P right thoracentesis with 1.7 L of fluid removed on 10/1  Transudative effusion        Pericardial effusion   Assessment & Plan    Pericardiocentesis done on 10/1 - 1 L of bloody fluid was removed  Pericardial drain removed on 10/3  Cytology negative for malignancy  CRP is markedly elevated  Rheumatoid factor positive, GENTRY negative, HIV negative, hepatitis panel negative  She will likely require a pericardial biopsy when improved  Empiric trial of hydrocortisone, 50 mg IV every 6 hours        Pulmonary edema- (present on admission)   Assessment & Plan    Hold on further diuresis        Hypotension   Assessment & Plan    Undifferentiated hypotension  Titrating norepinephrine to keep MAP > 65  Cortisol level is low - start  hydrocortisone, 50 mg IV every 6 hours  Continue midodrine, 5 mg 3 times daily        History of non-Hodgkin's lymphoma   Assessment & Plan    Diagnosed in 2000  Received chemotherapy in [place] and has been in remission per the         HTN (hypertension)- (present on admission)   Assessment & Plan    Titrating norepinephrine to keep MAP > 65        Dysphagia   Assessment & Plan    Continue enteral tube feedings        Hypernatremia   Assessment & Plan    Improved  Continue free water, 200 mL every 6 hours        Hypophosphatemia   Assessment & Plan    Replete phosphorus        Hypomagnesemia   Assessment & Plan    Replete magnesium        Hypokalemia   Assessment & Plan    Resolved after potassium repletion        Tobacco abuse- (present on admission)   Assessment & Plan    Nicotine replacement protocol        Hypothyroidism- (present on admission)   Assessment & Plan    Continue Synthroid, 88 mcg daily             VTE:  Heparin  Ulcer: H2 Antagonist  Lines: Central Line  Ongoing indication addressed    I have performed a physical exam and reviewed and updated ROS and Plan today (10/5/2018). In review of yesterday's note (10/4/2018), there are no changes except as documented above.     I have assessed and reassessed her respiratory status with ventilator adjustments and spontaneous breathing trials, ventilator waveforms, airway mechanics, blood pressure, hemodynamics, cardiovascular status with titration of norepinephrine and neurologic status with titration of propofol.  She is at increased risk for worsening respiratory and cardiovascular system dysfunction.    Discussed patient condition and risk of morbidity and/or mortality with Hospitalist, Family, RN, RT, Pharmacy, Charge nurse / hot rounds and QA team  The patient remains critically ill.  Critical care time = 87 minutes in directly providing and coordinating critical care and extensive data review.  No time overlap and excludes procedures.    High  risk of deterioration and worsening vital organ dysfunction and death without the above critical care interventions.    Jarvis Arnold MD  Pulmonary and Critical Care Medicine

## 2018-10-05 NOTE — CARE PLAN
Problem: Fluid Volume:  Goal: Will maintain balanced intake and output    Intervention: Monitor, educate, and encourage compliance with therapeutic intake of liquids  Strict I/Os monitored throughout shift q 2hour       Problem: Communication  Goal: The ability to communicate needs accurately and effectively will improve    Intervention: Educate patient and significant other/support system about the plan of care, procedures, treatments, medications and allow for questions  Patient educated on plan of care, Iman (gilda) at bedside and educated on patients condition      Problem: Safety  Goal: Will remain free from injury  Outcome: PROGRESSING AS EXPECTED    Intervention: Provide assistance with mobility  Mobilization performed with RN during shift

## 2018-10-05 NOTE — PROGRESS NOTES
[facility] Cardiology Follow-up Consult Note  Date of note:    10/4/2018      Consulting Physician: Néstor Guillen M.D.    Patient ID:  Name:   Erin Quigley     YOB: 1940  Age:   78 y.o.  female   MRN:   [id number]    Chief Complaint   Patient presents with   • Shortness of Breath     3 weeks on and off   • Peripheral Edema     started 3 weeks ago in her feet and is now up to her thighs       Interim Events:  - Overnight, Pt had runs of AVRT for about 10 seconds, rate 170s  - Pericardial drain removed 10/3/18  - CXR: significant right sided congestion  - Gradually weaning off Levophed.      ROS  - Unable to obtain as patient is intubated and sedated    Past medical, surgical, social, and family history reviewed and unchanged from admission except as noted in assessment and plan.    Medications: Reviewed in MAR  Current Facility-Administered Medications   Medication Dose Frequency Provider Last Rate Last Dose   • heparin injection 5,000 Units  5,000 Units Q8HRS Néstor Guillen M.D.   5,000 Units at 10/04/18 1305   • midodrine (PROAMATINE) tablet 5 mg  5 mg TID WITH MEALS Néstor Guillen M.D.   5 mg at 10/04/18 1721   • Respiratory Care per Protocol   Continuous RT Alvaro Perez M.D.       • MD Alert...ICU Electrolyte Replacement per Pharmacy   pharmacy to dose Alvaro Perez M.D.       • propofol (DIPRIVAN) injection  0-80 mcg/kg/min Continuous Alvaro Perez M.D. 5.7 mL/hr at 10/04/18 1818 12 mcg/kg/min at 10/04/18 1818   • norepinephrine (LEVOPHED) 8 mg in  mL Infusion  0-30 mcg/min Continuous Alvaro Perez M.D. 1.9 mL/hr at 10/04/18 1819 1 mcg/min at 10/04/18 1819   • Pharmacy Consult: Enteral tube feeding - review meds/change route/product selection   PRN Néstor Guillen M.D.       • famotidine (PEPCID) tablet 20 mg  20 mg Q12HRS Néstor Guillen M.D.   20 mg at 10/04/18 1721   • gabapentin (NEURONTIN) capsule 300 mg  300 mg BID Néstor Guillen M.D.   300 mg at  "10/04/18 1721   • levothyroxine (SYNTHROID) tablet 88 mcg  88 mcg AM ES Néstor Guillen M.D.   88 mcg at 10/04/18 0457   • senna-docusate (PERICOLACE or SENOKOT S) 8.6-50 MG per tablet 2 Tab  2 Tab BID Néstor Guillen M.D.   Stopped at 10/03/18 1800    And   • polyethylene glycol/lytes (MIRALAX) PACKET 1 Packet  1 Packet QDAY PRN Néstor Guillen M.D.        And   • magnesium hydroxide (MILK OF MAGNESIA) suspension 30 mL  30 mL QDAY PRN Néstor Guillen M.D.        And   • bisacodyl (DULCOLAX) suppository 10 mg  10 mg QDAY PRN Néstor Guillen M.D.       • simvastatin (ZOCOR) tablet 20 mg  20 mg Q EVENING Néstor Guillen M.D.   20 mg at 10/04/18 1721   • ipratropium-albuterol (DUONEB) nebulizer solution  3 mL Q2HRS PRN (RT) Jarvis Arnold M.D.       • labetalol (NORMODYNE,TRANDATE) injection 10 mg  10 mg Q30 MIN PRN Constantin Self M.D.       • Influenza Vaccine High-Dose pf injection 0.5 mL  0.5 mL Once PRN Rula Teresa M.D.       • nicotine (NICODERM) 14 MG/24HR 14 mg  14 mg Daily-0600 Hazel Cruz M.D.   14 mg at 10/04/18 0457    And   • nicotine polacrilex (NICORETTE) 2 MG piece 2 mg  2 mg Q HOUR PRN Hazel Cruz M.D.         Last reviewed on 9/29/2018  4:47 PM by Sugey White  No Known Allergies    Physical Exam  Body mass index is 30.28 kg/m². Blood pressure 133/79, pulse 86, temperature 37.5 °C (99.5 °F), resp. rate (!) 4, height 1.575 m (5' 2\"), weight 75.1 kg (165 lb 9.1 oz), SpO2 99 %, not currently breastfeeding.   Vitals:    10/04/18 1800 10/04/18 1815 10/04/18 1830 10/04/18 1854   BP:       Pulse: 83  86    Resp: 13  (!) 4    Temp:       SpO2: 96% 100% 96% 99%   Weight:       Height:        Oxygen Therapy:  Pulse Oximetry: 99 %, FiO2%: 40 %, O2 Delivery: Ventilator    General: Sedated and intubated. Appears ill.   HEENT: Normocephalic, Atraumatic. ET tube in place. Right subclavian in place  Cardiovascular: Normal heart rate, Normal rhythm. S1+, " S2+ (  Pericardial drain removed  Lungs: Coarse breath sounds. Mechanically ventilated   Abdomen:  Soft, No tenderness  Skin: No erythema, No rash  Lower limbs: trace pedal edema (significantly improved since admission)  Neurologic: Intubated and sedated, moving all limbs.   Other systems also examined, grossly normal.       Labs (personally reviewed and notable for):   Recent Results (from the past 24 hour(s))   MAGNESIUM    Collection Time: 10/04/18  3:43 AM   Result Value Ref Range    Magnesium 1.8 1.5 - 2.5 mg/dL   CBC with Differential    Collection Time: 10/04/18  3:43 AM   Result Value Ref Range    WBC 13.1 (H) 4.8 - 10.8 K/uL    RBC 4.61 4.20 - 5.40 M/uL    Hemoglobin 13.0 12.0 - 16.0 g/dL    Hematocrit 41.0 37.0 - 47.0 %    MCV 88.9 81.4 - 97.8 fL    MCH 28.2 27.0 - 33.0 pg    MCHC 31.7 (L) 33.6 - 35.0 g/dL    RDW 67.7 (H) 35.9 - 50.0 fL    Platelet Count 196 164 - 446 K/uL    MPV 11.3 9.0 - 12.9 fL    Nucleated RBC 0.00 /100 WBC    NRBC (Absolute) 0.00 K/uL    Neutrophils-Polys 82.30 (H) 44.00 - 72.00 %    Lymphocytes 8.40 (L) 22.00 - 41.00 %    Monocytes 7.60 0.00 - 13.40 %    Eosinophils 0.90 0.00 - 6.90 %    Basophils 0.20 0.00 - 1.80 %    Immature Granulocytes 0.60 0.00 - 0.90 %    Lymphs (Absolute) 1.10 1.00 - 4.80 K/uL    Monos (Absolute) 0.99 (H) 0.00 - 0.85 K/uL    Eos (Absolute) 0.12 0.00 - 0.51 K/uL    Baso (Absolute) 0.02 0.00 - 0.12 K/uL    Immature Granulocytes (abs) 0.08 0.00 - 0.11 K/uL    Neutrophils (Absolute) 10.75 (H) 2.00 - 7.15 K/uL    Anisocytosis 1+     Macrocytosis 1+     Microcytosis 1+    Basic Metabolic Panel (BMP)    Collection Time: 10/04/18  3:43 AM   Result Value Ref Range    Sodium 146 (H) 135 - 145 mmol/L    Potassium 3.3 (L) 3.6 - 5.5 mmol/L    Chloride 106 96 - 112 mmol/L    Co2 35 (H) 20 - 33 mmol/L    Glucose 158 (H) 65 - 99 mg/dL    Bun 19 8 - 22 mg/dL    Creatinine 0.38 (L) 0.50 - 1.40 mg/dL    Calcium 7.8 (L) 8.5 - 10.5 mg/dL    Anion Gap 5.0 0.0 - 11.9   Phosphorus     Collection Time: 10/04/18  3:43 AM   Result Value Ref Range    Phosphorus 2.5 2.5 - 4.5 mg/dL   TRIGLYCERIDE    Collection Time: 10/04/18  3:43 AM   Result Value Ref Range    Triglycerides 80 0 - 149 mg/dL   ESTIMATED GFR    Collection Time: 10/04/18  3:43 AM   Result Value Ref Range    GFR If African American >60 >60 mL/min/1.73 m 2    GFR If Non African American >60 >60 mL/min/1.73 m 2   PERIPHERAL SMEAR REVIEW    Collection Time: 10/04/18  3:43 AM   Result Value Ref Range    Peripheral Smear Review see below    PLATELET ESTIMATE    Collection Time: 10/04/18  3:43 AM   Result Value Ref Range    Plt Estimation Normal    MORPHOLOGY    Collection Time: 10/04/18  3:43 AM   Result Value Ref Range    RBC Morphology Present     Polychromia 1+     Poikilocytosis 1+     Ovalocytes 1+    DIFFERENTIAL COMMENT    Collection Time: 10/04/18  3:43 AM   Result Value Ref Range    Comments-Diff see below    ISTAT ARTERIAL BLOOD GAS    Collection Time: 10/04/18  4:22 AM   Result Value Ref Range    Ph 7.543 (H) 7.400 - 7.500    Pco2 40.1 (H) 26.0 - 37.0 mmHg    Po2 62 (L) 64 - 87 mmHg    Tco2 36 (H) 20 - 33 mmol/L    S02 94 93 - 99 %    Hco3 34.5 (H) 17.0 - 25.0 mmol/L    BE 11 (H) -4 - 3 mmol/L    Body Temp 37.1 C degrees    O2 Therapy 30 %    iPF Ratio 207     Ph Temp Janee 7.542 (H) 7.400 - 7.500    Pco2 Temp Co 40.2 (H) 26.0 - 37.0 mmHg    Po2 Temp Cor 63 (L) 64 - 87 mmHg    Specimen Arterial     Action Range Triggered NO     Inst. Qualified Patient YES        Cardiac Imaging and Procedures Review:    EKG and telemetry tracings personally reviewed    Impression and Medical Decision Making:  Principal Problem:    Acute hypoxemic respiratory failure (HCC) POA: Yes  Active Problems:    Pulmonary edema POA: Yes    Pericardial effusion POA: Unknown    Pleural effusion POA: Unknown    HTN (hypertension) POA: Yes    History of non-Hodgkin's lymphoma POA: Unknown      Overview: Nonhodgkins lymphoma  2000    Hypotension POA: Unknown     Hypothyroidism POA: Yes    Tobacco abuse POA: Yes    Hypokalemia POA: No    Hypomagnesemia POA: Unknown    Hypophosphatemia POA: Unknown    Acute encephalopathy POA: Unknown    Hypernatremia POA: Unknown    Dysphagia POA: Unknown  Resolved Problems:    * No resolved hospital problems. *        Assessment and Plan  Pericardial Effusion with Tamponade  - Resolved, not clear etiology, probably traumatic, might need biopsy in the future to determine the etiology.   - Patient presented with large pericardial effusion. EKG with very low voltage on admission. Underwent drainage 10/1. 1000 cc of bloody pericardial fluid was obtained. Pericardial drain removed. Preliminary cultures are negative. Cytology also pending. Will follow-up with the lab.   - Repeat echo: pericardial effusion successfully drained   - D/c coreg 3.125 mg twice a day, lisinopril 40 mg daily  - CVP 14 today       Pleural Effusion  - S/p thoracentesis. 1.7 liters have been removed.  - Cytology pending.        Pulmonary Edema  - Has been diuresed significantly.   - BNP not significantly elevated; troponins not significantly elevated  - Significant pulmonary congestion on x-ray however she is intravascularly dry. Recommend IV fluids (NS)  - Monitor I&Os    Hypernatremia  - Improved with 1/2 NS. Continue NS with free water flushes  - Monitor with BMP    It is my pleasure to participate in the care of Ms. Quigley.  Please do not hesitate to contact me with questions or concerns.

## 2018-10-05 NOTE — PROGRESS NOTES
[facility] Hospitalist Progress Note    Date of Service: 10/5/2018    Chief Complaint  78 y.o. Female from [facility] admitted 2018 with shortness of breath and peripheral edema.    Interval Problem Update      Prop at 20   Follows command  SR 70-90  Levo at 1  VD4 14/310/8/40%  SBT 80 mins   RA titer and CRP elevated                          Consultants/Specialty  Pulmonary  Cardiology    Disposition  Monitor in ICU        Review of Systems   Unable to perform ROS: Intubated      Physical Exam  Laboratory/Imaging   Hemodynamics  Temp (24hrs), Av.4 °C (99.3 °F), Min:37.2 °C (99 °F), Max:37.5 °C (99.5 °F)   Temperature: 37.5 °C (99.5 °F), Monitored Temp: 37.4 °C (99.3 °F)  Pulse  Av.3  Min: 60  Max: 115 Heart Rate (Monitored): 86  Arterial BP: 115/56, NIBP: 105/59 CVP (mm Hg): (!) 300 MM HG    Respiratory  Crum Vent Mode: APVCMV, Rate (breaths/min): 14, PEEP/CPAP: 8, PEEP/CPAP: 8, FiO2: 40, P Peak (PIP): 15, P MEAN: 9.7   Respiration: 14, Pulse Oximetry: 99 %     Work Of Breathing / Effort: Vented  RUL Breath Sounds: Clear, RML Breath Sounds: Clear, RLL Breath Sounds: Diminished, LORI Breath Sounds: Clear, LLL Breath Sounds: Diminished    Fluids    Intake/Output Summary (Last 24 hours) at 10/05/18 0953  Last data filed at 10/05/18 0600   Gross per 24 hour   Intake          1588.64 ml   Output             2395 ml   Net          -806.36 ml       Nutrition  Orders Placed This Encounter   Procedures   • Diet NPO     Standing Status:   Standing     Number of Occurrences:   1     Order Specific Question:   Restrict to:     Answer:   Strict [1]     Physical Exam   Constitutional: She appears well-developed and well-nourished.   HENT:   Head: Normocephalic and atraumatic.   Right Ear: External ear normal.   Left Ear: External ear normal.   Mouth/Throat: No oropharyngeal exudate.   Cortrak in right nostril   Eyes: Conjunctivae are normal. Right eye exhibits no discharge. Left eye exhibits no discharge. No  scleral icterus.   Neck: Neck supple. No JVD present. No tracheal deviation present.   Cardiovascular: Normal rate, regular rhythm and normal heart sounds.    Pulmonary/Chest: Effort normal. No respiratory distress. She has decreased breath sounds in the right lower field and the left lower field. She has no wheezes. She has no rhonchi. She has rales. She exhibits no tenderness, no bony tenderness and no deformity.   Intubated   Abdominal: Soft. Bowel sounds are normal. She exhibits no distension. There is no tenderness. There is no rebound and no guarding.   Musculoskeletal: She exhibits edema. She exhibits no tenderness.   Neurological: She is alert. No cranial nerve deficit. She exhibits normal muscle tone.   Follows command no focal deficits   Skin: Skin is warm and dry. She is not diaphoretic. No cyanosis. Nails show no clubbing.   Psychiatric: She has a normal mood and affect. Her behavior is normal.   Nursing note and vitals reviewed.      Recent Labs      10/03/18   0400  10/04/18   0343  10/05/18   0332   WBC  13.2*  13.1*  13.6*   RBC  4.59  4.61  4.14*   HEMOGLOBIN  12.8  13.0  12.0   HEMATOCRIT  40.6  41.0  37.1   MCV  88.5  88.9  89.6   MCH  27.9  28.2  29.0   MCHC  31.5*  31.7*  32.3*   RDW  66.1*  67.7*  68.2*   PLATELETCT  195  196  189   MPV  11.1  11.3  12.1     Recent Labs      10/03/18   0400  10/04/18   0343  10/05/18   0332   SODIUM  143  146*  145   POTASSIUM  3.1*  3.3*  4.1   CHLORIDE  104  106  108   CO2  34*  35*  33   GLUCOSE  172*  158*  151*   BUN  30*  19  17   CREATININE  0.58  0.38*  0.37*   CALCIUM  8.0*  7.8*  7.8*             Recent Labs      10/03/18   1130  10/04/18   0343   TRIGLYCERIDE  76  80          Assessment/Plan     * Acute hypoxemic respiratory failure (HCC)- (present on admission)   Assessment & Plan    Respiratory and management per critical care discussed with Dr. Arnold  Secondary to pleural effusions and pulmonary edema    SBT as tolerated        Pleural  effusion   Assessment & Plan    Status post thoracentesis on October 1          Pericardial effusion   Assessment & Plan    Cardiology following discussed with Dr. Lepe  Status post pericardial drain placement  Drain removed on 10/3/2018  Cytology negative fluid cultures negative  GENTRY negative RA factor positive CCP negative    Repeat echocardiogram to assess for reaccumulation of effusion  I discussed the case with  from rheumatology he recommended checking for cryoglobulins SPEP  Her elevated RA factor is nonspecific he agrees with trial of steroids if no contraindications        Pulmonary edema- (present on admission)   Assessment & Plan    Following pericardiocentesis  Echo with normal EF        Hypotension   Assessment & Plan    Relative adrenal insufficiency    Start stress dose hydrocortisone  Continue Midodrin  Taper off Levophed as tolerated        History of non-Hodgkin's lymphoma   Assessment & Plan    Hx of         Dysphagia   Assessment & Plan    Tolerating tube feeding        Hypernatremia   Assessment & Plan    Improved continue free water flushes            Hypophosphatemia   Assessment & Plan    Replete with K-Phos and monitor        Hypomagnesemia   Assessment & Plan    Replete with mag sulfate and monitor        Tobacco abuse- (present on admission)   Assessment & Plan    Continue NicoDerm and  patient on cessation post extubation        Hypothyroidism- (present on admission)   Assessment & Plan    Continue levothyroxine          Quality-Core Measures   Reviewed items::  Labs reviewed, Medications reviewed and Radiology images reviewed  Almonte catheter::  Unconscious / Sedated Patient on a Ventilator  Central line in place:  Shock  DVT prophylaxis pharmacological::  Heparin  DVT prophylaxis - mechanical:  SCDs  Ulcer Prophylaxis::  Yes        Plan of care reviewed with the patient's  and daughter at the bedside and the questions answered

## 2018-10-05 NOTE — CARE PLAN
Problem: Ventilation Defect:  Goal: Ability to achieve and maintain unassisted ventilation or tolerate decreased levels of ventilator support  Adult Ventilation Update    Total Vent Days: 3    Patient Lines/Drains/Airways Status    Active Airway     Name: Placement date: Placement time: Site: Days:    Airway ETT 8.0 10/02/18   0338      2              In the last 24 hours, the patient tolerated SBT for 2.5 on settings of 5/8.    #FVC / Vital Capacity (liters) : 339 (10/04/18 1600)  NIF (cm H2O) : -20 (10/04/18 1600)  Rapid Shallow Breathing Index (RR/VT): 104 (10/04/18 1600)  Plateau Pressure (Q Shift): 22 (10/04/18 1040)  Static Compliance (ml / cm H2O): 36.3 (10/04/18 1447)    Patient failed trials because of Barriers to Wean: FiO2 >60% or PEEP >10 CM H2O (10/03/18 0635)  Barriers to SBT Weaning Trial Stopped due to:: RSBI >105 (Rapid Shallow Breathing Index) (10/04/18 1600)  Length of Weaning Trial Length of Weaning Trial (Hours): 1 (10/04/18 1600)  Cough: Non Productive (10/04/18 1600)  Sputum Amount: Large (10/04/18 1600)  Sputum Color: Tan;Green;White (10/04/18 1600)  Sputum Consistency: Thick (10/04/18 1600)    Mobility  Level of Mobility: Level IV (10/04/18 1600)  Activity Performed: Edge of bed (10/04/18 0200)  Time Activity Tolerated: 5 min (10/04/18 0200)  Distance Per Occurrence (ft.): 0 feet (10/04/18 0200)  # of Times Distance was Traveled: 0 (10/04/18 0200)  Assistance / Tolerance: Assistance of Two or More (10/04/18 0200)  Pt Calls for Assistance: No (10/04/18 0800)  Staff Present for Mobilization: RN (10/04/18 0200)  Gait: Unable to Ambulate (10/04/18 0200)  Assistive Devices: Rails;Hand held assist (10/03/18 1600)  Reason Not Mobilized: Bed rest (Until 0300 10/3) (10/02/18 2000)  Mobilization Comments:  (intubation within the last 24 hours) (10/02/18 2000)    Events/Summary/Plan: Placed back on rest settings (10/04/18 1600)

## 2018-10-05 NOTE — CARE PLAN
Problem: Ventilation Defect:  Goal: Ability to achieve and maintain unassisted ventilation or tolerate decreased levels of ventilator support  Adult Ventilation Update    Total Vent Days: 4    Patient Lines/Drains/Airways Status    Active Airway     Name: Placement date: Placement time: Site: Days:    Airway ETT 8.0 10/02/18   0338      4                #FVC / Vital Capacity (liters) : 339 (10/04/18 1600)  NIF (cm H2O) : -20 (10/04/18 1600)  Rapid Shallow Breathing Index (RR/VT): 104 (10/04/18 1600)  Plateau Pressure (Q Shift): 22 (10/04/18 1040)  Static Compliance (ml / cm H2O): 36.3 (10/04/18 1447)  Barriers to SBT Weaning Trial Stopped due to:: RSBI >105 (Rapid Shallow Breathing Index) (10/04/18 1600)  Length of Weaning Trial Length of Weaning Trial (Hours): 1 (10/04/18 1600)         Sputum/Suction    Cough: Non Productive (10/05/18 0000)  Sputum Amount: Large (10/05/18 0000)  Sputum Color: Tan;Green;White (10/05/18 0000)  Sputum Consistency: Thick (10/05/18 0000)    Mobility  Level of Mobility: Level IV (10/04/18 2000)  Activity Performed: Edge of bed (10/04/18 1600)  Time Activity Tolerated: 5 min (10/04/18 1600)  Distance Per Occurrence (ft.): 0 feet (10/04/18 1600)  # of Times Distance was Traveled: 1 (10/04/18 1600)  Assistance / Tolerance: Assistance of Two or More;Tolerates Well (10/04/18 1600)  Pt Calls for Assistance: No (10/04/18 2200)  Staff Present for Mobilization: RN (10/04/18 1600)  Gait: Unable to Ambulate (10/04/18 0200)  Assistive Devices: Rails;Hand held assist (10/03/18 1600)  Reason Not Mobilized: Bed rest (Until 0300 10/3) (10/02/18 2000)  Mobilization Comments:  (intubation within the last 24 hours) (10/02/18 2000)    No Vent changes made this shift, continue to monitor and support.

## 2018-10-06 NOTE — PROGRESS NOTES
[facility] Cardiology Follow-up Consult Note  Date of note:    10/4/2018      Consulting Physician: Néstor Guillen M.D.    Patient ID:  Name:   Erin Quigley     YOB: 1940  Age:   78 y.o.  female   MRN:   [id number]    Chief Complaint   Patient presents with   • Shortness of Breath     3 weeks on and off   • Peripheral Edema     started 3 weeks ago in her feet and is now up to her thighs       Interim Events:  - Overnight, no overnight events. Remains intubated.   - More alert this morning, responding to commands.   - Tolerated SBTs for 30 minutes this morning  - Echo pending for evaluation of pericardial fluid        ROS  - Unable to obtain as patient is intubated and sedated    Past medical, surgical, social, and family history reviewed and unchanged from admission except as noted in assessment and plan.    Medications: Reviewed in MAR  Current Facility-Administered Medications   Medication Dose Frequency Provider Last Rate Last Dose   • hydrocortisone sodium succinate PF (SOLU-CORTEF) 100 MG injection 50 mg  50 mg Q6HRS Néstor Guillen M.D.   50 mg at 10/06/18 0449   • midodrine (PROAMATINE) tablet 5 mg  5 mg Q8HRS Jarvis Arnold M.D.   5 mg at 10/06/18 0449   • heparin injection 5,000 Units  5,000 Units Q8HRS Néstor Guillen M.D.   5,000 Units at 10/06/18 0448   • acetaminophen (TYLENOL) tablet 650 mg  650 mg Q4HRS PRN Jarvis Arnold M.D.   650 mg at 10/04/18 2102    Or   • acetaminophen (TYLENOL) suppository 650 mg  650 mg Q4HRS PRN Jarvis Arnold M.D.       • Respiratory Care per Protocol   Continuous RT Alvaro Perez M.D.       • MD Alert...ICU Electrolyte Replacement per Pharmacy   pharmacy to dose Alvaro Perez M.D.       • propofol (DIPRIVAN) injection  0-80 mcg/kg/min Continuous Alvaro Perez M.D. 14.2 mL/hr at 10/06/18 0544 30 mcg/kg/min at 10/06/18 0544   • norepinephrine (LEVOPHED) 8 mg in  mL Infusion  0-30 mcg/min Continuous Jarvis Ferrer  "DALY Lin   Stopped at 10/05/18 1600   • Pharmacy Consult: Enteral tube feeding - review meds/change route/product selection   PRN Néstor Guillen M.D.       • famotidine (PEPCID) tablet 20 mg  20 mg Q12HRS Néstor Guillen M.D.   20 mg at 10/06/18 0449   • gabapentin (NEURONTIN) capsule 300 mg  300 mg BID Néstor Guillen M.D.   300 mg at 10/06/18 0449   • levothyroxine (SYNTHROID) tablet 88 mcg  88 mcg AM ES Néstor Guillen M.D.   88 mcg at 10/06/18 0456   • senna-docusate (PERICOLACE or SENOKOT S) 8.6-50 MG per tablet 2 Tab  2 Tab BID Néstor Guillen M.D.   2 Tab at 10/06/18 0449    And   • polyethylene glycol/lytes (MIRALAX) PACKET 1 Packet  1 Packet QDAY PRN Néstor Guillen M.D.        And   • magnesium hydroxide (MILK OF MAGNESIA) suspension 30 mL  30 mL QDAY PRN Néstor Guillen M.D.        And   • bisacodyl (DULCOLAX) suppository 10 mg  10 mg QDAY PRN Néstor Guillen M.D.       • simvastatin (ZOCOR) tablet 20 mg  20 mg Q EVENING Néstor Guillen M.D.   20 mg at 10/05/18 1819   • ipratropium-albuterol (DUONEB) nebulizer solution  3 mL Q2HRS PRN (RT) Jarvis Arnold M.D.       • labetalol (NORMODYNE,TRANDATE) injection 10 mg  10 mg Q30 MIN PRN Constantin Self M.D.       • Influenza Vaccine High-Dose pf injection 0.5 mL  0.5 mL Once PRN Rula Teresa M.D.       • nicotine (NICODERM) 14 MG/24HR 14 mg  14 mg Daily-0600 Hazel Cruz M.D.   14 mg at 10/06/18 0448    And   • nicotine polacrilex (NICORETTE) 2 MG piece 2 mg  2 mg Q HOUR PRN Hazel Cruz M.D.         Last reviewed on 9/29/2018  4:47 PM by Jaylin Haynes T  No Known Allergies    Physical Exam  Body mass index is 32.1 kg/m². Blood pressure 133/79, pulse 71, temperature 37.3 °C (99.1 °F), resp. rate (!) 0, height 1.575 m (5' 2\"), weight 79.6 kg (175 lb 7.8 oz), SpO2 93 %, not currently breastfeeding.   Vitals:    10/06/18 0500 10/06/18 0600 10/06/18 0652 10/06/18 0803   BP:       Pulse: 76 " 71     Resp: (!) 10 (!) 0     Temp:       SpO2: 94% 95% 93% 93%   Weight:  79.6 kg (175 lb 7.8 oz)     Height:        Oxygen Therapy:  Pulse Oximetry: 93 %, FiO2%: 40 %, O2 Delivery: Ventilator    General: Sedated and intubated. Good color today  HEENT: Normocephalic, Atraumatic. ET tube in place. Right subclavian in place  Cardiovascular: Normal heart rate, Normal rhythm. S1+, S2+ Unable to appreciate rub  Lungs: Coarse breath sounds. Mechanically ventilated   Abdomen:  Soft, No tenderness  Skin: No erythema, No rash  Lower limbs: no edema.   Neurologic: Intubated and sedated, moving all limbs.   Other systems also examined, grossly normal.       Labs (personally reviewed and notable for):   Recent Results (from the past 24 hour(s))   HEPATITIS PANEL ACUTE(4 COMPONENTS)    Collection Time: 10/05/18  2:45 PM   Result Value Ref Range    Hepatitis B Surface Antigen Negative Negative    Hepatitis C Antibody Negative Negative    Hepatitis B Cors Ab,IgM Negative Negative    Hepatitis A Virus Ab, IgM Negative Negative   HIV AG/AB COMBO ASSAY SCREENING    Collection Time: 10/05/18  2:45 PM   Result Value Ref Range    HIV Ag/Ab Combo Assay Non Reactive Non Reactive   MAGNESIUM    Collection Time: 10/06/18  4:47 AM   Result Value Ref Range    Magnesium 2.0 1.5 - 2.5 mg/dL   CBC with Differential    Collection Time: 10/06/18  4:47 AM   Result Value Ref Range    WBC 13.2 (H) 4.8 - 10.8 K/uL    RBC 3.79 (L) 4.20 - 5.40 M/uL    Hemoglobin 10.5 (L) 12.0 - 16.0 g/dL    Hematocrit 34.0 (L) 37.0 - 47.0 %    MCV 89.7 81.4 - 97.8 fL    MCH 27.7 27.0 - 33.0 pg    MCHC 30.9 (L) 33.6 - 35.0 g/dL    RDW 67.3 (H) 35.9 - 50.0 fL    Platelet Count 197 164 - 446 K/uL    MPV 11.8 9.0 - 12.9 fL    Neutrophils-Polys 86.10 (H) 44.00 - 72.00 %    Lymphocytes 4.90 (L) 22.00 - 41.00 %    Monocytes 7.80 0.00 - 13.40 %    Eosinophils 0.00 0.00 - 6.90 %    Basophils 0.20 0.00 - 1.80 %    Immature Granulocytes 1.00 (H) 0.00 - 0.90 %    Nucleated RBC  0.00 /100 WBC    Neutrophils (Absolute) 11.33 (H) 2.00 - 7.15 K/uL    Lymphs (Absolute) 0.65 (L) 1.00 - 4.80 K/uL    Monos (Absolute) 1.03 (H) 0.00 - 0.85 K/uL    Eos (Absolute) 0.00 0.00 - 0.51 K/uL    Baso (Absolute) 0.03 0.00 - 0.12 K/uL    Immature Granulocytes (abs) 0.13 (H) 0.00 - 0.11 K/uL    NRBC (Absolute) 0.00 K/uL   Basic Metabolic Panel (BMP)    Collection Time: 10/06/18  4:47 AM   Result Value Ref Range    Sodium 142 135 - 145 mmol/L    Potassium 4.1 3.6 - 5.5 mmol/L    Chloride 107 96 - 112 mmol/L    Co2 30 20 - 33 mmol/L    Glucose 196 (H) 65 - 99 mg/dL    Bun 20 8 - 22 mg/dL    Creatinine 0.33 (L) 0.50 - 1.40 mg/dL    Calcium 7.9 (L) 8.5 - 10.5 mg/dL    Anion Gap 5.0 0.0 - 11.9   Phosphorus    Collection Time: 10/06/18  4:47 AM   Result Value Ref Range    Phosphorus 3.0 2.5 - 4.5 mg/dL   ESTIMATED GFR    Collection Time: 10/06/18  4:47 AM   Result Value Ref Range    GFR If African American >60 >60 mL/min/1.73 m 2    GFR If Non African American >60 >60 mL/min/1.73 m 2   ISTAT ARTERIAL BLOOD GAS    Collection Time: 10/06/18  4:56 AM   Result Value Ref Range    Ph 7.499 7.400 - 7.500    Pco2 42.2 (H) 26.0 - 37.0 mmHg    Po2 68 64 - 87 mmHg    Tco2 34 (H) 20 - 33 mmol/L    S02 95 93 - 99 %    Hco3 32.8 (H) 17.0 - 25.0 mmol/L    BE 9 (H) -4 - 3 mmol/L    Body Temp 36.9 C degrees    O2 Therapy 30 %    iPF Ratio 227     Ph Temp Janee 7.500 7.400 - 7.500    Pco2 Temp Co 42.1 (H) 26.0 - 37.0 mmHg    Po2 Temp Cor 68 64 - 87 mmHg    Specimen Arterial     Action Range Triggered NO     Inst. Qualified Patient YES        Cardiac Imaging and Procedures Review:    EKG and telemetry tracings personally reviewed    Impression and Medical Decision Making:  Principal Problem:    Acute hypoxemic respiratory failure (HCC) POA: Yes  Active Problems:    Pulmonary edema POA: Yes    Pericardial effusion POA: Unknown    Pleural effusion POA: Unknown    HTN (hypertension) POA: Yes    History of non-Hodgkin's lymphoma POA:  Unknown      Overview: Nonhodgkins lymphoma  2000    Hypotension POA: Unknown    Hypothyroidism POA: Yes    Tobacco abuse POA: Yes    Hypokalemia POA: No    Hypomagnesemia POA: Unknown    Hypophosphatemia POA: Unknown    Hypernatremia POA: Unknown    Dysphagia POA: Unknown  Resolved Problems:    Acute encephalopathy POA: Unknown        Assessment and Plan  Pericardial Effusion with Tamponade  - Resolved  - Patient presented with large pericardial effusion. EKG with very low voltage on admission. Underwent drainage 10/1. 1000 cc of bloody pericardial fluid was obtained. Pericardial drain removed. Preliminary cultures are negative  - Etiology not clear. Grossly was bloody (some traumatic component). Cytology negative for malignant cells. Unlikely to be infectious. May need to consider pericardial biopsy to determine the etiology  - GENTRY negative, RA elevated but CCP negative.   - Echo: small amount of pericardial fluid  - Holding outpatient coreg 3.125 mg twice a day, lisinopril 40 mg daily due to hypotension  - Plan for pericardial window with biopsy of pericardium       Pleural Effusion  - S/p thoracentesis. 1.7 liters have been removed.   - Reviewed AM CXR: appears fluid is re-accumulating  - Plan for pericardial window    Pulmonary Edema  - Has been diuresed significantly.   - BNP not significantly elevated; troponins not significantly elevated  - Significant pulmonary congestion on x-ray however she is intravascularly dry. Has been treated with IV fluids.  - Monitor I&Os    Hypernatremia  - Resolved with 1/2 NS. Continue  free water flushes  - Monitor with BMP    It is my pleasure to participate in the care of Ms. Quigley.  Please do not hesitate to contact me with questions or concerns. Will continue to follow

## 2018-10-06 NOTE — CARE PLAN
Problem: Ventilation Defect:  Goal: Ability to achieve and maintain unassisted ventilation or tolerate decreased levels of ventilator support    Intervention: Support and monitor invasive and noninvasive mechanical ventilation  Adult Ventilation Update    Total Vent Days: 5    Patient Lines/Drains/Airways Status    Active Airway     Name: Placement date: Placement time: Site: Days:    Airway ETT 8.0 @ 21 10/02/18   0338      5              Pt remains on APVCMV: 14, 310, +8, 30%  No events or changes overnight.   Will continue to monitor.

## 2018-10-06 NOTE — PROGRESS NOTES
Critical Care Progress Note    Date of admission  9/28/2018    Chief Complaint  78 y.o. female admitted 9/28/2018 with shortness of breath and edema.    Hospital Course     This lady was admitted with shortness of breath and peripheral edema.  She was diuresed.  She was found to have a large pericardial effusion and a right pleural effusion.    Interval Problem Update  Reviewed last 24 hour events:       -SR   -follows   -NE off   -afebrile   -TF at 60 (goal)   -vent 5   -FVC 0.6, NIF -19, RSBI 78   -Lasix 40 once      Review of Systems  Review of Systems   Unable to perform ROS: Acuity of condition        Vital Signs for last 24 hours   Temp:  [37.2 °C (99 °F)-37.4 °C (99.3 °F)] 37.4 °C (99.3 °F)  Pulse:  [72-92] 84  Resp:  [0-26] 19    Hemodynamic parameters for last 24 hours  CVP:  [4 MM HG-300 MM HG] 4 MM HG    Vent Settings for last 24 hours  Crum Vent Mode: APVCMV  Rate (breaths/min):  [14] 14  PEEP/CPAP:  [8] 8  FiO2:  [30-40] 30  P Peak (PIP):  [15-22] 22  P MEAN:  [9.5-12] 12    Physical Exam   Physical Exam   Constitutional:   Sedated on ventilator   HENT:   Head: Normocephalic.   Right Ear: External ear normal.   Left Ear: External ear normal.   Mouth/Throat: No oropharyngeal exudate.   Eyes: Pupils are equal, round, and reactive to light. Conjunctivae are normal. Right eye exhibits no discharge. Left eye exhibits no discharge. No scleral icterus.   Neck: Neck supple. No JVD present. No tracheal deviation present.   Cardiovascular: Intact distal pulses.  Exam reveals no gallop and no friction rub.    Sinus rhythm   Pulmonary/Chest: She has no wheezes. She has rales (Scattered crackles bilaterally).   Abdominal: Soft. Bowel sounds are normal. She exhibits no distension. There is no tenderness. There is no rebound.   Tolerating enteral tube feedings   Musculoskeletal: She exhibits no tenderness or deformity.   No clubbing or cyanosis   Neurological:   Sedated.  Will arouse and follow.  Moves all 4  extremities.   Skin: Skin is warm and dry. She is not diaphoretic. No erythema.       Medications  Current Facility-Administered Medications   Medication Dose Route Frequency Provider Last Rate Last Dose   • hydrocortisone sodium succinate PF (SOLU-CORTEF) 100 MG injection 50 mg  50 mg Intravenous Q6HRS Néstor Guillen M.D.   50 mg at 10/06/18 0449   • midodrine (PROAMATINE) tablet 5 mg  5 mg Feeding Tube Q8HRS Jarvis Arnold M.D.   5 mg at 10/06/18 0449   • heparin injection 5,000 Units  5,000 Units Subcutaneous Q8HRS Néstor Guillen M.D.   5,000 Units at 10/06/18 0448   • acetaminophen (TYLENOL) tablet 650 mg  650 mg Feeding Tube Q4HRS PRN Jarvis Arnold M.D.   650 mg at 10/04/18 2102    Or   • acetaminophen (TYLENOL) suppository 650 mg  650 mg Rectal Q4HRS PRN Jarvis Arnold M.D.       • Respiratory Care per Protocol   Nebulization Continuous RT Alvaro Perez M.D.       • MD Alert...ICU Electrolyte Replacement per Pharmacy   Other pharmacy to dose Alvaro Perez M.D.       • propofol (DIPRIVAN) injection  0-80 mcg/kg/min Intravenous Continuous Alvaro Perez M.D. 14.2 mL/hr at 10/06/18 0416 30 mcg/kg/min at 10/06/18 0416   • norepinephrine (LEVOPHED) 8 mg in  mL Infusion  0-30 mcg/min Intravenous Continuous Jarvis Arnold M.D.   Stopped at 10/05/18 1600   • Pharmacy Consult: Enteral tube feeding - review meds/change route/product selection   Other PRN Néstor Guillen M.D.       • famotidine (PEPCID) tablet 20 mg  20 mg Per NG Tube Q12HRS éNstor Guillen M.D.   20 mg at 10/06/18 0449   • gabapentin (NEURONTIN) capsule 300 mg  300 mg Per NG Tube BID Néstor Guillen M.D.   300 mg at 10/06/18 0449   • levothyroxine (SYNTHROID) tablet 88 mcg  88 mcg Per NG Tube AM ES Néstor Guillen M.D.   88 mcg at 10/06/18 0456   • senna-docusate (PERICOLACE or SENOKOT S) 8.6-50 MG per tablet 2 Tab  2 Tab Per NG Tube BID Néstor Guillen M.D.   2 Tab at  10/06/18 0449    And   • polyethylene glycol/lytes (MIRALAX) PACKET 1 Packet  1 Packet Per NG Tube QDAY PRN Néstor Guillen M.D.        And   • magnesium hydroxide (MILK OF MAGNESIA) suspension 30 mL  30 mL Per NG Tube QDAY PRN Néstor Guillen M.D.        And   • bisacodyl (DULCOLAX) suppository 10 mg  10 mg Rectal QDAY PRN Néstor Guillen M.D.       • simvastatin (ZOCOR) tablet 20 mg  20 mg Per NG Tube Q EVENING Néstor Guillen M.D.   20 mg at 10/05/18 1819   • ipratropium-albuterol (DUONEB) nebulizer solution  3 mL Nebulization Q2HRS PRN (RT) Jarvis Arnold M.D.       • labetalol (NORMODYNE,TRANDATE) injection 10 mg  10 mg Intravenous Q30 MIN PRN Constantin Self M.D.       • Influenza Vaccine High-Dose pf injection 0.5 mL  0.5 mL Intramuscular Once PRN Rula Teresa M.D.       • nicotine (NICODERM) 14 MG/24HR 14 mg  14 mg Transdermal Daily-0600 Hazel Cruz M.D.   14 mg at 10/06/18 0448    And   • nicotine polacrilex (NICORETTE) 2 MG piece 2 mg  2 mg Oral Q HOUR PRN Hazel Cruz M.D.           Fluids    Intake/Output Summary (Last 24 hours) at 10/06/18 0514  Last data filed at 10/06/18 0400   Gross per 24 hour   Intake          2804.22 ml   Output             2585 ml   Net           219.22 ml       Laboratory  Recent Results (from the past 48 hour(s))   MAGNESIUM    Collection Time: 10/05/18  3:32 AM   Result Value Ref Range    Magnesium 1.8 1.5 - 2.5 mg/dL   CBC with Differential    Collection Time: 10/05/18  3:32 AM   Result Value Ref Range    WBC 13.6 (H) 4.8 - 10.8 K/uL    RBC 4.14 (L) 4.20 - 5.40 M/uL    Hemoglobin 12.0 12.0 - 16.0 g/dL    Hematocrit 37.1 37.0 - 47.0 %    MCV 89.6 81.4 - 97.8 fL    MCH 29.0 27.0 - 33.0 pg    MCHC 32.3 (L) 33.6 - 35.0 g/dL    RDW 68.2 (H) 35.9 - 50.0 fL    Platelet Count 189 164 - 446 K/uL    MPV 12.1 9.0 - 12.9 fL    Neutrophils-Polys 77.60 (H) 44.00 - 72.00 %    Lymphocytes 9.50 (L) 22.00 - 41.00 %    Monocytes 10.50 0.00 - 13.40 %     Eosinophils 1.40 0.00 - 6.90 %    Basophils 0.20 0.00 - 1.80 %    Immature Granulocytes 0.80 0.00 - 0.90 %    Nucleated RBC 0.00 /100 WBC    Neutrophils (Absolute) 10.53 (H) 2.00 - 7.15 K/uL    Lymphs (Absolute) 1.29 1.00 - 4.80 K/uL    Monos (Absolute) 1.43 (H) 0.00 - 0.85 K/uL    Eos (Absolute) 0.19 0.00 - 0.51 K/uL    Baso (Absolute) 0.03 0.00 - 0.12 K/uL    Immature Granulocytes (abs) 0.11 0.00 - 0.11 K/uL    NRBC (Absolute) 0.00 K/uL   Basic Metabolic Panel (BMP)    Collection Time: 10/05/18  3:32 AM   Result Value Ref Range    Sodium 145 135 - 145 mmol/L    Potassium 4.1 3.6 - 5.5 mmol/L    Chloride 108 96 - 112 mmol/L    Co2 33 20 - 33 mmol/L    Glucose 151 (H) 65 - 99 mg/dL    Bun 17 8 - 22 mg/dL    Creatinine 0.37 (L) 0.50 - 1.40 mg/dL    Calcium 7.8 (L) 8.5 - 10.5 mg/dL    Anion Gap 4.0 0.0 - 11.9   Phosphorus    Collection Time: 10/05/18  3:32 AM   Result Value Ref Range    Phosphorus 2.3 (L) 2.5 - 4.5 mg/dL   CORTISOL    Collection Time: 10/05/18  3:32 AM   Result Value Ref Range    Cortisol 14.8 0.0 - 23.0 ug/dL   CRP HIGH SENSITIVE (CARDIAC)    Collection Time: 10/05/18  3:32 AM   Result Value Ref Range    C Reactive Protein High Sensitive 121.4 (H) 0.0 - 7.5 mg/L   ESTIMATED GFR    Collection Time: 10/05/18  3:32 AM   Result Value Ref Range    GFR If African American >60 >60 mL/min/1.73 m 2    GFR If Non African American >60 >60 mL/min/1.73 m 2   ISTAT ARTERIAL BLOOD GAS    Collection Time: 10/05/18  4:34 AM   Result Value Ref Range    Ph 7.485 7.400 - 7.500    Pco2 43.5 (H) 26.0 - 37.0 mmHg    Po2 79 64 - 87 mmHg    Tco2 34 (H) 20 - 33 mmol/L    S02 96 93 - 99 %    Hco3 32.7 (H) 17.0 - 25.0 mmol/L    BE 8 (H) -4 - 3 mmol/L    Body Temp 37.3 C degrees    O2 Therapy 40 %    iPF Ratio 198     Ph Temp Janee 7.480 7.400 - 7.500    Pco2 Temp Co 44.0 (H) 26.0 - 37.0 mmHg    Po2 Temp Cor 81 64 - 87 mmHg    Specimen Arterial     Action Range Triggered NO     Inst. Qualified Patient YES    HEPATITIS PANEL  ACUTE(4 COMPONENTS)    Collection Time: 10/05/18  2:45 PM   Result Value Ref Range    Hepatitis B Surface Antigen Negative Negative    Hepatitis C Antibody Negative Negative    Hepatitis B Cors Ab,IgM Negative Negative    Hepatitis A Virus Ab, IgM Negative Negative   HIV AG/AB COMBO ASSAY SCREENING    Collection Time: 10/05/18  2:45 PM   Result Value Ref Range    HIV Ag/Ab Combo Assay Non Reactive Non Reactive   ISTAT ARTERIAL BLOOD GAS    Collection Time: 10/06/18  4:56 AM   Result Value Ref Range    Ph 7.499 7.400 - 7.500    Pco2 42.2 (H) 26.0 - 37.0 mmHg    Po2 68 64 - 87 mmHg    Tco2 34 (H) 20 - 33 mmol/L    S02 95 93 - 99 %    Hco3 32.8 (H) 17.0 - 25.0 mmol/L    BE 9 (H) -4 - 3 mmol/L    Body Temp 36.9 C degrees    O2 Therapy 30 %    iPF Ratio 227     Ph Temp Janee 7.500 7.400 - 7.500    Pco2 Temp Co 42.1 (H) 26.0 - 37.0 mmHg    Po2 Temp Cor 68 64 - 87 mmHg    Specimen Arterial     Action Range Triggered NO     Inst. Qualified Patient YES        Imaging  X-Ray:  I have personally reviewed the images and compared with prior images. and My impression is: Increased edema and effusions    Assessment/Plan  * Acute hypoxemic respiratory failure (HCC)- (present on admission)   Assessment & Plan    Intubated 10/2  All appropriate ventilator bundles have been initiated  Continue vent support  SBT as tolerated        Pleural effusion   Assessment & Plan    S/P right thoracentesis with 1.7 L of fluid removed on 10/1  Transudative effusion        Pericardial effusion   Assessment & Plan    Pericardiocentesis done on 10/1 - 1 L of bloody fluid was removed  Pericardial drain removed on 10/3  Cytology negative for malignancy  CRP is markedly elevated  Rheumatoid factor positive, GENTRY negative, HIV negative, hepatitis panel negative  She will require a pericardial biopsy when improved  Continue empiric hydrocortisone, 50 mg IV every 6 hours        Pulmonary edema- (present on admission)   Assessment & Plan    Increased  edema  Force diuresis with Lasix, 40 mg IV        Hypotension   Assessment & Plan    Undifferentiated hypotension  Improved  Now off vasopressors  Cortisol level is low - continue hydrocortisone, 50 mg IV every 6 hours  Continue midodrine, 5 mg 3 times daily        History of non-Hodgkin's lymphoma   Assessment & Plan    Diagnosed in 2000  Received chemotherapy in [place] and has been in remission per the         HTN (hypertension)- (present on admission)   Assessment & Plan    Monitor blood pressure        Dysphagia   Assessment & Plan    Continue enteral tube feedings        Hypernatremia   Assessment & Plan    Improved  Continue free water, 200 mL every 6 hours        Tobacco abuse- (present on admission)   Assessment & Plan    Nicotine replacement protocol        Hypothyroidism- (present on admission)   Assessment & Plan    Continue Synthroid, 88 mcg daily             VTE:  Heparin  Ulcer: H2 Antagonist  Lines: Central Line  Ongoing indication addressed    I have performed a physical exam and reviewed and updated ROS and Plan today (10/6/2018). In review of yesterday's note (10/5/2018), there are no changes except as documented above.     I have assessed and reassessed her respiratory status with spontaneous breathing trials and ventilator adjustments, airway mechanics, ventilator waveforms, blood pressure, hemodynamics, cardiovascular status and neurologic status with titration of propofol.  She is at increased risk for worsening respiratory and cardiovascular system dysfunction.    Discussed patient condition and risk of morbidity and/or mortality with Hospitalist, Family, RN, RT, Pharmacy, Charge nurse / hot rounds and QA team  The patient remains critically ill.  Critical care time = 35 minutes in directly providing and coordinating critical care and extensive data review.  No time overlap and excludes procedures.    High risk of deterioration and worsening vital organ dysfunction and death without  the above critical care interventions.    Jarvis Arnold MD  Pulmonary and Critical Care Medicine

## 2018-10-06 NOTE — CARE PLAN
Problem: Fluid Volume:  Goal: Will maintain balanced intake and output  Outcome: PROGRESSING AS EXPECTED    Intervention: Monitor, educate, and encourage compliance with therapeutic intake of liquids  Strict I and O taken         Problem: Mobility  Goal: Risk for activity intolerance will decrease  Outcome: PROGRESSING AS EXPECTED    Intervention: Assess and monitor signs of activity intolerance  Pt is refusing mobilization due to being tired.  Will revaluate and encourage mobilization.

## 2018-10-06 NOTE — PROGRESS NOTES
MS    SR, BBB  3 minute episode of ventricular bigeminy occurred around 0500.  Rate 70-90  0.16 / 0.12 / 0.32

## 2018-10-06 NOTE — CARE PLAN
Problem: Bowel/Gastric:  Goal: Normal bowel function is maintained or improved  Outcome: PROGRESSING SLOWER THAN EXPECTED  Bowel sounds normoactive x4 quadrants. No stool observed as of the time of this note filing.    Problem: Skin Integrity  Goal: Risk for impaired skin integrity will decrease  Outcome: PROGRESSING AS EXPECTED  After bed bath/CHG bath, moisturizer applied to areas at risk for skin breakdown. Absorbent pads placed under forearms.

## 2018-10-06 NOTE — PROGRESS NOTES
[facility] Hospitalist Progress Note    Date of Service: 10/6/2018    Chief Complaint  78 y.o. Female from [facility] admitted 2018 with shortness of breath and peripheral edema.    Interval Problem Update      SR  Off levo since 4pm yesterday  Follows commands  TF at goal  VD5 PEEP8/30%   SBT for 1 hour  Repeat echo done today results pending   and daughter at bedside                          Consultants/Specialty  Pulmonary  Cardiology    Disposition  Monitor in ICU        Review of Systems   Unable to perform ROS: Intubated      Physical Exam  Laboratory/Imaging   Hemodynamics  Temp (24hrs), Av.4 °C (99.4 °F), Min:37.2 °C (99 °F), Max:37.7 °C (99.9 °F)   Temperature: 37.3 °C (99.1 °F), Monitored Temp: 36.6 °C (97.9 °F)  Pulse  Av.3  Min: 60  Max: 115 Heart Rate (Monitored): 91  Arterial BP: (!) 99/42, NIBP: (!) 94/46      Respiratory  Crum Vent Mode: APVCMV, Rate (breaths/min): 14, PEEP/CPAP: 8, PEEP/CPAP: 8, FiO2: 30, P Peak (PIP): 14, P MEAN: 9.4   Respiration: (!) 0, Pulse Oximetry: 93 %     Work Of Breathing / Effort: Vented  RUL Breath Sounds: Clear, RML Breath Sounds: Diminished, RLL Breath Sounds: Diminished, LORI Breath Sounds: Clear, LLL Breath Sounds: Diminished    Fluids    Intake/Output Summary (Last 24 hours) at 10/06/18 0941  Last data filed at 10/06/18 0600   Gross per 24 hour   Intake          2570.06 ml   Output             2290 ml   Net           280.06 ml       Nutrition  Orders Placed This Encounter   Procedures   • Diet NPO     Standing Status:   Standing     Number of Occurrences:   1     Order Specific Question:   Restrict to:     Answer:   Strict [1]     Physical Exam   Constitutional: She appears well-developed and well-nourished.   HENT:   Head: Normocephalic and atraumatic.   Mouth/Throat: Oropharynx is clear and moist.   Cortrak in right nostril   Eyes: Pupils are equal, round, and reactive to light. Right eye exhibits no discharge. Left eye exhibits no  discharge.   Neck: Neck supple. No JVD present. No tracheal deviation present.   Cardiovascular: Normal rate and regular rhythm.  Exam reveals no gallop and no friction rub.    No murmur heard.  Pulmonary/Chest: Effort normal. No respiratory distress. She has decreased breath sounds in the right lower field and the left lower field. She has no rhonchi. She has no rales. She exhibits no crepitus.   Intubated    Abdominal: Soft. Bowel sounds are normal. She exhibits no distension. There is no tenderness. There is no rebound.   Musculoskeletal: She exhibits edema (1+). She exhibits no tenderness.   Neurological: No cranial nerve deficit. She exhibits normal muscle tone.   Sedated no focal deficits noted   Skin: Skin is warm and dry. She is not diaphoretic. No cyanosis. Nails show no clubbing.   Psychiatric:   Sedated   Nursing note and vitals reviewed.      Recent Labs      10/04/18   0343  10/05/18   0332  10/06/18   0447   WBC  13.1*  13.6*  13.2*   RBC  4.61  4.14*  3.79*   HEMOGLOBIN  13.0  12.0  10.5*   HEMATOCRIT  41.0  37.1  34.0*   MCV  88.9  89.6  89.7   MCH  28.2  29.0  27.7   MCHC  31.7*  32.3*  30.9*   RDW  67.7*  68.2*  67.3*   PLATELETCT  196  189  197   MPV  11.3  12.1  11.8     Recent Labs      10/04/18   0343  10/05/18   0332  10/06/18   0447   SODIUM  146*  145  142   POTASSIUM  3.3*  4.1  4.1   CHLORIDE  106  108  107   CO2  35*  33  30   GLUCOSE  158*  151*  196*   BUN  19  17  20   CREATININE  0.38*  0.37*  0.33*   CALCIUM  7.8*  7.8*  7.9*             Recent Labs      10/03/18   1130  10/04/18   0343   TRIGLYCERIDE  76  80          Assessment/Plan     * Acute hypoxemic respiratory failure (HCC)- (present on admission)   Assessment & Plan    Vent management per critical care discussed with Dr. Arnold  Secondary to pleural effusions and pulmonary edema  SBT as tolerated    Diuresis as tolerated        Pleural effusion   Assessment & Plan    Status post thoracentesis on October 1    Diuresis  tolerated and monitor  Follow-up on echo        Pericardial effusion   Assessment & Plan    Cardiology following   Status post pericardial drain placement  Drain removed on 10/3/2018  Cytology negative fluid cultures negative  GENTRY negative RA factor positive CCP negative    Follow-up on repeat echocardiogram    I discussed the case with  from rheumatology on 10/5/2018 he recommended checking for cryoglobulins SPEP  Her elevated RA factor is nonspecific he agrees with trial of steroids if no contraindications     discussed with Dr. Lepe may need pericardial window and biopsy            Pulmonary edema- (present on admission)   Assessment & Plan    Following pericardiocentesis  Echo with normal EF  IV Lasix        Hypotension   Assessment & Plan    Relative adrenal insufficiency    Improved weaned off norepinephrine  Continue stress dose hydrocortisone and Midrin  Start tapering steroids tomorrow if blood pressure remains stable        History of non-Hodgkin's lymphoma   Assessment & Plan    Hx of         Dysphagia   Assessment & Plan    Continue tube feeding        Hypernatremia   Assessment & Plan    Sodium 142 continue free water flushes and monitor          Tobacco abuse- (present on admission)   Assessment & Plan    Continue NicoDerm and  patient on cessation post extubation        Hypothyroidism- (present on admission)   Assessment & Plan    Continue levothyroxine          Quality-Core Measures   Reviewed items::  Labs reviewed, Medications reviewed and Radiology images reviewed  [place] catheter::  Unconscious / Sedated Patient on a Ventilator  Central line in place:  Shock  DVT prophylaxis pharmacological::  Heparin  DVT prophylaxis - mechanical:  SCDs  Ulcer Prophylaxis::  Yes      Plan of care reviewed with patient's  and discussed with nursing staff

## 2018-10-07 NOTE — CARE PLAN
Problem: Ventilation Defect:  Goal: Ability to achieve and maintain unassisted ventilation or tolerate decreased levels of ventilator support    Intervention: Support and monitor invasive and noninvasive mechanical ventilation  Adult Ventilation Update    Total Vent Days: 6    Patient Lines/Drains/Airways Status    Active Airway     Name: Placement date: Placement time: Site: Days:    Airway ETT 8.0 @ 21 10/02/18   0338      6              Pt on APV: 14, 310, +8, 30%  No changes made overnight, will continue to monitor.

## 2018-10-07 NOTE — CARE PLAN
Problem: Venous Thromboembolism (VTW)/Deep Vein Thrombosis (DVT) Prevention:  Goal: Patient will participate in Venous Thrombosis (VTE)/Deep Vein Thrombosis (DVT)Prevention Measures  Pt receiving heparin for DVT prophylaxis.      Problem: Mobility  Goal: Risk for activity intolerance will decrease  Patient to edge of bed, tolerated well

## 2018-10-07 NOTE — CARE PLAN
Problem: Mobility  Goal: Risk for activity intolerance will decrease  Outcome: PROGRESSING AS EXPECTED  Assesses for signs of activity intolerance and encouraged pt to stand after being at EOB for 10 minutes. Pt able to hold her self up while seating and kick legs. Pt stood for 2 minutes, then started slipping and was returned to bed. Assist of myself and 2 other nurses.         Problem: Skin Integrity  Goal: Risk for impaired skin integrity will decrease  Outcome: PROGRESSING AS EXPECTED  Assessed risk factors for skin integrity impermeant / pressure ulcers.Assesses pt skin front and back. Educated pt. and family about the precautions we use to prevent skin breakdown. Pt's family verbalized understanding, pt nodes appropriately after explenetion. Pt understand the impotence of turning Q 2 hours. No S&S of skin breakdown / pressure ulcers at this time.  Small old scab noted on on right wrist.  Myplex sacral dressing placed for protection.

## 2018-10-07 NOTE — CARE PLAN
Problem: Bowel/Gastric:  Goal: Normal bowel function is maintained or improved  Outcome: PROGRESSING SLOWER THAN EXPECTED  Normoactive bowel sounds x 4 quadrants. Last BM 10/3. Bowel protocol initiated      Problem: Safety - Medical Restraint  Goal: Remains free of injury from restraints (Restraint for Interference with Medical Device)  INTERVENTIONS:  1. Determine that other, less restrictive measures have been tried or would not be effective before applying the restraint  2. Evaluate the patient's condition at the time of restraint application  3. Inform patient/family regarding the reason for restraint  4. Q2H: Monitor safety, psychosocial status, comfort, nutrition and hydration     Outcome: PROGRESSING AS EXPECTED  Patient and family educated on importance of restraints while sedated and on the ventilator. Patient nods head yes to understanding and family verbalizes understanding.

## 2018-10-07 NOTE — PROGRESS NOTES
12 hour chart check    Monitor Summary: .16/.08/.38 Rhythm: SR, Rate: 60-80.    2 RN skin check

## 2018-10-07 NOTE — RESPIRATORY CARE
Extubation      Events/Summary/Plan: pt extubated per Dr. Arnold, no cuff leak present and MD aware, no stridor noted, pt extubated to 3LNC and tolerating well at this time (10/07/18 2180)

## 2018-10-07 NOTE — PROGRESS NOTES
Critical Care Progress Note    Date of admission  9/28/2018    Chief Complaint  78 y.o. female admitted 9/28/2018 with shortness of breath and edema.    Hospital Course     This lady was admitted with shortness of breath and peripheral edema.  She was diuresed.  She was found to have a large pericardial effusion and a right pleural effusion.    Interval Problem Update  Reviewed last 24 hour events:       -SR   -prop 30   -afebrile   -TF 60 (goal)   -replete K and Mg and PO4   -CXR with improved edema   -NIF -21, FVC 0.6, RSBI 73   -Lasix 40 once   -steroids in half      Review of Systems  Review of Systems   Unable to perform ROS: Acuity of condition        Vital Signs for last 24 hours   Temp:  [36.3 °C (97.3 °F)-36.6 °C (97.9 °F)] 36.6 °C (97.9 °F)  Pulse:  [64-89] 64  Resp:  [0-25] 14    Hemodynamic parameters for last 24 hours       Vent Settings for last 24 hours  Crum Vent Mode: APVCMV  Rate (breaths/min):  [14] 14  PEEP/CPAP:  [8] 8  FiO2:  [30] 30  P Peak (PIP):  [14-21] 20  P MEAN:  [8.8-11] 11    Physical Exam   Physical Exam   Constitutional:   Sedated on ventilator   HENT:   Head: Normocephalic and atraumatic.   Right Ear: External ear normal.   Left Ear: External ear normal.   Mouth/Throat: Oropharynx is clear and moist.   Eyes: Pupils are equal, round, and reactive to light. EOM are normal. Right eye exhibits no discharge. Left eye exhibits no discharge. No scleral icterus.   Neck: Neck supple. No JVD present. No tracheal deviation present.   Cardiovascular: Intact distal pulses.  Exam reveals no gallop and no friction rub.    Sinus rhythm   Pulmonary/Chest: She has no wheezes. She has rales (Few crackles at the bases).   Abdominal: Soft. Bowel sounds are normal. She exhibits no distension. There is no tenderness. There is no guarding.   Tolerating enteral tube feedings   Musculoskeletal: She exhibits no tenderness or deformity.   No clubbing or cyanosis   Neurological:   Sedated.  Will arouse and  follow.  Moves all 4 extremities.   Skin: Skin is warm and dry. No rash noted. She is not diaphoretic.       Medications  Current Facility-Administered Medications   Medication Dose Route Frequency Provider Last Rate Last Dose   • artificial tears 1.4 % ophthalmic solution 1 Drop  1 Drop Both Eyes Q2HRS PRN Jarvis Arnold M.D.       • hydrocortisone sodium succinate PF (SOLU-CORTEF) 100 MG injection 50 mg  50 mg Intravenous Q6HRS Néstor Guillen M.D.   50 mg at 10/07/18 0204   • midodrine (PROAMATINE) tablet 5 mg  5 mg Feeding Tube Q8HRS Jarvis Arnold M.D.   5 mg at 10/06/18 2145   • heparin injection 5,000 Units  5,000 Units Subcutaneous Q8HRS Néstor Guillen M.D.   5,000 Units at 10/06/18 2145   • acetaminophen (TYLENOL) tablet 650 mg  650 mg Feeding Tube Q4HRS PRN Jarvis Arnold M.D.   650 mg at 10/04/18 2102    Or   • acetaminophen (TYLENOL) suppository 650 mg  650 mg Rectal Q4HRS PRN Jarvis Arnold M.D.       • Respiratory Care per Protocol   Nebulization Continuous RT Alvaro Perez M.D.       • MD Alert...ICU Electrolyte Replacement per Pharmacy   Other pharmacy to dose Alvaro Perez M.D.       • propofol (DIPRIVAN) injection  0-80 mcg/kg/min Intravenous Continuous Alvaro Perez M.D. 11.9 mL/hr at 10/07/18 0106 25 mcg/kg/min at 10/07/18 0106   • Pharmacy Consult: Enteral tube feeding - review meds/change route/product selection   Other PRN Néstor Guillen M.D.       • famotidine (PEPCID) tablet 20 mg  20 mg Per NG Tube Q12HRS Néstor Guillen M.D.   20 mg at 10/06/18 1822   • gabapentin (NEURONTIN) capsule 300 mg  300 mg Per NG Tube BID Néstor Guillen M.D.   300 mg at 10/06/18 1822   • levothyroxine (SYNTHROID) tablet 88 mcg  88 mcg Per NG Tube AM ES Néstor Guillen M.D.   88 mcg at 10/06/18 0456   • senna-docusate (PERICOLACE or SENOKOT S) 8.6-50 MG per tablet 2 Tab  2 Tab Per NG Tube BID Néstor Guillen M.D.   2 Tab at 10/06/18 1823     And   • polyethylene glycol/lytes (MIRALAX) PACKET 1 Packet  1 Packet Per NG Tube QDAY PRN Néstor Guillen M.D.   1 Packet at 10/06/18 1108    And   • magnesium hydroxide (MILK OF MAGNESIA) suspension 30 mL  30 mL Per NG Tube QDAY PRN Néstor Guillen M.D.        And   • bisacodyl (DULCOLAX) suppository 10 mg  10 mg Rectal QDAY PRN Néstor Guillen M.D.       • simvastatin (ZOCOR) tablet 20 mg  20 mg Per NG Tube Q EVENING Néstor Guillen M.D.   20 mg at 10/06/18 1822   • ipratropium-albuterol (DUONEB) nebulizer solution  3 mL Nebulization Q2HRS PRN (RT) Jarvis Arnold M.D.       • labetalol (NORMODYNE,TRANDATE) injection 10 mg  10 mg Intravenous Q30 MIN PRN Constantin Self M.D.       • Influenza Vaccine High-Dose pf injection 0.5 mL  0.5 mL Intramuscular Once PRN Rula Teresa M.D.       • nicotine (NICODERM) 14 MG/24HR 14 mg  14 mg Transdermal Daily-0600 Hazel Cruz M.D.   14 mg at 10/06/18 0448    And   • nicotine polacrilex (NICORETTE) 2 MG piece 2 mg  2 mg Oral Q HOUR PRN Hazel Cruz M.D.           Fluids    Intake/Output Summary (Last 24 hours) at 10/07/18 0500  Last data filed at 10/07/18 0400   Gross per 24 hour   Intake          2494.81 ml   Output             3350 ml   Net          -855.19 ml       Laboratory  Recent Results (from the past 48 hour(s))   HEPATITIS PANEL ACUTE(4 COMPONENTS)    Collection Time: 10/05/18  2:45 PM   Result Value Ref Range    Hepatitis B Surface Antigen Negative Negative    Hepatitis C Antibody Negative Negative    Hepatitis B Cors Ab,IgM Negative Negative    Hepatitis A Virus Ab, IgM Negative Negative   HIV AG/AB COMBO ASSAY SCREENING    Collection Time: 10/05/18  2:45 PM   Result Value Ref Range    HIV Ag/Ab Combo Assay Non Reactive Non Reactive   MAGNESIUM    Collection Time: 10/06/18  4:47 AM   Result Value Ref Range    Magnesium 2.0 1.5 - 2.5 mg/dL   CBC with Differential    Collection Time: 10/06/18  4:47 AM   Result Value Ref Range     WBC 13.2 (H) 4.8 - 10.8 K/uL    RBC 3.79 (L) 4.20 - 5.40 M/uL    Hemoglobin 10.5 (L) 12.0 - 16.0 g/dL    Hematocrit 34.0 (L) 37.0 - 47.0 %    MCV 89.7 81.4 - 97.8 fL    MCH 27.7 27.0 - 33.0 pg    MCHC 30.9 (L) 33.6 - 35.0 g/dL    RDW 67.3 (H) 35.9 - 50.0 fL    Platelet Count 197 164 - 446 K/uL    MPV 11.8 9.0 - 12.9 fL    Neutrophils-Polys 86.10 (H) 44.00 - 72.00 %    Lymphocytes 4.90 (L) 22.00 - 41.00 %    Monocytes 7.80 0.00 - 13.40 %    Eosinophils 0.00 0.00 - 6.90 %    Basophils 0.20 0.00 - 1.80 %    Immature Granulocytes 1.00 (H) 0.00 - 0.90 %    Nucleated RBC 0.00 /100 WBC    Neutrophils (Absolute) 11.33 (H) 2.00 - 7.15 K/uL    Lymphs (Absolute) 0.65 (L) 1.00 - 4.80 K/uL    Monos (Absolute) 1.03 (H) 0.00 - 0.85 K/uL    Eos (Absolute) 0.00 0.00 - 0.51 K/uL    Baso (Absolute) 0.03 0.00 - 0.12 K/uL    Immature Granulocytes (abs) 0.13 (H) 0.00 - 0.11 K/uL    NRBC (Absolute) 0.00 K/uL   Basic Metabolic Panel (BMP)    Collection Time: 10/06/18  4:47 AM   Result Value Ref Range    Sodium 142 135 - 145 mmol/L    Potassium 4.1 3.6 - 5.5 mmol/L    Chloride 107 96 - 112 mmol/L    Co2 30 20 - 33 mmol/L    Glucose 196 (H) 65 - 99 mg/dL    Bun 20 8 - 22 mg/dL    Creatinine 0.33 (L) 0.50 - 1.40 mg/dL    Calcium 7.9 (L) 8.5 - 10.5 mg/dL    Anion Gap 5.0 0.0 - 11.9   Phosphorus    Collection Time: 10/06/18  4:47 AM   Result Value Ref Range    Phosphorus 3.0 2.5 - 4.5 mg/dL   ESTIMATED GFR    Collection Time: 10/06/18  4:47 AM   Result Value Ref Range    GFR If African American >60 >60 mL/min/1.73 m 2    GFR If Non African American >60 >60 mL/min/1.73 m 2   ISTAT ARTERIAL BLOOD GAS    Collection Time: 10/06/18  4:56 AM   Result Value Ref Range    Ph 7.499 7.400 - 7.500    Pco2 42.2 (H) 26.0 - 37.0 mmHg    Po2 68 64 - 87 mmHg    Tco2 34 (H) 20 - 33 mmol/L    S02 95 93 - 99 %    Hco3 32.8 (H) 17.0 - 25.0 mmol/L    BE 9 (H) -4 - 3 mmol/L    Body Temp 36.9 C degrees    O2 Therapy 30 %    iPF Ratio 227     Ph Temp Janee 7.500  7.400 - 7.500    Pco2 Temp Co 42.1 (H) 26.0 - 37.0 mmHg    Po2 Temp Cor 68 64 - 87 mmHg    Specimen Arterial     Action Range Triggered NO     Inst. Qualified Patient YES    EC-ECHOCARDIOGRAM LTD W/O CONT    Collection Time: 10/06/18  9:09 AM   Result Value Ref Range    Left Ventrical Ejection Fraction 55        Imaging  X-Ray:  I have personally reviewed the images and compared with prior images. and My impression is: Improved edema    Assessment/Plan  * Acute hypoxemic respiratory failure (HCC)- (present on admission)   Assessment & Plan    Intubated 10/2  All appropriate ventilator bundles have been initiated  Continue vent support  SBT as tolerated        Pleural effusion   Assessment & Plan    S/P right thoracentesis with 1.7 L of fluid removed on 10/1  Transudative effusion        Pericardial effusion   Assessment & Plan    Pericardiocentesis done on 10/1 - 1 L of bloody fluid was removed  Pericardial drain removed on 10/3  Cytology negative for malignancy  CRP is markedly elevated  Rheumatoid factor positive, GENTRY negative, HIV negative, hepatitis panel negative  She needs a thoracic surgery consultation for pericardial biopsy when improved  Taper empiric hydrocortisone, 50 mg IV every 12 hours        Pulmonary edema- (present on admission)   Assessment & Plan    Improved with diuresis  I will give Lasix, 40 mg IV        Hypotension   Assessment & Plan    Undifferentiated hypotension  Improved  Remains off vasopressors  Cortisol level is low -Taper hydrocortisone, 50 mg IV every 12 hours  Continue midodrine, 5 mg 3 times daily        History of non-Hodgkin's lymphoma   Assessment & Plan    Diagnosed in 2000  Received chemotherapy in [place] and has been in remission per the         HTN (hypertension)- (present on admission)   Assessment & Plan    Monitor blood pressure        Dysphagia   Assessment & Plan    Continue enteral tube feedings        Hypernatremia   Assessment & Plan    Improved  Continue  free water, 200 mL every 6 hours        Hypomagnesemia   Assessment & Plan    Replete magnesium        Hypokalemia   Assessment & Plan    Replete potassium        Tobacco abuse- (present on admission)   Assessment & Plan    Nicotine replacement protocol        Hypothyroidism- (present on admission)   Assessment & Plan    Continue Synthroid, 88 mcg daily             VTE:  Heparin  Ulcer: H2 Antagonist  Lines: Central Line  Ongoing indication addressed    I have performed a physical exam and reviewed and updated ROS and Plan today (10/7/2018). In review of yesterday's note (10/6/2018), there are no changes except as documented above.     I have assessed and reassessed her respiratory status with ventilator adjustments and spontaneous breathing trials, ventilator waveforms, airway mechanics, but pressure, hemodynamics, cardiovascular status and neurologic status with titration of propofol.  She is at increased risk for worsening respiratory and cardiovascular system dysfunction.    Discussed patient condition and risk of morbidity and/or mortality with Hospitalist, Family, RN, RT, Pharmacy, Charge nurse / hot rounds and QA team  The patient remains critically ill.  Critical care time = 88 minutes in directly providing and coordinating critical care and extensive data review.  No time overlap and excludes procedures.    High risk of deterioration and worsening vital organ dysfunction and death without the above critical care interventions.    Jarvis Arnold MD  Pulmonary and Critical Care Medicine

## 2018-10-07 NOTE — PROGRESS NOTES
Received report from RASTA Roper and assumed care of pt. VSS.  Went over plan of care and answered any questions. Verified lines and ensured patency, drips are set at the correct rate. Safety measurers implemented. Call light and personal belonging within reach. Pt is vented and resting in bed without s & s of pain or distress. 2 skin check done, no new skin issues

## 2018-10-07 NOTE — PROGRESS NOTES
[facility] Cardiology Follow-up Consult Note  Date of note:    10/4/2018      Consulting Physician: Néstor Guillen M.D.    Patient ID:  Name:   Erin Quigley     YOB: 1940  Age:   78 y.o.  female   MRN:   [id number]    Chief Complaint   Patient presents with   • Shortness of Breath     3 weeks on and off   • Peripheral Edema     started 3 weeks ago in her feet and is now up to her thighs       Interim Events:  - Overnight, no overnight events. Remains intubated.   - Alert this morning, nodding appropriately to questions  - Tele: SR 60-80s  - Echo 10/6: LV EF 55% small pericardial effusion without evidence of hemodynamic compromise  - Off of pressors; on hydrocortisone and midodrine  - Received 1 dose of IV lasix 40 mg yesterday      ROS  - Unable to obtain as patient is intubated and sedated    Past medical, surgical, social, and family history reviewed and unchanged from admission except as noted in assessment and plan.    Medications: Reviewed in MAR  Current Facility-Administered Medications   Medication Dose Frequency Provider Last Rate Last Dose   • artificial tears 1.4 % ophthalmic solution 1 Drop  1 Drop Q2HRS PRN Jarvis Arnold M.D.       • hydrocortisone sodium succinate PF (SOLU-CORTEF) 100 MG injection 50 mg  50 mg Q6HRS Néstor Guillen M.D.   50 mg at 10/07/18 0520   • midodrine (PROAMATINE) tablet 5 mg  5 mg Q8HRS Jarvis Arnold M.D.   5 mg at 10/07/18 0520   • heparin injection 5,000 Units  5,000 Units Q8HRS Néstor Guillen M.D.   5,000 Units at 10/07/18 0520   • acetaminophen (TYLENOL) tablet 650 mg  650 mg Q4HRS PRN Jarvis Arnold M.D.   650 mg at 10/04/18 2102    Or   • acetaminophen (TYLENOL) suppository 650 mg  650 mg Q4HRS PRN Jarvis Arnold M.D.       • Respiratory Care per Protocol   Continuous RT Alvaro Perez M.D.       • MD Alert...ICU Electrolyte Replacement per Pharmacy   pharmacy to dose Alvaro Perez M.D.       • propofol  "(DIPRIVAN) injection  0-80 mcg/kg/min Continuous Alvaro Perez M.D. 11.9 mL/hr at 10/07/18 0106 25 mcg/kg/min at 10/07/18 0106   • Pharmacy Consult: Enteral tube feeding - review meds/change route/product selection   PRN Néstor Guillen M.D.       • famotidine (PEPCID) tablet 20 mg  20 mg Q12HRS Néstor Guillen M.D.   20 mg at 10/07/18 0520   • gabapentin (NEURONTIN) capsule 300 mg  300 mg BID Néstor Guillen M.D.   300 mg at 10/07/18 0520   • levothyroxine (SYNTHROID) tablet 88 mcg  88 mcg AM ES Néstor Guillen M.D.   88 mcg at 10/07/18 0536   • senna-docusate (PERICOLACE or SENOKOT S) 8.6-50 MG per tablet 2 Tab  2 Tab BID Néstor Guillen M.D.   2 Tab at 10/07/18 0520    And   • polyethylene glycol/lytes (MIRALAX) PACKET 1 Packet  1 Packet QDAY PRN Néstor Guillen M.D.   1 Packet at 10/06/18 1108    And   • magnesium hydroxide (MILK OF MAGNESIA) suspension 30 mL  30 mL QDAY PRN Néstor Guillen M.D.        And   • bisacodyl (DULCOLAX) suppository 10 mg  10 mg QDAY PRN Néstor Guillen M.D.       • simvastatin (ZOCOR) tablet 20 mg  20 mg Q EVENING Néstor Guillen M.D.   20 mg at 10/06/18 1822   • ipratropium-albuterol (DUONEB) nebulizer solution  3 mL Q2HRS PRN (RT) Jarvis Arnold M.D.       • labetalol (NORMODYNE,TRANDATE) injection 10 mg  10 mg Q30 MIN PRN Constantin Self M.D.       • Influenza Vaccine High-Dose pf injection 0.5 mL  0.5 mL Once PRN Rula K Teresa, M.D.       • nicotine (NICODERM) 14 MG/24HR 14 mg  14 mg Daily-0600 Hazel Cruz M.D.   14 mg at 10/07/18 0520    And   • nicotine polacrilex (NICORETTE) 2 MG piece 2 mg  2 mg Q HOUR PRN Hazel Cruz M.D.         Last reviewed on 9/29/2018  4:47 PM by Jaylin Haynes, PhT  No Known Allergies    Physical Exam  Body mass index is 32.1 kg/m². Blood pressure 133/79, pulse 72, temperature 36.6 °C (97.9 °F), resp. rate 14, height 1.575 m (5' 2\"), weight 79.6 kg (175 lb 7.8 oz), SpO2 96 %, not " currently breastfeeding.   Vitals:    10/07/18 0400 10/07/18 0500 10/07/18 0519 10/07/18 0600   BP:       Pulse: 64 74  72   Resp: 14 12  14   Temp:       SpO2: 94% 95% 95% 96%   Weight:       Height:        Oxygen Therapy:  Pulse Oximetry: 96 %, FiO2%: 30 %, O2 Delivery: Ventilator    General: Sedated and intubated. Good color today  HEENT: Normocephalic, Atraumatic. ET tube in place. Right subclavian in place  Cardiovascular: Normal heart rate, Normal rhythm. S1+, S2+ Unable to appreciate rub  Lungs: Coarse breath sounds. Mechanically ventilated   Abdomen:  Soft, No tenderness  Skin: No erythema, No rash  Lower limbs: no edema.   Neurologic: Intubated and sedated, moving all limbs.   Other systems also examined, grossly normal.       Labs (personally reviewed and notable for):   Recent Results (from the past 24 hour(s))   EC-ECHOCARDIOGRAM LTD W/O CONT    Collection Time: 10/06/18  9:09 AM   Result Value Ref Range    Left Ventrical Ejection Fraction 55    ISTAT ARTERIAL BLOOD GAS    Collection Time: 10/07/18  5:19 AM   Result Value Ref Range    Ph 7.523 (H) 7.400 - 7.500    Pco2 39.9 (H) 26.0 - 37.0 mmHg    Po2 65 64 - 87 mmHg    Tco2 34 (H) 20 - 33 mmol/L    S02 94 93 - 99 %    Hco3 32.8 (H) 17.0 - 25.0 mmol/L    BE 9 (H) -4 - 3 mmol/L    Body Temp 36.7 C degrees    O2 Therapy 30 %    iPF Ratio 217     Ph Temp Janee 7.527 (H) 7.400 - 7.500    Pco2 Temp Co 39.4 (H) 26.0 - 37.0 mmHg    Po2 Temp Cor 63 (L) 64 - 87 mmHg    Specimen Arterial     Action Range Triggered NO     Inst. Qualified Patient YES    MAGNESIUM    Collection Time: 10/07/18  5:30 AM   Result Value Ref Range    Magnesium 1.8 1.5 - 2.5 mg/dL   CBC with Differential    Collection Time: 10/07/18  5:30 AM   Result Value Ref Range    WBC 13.9 (H) 4.8 - 10.8 K/uL    RBC 3.87 (L) 4.20 - 5.40 M/uL    Hemoglobin 10.9 (L) 12.0 - 16.0 g/dL    Hematocrit 34.8 (L) 37.0 - 47.0 %    MCV 89.9 81.4 - 97.8 fL    MCH 28.2 27.0 - 33.0 pg    MCHC 31.3 (L) 33.6 - 35.0  g/dL    RDW 67.9 (H) 35.9 - 50.0 fL    Platelet Count 243 164 - 446 K/uL    MPV 11.6 9.0 - 12.9 fL    Neutrophils-Polys 81.50 (H) 44.00 - 72.00 %    Lymphocytes 6.70 (L) 22.00 - 41.00 %    Monocytes 9.60 0.00 - 13.40 %    Eosinophils 0.10 0.00 - 6.90 %    Basophils 0.20 0.00 - 1.80 %    Immature Granulocytes 1.90 (H) 0.00 - 0.90 %    Nucleated RBC 0.00 /100 WBC    Neutrophils (Absolute) 11.28 (H) 2.00 - 7.15 K/uL    Lymphs (Absolute) 0.93 (L) 1.00 - 4.80 K/uL    Monos (Absolute) 1.33 (H) 0.00 - 0.85 K/uL    Eos (Absolute) 0.01 0.00 - 0.51 K/uL    Baso (Absolute) 0.03 0.00 - 0.12 K/uL    Immature Granulocytes (abs) 0.27 (H) 0.00 - 0.11 K/uL    NRBC (Absolute) 0.00 K/uL   Basic Metabolic Panel (BMP)    Collection Time: 10/07/18  5:30 AM   Result Value Ref Range    Sodium 144 135 - 145 mmol/L    Potassium 3.6 3.6 - 5.5 mmol/L    Chloride 107 96 - 112 mmol/L    Co2 31 20 - 33 mmol/L    Glucose 217 (H) 65 - 99 mg/dL    Bun 23 (H) 8 - 22 mg/dL    Creatinine 0.32 (L) 0.50 - 1.40 mg/dL    Calcium 8.1 (L) 8.5 - 10.5 mg/dL    Anion Gap 6.0 0.0 - 11.9   Phosphorus    Collection Time: 10/07/18  5:30 AM   Result Value Ref Range    Phosphorus 2.3 (L) 2.5 - 4.5 mg/dL   ESTIMATED GFR    Collection Time: 10/07/18  5:30 AM   Result Value Ref Range    GFR If African American >60 >60 mL/min/1.73 m 2    GFR If Non African American >60 >60 mL/min/1.73 m 2       Cardiac Imaging and Procedures Review:    EKG and telemetry tracings personally reviewed    Impression and Medical Decision Making:  Principal Problem:    Acute hypoxemic respiratory failure (HCC) POA: Yes  Active Problems:    Pulmonary edema POA: Yes    Pericardial effusion POA: Unknown    Pleural effusion POA: Unknown    HTN (hypertension) POA: Yes    History of non-Hodgkin's lymphoma POA: Unknown      Overview: Nonhodgkins lymphoma  2000    Hypotension POA: Unknown    Hypothyroidism POA: Yes    Tobacco abuse POA: Yes    Hypokalemia POA: No    Hypomagnesemia POA: Unknown     Hypophosphatemia POA: Unknown    Hypernatremia POA: Unknown    Dysphagia POA: Unknown  Resolved Problems:    Acute encephalopathy POA: Unknown        Assessment and Plan  Pericardial Effusion with Tamponade  - Resolved  - Patient presented with large pericardial effusion. EKG with very low voltage on admission. Underwent drainage 10/1. 1000 cc of bloody pericardial fluid was obtained. Pericardial drain removed. She likely developed pericardial decompression syndrome post pericardiocentesis.  - Etiology not clear. Grossly was bloody (some traumatic component). Cytology negative for malignant cells. Unlikely to be infectious.    - GENTRY negative, RA elevated but CCP negative. SPEP and Cryoglobulin pending  - Echo: small amount of pericardial fluid without hemodynamic compromise  - Holding outpatient coreg 3.125 mg twice a day and lisinopril 40 mg daily due to hypotension. Resume when blood pressure stable.   - Plan forthoracoscopic pericardial window with biopsy of pericardium       Pleural Effusion  - S/p thoracentesis. 1.7 liters have been removed.   - Reviewed AM CXR: some what improved from yesterday however has persistent bilateral pleural effusions  - Plan forthoracoscopic pericardial window with biopsy of pericardium      Pulmonary Edema  - BNP not significantly elevated; troponins not significantly elevated  - Has been diuresed significantly since admission however subsequently became intravascularly dry; treated with IV fluids. She unfortunately likely developed pericardial decompression syndrome post pericardiocentesis.   - Received IV lasix 40 mg yesterday (one time dose) for pulmonary congestion. AM CXR improved congestion however bilateral effusions unchanged. Continue PRN lasix as needed.   - Monitor I&Os      Hypernatremia  - Resolved with 1/2 NS. Continue  free water flushes  - Monitor with BMP      It is my pleasure to participate in the care of Ms. Quigley.  Please do not hesitate to contact me  with questions or concerns. Will continue to follow

## 2018-10-07 NOTE — PROGRESS NOTES
[facility] Hospitalist Progress Note    Date of Service: 10/7/2018    Chief Complaint  78 y.o. Female from [facility] admitted 2018 with shortness of breath and peripheral edema.    Interval Problem Update      VD#6 8/30%  SBT 1 hour  Follows command  Remains off pressorrs  Alert and following command during SBT                        Consultants/Specialty  Pulmonary  Cardiology    Disposition  Monitor in ICU        Review of Systems   Unable to perform ROS: Intubated      Physical Exam  Laboratory/Imaging   Hemodynamics  Temp (24hrs), Av.5 °C (97.7 °F), Min:36.3 °C (97.3 °F), Max:36.6 °C (97.9 °F)   Temperature: 36.6 °C (97.9 °F), Monitored Temp: 36.7 °C (98.1 °F)  Pulse  Av.9  Min: 60  Max: 115 Heart Rate (Monitored): 82  Arterial BP: 118/52, NIBP: 128/61      Respiratory  Crum Vent Mode: APVCMV, Rate (breaths/min): 14, PEEP/CPAP: 8, PEEP/CPAP: 8, FiO2: 30, P Peak (PIP): 15, P MEAN: 9.3   Respiration: 14, Pulse Oximetry: 96 %     Work Of Breathing / Effort: Vented  RUL Breath Sounds: Clear, RML Breath Sounds: Diminished, RLL Breath Sounds: Diminished, LORI Breath Sounds: Clear, LLL Breath Sounds: Diminished    Fluids    Intake/Output Summary (Last 24 hours) at 10/07/18 0929  Last data filed at 10/07/18 0800   Gross per 24 hour   Intake          2774.97 ml   Output             3105 ml   Net          -330.03 ml       Nutrition  Orders Placed This Encounter   Procedures   • Diet NPO     Standing Status:   Standing     Number of Occurrences:   1     Order Specific Question:   Restrict to:     Answer:   Strict [1]     Physical Exam   Constitutional: She appears well-developed and well-nourished.   HENT:   Head: Normocephalic and atraumatic.   Mouth/Throat: Oropharynx is clear and moist. No oropharyngeal exudate.   Cortrak in right nares   Eyes: Pupils are equal, round, and reactive to light. Conjunctivae are normal. Right eye exhibits no discharge. Left eye exhibits no discharge.   Neck: Neck supple. No  JVD present. No tracheal deviation present.   Cardiovascular: Normal rate and regular rhythm.  Exam reveals no gallop and no friction rub.    No murmur heard.  Pulmonary/Chest: Effort normal. No stridor. No respiratory distress. She has decreased breath sounds (Diminished at the bases). She has no wheezes. She has no rhonchi. She has no rales. She exhibits no bony tenderness.   Intubated    Abdominal: Soft. Bowel sounds are normal. She exhibits no distension. There is no tenderness. There is no rebound and no guarding.   Musculoskeletal: She exhibits edema (1+). She exhibits no tenderness.   Neurological: She is alert. No cranial nerve deficit. She exhibits normal muscle tone.   Skin: Skin is warm and dry. She is not diaphoretic. No cyanosis or erythema. Nails show no clubbing.   Psychiatric: She has a normal mood and affect.   Nursing note and vitals reviewed.      Recent Labs      10/05/18   0332  10/06/18   0447  10/07/18   0530   WBC  13.6*  13.2*  13.9*   RBC  4.14*  3.79*  3.87*   HEMOGLOBIN  12.0  10.5*  10.9*   HEMATOCRIT  37.1  34.0*  34.8*   MCV  89.6  89.7  89.9   MCH  29.0  27.7  28.2   MCHC  32.3*  30.9*  31.3*   RDW  68.2*  67.3*  67.9*   PLATELETCT  189  197  243   MPV  12.1  11.8  11.6     Recent Labs      10/05/18   0332  10/06/18   0447  10/07/18   0530   SODIUM  145  142  144   POTASSIUM  4.1  4.1  3.6   CHLORIDE  108  107  107   CO2  33  30  31   GLUCOSE  151*  196*  217*   BUN  17  20  23*   CREATININE  0.37*  0.33*  0.32*   CALCIUM  7.8*  7.9*  8.1*                      Assessment/Plan     * Acute hypoxemic respiratory failure (HCC)- (present on admission)   Assessment & Plan    Vent management per critical care discussed with Dr. Arnold  Secondary to pleural effusions and pulmonary edema    Continue SBT as tolerated  IV Lasix        Pleural effusion   Assessment & Plan    Status post thoracentesis on October 1    Continue IV Lasix and monitor        Pericardial effusion   Assessment &  Plan    Cardiology following   Status post pericardial drain placement  Drain removed on 10/3/2018  Cytology negative fluid cultures negative  GENTRY negative RA factor positive CCP negative    Repeat echocardiogram with small effusion that appears old per report    I discussed the case with  from rheumatology on 10/5/2018 he recommended checking for cryoglobulins SPEP  Her elevated RA factor is nonspecific he agrees with trial of steroids if no contraindications     We will start tapering steroids since she is off pressors  Etiology is unclear and patient may eventually need pericardial biopsy for definitive diagnosis discussed with Dr. Arnold and patient's  will defer this procedure until she is more clinically stable and extubated unless we have evidence of recurrence of hemodynamically significant effusion            Pulmonary edema- (present on admission)   Assessment & Plan    Following pericardiocentesis  Echo with normal EF    Continue diuresis        Hypotension   Assessment & Plan    Relative adrenal insufficiency    Improved weaned off pressors  Start tapering hydrocortisone        History of non-Hodgkin's lymphoma   Assessment & Plan    Hx of         Dysphagia   Assessment & Plan    Tube feeding as tolerated        Hypernatremia   Assessment & Plan    Stable on water flushes continue to monitor          Hypophosphatemia   Assessment & Plan    Replete with K-Phos and monitor        Hypokalemia   Assessment & Plan    Replete and monitor        Tobacco abuse- (present on admission)   Assessment & Plan    Continue NicoDerm and  patient on cessation post extubation        Hypothyroidism- (present on admission)   Assessment & Plan    Continue levothyroxine          Quality-Core Measures   Reviewed items::  Labs reviewed, Medications reviewed and Radiology images reviewed  [place]te catheter::  Unconscious / Sedated Patient on a Ventilator  Central line in place:  Shock  DVT prophylaxis  pharmacological::  Heparin  DVT prophylaxis - mechanical:  SCDs  Ulcer Prophylaxis::  Yes        Plan of care reviewed with patient's  and daughter and discussed with nursing staff pharmacist critical care and cardiology

## 2018-10-08 PROBLEM — E83.39 HYPOPHOSPHATEMIA: Status: RESOLVED | Noted: 2018-01-01 | Resolved: 2018-01-01

## 2018-10-08 NOTE — DIETARY
Nutrition support weekly update:  Day 10 of admit.  Erin Quigley is a 78 y.o. female with admitting DX of Hypoxia.    Tube feeding initiated on 10/2.  Pt pulled Cortark and is currently awaiting SLP evaluation for possible PO diet.  (TF orders: Replete Fiber, goal rate 60 ml/hr to provide 1440 kcal, 90 grams protein, and 1210 ml free water per day.)    Assessment:  Weight 79.6 kg, BMI 32.1    Estimated needs per initial assessment (weight 74 kg, BMI 29.84):  REE per MSJ x1.1-1.2 = 3566-9062 kcal/day  Total Calories / day: 1300 - 1500 (Calories / k - 20)  Total Grams Protein / day: 74 - 89 (Grams Protein / k.0 - 1.2)  25 ml free water/kg = 1850 ml free water per day    Evaluation:   1. Labs: Na 147, glu 177, BUN 28, Cr 0.4, AST 57, ALT 59, alb 2.8, .4 (10/5), ammonia 51 (10/2)  2. Meds: Decadron, insulin  3. 200 ml free water Q 6 hours  4. If TF to resume, then will change to diabetic TF formula.    Recommendations/Plan:  1. If TF to resume, change to Diabetisource AC @ goal rate 55 ml/hr to provide 1584 kcal, 79 grams protein, and 1082 ml free water per day.  2. Fluids per MD.   3. PO diet per SLP.    RD following.

## 2018-10-08 NOTE — PROGRESS NOTES
Cortrak Placement    Tube Team verified patient name and medical record number prior to tube placement.  Cortrak tube (43 inches, 10 Citizen of the [place]) placed at 79 cm in left nare.  Per Cortrak picture, tube appears to be in the stomach.  Nursing Instructions: Awaiting KUB to confirm placement before use for medications or feeding. Once placement confirmed, flush tube with 30 ml of water, and then remove and save stylet, in patient medication drawer.

## 2018-10-08 NOTE — PROGRESS NOTES
12 hour chart check     12 hour monitor summery:    Rhythm: SR 60-90  Rate: 12.08.36  Ectopy: None noted  2 RN skin check to be completed at bedside. Myplex placed on pt for protection, skin underweight red and blenching. Small old scab noted on right FA. No new skin issues at this time

## 2018-10-08 NOTE — PROGRESS NOTES
[facility] Hospitalist Progress Note    Date of Service: 10/8/2018    Chief Complaint  78 y.o. Female from [facility] admitted 2018 with shortness of breath and peripheral edema.    Interval Problem Update      Extubated yesterday  Started on decadron for stridor  Intermittent hallucinations overnight  -130  SLP dylon scheduled for fees this afternoon   at bedside                  Consultants/Specialty  Pulmonary  Cardiology    Disposition  Monitor in ICU        Review of Systems   Constitutional: Positive for malaise/fatigue. Negative for chills and fever.   HENT: Negative.    Eyes: Negative for discharge and redness.   Respiratory: Positive for cough and shortness of breath (Improved). Negative for stridor (Resolved).    Cardiovascular: Negative for leg swelling and PND.   Gastrointestinal: Negative for blood in stool, nausea and vomiting.   Genitourinary: Negative for hematuria.   Musculoskeletal: Negative for back pain and falls.   Skin: Negative for rash.   Neurological: Positive for weakness (Generalized). Negative for focal weakness, seizures and loss of consciousness.   Psychiatric/Behavioral: Negative for substance abuse. The patient is not nervous/anxious.    All other systems reviewed and are negative.     Physical Exam  Laboratory/Imaging   Hemodynamics  Temp (24hrs), Av.7 °C (98.1 °F), Min:36.6 °C (97.9 °F), Max:36.9 °C (98.4 °F)   Temperature: 36.9 °C (98.4 °F), Monitored Temp: 36.9 °C (98.4 °F)  Pulse  Av.4  Min: 60  Max: 132 Heart Rate (Monitored): (!) 112  NIBP: 115/68      Respiratory  Crum Vent Mode: Spont, Rate (breaths/min): 14, PEEP/CPAP: 8, PEEP/CPAP: 8, FiO2: 30, P Peak (PIP): 14, P MEAN: 9.7 #Aerosol Therapy / Airway Management:  (on 4L/min NC), Aerosol Humidity Temp (celsius):  (cool mist) Respiration: (!) 5, Pulse Oximetry: 95 %, O2 Daily Delivery Respiratory : Nasal Cannula     Given By:: Mask, Work Of Breathing / Effort: Mild (Stridor improving)  RUL Breath  Sounds: Clear;Expiratory Wheezes, RML Breath Sounds: Diminished;Expiratory Wheezes, RLL Breath Sounds: Diminished, LORI Breath Sounds: Clear;Expiratory Wheezes, LLL Breath Sounds: Diminished    Fluids    Intake/Output Summary (Last 24 hours) at 10/08/18 1008  Last data filed at 10/08/18 1000   Gross per 24 hour   Intake          1660.23 ml   Output             2650 ml   Net          -989.77 ml       Nutrition  Orders Placed This Encounter   Procedures   • Diet NPO     Standing Status:   Standing     Number of Occurrences:   1     Order Specific Question:   Restrict to:     Answer:   Strict [1]     Physical Exam   Constitutional: She is oriented to person, place, and time. She appears well-developed and well-nourished.   HENT:   Head: Normocephalic and atraumatic.   Right Ear: External ear normal.   Left Ear: External ear normal.   Mouth/Throat: No oropharyngeal exudate.   Hoarseness   Eyes: Pupils are equal, round, and reactive to light. Conjunctivae are normal. Right eye exhibits no discharge. Left eye exhibits no discharge.   Neck: Neck supple. No JVD present. No tracheal deviation present.   Cardiovascular: Regular rhythm.  Exam reveals no gallop and no friction rub.    No murmur heard.  Tachycardia   Pulmonary/Chest: Effort normal. No stridor. No respiratory distress. She has decreased breath sounds (Diminished at the bases). She has no wheezes. She has no rhonchi. She has no rales. She exhibits no bony tenderness.   Intubated    Abdominal: Soft. Bowel sounds are normal. She exhibits no distension. There is no tenderness. There is no rebound.   Musculoskeletal: She exhibits edema (1+ generalized). She exhibits no tenderness.   Neurological: She is alert and oriented to person, place, and time. No cranial nerve deficit. She exhibits normal muscle tone.   Skin: Skin is warm and dry. No rash noted. She is not diaphoretic. No cyanosis or erythema. Nails show no clubbing.   Psychiatric: She has a normal mood and  affect. Her speech is delayed. She is slowed.   Nursing note and vitals reviewed.      Recent Labs      10/06/18   0447  10/07/18   0530  10/08/18   0440   WBC  13.2*  13.9*  12.5*   RBC  3.79*  3.87*  4.19*   HEMOGLOBIN  10.5*  10.9*  11.9*   HEMATOCRIT  34.0*  34.8*  38.1   MCV  89.7  89.9  90.9   MCH  27.7  28.2  28.4   MCHC  30.9*  31.3*  31.2*   RDW  67.3*  67.9*  67.2*   PLATELETCT  197  243  298   MPV  11.8  11.6  11.5     Recent Labs      10/06/18   0447  10/07/18   0530  10/08/18   0440   SODIUM  142  144  147*   POTASSIUM  4.1  3.6  3.9   CHLORIDE  107  107  104   CO2  30  31  38*   GLUCOSE  196*  217*  177*   BUN  20  23*  28*   CREATININE  0.33*  0.32*  0.40*   CALCIUM  7.9*  8.1*  8.7                      Assessment/Plan     * Acute hypoxemic respiratory failure (HCC)- (present on admission)   Assessment & Plan    Extubated on 10/7/2018  Started on Decadron for stridor  Continue close clinical monitoring in the ICU  Continue oxygen and RT protocol        Pleural effusion   Assessment & Plan    Status post thoracentesis on October 1    Monitoring diuresis needed        Pericardial effusion   Assessment & Plan    Cardiology following   Status post pericardial drain placement  Drain removed on 10/3/2018  Cytology negative fluid cultures negative  GENTRY negative RA factor positive CCP negative    Follow-up on repeat echocardiogram from today    I previously discussed the case with  from rheumatology on 10/5/2018 he recommended checking for cryoglobulins SPEP  Her elevated RA factor is nonspecific he agrees with trial of steroids if no contraindications       Etiology is unclear and patient may eventually need pericardial biopsy for definitive diagnosis             Pulmonary edema- (present on admission)   Assessment & Plan    Monitor fluid status and follow-up on repeat echo  Diuresis as needed        Hypotension   Assessment & Plan    Relative adrenal insufficiency    Improved weaned off  pressors  Continue Midodrin          History of non-Hodgkin's lymphoma   Assessment & Plan    Hx of         Dysphagia   Assessment & Plan    Evaluated by speech therapy with plan for FEES  Discussed with Dr. Banegas he is concerned about possible underlying neurologic disorder will consult neurology discussed with Dr. Leann Montoya        Hypernatremia   Assessment & Plan    Resume water flushes per NG if failed swallow eval          Hypomagnesemia   Assessment & Plan    Replete with mag sulfate        Hypokalemia   Assessment & Plan    Replete and monitor        Tobacco abuse- (present on admission)   Assessment & Plan    Continue NicoDerm   on cessation when mental status improved        Hypothyroidism- (present on admission)   Assessment & Plan    Continue levothyroxine          Quality-Core Measures   Reviewed items::  Labs reviewed, Medications reviewed and Radiology images reviewed  [place] catheter::  Critically Ill - Requiring Accurate Measurement of Urinary Output  Central line in place:  Shock  DVT prophylaxis pharmacological::  Heparin  DVT prophylaxis - mechanical:  SCDs  Ulcer Prophylaxis::  Yes        Plan of care reviewed with patient  at bedside and discussed with Dr. Banegas

## 2018-10-08 NOTE — ASSESSMENT & PLAN NOTE
Multiple issues as noted in revised problem list  - pericardial effusion: w/u in progress, pericardial bx under discussion    - low NA- related to diuresis and NPO, correct with tube feeds (Pt OK to replace tube)    - global weakness./ weak cough: unclear etiology, consider neuro consult, differential may include myasthenia    - nutrition: tube feeds as noted    - central line: replace with PIV or PICC today    - activity: OT/PT consults and OOB to chair today    - voice/swallow: speech evaluation    - social discussed in detail with pt/familily    This patient remains at risk for recurrent respiratory failure. I spent 40 minutes of critical care time with this patient not including time with procedures

## 2018-10-08 NOTE — PROGRESS NOTES
Speech at bedside for swallow eval. Family educated on the importance of keeping the patient NPO right now pending the FEES study.

## 2018-10-08 NOTE — PROGRESS NOTES
[facility] Follow-up Consult Note  Date of note:    10/4/2018      Consulting Physician: Néstor Guillen M.D.    Patient ID:  Name:   Erin Quigley     YOB: 1940  Age:   78 y.o.  female   MRN:   [id number]    Chief Complaint   Patient presents with   • Shortness of Breath     3 weeks on and off   • Peripheral Edema     started 3 weeks ago in her feet and is now up to her thighs       Interim Events:  - Overnight, extubated. Subsequently developed stridor requiring racepinephrine. She also received IV solumedrol. Appears comfortable this morning  - Alert this morning, nodding appropriately to questions  - Tele: SR 110s; change from her HR of sinus in 70-80s. Carotid massage was done with no change of HR noted on monitor   - Repeat STAT echo for re-accumulation of pericardial fluid  - Will get EKG as rhythm as changed  - Plan forthoracoscopic pericardial window with biopsy of pericardium once patient is stable off of the vent  - BP has been stable; consider d/c midodrine  - CXR reviewed: possible her central line is stimulating atria; consider pulling it back a bit.       ROS  - Unable to obtain as patient has hoarse voice from being intubated. Denies any pain currently    Past medical, surgical, social, and family history reviewed and unchanged from admission except as noted in assessment and plan.    Medications: Reviewed in MAR  Current Facility-Administered Medications   Medication Dose Frequency Provider Last Rate Last Dose   • insulin regular (HUMULIN R) injection 2-9 Units  2-9 Units Q6HRS Shekhar Gonzalez M.D.        And   • glucose 4 g chewable tablet 16 g  16 g Q15 MIN PRN Shekhar Gonzalez M.D.        And   • dextrose 50% (D50W) injection 25 mL  25 mL Q15 MIN PRN Shekhar Gonzalez M.D.       • oxyCODONE immediate-release (ROXICODONE) tablet 2.5 mg  2.5 mg Q4HRS PRN Jarvis Arnold M.D.        Or   • oxyCODONE immediate-release (ROXICODONE) tablet 5 mg  5 mg Q4HRS PRN  Jarvis Arnold M.D.       • fentaNYL (SUBLIMAZE) injection  mcg   mcg Q HOUR PRN Jarvis Arnodl M.D.   50 mcg at 10/07/18 1650   • racepinephrine (MICRONEFRIN) 2.25 % nebulizer solution 0.5 mL  0.5 mL Q4H PRN (RT) Jarvis Arnold M.D.   0.5 mL at 10/08/18 0737   • dexamethasone (DECADRON) injection 4 mg  4 mg Q6HRS Jarvis Arnold M.D.   4 mg at 10/08/18 0510   • artificial tears 1.4 % ophthalmic solution 1 Drop  1 Drop Q2HRS PRN Jarvis Arnold M.D.       • midodrine (PROAMATINE) tablet 5 mg  5 mg Q8HRS Jarvis Arnold M.D.   Stopped at 10/07/18 2200   • heparin injection 5,000 Units  5,000 Units Q8HRS Néstor Guillen M.D.   5,000 Units at 10/08/18 0510   • acetaminophen (TYLENOL) tablet 650 mg  650 mg Q4HRS PRN Jarvis Arnold M.D.   650 mg at 10/07/18 2053    Or   • acetaminophen (TYLENOL) suppository 650 mg  650 mg Q4HRS PRN Jarvsi Arnold M.D.       • Respiratory Care per Protocol   Continuous RT Alvaro Perez M.D.       • MD Alert...ICU Electrolyte Replacement per Pharmacy   pharmacy to dose Alvaro Perez M.D.       • Pharmacy Consult: Enteral tube feeding - review meds/change route/product selection   PRN Néstor Guillen M.D.       • gabapentin (NEURONTIN) capsule 300 mg  300 mg BID Néstor Guillen M.D.   Stopped at 10/08/18 0600   • levothyroxine (SYNTHROID) tablet 88 mcg  88 mcg AM ES Néstor Guillen M.D.   Stopped at 10/08/18 0600   • senna-docusate (PERICOLACE or SENOKOT S) 8.6-50 MG per tablet 2 Tab  2 Tab BID Néstor Guillen M.D.   Stopped at 10/08/18 0600    And   • polyethylene glycol/lytes (MIRALAX) PACKET 1 Packet  1 Packet QDAY PRN Néstor Guillen M.D.   1 Packet at 10/07/18 0852    And   • magnesium hydroxide (MILK OF MAGNESIA) suspension 30 mL  30 mL QDAY PRN Néstor Guillen M.D.        And   • bisacodyl (DULCOLAX) suppository 10 mg  10 mg QDAY PRN Néstor Guillen M.D.       •  "simvastatin (ZOCOR) tablet 20 mg  20 mg Q EVENING Néstor Guillen M.D.   20 mg at 10/07/18 1707   • ipratropium-albuterol (DUONEB) nebulizer solution  3 mL Q2HRS PRN (RT) Jarvis Arnold M.D.       • labetalol (NORMODYNE,TRANDATE) injection 10 mg  10 mg Q30 MIN PRN Constantin Self M.D.       • Influenza Vaccine High-Dose pf injection 0.5 mL  0.5 mL Once PRN Rula Teresa M.D.       • nicotine (NICODERM) 14 MG/24HR 14 mg  14 mg Daily-0600 Hazel Cruz M.D.   14 mg at 10/08/18 0511    And   • nicotine polacrilex (NICORETTE) 2 MG piece 2 mg  2 mg Q HOUR PRN Hazel Cruz M.D.         Last reviewed on 9/29/2018  4:47 PM by Jaylin Haynes, T  No Known Allergies    Physical Exam  Body mass index is 32.1 kg/m². Blood pressure 133/79, pulse (!) 106, temperature 36.8 °C (98.2 °F), resp. rate 19, height 1.575 m (5' 2\"), weight 79.6 kg (175 lb 7.8 oz), SpO2 97 %, not currently breastfeeding.   Vitals:    10/08/18 1000 10/08/18 1100 10/08/18 1200 10/08/18 1203   BP:       Pulse: (!) 112 (!) 111  (!) 106   Resp: (!) 11 (!) 25  19   Temp:   36.8 °C (98.2 °F)    SpO2: 97% 93% 94% 97%   Weight:       Height:        Oxygen Therapy:  Pulse Oximetry: 97 %, O2 (LPM): 3.5, FiO2%: 35 %, O2 Delivery: Silicone Nasal Cannula    General: Sedated and intubated. Good color today  HEENT: Normocephalic, Atraumatic. Right subclavian in place. No stridor.   Cardiovascular: Normal heart rate, Normal rhythm. S1+, S2+ Unable to appreciate rub  Lungs: Diffuse wheeze throughout. Symmetric chest expansion  Abdomen:  Soft, No tenderness  Skin: No erythema, No rash  Lower limbs: no edema.   Neurologic: Alert, following commands. Moving all 4 extremities. Strength intact.   Other systems also examined, grossly normal.       Labs (personally reviewed and notable for):   Recent Results (from the past 24 hour(s))   MAGNESIUM    Collection Time: 10/08/18  4:40 AM   Result Value Ref Range    Magnesium 1.8 1.5 - 2.5 mg/dL   CBC with " Differential    Collection Time: 10/08/18  4:40 AM   Result Value Ref Range    WBC 12.5 (H) 4.8 - 10.8 K/uL    RBC 4.19 (L) 4.20 - 5.40 M/uL    Hemoglobin 11.9 (L) 12.0 - 16.0 g/dL    Hematocrit 38.1 37.0 - 47.0 %    MCV 90.9 81.4 - 97.8 fL    MCH 28.4 27.0 - 33.0 pg    MCHC 31.2 (L) 33.6 - 35.0 g/dL    RDW 67.2 (H) 35.9 - 50.0 fL    Platelet Count 298 164 - 446 K/uL    MPV 11.5 9.0 - 12.9 fL    Neutrophils-Polys 80.60 (H) 44.00 - 72.00 %    Lymphocytes 5.30 (L) 22.00 - 41.00 %    Monocytes 8.60 0.00 - 13.40 %    Eosinophils 0.00 0.00 - 6.90 %    Basophils 0.60 0.00 - 1.80 %    Immature Granulocytes 4.90 (H) 0.00 - 0.90 %    Nucleated RBC 0.00 /100 WBC    Neutrophils (Absolute) 10.06 (H) 2.00 - 7.15 K/uL    Lymphs (Absolute) 0.66 (L) 1.00 - 4.80 K/uL    Monos (Absolute) 1.07 (H) 0.00 - 0.85 K/uL    Eos (Absolute) 0.00 0.00 - 0.51 K/uL    Baso (Absolute) 0.08 0.00 - 0.12 K/uL    Immature Granulocytes (abs) 0.61 (H) 0.00 - 0.11 K/uL    NRBC (Absolute) 0.00 K/uL   Phosphorus    Collection Time: 10/08/18  4:40 AM   Result Value Ref Range    Phosphorus 3.4 2.5 - 4.5 mg/dL   COMP METABOLIC PANEL    Collection Time: 10/08/18  4:40 AM   Result Value Ref Range    Sodium 147 (H) 135 - 145 mmol/L    Potassium 3.9 3.6 - 5.5 mmol/L    Chloride 104 96 - 112 mmol/L    Co2 38 (H) 20 - 33 mmol/L    Anion Gap 5.0 0.0 - 11.9    Glucose 177 (H) 65 - 99 mg/dL    Bun 28 (H) 8 - 22 mg/dL    Creatinine 0.40 (L) 0.50 - 1.40 mg/dL    Calcium 8.7 8.5 - 10.5 mg/dL    AST(SGOT) 57 (H) 12 - 45 U/L    ALT(SGPT) 59 (H) 2 - 50 U/L    Alkaline Phosphatase 71 30 - 99 U/L    Total Bilirubin 0.4 0.1 - 1.5 mg/dL    Albumin 2.8 (L) 3.2 - 4.9 g/dL    Total Protein 5.7 (L) 6.0 - 8.2 g/dL    Globulin 2.9 1.9 - 3.5 g/dL    A-G Ratio 1.0 g/dL   ESTIMATED GFR    Collection Time: 10/08/18  4:40 AM   Result Value Ref Range    GFR If African American >60 >60 mL/min/1.73 m 2    GFR If Non African American >60 >60 mL/min/1.73 m 2   EKG    Collection Time: 10/08/18  11:17 AM   Result Value Ref Range    Report       [facility]    Test Date:  2018-10-08  Pt Name:    AVEL COLLAZO            Department: 161  MRN:        [id number]                      Room:       T620  Gender:     Female                       Technician: PC  :        [id number]                   Requested By:BRENDEN VALENZUELA  Order #:    [id number]                    Reading MD:    Measurements  Intervals                                Axis  Rate:       108                          P:          46  AK:         168                          QRS:        160  QRSD:       86                           T:          0  QT:         332  QTc:        445    Interpretive Statements  SINUS TACHYCARDIA  LOW VOLTAGE THROUGHOUT  BORDERLINE T ABNORMALITIES, DIFFUSE LEADS  BASELINE WANDER IN LEAD(S) V3  Compared to ECG 10/02/2018 12:59:58  T-wave abnormality now present  Sinus rhythm no longer present  Myocardial infarct finding no longer present         Cardiac Imaging and Procedures Review:    EKG and telemetry tracings personally reviewed    Impression and Medical Decision Making:  Principal Problem:    Acute hypoxemic respiratory failure (HCC) POA: Yes  Active Problems:    Pulmonary edema POA: Yes    Pericardial effusion POA: Unknown    Pleural effusion POA: Unknown    HTN (hypertension) POA: Yes    History of non-Hodgkin's lymphoma POA: Unknown      Overview: Nonhodgkins lymphoma  2000    Hypotension POA: Unknown    Hypothyroidism POA: Yes    Tobacco abuse POA: Yes    Hypokalemia POA: No    Hypomagnesemia POA: Unknown    Hypophosphatemia POA: Unknown    Hypernatremia POA: Unknown    Dysphagia POA: Unknown  Resolved Problems:    Acute encephalopathy POA: Unknown        Assessment and Plan  Pericardial Effusion with Tamponade  - Resolved  - Patient presented with large pericardial effusion. EKG with very low voltage on admission. Underwent drainage 10/1. 1000 cc of bloody pericardial fluid was obtained. Pericardial drain  removed. She likely developed pericardial decompression syndrome post pericardiocentesis.  - Etiology not clear. Grossly was bloody (some traumatic component). Cytology negative for malignant cells. Unlikely to be infectious. GENTRY negative, RA elevated but CCP negative. SPEP and Cryoglobulin pending  - Echo: small amount of pericardial fluid without hemodynamic compromise. Given new onset tachycardia, will repeat echo for possibility of re-accumulation of of fluid.   - Will also get EKG given the new tachycardia  - Holding outpatient coreg 3.125 mg twice a day and lisinopril 40 mg daily due to hypotension. Resume when blood pressure stable.   - Since she is no longer hypotensive, recommend d/c midodrine.   - Plan forthoracoscopic pericardial window with biopsy of pericardium once patient is stable off of the vent      Tachycardia  - New since overnight; has been normal sinus rhythm and rate during her hospitalization  - She may have re-accumulated pericardial fluids; will get stat ECHO to check. Will also get EKG to evaluate closer for rhythm changes.   - On reviewing CXR, may need to pull back central line catheter a bit as it may be causing some atrial stimulation as well.        Pleural Effusion  - S/p thoracentesis. 1.7 liters have been removed.   - Reviewed AM CXR: has persistent bilateral pleural effusions  - Plan forthoracoscopic pericardial window with biopsy of pericardium once patient is stable off of the vent      Pulmonary Edema  - BNP not significantly elevated; troponins not significantly elevated  - Has been diuresed significantly since admission however subsequently became intravascularly dry; treated with IV fluids. She unfortunately likely developed pericardial decompression syndrome post pericardiocentesis.   - AM CXR continues to show pulmonary congestion with bilateral effusions  - Plan forthoracoscopic pericardial window with biopsy of pericardium once patient is stable off of the  vent      Hypernatremia  - Resolved with 1/2 NS. Slightly elevated again today.   - Continue free water flushes. May need some fluid today.   - Monitor with BMP      It is my pleasure to participate in the care of Ms. Quigley.  Please do not hesitate to contact me with questions or concerns. Will continue to follow

## 2018-10-08 NOTE — CONSULTS
CC:  To evaluate possible Myasthenia    Date of Admission: 9/28/2018    Today's Date: 10/08/18    Consulting Physician: Néstor Guillen M.D.      HPI:    Erin Quigley is a 78 y.o. female R handed, who was admitted for cellulitis 9/28 and 10/01 she need intubation for hypoxia, and AMS  The patient was found with CHF, and just extubated yesterday  Yesterday after extubation she had a lot of stridor, hoarseness and was struggling to breath.  Today feels better  Per patient and family in the room, the patient feels that before this sickness she was having some raspy voice, but no pain, and no SOB, or diplopia, or lid ptosis.or any weakness        ROS:   Constitutional: No fevers or chills.  Eyes: No blurry vision or eye pain.  ENT: Raspy voice, hoarseness  Respiratory: No cough or shortness of breath.  Cardiovascular: No chest pain or palpitations.  GI: No nausea, vomiting, or diarrhea.  : No urinary incontinence or dysuria.  Musculoskeletal: leg swelling, and redness  Arthritis pain  Skin: No skin rashes.  Neuro: See HPI.  Endocrine: No heat or cold intolerance. No polydipsia or polyuria.  Psych: No depression or anxiety.  Heme/Lymph: No easy bruising or swollen lymph nodes.  All other systems were reviewed and were negative.       Past Medical History:   Past Medical History:   Diagnosis Date   • Arthritis    • Cancer (HCC)     Nonhodgkins lymphoma  2000   • Hypertension    • Unspecified disorder of thyroid     hypothyroid       Past Surgical History:   Past Surgical History:   Procedure Laterality Date   • CERVICAL FUSION POSTERIOR  10/2/2009    Performed by RICARDO IYER at SURGERY [facility] ORS   • CERVICAL LAMINECTOMY POSTERIOR  10/2/2009    Performed by RICARDO IYER at SURGERY [facility] ORS   • GYN SURGERY      Hysterectomy  2003   • OTHER      colonoscopy and endoscopy       Social History:   Social History     Social History   • Marital status:      Spouse name: N/A   • Number of  children: N/A   • Years of education: N/A     Occupational History   • Not on file.     Social History Main Topics   • Smoking status: Current Every Day Smoker     Types: Cigarettes   • Smokeless tobacco: Never Used   • Alcohol use Yes      Comment: occ   • Drug use: No   • Sexual activity: Not on file     Other Topics Concern   • Not on file     Social History Narrative   • No narrative on file       Family History: History reviewed. No pertinent family history.    Allergies: No Known Allergies      Current Facility-Administered Medications:   •  insulin regular (HUMULIN R) injection 2-9 Units, 2-9 Units, Subcutaneous, Q6HRS, Stopped at 10/08/18 1200 **AND** Accu-Chek Q6 if NPO, , , Q6H **AND** NOTIFY MD and PharmD, , , Once **AND** glucose 4 g chewable tablet 16 g, 16 g, Oral, Q15 MIN PRN **AND** dextrose 50% (D50W) injection 25 mL, 25 mL, Intravenous, Q15 MIN PRN, Shekhar Gonzalez M.D.  •  oxyCODONE immediate-release (ROXICODONE) tablet 2.5 mg, 2.5 mg, Oral, Q4HRS PRN **OR** oxyCODONE immediate-release (ROXICODONE) tablet 5 mg, 5 mg, Oral, Q4HRS PRN, Jarvis Arnold M.D.  •  fentaNYL (SUBLIMAZE) injection  mcg,  mcg, Intravenous, Q HOUR PRN, Jarvis Arnold M.D., 50 mcg at 10/07/18 1650  •  racepinephrine (MICRONEFRIN) 2.25 % nebulizer solution 0.5 mL, 0.5 mL, Nebulization, Q4H PRN (RT), Jarvis Arnold M.D., 0.5 mL at 10/08/18 0737  •  dexamethasone (DECADRON) injection 4 mg, 4 mg, Intravenous, Q6HRS, Jarvis Arnold M.D., 4 mg at 10/08/18 1245  •  artificial tears 1.4 % ophthalmic solution 1 Drop, 1 Drop, Both Eyes, Q2HRS PRN, Jarvis Arnold M.D.  •  midodrine (PROAMATINE) tablet 5 mg, 5 mg, Feeding Tube, Q8HRS, Jarvis Arnold M.D., Stopped at 10/07/18 2200  •  heparin injection 5,000 Units, 5,000 Units, Subcutaneous, Q8HRS, Néstor Guillen M.D., 5,000 Units at 10/08/18 1245  •  acetaminophen (TYLENOL) tablet 650 mg, 650 mg, Feeding Tube, Q4HRS PRN,  650 mg at 10/07/18 2053 **OR** acetaminophen (TYLENOL) suppository 650 mg, 650 mg, Rectal, Q4HRS PRN, Jarvis Arnold M.D.  •  Respiratory Care per Protocol, , Nebulization, Continuous RT, Alvaro Perez M.D.  •  MD Alert...ICU Electrolyte Replacement per Pharmacy, , Other, pharmacy to dose, Alvaro Perez M.D.  •  Pharmacy Consult: Enteral tube feeding - review meds/change route/product selection, , Other, PRN, Néstor Guillen M.D.  •  gabapentin (NEURONTIN) capsule 300 mg, 300 mg, Per NG Tube, BID, Néstor Guillen M.D., Stopped at 10/08/18 0600  •  levothyroxine (SYNTHROID) tablet 88 mcg, 88 mcg, Per NG Tube, AM ES, Néstor Guillen M.D., Stopped at 10/08/18 0600  •  senna-docusate (PERICOLACE or SENOKOT S) 8.6-50 MG per tablet 2 Tab, 2 Tab, Per NG Tube, BID, Stopped at 10/08/18 0600 **AND** polyethylene glycol/lytes (MIRALAX) PACKET 1 Packet, 1 Packet, Per NG Tube, QDAY PRN, 1 Packet at 10/07/18 0852 **AND** magnesium hydroxide (MILK OF MAGNESIA) suspension 30 mL, 30 mL, Per NG Tube, QDAY PRN **AND** bisacodyl (DULCOLAX) suppository 10 mg, 10 mg, Rectal, QDAY PRN, Néstor Guillen M.D.  •  simvastatin (ZOCOR) tablet 20 mg, 20 mg, Per NG Tube, Q EVENING, Néstor Guillen M.D., 20 mg at 10/07/18 1707  •  ipratropium-albuterol (DUONEB) nebulizer solution, 3 mL, Nebulization, Q2HRS PRN (RT), Jarvis Arnold M.D.  •  labetalol (NORMODYNE,TRANDATE) injection 10 mg, 10 mg, Intravenous, Q30 MIN PRN, Constantin Mirzoyan, M.D.  •  Influenza Vaccine High-Dose pf injection 0.5 mL, 0.5 mL, Intramuscular, Once PRN, Rula Treesa M.D.  •  nicotine (NICODERM) 14 MG/24HR 14 mg, 14 mg, Transdermal, Daily-0600, 14 mg at 10/08/18 0511 **AND** Nicotine Replacement Patient Education Materials, , , Once **AND** nicotine polacrilex (NICORETTE) 2 MG piece 2 mg, 2 mg, Oral, Q HOUR PRN, Hazel Cruz M.D.      PHYSICAL EXAM    Vitals:    10/08/18 1200 10/08/18 1203 10/08/18 1300 10/08/18 1400   BP:        Pulse: (!) 106 (!) 106 (!) 102 (!) 105   Resp: 19 19 18 19   Temp: 36.8 °C (98.2 °F)      SpO2: 94% 97% 98% 98%   Weight:       Height:           Constitutional:     Head/Neck: NCAT. no meningismus neg kernig neg brudzinski. No obvious mass or heard bruit. No tender arteries or lost pulses. No rash of head or neck.  The throat is very red and swollen; no purulent  Palate elevate normal, as well as Uvula.    Cardiovascular: Regular, rhythmic    Pulmonary: Normal breath sounds    Extremities: Normal inspection, No edema, normal pulses    Skin: Warm, dry, intact. No rashes observed head/neck or body  No stigmata  Has skin trophic changes in the legs, induration     Eyes/Funduscopic: no papilledema or atrophy. Norm post segments.    Psych: normal affect    Mental Status: Awake, alert, oriented x 3. Name/repeat/fluent/command follows. No neglect/extinction. Attention and concentration, recent & remote memory, Fund of Knowledge wnl   Speech is hoarse but no fatigable .    Cranial Nerves: CN II-XII intact. Pupillary reflex + 4  No afferent pupillary defect. EOM full;  VF full; No nystagmus.       Motor:  Proximal at least 5/5  No ptosis, no lid fatigue, neg cogan lid twitch       Sensory: symmetric to all modalities.  Coordination: dysmetria absent    DTR's: +1 ;  no clonus.    Babinski: neg    Gait/Station:deferred       Total:       Labs:  Recent Labs      10/06/18   0447  10/07/18   0530  10/08/18   0440   WBC  13.2*  13.9*  12.5*   RBC  3.79*  3.87*  4.19*   HEMOGLOBIN  10.5*  10.9*  11.9*   HEMATOCRIT  34.0*  34.8*  38.1   MCV  89.7  89.9  90.9   MCH  27.7  28.2  28.4   MCHC  30.9*  31.3*  31.2*   RDW  67.3*  67.9*  67.2*   PLATELETCT  197  243  298   MPV  11.8  11.6  11.5     Recent Labs      10/06/18   0447  10/07/18   0530  10/08/18   0440   SODIUM  142  144  147*   POTASSIUM  4.1  3.6  3.9   CHLORIDE  107  107  104   CO2  30  31  38*   GLUCOSE  196*  217*  177*   BUN  20  23*  28*   CREATININE  0.33*  0.32*   0.40*   CALCIUM  7.9*  8.1*  8.7                     Recent Labs      10/06/18   0447  10/07/18   0530  10/08/18   0440   SODIUM  142  144  147*   POTASSIUM  4.1  3.6  3.9   CHLORIDE  107  107  104   CO2  30  31  38*   GLUCOSE  196*  217*  177*   BUN  20  23*  28*     Recent Labs      10/06/18   0447  10/07/18   0530  10/08/18   0440   SODIUM  142  144  147*   POTASSIUM  4.1  3.6  3.9   CHLORIDE  107  107  104   CO2  30  31  38*   BUN  20  23*  28*   CREATININE  0.33*  0.32*  0.40*   MAGNESIUM  2.0  1.8  1.8   PHOSPHORUS  3.0  2.3*  3.4   CALCIUM  7.9*  8.1*  8.7         No results found for this or any previous visit.           Imaging: neuroimaging reviewed and directly visualized by me  EC-ECHOCARDIOGRAM COMPLETE W/O CONT   Final Result      DX-CHEST-PORTABLE (1 VIEW)   Final Result      No significant interval change.      DX-CHEST-PORTABLE (1 VIEW)   Final Result      No significant interval change.      EC-ECHOCARDIOGRAM LTD W/O CONT   Final Result      DX-CHEST-PORTABLE (1 VIEW)   Final Result         1.  Pulmonary edema and/or infiltrates are identified, which are stable since the prior exam. Layering bilateral pleural effusions, greater on the left.   2.  Atherosclerosis               DX-CHEST-PORTABLE (1 VIEW)   Final Result         1.  Pulmonary edema and/or infiltrates are identified, which are stable since the prior exam.   2.  Small right pleural effusion   3.  Atherosclerosis            DX-CHEST-PORTABLE (1 VIEW)   Final Result         1.  Pulmonary edema and/or infiltrates are identified, which are stable since the prior exam.   2.  Small right pleural effusion   3.  Atherosclerosis         DX-CHEST-PORTABLE (1 VIEW)   Final Result         1.  Pulmonary edema and/or infiltrates are identified, which are stable since the prior exam.   2.  Small right pleural effusion   3.  Atherosclerosis      DX-ABDOMEN FOR TUBE PLACEMENT   Final Result      Feeding tube placement with the tip projecting over the  stomach body.      DX-ABDOMEN FOR TUBE PLACEMENT   Final Result      1.  Feeding tube appears looped within the stomach. The distal aspect of the tip is directed cephalad in the fundal region.      DX-CHEST-LIMITED (1 VIEW)   Final Result         1.  Pulmonary edema and/or infiltrates are identified, which are stable since the prior exam.   2.  Small right pleural effusion   3.  Atherosclerosis      DX-CHEST-LIMITED (1 VIEW)   Final Result         1.  Pulmonary edema and/or infiltrates are identified, which are stable since the prior exam.   2.  Atherosclerosis      DX-CHEST-PORTABLE (1 VIEW)   Final Result         1.  Pulmonary edema and/or infiltrates are identified, which are stable since the prior exam.   2.  Atherosclerosis      DX-CHEST-LIMITED (1 VIEW)   Final Result      1.  No pneumothorax identified status post right thoracentesis and pericardiocentesis.      2.  Small amount of pneumomediastinum is likely related to recent pericardiocentesis and drain placement      3.  Cardiomegaly      EC-ECHOCARDIOGRAM LTD W/O CONT   Final Result      DX-CHEST-PORTABLE (1 VIEW)   Final Result      1.  Evacuation large right pleural effusion status post thoracentesis.   2.  No definite right pneumothorax identified. Recommend interval follow-up chest x-ray.   3.  Small left pleural effusion and left perihilar/left lung base atelectasis.   4.  Findings discussed with the patient's nurse. Follow-up chest x-ray will be obtained in approximately 3:00 PM      US-THORACENTESIS PUNCTURE RIGHT   Final Result      1. Ultrasound guided right sided diagnostic and therapeutic thoracentesis.      2. 1700 mL of fluid withdrawn.      EC-ECHOCARDIOGRAM COMPLETE W/O CONT   Final Result      DX-CHEST-PORTABLE (1 VIEW)   Final Result         1.  Pulmonary edema and/or infiltrates are identified, which are stable since the prior exam.   2.  Layering right pleural effusion, stable   3.  Cardiomegaly   4.  Atherosclerosis      DX-CHEST-2  VIEWS   Final Result      1.  Probable bilateral atelectasis      2.  Probable right pleural effusion which may be significant in size      3.  Limited assessment on one view portable radiography      OUTSIDE IMAGES-DX CHEST   Final Result      OUTSIDE IMAGES-DX CHEST   Final Result      EC-ECHOCARDIOGRAM LTD W/O CONT    (Results Pending)       Assessment/Plan:    Hoarseness: with inflammatory changes in the throat which can be explained by intubation  I dont know why her voice was raspy before intubation, but perhaps given the fact that she is a heavy smoker she should have a full ENT laryngoscopy.    Weakness: mild no specific proximal pattern.  Could related with decondition.    Check TSH as in any weakness is important  Check CK  This patient has no clinic, and no examination of MG  Neurology signs off.      Jeannie Robert M.D.    Clinical Professor, [employer]  Diplomate, Neurology , Vascular Neurology, Neurophysiology

## 2018-10-08 NOTE — PROGRESS NOTES
Called to room at 830.  I went to see patient, she has been extubated today.  She had stridor and was struggling to breathe.  She had received 2 doses of racemic epi.  She did receive hydrocortisone earlier this evening.  She is full code.  I looked through the chart review.  Family present at bedside.  Patient says that she is short of breath.  She is moving air moderately bilaterally without significant wheezing or rhonchi.  She does have upper airway stridor with limited air movement.  She is able to speak in 2-3 word sentences.  She is on losartan outpatient she was admitted on 928 for shortness of breath and peripheral edema, she was diuresed since that time, just found to have a large pericardial effusion.  She is alert and oriented x3.  She was intubated 10 to, she has had a thoracentesis with 1.7 L removed.  .  She initially had a pericardial drain placed on 1 October.  It was removed on 3 October.  She also has a history of non-Hodgkin's lymphoma, hypertension, dysphagia, and tobacco use.  Her last blood gas was this morning which showed respiratory alkalosis with hypoxia.  She is currently on 5 L of Ventimask.  I reviewed her chest x-ray from 10 7 and this morning at 3 AM that showed bilateral edema.  Will give patient 40 mg of Solu-Medrol.  Hopefully this will resolve or improve her stridor.  She can get continued doses of racemic epi.  Close monitoring for airway.  Discussed with family and patient, and she is still okay with being put back on mechanical ventilation in the event of respiratory failure.    Patient is critically ill. If untreated there is a high chance of deterioration and eventually death. The critical that has been undertaken is medically complex. There has been no overlap in critical care time. Critical Care Time not including procedures: 20 minutes

## 2018-10-08 NOTE — CARE PLAN
Problem: Safety  Goal: Will remain free from falls  Outcome: PROGRESSING AS EXPECTED  Bed alarm set, hourly room checks    Problem: Skin Integrity  Goal: Risk for impaired skin integrity will decrease  Outcome: PROGRESSING AS EXPECTED  Turn and reposition q2, waffle mattress inflated.

## 2018-10-08 NOTE — PROGRESS NOTES
Pt very stridorous  2nd dose of racepinephrine given around 2015. Dr. Perez at bedside to evaluate patient. x1dose of solumedrol 40mg ordered and administered. Dr. Arnold came by to check on patient and updates given. He ordered to give scheduled Decadron 4mg q6 including now. Pt is on continuous cool aerosol mask right now sating 94%. Family/ called for updates so Isacc is aware that pt could potentially be reintubated tonight and RN will call him if it needs to happen.   @6744 3rd dose of racepinephrine given.

## 2018-10-08 NOTE — THERAPY
"Speech Language Therapy FEES completed.  Functional Status: The patient was seen for FEES evaluation this date. FEES procedure completed. Upon insertion of scope, patient was noted to have erythema throughout pharynx including the post cricoid space, arytenoids and vocal folds bilaterally with right having moderate sized space occupying lesion on posterior aspect. Vocal folds appeared to completely adduct however, limited visualization of vocal fold movement on right during phonation. Presentation of PO included ice chips, nectars, and purees. The patient presented with oropharyngeal dysphagia as evidenced by penetration before, suspected during and after the swallow on ice chip, nectars and purees. The patient exhibited delayed cough response in 25% of penetration episodes noted this session. At this time, patient remains high risk for aspiration with PO alimentation and given current status of airway, would strongly recommend patient remain NPO/TF. Discussed results and recommendations with family and MD. At this time, recommend patient remain NPO with NG replaced for nutrition. Patient educated on vocal rest and vocal hygiene. SLP following.      Recommendations - Diet:  NPO, Pre-Feeding Trials with SLP Only                          Strategies: Head of Bed at 90 Degrees                          Medication Administration: Via gastric tube.     Plan of Care: Will benefit from Speech Therapy 3 times per week  Post-Acute Therapy: Discharge to a transitional care facility for continued skilled therapy services.    See \"Rehab Therapy-Acute\" Patient Summary Report for complete documentation.   "

## 2018-10-08 NOTE — PROGRESS NOTES
Critical Care Progress Note    Date of admission  9/28/2018    Chief Complaint  78 y.o. female admitted 9/28/2018 with shortness of breath and edema.    Hospital Course     This lady was admitted with shortness of breath and peripheral edema.  She was diuresed.  She was found to have a large pericardial effusion and a right pleural effusion.    Interval Problem Update  Reviewed last 24 hour events:    - pt pulled on NG last evening- confused  - extubated yesterday with post-extubation wheezing, significant hoarseness  - echo today by cardiology  - consider pericardial biopsy  - weak cough  - poor phonation  - feels fatigued, congested  - no SO, no abdominal pain  - central line in since admission (limited PIV access)  - afebrile  - etiology of pericardial effusion unclear      Yesterday   -SR   -prop 30   -afebrile   -TF 60 (goal)   -replete K and Mg and PO4   -CXR with improved edema   -NIF -21, FVC 0.6, RSBI 73   -Lasix 40 once   -steroids in half      Review of Systems  Review of Systems   Unable to perform ROS: Acuity of condition   Constitutional: Positive for fatigue. Negative for activity change, chills, diaphoresis and fever.   HENT: Positive for congestion and trouble swallowing.    Eyes: Negative for photophobia, pain, discharge, redness and itching.   Respiratory: Positive for stridor.    Cardiovascular: Negative for chest pain, palpitations and leg swelling.   Gastrointestinal: Negative.  Negative for abdominal distention.   Endocrine: Negative.    Genitourinary: Negative.    Musculoskeletal: Negative.    Skin: Negative.    Allergic/Immunologic: Negative.    Neurological: Negative.    Hematological: Negative.    Psychiatric/Behavioral: Negative.         Vital Signs for last 24 hours   Temp:  [36.6 °C (97.9 °F)-36.9 °C (98.4 °F)] 36.9 °C (98.4 °F)  Pulse:  [] 111  Resp:  [5-30] 25    Hemodynamic parameters for last 24 hours       Vent; off vent    Physical Exam   Physical Exam   Constitutional:    Sedated on ventilator   HENT:   Head: Normocephalic and atraumatic.   Right Ear: External ear normal.   Left Ear: External ear normal.   Nose: Nose normal.   Mouth/Throat: Oropharynx is clear and moist.   Eyes: Pupils are equal, round, and reactive to light. Conjunctivae and EOM are normal. Right eye exhibits no discharge.   Neck: Normal range of motion. Neck supple. No thyromegaly present.   Cardiovascular: Normal rate, regular rhythm and intact distal pulses.    Sinus rhythm, distant heart sounds   Pulmonary/Chest: Effort normal. No stridor. She has no wheezes. Rales: Few crackles at the bases.   Abdominal: Soft. Bowel sounds are normal. She exhibits no distension and no mass. There is no tenderness. There is no rebound and no guarding.   Tolerating enteral tube feedings   Musculoskeletal: Normal range of motion.   No clubbing or cyanosis, 4 of 5 strength globally   Lymphadenopathy:     She has no cervical adenopathy.   Neurological: She is alert.   Hoarse, minimal ability to phonate   Skin: Skin is warm and dry. No abrasion and no rash noted. No erythema.   Psychiatric: She has a normal mood and affect. Her behavior is normal. Judgment normal. Cognition and memory are normal.       Medications  Current Facility-Administered Medications   Medication Dose Route Frequency Provider Last Rate Last Dose   • insulin regular (HUMULIN R) injection 2-9 Units  2-9 Units Subcutaneous Q6HRS Shekhar Gonzalez M.D.        And   • glucose 4 g chewable tablet 16 g  16 g Oral Q15 MIN PRN Shekhar Gonzalez M.D.        And   • dextrose 50% (D50W) injection 25 mL  25 mL Intravenous Q15 MIN PRN Shekhar Gonzalez M.D.       • oxyCODONE immediate-release (ROXICODONE) tablet 2.5 mg  2.5 mg Oral Q4HRS PRN Jarvis Arnold M.D.        Or   • oxyCODONE immediate-release (ROXICODONE) tablet 5 mg  5 mg Oral Q4HRS PRN Jarvis Arnold M.D.       • fentaNYL (SUBLIMAZE) injection  mcg   mcg Intravenous Q HOUR PRN Jarvis  SELENA Arnold M.D.   50 mcg at 10/07/18 1650   • racepinephrine (MICRONEFRIN) 2.25 % nebulizer solution 0.5 mL  0.5 mL Nebulization Q4H PRN (RT) Jarvis Arnold M.D.   0.5 mL at 10/08/18 0737   • dexamethasone (DECADRON) injection 4 mg  4 mg Intravenous Q6HRS Jarvis Arnold M.D.   4 mg at 10/08/18 0510   • artificial tears 1.4 % ophthalmic solution 1 Drop  1 Drop Both Eyes Q2HRS PRN Jarvis Arnold M.D.       • midodrine (PROAMATINE) tablet 5 mg  5 mg Feeding Tube Q8HRS Jarvis Arnold M.D.   Stopped at 10/07/18 2200   • heparin injection 5,000 Units  5,000 Units Subcutaneous Q8HRS Néstor Guillen M.D.   5,000 Units at 10/08/18 0510   • acetaminophen (TYLENOL) tablet 650 mg  650 mg Feeding Tube Q4HRS PRN Jarvis Arnold M.D.   650 mg at 10/07/18 2053    Or   • acetaminophen (TYLENOL) suppository 650 mg  650 mg Rectal Q4HRS PRN Jarvis Arnold M.D.       • Respiratory Care per Protocol   Nebulization Continuous RT Alvaro Perez M.D.       • MD Alert...ICU Electrolyte Replacement per Pharmacy   Other pharmacy to dose Alvaro Perez M.D.       • Pharmacy Consult: Enteral tube feeding - review meds/change route/product selection   Other PRN Néstor Guillen M.D.       • gabapentin (NEURONTIN) capsule 300 mg  300 mg Per NG Tube BID Néstor Guillen M.D.   Stopped at 10/08/18 0600   • levothyroxine (SYNTHROID) tablet 88 mcg  88 mcg Per NG Tube AM ES Néstor Guillen M.D.   Stopped at 10/08/18 0600   • senna-docusate (PERICOLACE or SENOKOT S) 8.6-50 MG per tablet 2 Tab  2 Tab Per NG Tube BID Néstor Guillen M.D.   Stopped at 10/08/18 0600    And   • polyethylene glycol/lytes (MIRALAX) PACKET 1 Packet  1 Packet Per NG Tube QDAY PRN Néstor Guillen M.D.   1 Packet at 10/07/18 0852    And   • magnesium hydroxide (MILK OF MAGNESIA) suspension 30 mL  30 mL Per NG Tube QDAY PRN Néstor Guillen M.D.        And   • bisacodyl (DULCOLAX) suppository 10 mg   10 mg Rectal QDAY PRN Néstor Guillen M.D.       • simvastatin (ZOCOR) tablet 20 mg  20 mg Per NG Tube Q EVENING Néstor Guillen M.D.   20 mg at 10/07/18 1707   • ipratropium-albuterol (DUONEB) nebulizer solution  3 mL Nebulization Q2HRS PRN (RT) Jarvis Arnold M.D.       • labetalol (NORMODYNE,TRANDATE) injection 10 mg  10 mg Intravenous Q30 MIN PRN Constantin Self M.D.       • Influenza Vaccine High-Dose pf injection 0.5 mL  0.5 mL Intramuscular Once PRN Rula Teresa M.D.       • nicotine (NICODERM) 14 MG/24HR 14 mg  14 mg Transdermal Daily-0600 Hazel Cruz M.D.   14 mg at 10/08/18 0511    And   • nicotine polacrilex (NICORETTE) 2 MG piece 2 mg  2 mg Oral Q HOUR PRN Hazel Cruz M.D.           Fluids    Intake/Output Summary (Last 24 hours) at 10/08/18 1143  Last data filed at 10/08/18 1000   Gross per 24 hour   Intake          1660.23 ml   Output             2700 ml   Net         -1039.77 ml       Laboratory  Recent Results (from the past 48 hour(s))   ISTAT ARTERIAL BLOOD GAS    Collection Time: 10/07/18  5:19 AM   Result Value Ref Range    Ph 7.523 (H) 7.400 - 7.500    Pco2 39.9 (H) 26.0 - 37.0 mmHg    Po2 65 64 - 87 mmHg    Tco2 34 (H) 20 - 33 mmol/L    S02 94 93 - 99 %    Hco3 32.8 (H) 17.0 - 25.0 mmol/L    BE 9 (H) -4 - 3 mmol/L    Body Temp 36.7 C degrees    O2 Therapy 30 %    iPF Ratio 217     Ph Temp Janee 7.527 (H) 7.400 - 7.500    Pco2 Temp Co 39.4 (H) 26.0 - 37.0 mmHg    Po2 Temp Cor 63 (L) 64 - 87 mmHg    Specimen Arterial     Action Range Triggered NO     Inst. Qualified Patient YES    MAGNESIUM    Collection Time: 10/07/18  5:30 AM   Result Value Ref Range    Magnesium 1.8 1.5 - 2.5 mg/dL   CBC with Differential    Collection Time: 10/07/18  5:30 AM   Result Value Ref Range    WBC 13.9 (H) 4.8 - 10.8 K/uL    RBC 3.87 (L) 4.20 - 5.40 M/uL    Hemoglobin 10.9 (L) 12.0 - 16.0 g/dL    Hematocrit 34.8 (L) 37.0 - 47.0 %    MCV 89.9 81.4 - 97.8 fL    MCH 28.2 27.0 - 33.0 pg     MCHC 31.3 (L) 33.6 - 35.0 g/dL    RDW 67.9 (H) 35.9 - 50.0 fL    Platelet Count 243 164 - 446 K/uL    MPV 11.6 9.0 - 12.9 fL    Neutrophils-Polys 81.50 (H) 44.00 - 72.00 %    Lymphocytes 6.70 (L) 22.00 - 41.00 %    Monocytes 9.60 0.00 - 13.40 %    Eosinophils 0.10 0.00 - 6.90 %    Basophils 0.20 0.00 - 1.80 %    Immature Granulocytes 1.90 (H) 0.00 - 0.90 %    Nucleated RBC 0.00 /100 WBC    Neutrophils (Absolute) 11.28 (H) 2.00 - 7.15 K/uL    Lymphs (Absolute) 0.93 (L) 1.00 - 4.80 K/uL    Monos (Absolute) 1.33 (H) 0.00 - 0.85 K/uL    Eos (Absolute) 0.01 0.00 - 0.51 K/uL    Baso (Absolute) 0.03 0.00 - 0.12 K/uL    Immature Granulocytes (abs) 0.27 (H) 0.00 - 0.11 K/uL    NRBC (Absolute) 0.00 K/uL   Basic Metabolic Panel (BMP)    Collection Time: 10/07/18  5:30 AM   Result Value Ref Range    Sodium 144 135 - 145 mmol/L    Potassium 3.6 3.6 - 5.5 mmol/L    Chloride 107 96 - 112 mmol/L    Co2 31 20 - 33 mmol/L    Glucose 217 (H) 65 - 99 mg/dL    Bun 23 (H) 8 - 22 mg/dL    Creatinine 0.32 (L) 0.50 - 1.40 mg/dL    Calcium 8.1 (L) 8.5 - 10.5 mg/dL    Anion Gap 6.0 0.0 - 11.9   Phosphorus    Collection Time: 10/07/18  5:30 AM   Result Value Ref Range    Phosphorus 2.3 (L) 2.5 - 4.5 mg/dL   ESTIMATED GFR    Collection Time: 10/07/18  5:30 AM   Result Value Ref Range    GFR If African American >60 >60 mL/min/1.73 m 2    GFR If Non African American >60 >60 mL/min/1.73 m 2   MAGNESIUM    Collection Time: 10/08/18  4:40 AM   Result Value Ref Range    Magnesium 1.8 1.5 - 2.5 mg/dL   CBC with Differential    Collection Time: 10/08/18  4:40 AM   Result Value Ref Range    WBC 12.5 (H) 4.8 - 10.8 K/uL    RBC 4.19 (L) 4.20 - 5.40 M/uL    Hemoglobin 11.9 (L) 12.0 - 16.0 g/dL    Hematocrit 38.1 37.0 - 47.0 %    MCV 90.9 81.4 - 97.8 fL    MCH 28.4 27.0 - 33.0 pg    MCHC 31.2 (L) 33.6 - 35.0 g/dL    RDW 67.2 (H) 35.9 - 50.0 fL    Platelet Count 298 164 - 446 K/uL    MPV 11.5 9.0 - 12.9 fL    Neutrophils-Polys 80.60 (H) 44.00 - 72.00 %     Lymphocytes 5.30 (L) 22.00 - 41.00 %    Monocytes 8.60 0.00 - 13.40 %    Eosinophils 0.00 0.00 - 6.90 %    Basophils 0.60 0.00 - 1.80 %    Immature Granulocytes 4.90 (H) 0.00 - 0.90 %    Nucleated RBC 0.00 /100 WBC    Neutrophils (Absolute) 10.06 (H) 2.00 - 7.15 K/uL    Lymphs (Absolute) 0.66 (L) 1.00 - 4.80 K/uL    Monos (Absolute) 1.07 (H) 0.00 - 0.85 K/uL    Eos (Absolute) 0.00 0.00 - 0.51 K/uL    Baso (Absolute) 0.08 0.00 - 0.12 K/uL    Immature Granulocytes (abs) 0.61 (H) 0.00 - 0.11 K/uL    NRBC (Absolute) 0.00 K/uL   Phosphorus    Collection Time: 10/08/18  4:40 AM   Result Value Ref Range    Phosphorus 3.4 2.5 - 4.5 mg/dL   COMP METABOLIC PANEL    Collection Time: 10/08/18  4:40 AM   Result Value Ref Range    Sodium 147 (H) 135 - 145 mmol/L    Potassium 3.9 3.6 - 5.5 mmol/L    Chloride 104 96 - 112 mmol/L    Co2 38 (H) 20 - 33 mmol/L    Anion Gap 5.0 0.0 - 11.9    Glucose 177 (H) 65 - 99 mg/dL    Bun 28 (H) 8 - 22 mg/dL    Creatinine 0.40 (L) 0.50 - 1.40 mg/dL    Calcium 8.7 8.5 - 10.5 mg/dL    AST(SGOT) 57 (H) 12 - 45 U/L    ALT(SGPT) 59 (H) 2 - 50 U/L    Alkaline Phosphatase 71 30 - 99 U/L    Total Bilirubin 0.4 0.1 - 1.5 mg/dL    Albumin 2.8 (L) 3.2 - 4.9 g/dL    Total Protein 5.7 (L) 6.0 - 8.2 g/dL    Globulin 2.9 1.9 - 3.5 g/dL    A-G Ratio 1.0 g/dL   ESTIMATED GFR    Collection Time: 10/08/18  4:40 AM   Result Value Ref Range    GFR If African American >60 >60 mL/min/1.73 m 2    GFR If Non African American >60 >60 mL/min/1.73 m 2   EKG    Collection Time: 10/08/18 11:17 AM   Result Value Ref Range    Report       [facility]    Test Date:  2018-10-08  Pt Name:    AVEL COLLAZO            Department: 161  MRN:        [id number]                      Room:       [facility]  Gender:     Female                       Technician: KURTIS  :        [id number]                   Requested By:BRENDEN VALENZUELA  Order #:    [id number]                    Reading MD:    Measurements  Intervals                                 Axis  Rate:       108                          P:          46  ND:         168                          QRS:        160  QRSD:       86                           T:          0  QT:         332  QTc:        445    Interpretive Statements  SINUS TACHYCARDIA  LOW VOLTAGE THROUGHOUT  BORDERLINE T ABNORMALITIES, DIFFUSE LEADS  BASELINE WANDER IN LEAD(S) V3  Compared to ECG 10/02/2018 12:59:58  T-wave abnormality now present  Sinus rhythm no longer present  Myocardial infarct finding no longer present         Imaging  X-Ray:  I have personally reviewed the images and compared with prior images. and My impression is: Improved edema    Assessment/Plan  * Acute hypoxemic respiratory failure (HCC)- (present on admission)   Assessment & Plan    Vent management per critical care discussed with Dr. Arnold  Secondary to pleural effusions and pulmonary edema    Continue SBT as tolerated  IV Lasix        Pleural effusion   Assessment & Plan    Status post thoracentesis on October 1    Continue IV Lasix and monitor        Pericardial effusion   Assessment & Plan    Cardiology following   Status post pericardial drain placement  Drain removed on 10/3/2018  Cytology negative fluid cultures negative  GENTRY negative RA factor positive CCP negative    Repeat echocardiogram with small effusion that appears old per report    I discussed the case with  from rheumatology on 10/5/2018 he recommended checking for cryoglobulins SPEP  Her elevated RA factor is nonspecific he agrees with trial of steroids if no contraindications     We will start tapering steroids since she is off pressors  Etiology is unclear and patient may eventually need pericardial biopsy for definitive diagnosis discussed with Dr. Arnold and patient's  will defer this procedure until she is more clinically stable and extubated unless we have evidence of recurrence of hemodynamically significant effusion            Pulmonary edema- (present on  admission)   Assessment & Plan    Following pericardiocentesis  Echo with normal EF    Continue diuresis        Hypotension   Assessment & Plan    Relative adrenal insufficiency    Improved weaned off pressors  Start tapering hydrocortisone        History of non-Hodgkin's lymphoma   Assessment & Plan    Hx of         Dysphagia   Assessment & Plan    Tube feeding as tolerated        Hypernatremia   Assessment & Plan    Stable on water flushes continue to monitor          Hypophosphatemia   Assessment & Plan    Replete with K-Phos and monitor        Hypokalemia   Assessment & Plan    Replete and monitor        Tobacco abuse- (present on admission)   Assessment & Plan    Continue NicoDerm and  patient on cessation post extubation        Hypothyroidism- (present on admission)   Assessment & Plan    Continue levothyroxine             VTE:  Heparin  Ulcer: H2 Antagonist  Lines: Central Line  Ongoing indication addressed    I have performed a physical exam and reviewed and updated ROS and Plan today (10/8/2018). In review of yesterday's note (10/7/2018), there are no changes except as documented above.     I have assessed and reassessed her respiratory status with ventilator adjustments and spontaneous breathing trials, ventilator waveforms, airway mechanics, but pressure, hemodynamics, cardiovascular status and neurologic status with titration of propofol.  She is at increased risk for worsening respiratory and cardiovascular system dysfunction.    Discussed patient condition and risk of morbidity and/or mortality with Hospitalist, Family, RN, RT, Pharmacy, Charge nurse / hot rounds and QA team  The patient remains critically ill.  Critical care time = 88 minutes in directly providing and coordinating critical care and extensive data review.  No time overlap and excludes procedures.    High risk of deterioration and worsening vital organ dysfunction and death without the above critical care  interventions.    Constantin Banegas MD  Pulmonary and Critical Care Medicine

## 2018-10-08 NOTE — PROGRESS NOTES
Critical Care Progress Note    Date of admission  9/28/2018    Chief Complaint  78 y.o. female admitted 9/28/2018 with shortness of breath and edema.    Hospital Course     This lady was admitted with shortness of breath and peripheral edema.  She was diuresed.  She was found to have a large pericardial effusion and a right pleural effusion.    Interval Problem Update  Reviewed last 24 hour events:                  YESTERDAY   -SR   -prop 30   -afebrile   -TF 60 (goal)   -replete K and Mg and PO4   -CXR with improved edema   -NIF -21, FVC 0.6, RSBI 73   -Lasix 40 once   -steroids in half      Review of Systems  Review of Systems   Unable to perform ROS: Acuity of condition   Neurological: Positive for speech difficulty.        Vital Signs for last 24 hours   Temp:  [36.6 °C (97.9 °F)-36.7 °C (98.1 °F)] 36.7 °C (98.1 °F)  Pulse:  [107-132] 129  Resp:  [16-30] 30    Hemodynamic parameters for last 24 hours       Vent Settings for last 24 hours  [place] Vent Mode: Spont  Rate (breaths/min):  [14] 14  PEEP/CPAP:  [8] 8  FiO2:  [30] 30  P Peak (PIP):  [14-20] 14  P MEAN:  [9.3-10] 9.7    Physical Exam   Physical Exam   Constitutional:   Sedated on ventilator   HENT:   Head: Normocephalic and atraumatic.   Right Ear: External ear normal.   Left Ear: External ear normal.   Mouth/Throat: Oropharynx is clear and moist.   Eyes: Pupils are equal, round, and reactive to light. EOM are normal. Right eye exhibits no discharge. Left eye exhibits no discharge. No scleral icterus.   Neck: Neck supple. No JVD present. No tracheal deviation present.   Cardiovascular: Intact distal pulses.  Exam reveals no gallop and no friction rub.    Sinus rhythm   Pulmonary/Chest: She has no wheezes. She has rales (Few crackles at the bases).   Abdominal: Soft. Bowel sounds are normal. She exhibits no distension. There is no tenderness. There is no guarding.   Tolerating enteral tube feedings   Musculoskeletal: She exhibits no tenderness or  deformity.   No clubbing or cyanosis   Neurological:   Sedated.  Will arouse and follow.  Moves all 4 extremities.   Skin: Skin is warm and dry. No rash noted. She is not diaphoretic.       Medications  Current Facility-Administered Medications   Medication Dose Route Frequency Provider Last Rate Last Dose   • oxyCODONE immediate-release (ROXICODONE) tablet 2.5 mg  2.5 mg Oral Q4HRS PRN Jarvis Arnold M.D.        Or   • oxyCODONE immediate-release (ROXICODONE) tablet 5 mg  5 mg Oral Q4HRS PRN Jarvis Arnold M.D.       • fentaNYL (SUBLIMAZE) injection  mcg   mcg Intravenous Q HOUR PRN Jarvis Arnold M.D.   50 mcg at 10/07/18 1650   • racepinephrine (MICRONEFRIN) 2.25 % nebulizer solution 0.5 mL  0.5 mL Nebulization Q4H PRN (RT) Jarvis Arnold M.D.   0.5 mL at 10/08/18 0250   • dexamethasone (DECADRON) injection 4 mg  4 mg Intravenous Q6HRS Jarvis Arnold M.D.   4 mg at 10/08/18 0510   • artificial tears 1.4 % ophthalmic solution 1 Drop  1 Drop Both Eyes Q2HRS PRN Jarvis Arnold M.D.       • midodrine (PROAMATINE) tablet 5 mg  5 mg Feeding Tube Q8HRS Jarvis Arnold M.D.   Stopped at 10/07/18 2200   • heparin injection 5,000 Units  5,000 Units Subcutaneous Q8HRS Néstor Guillen M.D.   5,000 Units at 10/08/18 0510   • acetaminophen (TYLENOL) tablet 650 mg  650 mg Feeding Tube Q4HRS PRN Jarvis Arnold M.D.   650 mg at 10/07/18 2053    Or   • acetaminophen (TYLENOL) suppository 650 mg  650 mg Rectal Q4HRS PRN Jarvis Arnold M.D.       • Respiratory Care per Protocol   Nebulization Continuous RT Alvaro Perez M.D.       • MD Alert...ICU Electrolyte Replacement per Pharmacy   Other pharmacy to dose Alvaro Perez M.D.       • Pharmacy Consult: Enteral tube feeding - review meds/change route/product selection   Other PRN Néstor Guillen M.D.       • famotidine (PEPCID) tablet 20 mg  20 mg Per NG Tube Q12HRS Néstor Guillen,  M.D.   20 mg at 10/07/18 1707   • gabapentin (NEURONTIN) capsule 300 mg  300 mg Per NG Tube BID Néstor Guillen M.D.   300 mg at 10/07/18 1711   • levothyroxine (SYNTHROID) tablet 88 mcg  88 mcg Per NG Tube AM ES Néstor Guillen M.D.   88 mcg at 10/07/18 0536   • senna-docusate (PERICOLACE or SENOKOT S) 8.6-50 MG per tablet 2 Tab  2 Tab Per NG Tube BID Néstor Guillen M.D.   2 Tab at 10/07/18 1707    And   • polyethylene glycol/lytes (MIRALAX) PACKET 1 Packet  1 Packet Per NG Tube QDAY PRN Néstor Guillen M.D.   1 Packet at 10/07/18 0852    And   • magnesium hydroxide (MILK OF MAGNESIA) suspension 30 mL  30 mL Per NG Tube QDAY PRN Néstor Guillen M.D.        And   • bisacodyl (DULCOLAX) suppository 10 mg  10 mg Rectal QDAY PRN Néstor Guillen M.D.       • simvastatin (ZOCOR) tablet 20 mg  20 mg Per NG Tube Q EVENING Néstor Guillen M.D.   20 mg at 10/07/18 1707   • ipratropium-albuterol (DUONEB) nebulizer solution  3 mL Nebulization Q2HRS PRN (RT) Jarvis Arnold M.D.       • labetalol (NORMODYNE,TRANDATE) injection 10 mg  10 mg Intravenous Q30 MIN PRN Constantin Self M.D.       • Influenza Vaccine High-Dose pf injection 0.5 mL  0.5 mL Intramuscular Once PRN Rula Teresa M.D.       • nicotine (NICODERM) 14 MG/24HR 14 mg  14 mg Transdermal Daily-0600 Hazel Cruz M.D.   14 mg at 10/08/18 0511    And   • nicotine polacrilex (NICORETTE) 2 MG piece 2 mg  2 mg Oral Q HOUR PRN Hazel Cruz M.D.           Fluids    Intake/Output Summary (Last 24 hours) at 10/08/18 0659  Last data filed at 10/08/18 0600   Gross per 24 hour   Intake          2343.03 ml   Output             2725 ml   Net          -381.97 ml       Laboratory  Recent Results (from the past 48 hour(s))   EC-ECHOCARDIOGRAM LTD W/O CONT    Collection Time: 10/06/18  9:09 AM   Result Value Ref Range    Left Ventrical Ejection Fraction 55    ISTAT ARTERIAL BLOOD GAS    Collection Time: 10/07/18  5:19 AM    Result Value Ref Range    Ph 7.523 (H) 7.400 - 7.500    Pco2 39.9 (H) 26.0 - 37.0 mmHg    Po2 65 64 - 87 mmHg    Tco2 34 (H) 20 - 33 mmol/L    S02 94 93 - 99 %    Hco3 32.8 (H) 17.0 - 25.0 mmol/L    BE 9 (H) -4 - 3 mmol/L    Body Temp 36.7 C degrees    O2 Therapy 30 %    iPF Ratio 217     Ph Temp Janee 7.527 (H) 7.400 - 7.500    Pco2 Temp Co 39.4 (H) 26.0 - 37.0 mmHg    Po2 Temp Cor 63 (L) 64 - 87 mmHg    Specimen Arterial     Action Range Triggered NO     Inst. Qualified Patient YES    MAGNESIUM    Collection Time: 10/07/18  5:30 AM   Result Value Ref Range    Magnesium 1.8 1.5 - 2.5 mg/dL   CBC with Differential    Collection Time: 10/07/18  5:30 AM   Result Value Ref Range    WBC 13.9 (H) 4.8 - 10.8 K/uL    RBC 3.87 (L) 4.20 - 5.40 M/uL    Hemoglobin 10.9 (L) 12.0 - 16.0 g/dL    Hematocrit 34.8 (L) 37.0 - 47.0 %    MCV 89.9 81.4 - 97.8 fL    MCH 28.2 27.0 - 33.0 pg    MCHC 31.3 (L) 33.6 - 35.0 g/dL    RDW 67.9 (H) 35.9 - 50.0 fL    Platelet Count 243 164 - 446 K/uL    MPV 11.6 9.0 - 12.9 fL    Neutrophils-Polys 81.50 (H) 44.00 - 72.00 %    Lymphocytes 6.70 (L) 22.00 - 41.00 %    Monocytes 9.60 0.00 - 13.40 %    Eosinophils 0.10 0.00 - 6.90 %    Basophils 0.20 0.00 - 1.80 %    Immature Granulocytes 1.90 (H) 0.00 - 0.90 %    Nucleated RBC 0.00 /100 WBC    Neutrophils (Absolute) 11.28 (H) 2.00 - 7.15 K/uL    Lymphs (Absolute) 0.93 (L) 1.00 - 4.80 K/uL    Monos (Absolute) 1.33 (H) 0.00 - 0.85 K/uL    Eos (Absolute) 0.01 0.00 - 0.51 K/uL    Baso (Absolute) 0.03 0.00 - 0.12 K/uL    Immature Granulocytes (abs) 0.27 (H) 0.00 - 0.11 K/uL    NRBC (Absolute) 0.00 K/uL   Basic Metabolic Panel (BMP)    Collection Time: 10/07/18  5:30 AM   Result Value Ref Range    Sodium 144 135 - 145 mmol/L    Potassium 3.6 3.6 - 5.5 mmol/L    Chloride 107 96 - 112 mmol/L    Co2 31 20 - 33 mmol/L    Glucose 217 (H) 65 - 99 mg/dL    Bun 23 (H) 8 - 22 mg/dL    Creatinine 0.32 (L) 0.50 - 1.40 mg/dL    Calcium 8.1 (L) 8.5 - 10.5 mg/dL    Anion  Gap 6.0 0.0 - 11.9   Phosphorus    Collection Time: 10/07/18  5:30 AM   Result Value Ref Range    Phosphorus 2.3 (L) 2.5 - 4.5 mg/dL   ESTIMATED GFR    Collection Time: 10/07/18  5:30 AM   Result Value Ref Range    GFR If African American >60 >60 mL/min/1.73 m 2    GFR If Non African American >60 >60 mL/min/1.73 m 2   MAGNESIUM    Collection Time: 10/08/18  4:40 AM   Result Value Ref Range    Magnesium 1.8 1.5 - 2.5 mg/dL   CBC with Differential    Collection Time: 10/08/18  4:40 AM   Result Value Ref Range    WBC 12.5 (H) 4.8 - 10.8 K/uL    RBC 4.19 (L) 4.20 - 5.40 M/uL    Hemoglobin 11.9 (L) 12.0 - 16.0 g/dL    Hematocrit 38.1 37.0 - 47.0 %    MCV 90.9 81.4 - 97.8 fL    MCH 28.4 27.0 - 33.0 pg    MCHC 31.2 (L) 33.6 - 35.0 g/dL    RDW 67.2 (H) 35.9 - 50.0 fL    Platelet Count 298 164 - 446 K/uL    MPV 11.5 9.0 - 12.9 fL    Neutrophils-Polys 80.60 (H) 44.00 - 72.00 %    Lymphocytes 5.30 (L) 22.00 - 41.00 %    Monocytes 8.60 0.00 - 13.40 %    Eosinophils 0.00 0.00 - 6.90 %    Basophils 0.60 0.00 - 1.80 %    Immature Granulocytes 4.90 (H) 0.00 - 0.90 %    Nucleated RBC 0.00 /100 WBC    Neutrophils (Absolute) 10.06 (H) 2.00 - 7.15 K/uL    Lymphs (Absolute) 0.66 (L) 1.00 - 4.80 K/uL    Monos (Absolute) 1.07 (H) 0.00 - 0.85 K/uL    Eos (Absolute) 0.00 0.00 - 0.51 K/uL    Baso (Absolute) 0.08 0.00 - 0.12 K/uL    Immature Granulocytes (abs) 0.61 (H) 0.00 - 0.11 K/uL    NRBC (Absolute) 0.00 K/uL   Phosphorus    Collection Time: 10/08/18  4:40 AM   Result Value Ref Range    Phosphorus 3.4 2.5 - 4.5 mg/dL   COMP METABOLIC PANEL    Collection Time: 10/08/18  4:40 AM   Result Value Ref Range    Sodium 147 (H) 135 - 145 mmol/L    Potassium 3.9 3.6 - 5.5 mmol/L    Chloride 104 96 - 112 mmol/L    Co2 38 (H) 20 - 33 mmol/L    Anion Gap 5.0 0.0 - 11.9    Glucose 177 (H) 65 - 99 mg/dL    Bun 28 (H) 8 - 22 mg/dL    Creatinine 0.40 (L) 0.50 - 1.40 mg/dL    Calcium 8.7 8.5 - 10.5 mg/dL    AST(SGOT) 57 (H) 12 - 45 U/L    ALT(SGPT) 59 (H)  2 - 50 U/L    Alkaline Phosphatase 71 30 - 99 U/L    Total Bilirubin 0.4 0.1 - 1.5 mg/dL    Albumin 2.8 (L) 3.2 - 4.9 g/dL    Total Protein 5.7 (L) 6.0 - 8.2 g/dL    Globulin 2.9 1.9 - 3.5 g/dL    A-G Ratio 1.0 g/dL   ESTIMATED GFR    Collection Time: 10/08/18  4:40 AM   Result Value Ref Range    GFR If African American >60 >60 mL/min/1.73 m 2    GFR If Non African American >60 >60 mL/min/1.73 m 2       Imaging  X-Ray:  I have personally reviewed the images and compared with prior images. and My impression is: Improved edema    Assessment/Plan  * Acute hypoxemic respiratory failure (HCC)- (present on admission)   Assessment & Plan    Vent management per critical care discussed with Dr. Arnold  Secondary to pleural effusions and pulmonary edema    Continue SBT as tolerated  IV Lasix        Pleural effusion   Assessment & Plan    Status post thoracentesis on October 1    Continue IV Lasix and monitor        Pericardial effusion   Assessment & Plan    Cardiology following   Status post pericardial drain placement  Drain removed on 10/3/2018  Cytology negative fluid cultures negative  GENTRY negative RA factor positive CCP negative    Repeat echocardiogram with small effusion that appears old per report    I discussed the case with  from rheumatology on 10/5/2018 he recommended checking for cryoglobulins SPEP  Her elevated RA factor is nonspecific he agrees with trial of steroids if no contraindications     We will start tapering steroids since she is off pressors  Etiology is unclear and patient may eventually need pericardial biopsy for definitive diagnosis discussed with Dr. Arnold and patient's  will defer this procedure until she is more clinically stable and extubated unless we have evidence of recurrence of hemodynamically significant effusion            Pulmonary edema- (present on admission)   Assessment & Plan    Following pericardiocentesis  Echo with normal EF    Continue  diuresis        Hypotension   Assessment & Plan    Relative adrenal insufficiency    Improved weaned off pressors  Start tapering hydrocortisone        History of non-Hodgkin's lymphoma   Assessment & Plan    Hx of         Dysphagia   Assessment & Plan    Tube feeding as tolerated        Hypernatremia   Assessment & Plan    Stable on water flushes continue to monitor          Hypophosphatemia   Assessment & Plan    Replete with K-Phos and monitor        Hypokalemia   Assessment & Plan    Replete and monitor        Tobacco abuse- (present on admission)   Assessment & Plan    Continue NicoDerm and  patient on cessation post extubation        Hypothyroidism- (present on admission)   Assessment & Plan    Continue levothyroxine             VTE:  Heparin  Ulcer: H2 Antagonist  Lines: Central Line  Ongoing indication addressed    I have performed a physical exam and reviewed and updated ROS and Plan today (10/8/2018). In review of yesterday's note (10/7/2018), there are no changes except as documented above.     I have assessed and reassessed her respiratory status with ventilator adjustments and spontaneous breathing trials, ventilator waveforms, airway mechanics, but pressure, hemodynamics, cardiovascular status and neurologic status with titration of propofol.  She is at increased risk for worsening respiratory and cardiovascular system dysfunction.    Discussed patient condition and risk of morbidity and/or mortality with Hospitalist, Family, RN, RT, Pharmacy, Charge nurse / hot rounds and QA team  The patient remains critically ill.  Critical care time = 88 minutes in directly providing and coordinating critical care and extensive data review.  No time overlap and excludes procedures.    High risk of deterioration and worsening vital organ dysfunction and death without the above critical care interventions.    Constantin Banegas MD  Pulmonary and Critical Care Medicine

## 2018-10-08 NOTE — THERAPY
"Speech Language Therapy Clinical Swallow Evaluation completed.  Functional Status: The patient was seen for clinical swallow evaluation this date after requiring intubation. The patient was awake, alert and noted to be aphonic with minimal vocalization achieved with max cues and laryngeal relaxation exercises. The patient was given PO trials of ice chips, and NTL via 1/4tsp. The patient presented with s/s highly concerning for penetration/aspiration as evidenced by delayed (weak) cough response following 25% of ice chips trials and 40% of NTL trials. The patient was also noted to have increased oral holding as PO trials progressed which can be concerning for oropharyngeal dysphagia. At this time, recommend patient remain NPO pending FEES evaluation to r/o aspiration and further assess oropharyngeal swallow function for safe PO alimentation.     Recommendations - Diet:  NPO, Pre-Feeding Trials with SLP Only                          Strategies: Head of Bed at 90 Degrees                          Medication Administration: Non-oral pending FEES    Plan of Care: Will benefit from Speech Therapy 3 times per week  Post-Acute Therapy: Discharge to a transitional care facility for continued skilled therapy services.    See \"Rehab Therapy-Acute\" Patient Summary Report for complete documentation.   "

## 2018-10-08 NOTE — CARE PLAN
Problem: Oxygenation:  Goal: Maintain adequate oxygenation dependent on patient condition    Intervention: Manage oxygen therapy by monitoring pulse oximetry and/or ABG values  Cool Aerosol 50%, 5L      Problem: Bronchoconstriction:  Goal: Improve in air movement and diminished wheezing    Intervention: Implement inhaled treatments  Racemic treatments PRN

## 2018-10-09 NOTE — THERAPY
"Physical Therapy Evaluation completed.   Bed Mobility:  Supine to Sit: Minimal Assist (HOB raised )  Transfers: Sit to Stand: Contact Guard Assist  Gait: Level Of Assist: Contact Guard Assist with BUE support given by therapists    Plan of Care: Will benefit from Physical Therapy 3 times per week  Discharge Recommendations: Equipment: Will Continue to Assess for Equipment Needs.     Pt is 78 yoF who presents with hypoxia and sob. She lives with  who, as reported by family, is available to help as needed. She presents w/ CHF and non-hodgkins lymphoma. She exhibits decreased activity tolerance with limited functional mobility, impaired strength and endurance. Pt would benefit from skilled therapeutic interventions while in house to address the impairments. She requires Min A for all functional transfers. Post activity, pt's BP was 130/90 with 111 bpm. At time of initial evaluation, recommend d/c to skilled nursing facility to improve current functional limitations as pt is unable to function independently at this time.     See \"Rehab Therapy-Acute\" Patient Summary Report for complete documentation.     "

## 2018-10-09 NOTE — THERAPY
"Occupational Therapy Evaluation completed.   Functional Status:  CGA seated grooming, CGA UB dressing, CGA STS, Min A functional txf.  Plan of Care: Will benefit from Occupational Therapy 3 times per week  Discharge Recommendations:  Equipment: Will Continue to Assess for Equipment Needs. Post-acute therapy: As of today, recommend DC to subacute placement, however, pending length of acute stay pt shows good potential to DC home w/ home health    See \"Rehab Therapy-Acute\" Patient Summary Report for complete documentation.    Pt is a 77 y/o female who presents to acute w/ hypoxia and SOB. PMH includes arthritis, non hodgkins lymphoma, HTN.  Pt lives in single story home w/ her spouse. Son at bedside and appears supportive. PLOF is independent w/ BADLs and IADLs.  Pt presents w/ decreased activity tolerance, functional mobility and balance impacting independence and safety w/ BADLs. Will follow for Acute OT services.     "

## 2018-10-09 NOTE — PROGRESS NOTES
2 RN skin check completed with RASTA [place]    Bilateral ears red, blanching.   Bottom red, blanching. Waffle mattress in place. Meplex in place. Q2hr turns initiated.     Otherwise skin intact.

## 2018-10-09 NOTE — PROGRESS NOTES
[facility] Hospitalist Progress Note    Date of Service: 10/9/2018    Chief Complaint    78 y.o. Female from [facility] admitted 2018 with shortness of breath and peripheral edema found to have significant pericardial effusion.    Pericardial effusion tapped 10/1  Intubated 10/2  Extubated 10/7      Interval Problem Update  ROS limited by some confusion.  Pt c/o some sore throat and ear pain, no other complaints  Sinus/sinus tach  AFebrile  TFs goal  UOP 830ml/24hrs    Consultants/Specialty  Cardiology  pulmonology    35mins with pt, , son and daughter in law reviewing all relevant issues.  All questions answered    Disposition  OK to Tele        Review of Systems   Unable to perform ROS: Other   HENT: Positive for ear pain and sore throat.       Physical Exam  Laboratory/Imaging   Hemodynamics  Temp (24hrs), Av.9 °C (98.5 °F), Min:36.8 °C (98.2 °F), Max:37.2 °C (99 °F)   Temperature: 36.8 °C (98.2 °F), Monitored Temp: 36.8 °C (98.2 °F)  Pulse  Av.7  Min: 60  Max: 132 Heart Rate (Monitored): (!) 102  NIBP: 120/78      Respiratory    #Aerosol Therapy / Airway Management:  (on 4L/min NC) Respiration: 14, Pulse Oximetry: 95 %, O2 Daily Delivery Respiratory : Silicone Nasal Cannula     Given By:: Mask, Work Of Breathing / Effort: Mild  RUL Breath Sounds: Clear;Coarse Crackles;Stridor, RML Breath Sounds: Coarse Crackles;Stridor, RLL Breath Sounds: Diminished, LORI Breath Sounds: Clear;Coarse Crackles;Stridor, LLL Breath Sounds: Diminished    Fluids    Intake/Output Summary (Last 24 hours) at 10/09/18 0450  Last data filed at 10/09/18 0400   Gross per 24 hour   Intake           547.08 ml   Output             1275 ml   Net          -727.92 ml       Nutrition  Orders Placed This Encounter   Procedures   • Diet NPO     Standing Status:   Standing     Number of Occurrences:   1     Order Specific Question:   Restrict to:     Answer:   Strict [1]     Physical Exam   Constitutional: She appears well-developed  and well-nourished. No distress.   HENT:   Head: Normocephalic and atraumatic.   Neck: No JVD present.   Cardiovascular: Normal rate and regular rhythm.    Pulmonary/Chest: Effort normal. No stridor. No respiratory distress. She has no wheezes. She has rales.   Abdominal: Soft. There is no tenderness. There is no rebound and no guarding.   Musculoskeletal: She exhibits edema.   Neurological: She is alert.   O x 2   Skin: Skin is warm and dry. No rash noted. She is not diaphoretic.   Psychiatric: She has a normal mood and affect. Thought content normal.   Nursing note and vitals reviewed.      Recent Labs      10/07/18   0530  10/08/18   0440   WBC  13.9*  12.5*   RBC  3.87*  4.19*   HEMOGLOBIN  10.9*  11.9*   HEMATOCRIT  34.8*  38.1   MCV  89.9  90.9   MCH  28.2  28.4   MCHC  31.3*  31.2*   RDW  67.9*  67.2*   PLATELETCT  243  298   MPV  11.6  11.5     Recent Labs      10/07/18   0530  10/08/18   0440   SODIUM  144  147*   POTASSIUM  3.6  3.9   CHLORIDE  107  104   CO2  31  38*   GLUCOSE  217*  177*   BUN  23*  28*   CREATININE  0.32*  0.40*   CALCIUM  8.1*  8.7                      Assessment/Plan     * Acute hypoxemic respiratory failure (HCC)- (present on admission)   Assessment & Plan    Extubated on 10/7/2018  Started on Decadron for stridor  Continue close clinical monitoring in the ICU  Continue oxygen and RT protocol        Pleural effusion   Assessment & Plan    Status post thoracentesis on October 1    Monitoring diuresis needed        Pericardial effusion   Assessment & Plan    Cardiology following   Status post pericardial drain placement  Drain removed on 10/3/2018  Cytology negative fluid cultures negative  GENTRY negative RA factor positive CCP negative  Repeat Echo stable  Her elevated RA factor is nonspecific he agrees with trial of steroids if no contraindications       Etiology is unclear and patient may eventually need pericardial biopsy for definitive diagnosis             Pulmonary edema- (present  on admission)   Assessment & Plan    Monitor fluid status and follow-up on repeat echo  Diuresis as needed        Hypotension   Assessment & Plan    Relative adrenal insufficiency    Improved weaned off pressors  Continue Midodrin          History of non-Hodgkin's lymphoma   Assessment & Plan    Hx of         HTN (hypertension)- (present on admission)   Assessment & Plan    On amlodipine and HCZ as outpt  Cont to monitor and resume meds if needed        Dysphagia   Assessment & Plan    Evaluated by speech therapy with plan for FEES  Discussed with Dr. Banegas he is concerned about possible underlying neurologic disorder will consult neurology discussed with Dr. Leann Montoya        Hypernatremia   Assessment & Plan    Resume water flushes per NG if failed swallow eval          Hypomagnesemia   Assessment & Plan    Replete with mag sulfate        Hypokalemia   Assessment & Plan    Replete and monitor        Tobacco abuse- (present on admission)   Assessment & Plan    Continue NicoDerm   on cessation when mental status improved        Hypothyroidism- (present on admission)   Assessment & Plan    Continue levothyroxine          Quality-Core Measures   Reviewed items::  EKG reviewed, Labs reviewed, Medications reviewed and Radiology images reviewed  DVT prophylaxis pharmacological::  Heparin  DVT prophylaxis - mechanical:  SCDs  Ulcer Prophylaxis::  Not indicated

## 2018-10-09 NOTE — CARE PLAN
Problem: Nutritional:  Goal: Nutrition support tolerated and meeting greater than 85% of estimated needs  Outcome: MET Date Met: 10/09/18

## 2018-10-09 NOTE — CARE PLAN
Problem: Respiratory:  Goal: Respiratory status will improve    Intervention: Administer and titrate oxygen therapy  Pt on O2 NC. Titrating O2 as needed.      Problem: Mobility  Goal: Risk for activity intolerance will decrease    Intervention: Encourage patient to increase activity level in collaboration with Interdisciplinary Team  Pt ambulated to EOB. PT/OT assisted pt to chair. Pt sat in chair for 240 minutes; tolerated well.

## 2018-10-09 NOTE — PROGRESS NOTES
Bedside report received. Pt A&Ox4. No complaints of pain or SOB. 1L via NC. VSS. Placed on tele monitor, . POC discussed with pt. Pt verbalizes understanding. Call light and belongings within reach. Bed locked and in lowest position. Alarm and fall precautions in place.

## 2018-10-09 NOTE — PROGRESS NOTES
12 hour chart check complete.     2 RN skin check complete.   · Devices in place: mike catheter, SCDs, blood pressure cuff, pulse ox probe.   · Skin assessed under devices: yes.  · Confirmed healing skin tear found on right wrist.  · The following interventions in place: Q2 turns, barrier cream, barrier wipes, mepilex.

## 2018-10-09 NOTE — PROGRESS NOTES
Monitor Summary:     Rhythm: Sinus Tachycardia   Ectopy: n/a  Rate: 100-115  Measurements: 0.16 /0.06 /0.32

## 2018-10-09 NOTE — PROGRESS NOTES
Critical Care Progress Note    Date of admission  9/28/2018    Chief Complaint  78 y.o. female admitted 9/28/2018 with shortness of breath and edema.    Hospital Course     This lady was admitted with shortness of breath and peripheral edema.  She was diuresed.  She was found to have a large pericardial effusion and a right pleural effusion.    Interval Problem Update  Reviewed last 24 hour events:  - speech eval yesterday- difficulty swallowing made NOP  - feeding tube replaced  - slept well  - gets up in room with assistance  - cardiology pericardial bx pending                  YESTERDAY   -SR   -prop 30   -afebrile   -TF 60 (goal)   -replete K and Mg and PO4   -CXR with improved edema   -NIF -21, FVC 0.6, RSBI 73   -Lasix 40 once   -steroids in half      Review of Systems  Review of Systems   Unable to perform ROS: Acuity of condition   Constitutional: Positive for activity change. Negative for appetite change and fatigue.   HENT: Positive for sore throat, trouble swallowing and voice change.    Eyes: Negative for photophobia, pain, discharge and itching.   Respiratory: Negative.  Negative for apnea, cough, choking, chest tightness, shortness of breath, wheezing and stridor.    Cardiovascular: Negative.  Negative for chest pain and leg swelling.   Gastrointestinal: Negative.    Endocrine: Negative.    Genitourinary: Negative.    Neurological: Positive for speech difficulty.   Hematological: Negative.    Psychiatric/Behavioral: Negative.         Vital Signs for last 24 hours   Temp:  [36.7 °C (98.1 °F)-37.2 °C (99 °F)] 36.7 °C (98.1 °F)  Pulse:  [102-110] 105  Resp:  [12-25] 19    Hemodynamic parameters for last 24 hours       Vent Settings for last 24 hours       Physical Exam   Physical Exam   Constitutional: She is oriented to person, place, and time. She appears well-developed and well-nourished.   Sedated on ventilator   HENT:   Head: Normocephalic and atraumatic.   Right Ear: External ear normal.   Left Ear:  External ear normal.   Mouth/Throat: Oropharynx is clear and moist.   Eyes: Pupils are equal, round, and reactive to light. EOM are normal. Right eye exhibits no discharge. Left eye exhibits no discharge. No scleral icterus.   Neck: Neck supple. No JVD present. No tracheal deviation present.   Cardiovascular: Normal rate, regular rhythm, normal heart sounds and intact distal pulses.  Exam reveals no gallop and no friction rub.    No murmur heard.  Sinus rhythm   Pulmonary/Chest: Effort normal. She has no wheezes. She has rales (Few crackles at the bases). She exhibits no tenderness.   Abdominal: Soft. Bowel sounds are normal. She exhibits no distension. There is no tenderness. There is no rebound and no guarding.   Tolerating enteral tube feedings   Musculoskeletal: Normal range of motion. She exhibits no tenderness or deformity.   No clubbing or cyanosis   Lymphadenopathy:     She has no cervical adenopathy.   Neurological: She is oriented to person, place, and time.   Sedated.  Will arouse and follow.  Moves all 4 extremities.   Skin: Skin is warm and dry. No rash noted. She is not diaphoretic.       Medications  Current Facility-Administered Medications   Medication Dose Route Frequency Provider Last Rate Last Dose   • dexamethasone (DECADRON) tablet 4 mg  4 mg Oral Q6HRS Constantin Banegas M.D.   4 mg at 10/09/18 1314   • insulin regular (HUMULIN R) injection 2-9 Units  2-9 Units Subcutaneous Q6HRS Shekhar Gonzalez M.D.   2 Units at 10/09/18 1311    And   • glucose 4 g chewable tablet 16 g  16 g Oral Q15 MIN PRN Shekhar Gonzalez M.D.        And   • dextrose 50% (D50W) injection 25 mL  25 mL Intravenous Q15 MIN PRN Shekhar Gonzalez M.D.       • oxyCODONE immediate-release (ROXICODONE) tablet 2.5 mg  2.5 mg Oral Q4HRS PRN Jarvis Arnold M.D.        Or   • oxyCODONE immediate-release (ROXICODONE) tablet 5 mg  5 mg Oral Q4HRS PRN Jarvis Arnold M.D.       • fentaNYL (SUBLIMAZE) injection  mcg    mcg Intravenous Q HOUR PRN Jarvis Arnold M.D.   50 mcg at 10/07/18 1650   • racepinephrine (MICRONEFRIN) 2.25 % nebulizer solution 0.5 mL  0.5 mL Nebulization Q4H PRN (RT) Jarvis Arnold M.D.   0.5 mL at 10/08/18 0737   • artificial tears 1.4 % ophthalmic solution 1 Drop  1 Drop Both Eyes Q2HRS PRN Jarvis Arnold M.D.       • heparin injection 5,000 Units  5,000 Units Subcutaneous Q8HRS Néstor Guillen M.D.   5,000 Units at 10/09/18 1314   • acetaminophen (TYLENOL) tablet 650 mg  650 mg Feeding Tube Q4HRS PRN Jarvis Arnold M.D.   650 mg at 10/07/18 2053    Or   • acetaminophen (TYLENOL) suppository 650 mg  650 mg Rectal Q4HRS PRN Jarvis Arnold M.D.       • Respiratory Care per Protocol   Nebulization Continuous RT Alvaro Perez M.D.       • MD Alert...ICU Electrolyte Replacement per Pharmacy   Other pharmacy to dose Alvaro Perez M.D.       • Pharmacy Consult: Enteral tube feeding - review meds/change route/product selection   Other PRN Néstor Guillen M.D.       • gabapentin (NEURONTIN) capsule 300 mg  300 mg Per NG Tube BID Néstor Guillen M.D.   300 mg at 10/09/18 0516   • levothyroxine (SYNTHROID) tablet 88 mcg  88 mcg Per NG Tube AM ES Néstor Guillen M.D.   88 mcg at 10/09/18 0516   • senna-docusate (PERICOLACE or SENOKOT S) 8.6-50 MG per tablet 2 Tab  2 Tab Per NG Tube BID Néstor Guillen M.D.   2 Tab at 10/09/18 0516    And   • polyethylene glycol/lytes (MIRALAX) PACKET 1 Packet  1 Packet Per NG Tube QDAY PRN Néstor Guillen M.D.   1 Packet at 10/07/18 0852    And   • magnesium hydroxide (MILK OF MAGNESIA) suspension 30 mL  30 mL Per NG Tube QDAY PRN Néstor Guillen M.D.        And   • bisacodyl (DULCOLAX) suppository 10 mg  10 mg Rectal QDAY PRN Néstor Guillen M.D.       • simvastatin (ZOCOR) tablet 20 mg  20 mg Per NG Tube Q EVENING Néstor Guillen M.D.   20 mg at 10/08/18 0970   • ipratropium-albuterol  (DUONEB) nebulizer solution  3 mL Nebulization Q2HRS PRN (RT) Jarvis Arnold M.D.       • labetalol (NORMODYNE,TRANDATE) injection 10 mg  10 mg Intravenous Q30 MIN PRN Constantin Self M.D.       • Influenza Vaccine High-Dose pf injection 0.5 mL  0.5 mL Intramuscular Once PRN Rula Teresa M.D.       • nicotine (NICODERM) 14 MG/24HR 14 mg  14 mg Transdermal Daily-0600 Hazel Cruz M.D.   14 mg at 10/09/18 0516    And   • nicotine polacrilex (NICORETTE) 2 MG piece 2 mg  2 mg Oral Q HOUR PRN Hazel Cruz M.D.           Fluids    Intake/Output Summary (Last 24 hours) at 10/09/18 1449  Last data filed at 10/09/18 1000   Gross per 24 hour   Intake              795 ml   Output              645 ml   Net              150 ml       Laboratory  Recent Results (from the past 48 hour(s))   MAGNESIUM    Collection Time: 10/08/18  4:40 AM   Result Value Ref Range    Magnesium 1.8 1.5 - 2.5 mg/dL   CBC with Differential    Collection Time: 10/08/18  4:40 AM   Result Value Ref Range    WBC 12.5 (H) 4.8 - 10.8 K/uL    RBC 4.19 (L) 4.20 - 5.40 M/uL    Hemoglobin 11.9 (L) 12.0 - 16.0 g/dL    Hematocrit 38.1 37.0 - 47.0 %    MCV 90.9 81.4 - 97.8 fL    MCH 28.4 27.0 - 33.0 pg    MCHC 31.2 (L) 33.6 - 35.0 g/dL    RDW 67.2 (H) 35.9 - 50.0 fL    Platelet Count 298 164 - 446 K/uL    MPV 11.5 9.0 - 12.9 fL    Neutrophils-Polys 80.60 (H) 44.00 - 72.00 %    Lymphocytes 5.30 (L) 22.00 - 41.00 %    Monocytes 8.60 0.00 - 13.40 %    Eosinophils 0.00 0.00 - 6.90 %    Basophils 0.60 0.00 - 1.80 %    Immature Granulocytes 4.90 (H) 0.00 - 0.90 %    Nucleated RBC 0.00 /100 WBC    Neutrophils (Absolute) 10.06 (H) 2.00 - 7.15 K/uL    Lymphs (Absolute) 0.66 (L) 1.00 - 4.80 K/uL    Monos (Absolute) 1.07 (H) 0.00 - 0.85 K/uL    Eos (Absolute) 0.00 0.00 - 0.51 K/uL    Baso (Absolute) 0.08 0.00 - 0.12 K/uL    Immature Granulocytes (abs) 0.61 (H) 0.00 - 0.11 K/uL    NRBC (Absolute) 0.00 K/uL   Phosphorus    Collection Time: 10/08/18  4:40 AM    Result Value Ref Range    Phosphorus 3.4 2.5 - 4.5 mg/dL   COMP METABOLIC PANEL    Collection Time: 10/08/18  4:40 AM   Result Value Ref Range    Sodium 147 (H) 135 - 145 mmol/L    Potassium 3.9 3.6 - 5.5 mmol/L    Chloride 104 96 - 112 mmol/L    Co2 38 (H) 20 - 33 mmol/L    Anion Gap 5.0 0.0 - 11.9    Glucose 177 (H) 65 - 99 mg/dL    Bun 28 (H) 8 - 22 mg/dL    Creatinine 0.40 (L) 0.50 - 1.40 mg/dL    Calcium 8.7 8.5 - 10.5 mg/dL    AST(SGOT) 57 (H) 12 - 45 U/L    ALT(SGPT) 59 (H) 2 - 50 U/L    Alkaline Phosphatase 71 30 - 99 U/L    Total Bilirubin 0.4 0.1 - 1.5 mg/dL    Albumin 2.8 (L) 3.2 - 4.9 g/dL    Total Protein 5.7 (L) 6.0 - 8.2 g/dL    Globulin 2.9 1.9 - 3.5 g/dL    A-G Ratio 1.0 g/dL   ESTIMATED GFR    Collection Time: 10/08/18  4:40 AM   Result Value Ref Range    GFR If African American >60 >60 mL/min/1.73 m 2    GFR If Non African American >60 >60 mL/min/1.73 m 2   EKG    Collection Time: 10/08/18 11:17 AM   Result Value Ref Range    Report       [facility]    Test Date:  2018-10-08  Pt Name:    AVEL COLLAZO            Department: [facility]  MRN:        [id number]                      Room:       20  Gender:     Female                       Technician: KURTIS  :        [id number]                   Requested By:BRENDEN VALENZUELA  Order #:    [id number]                    Reading MD: Bartolo Goodrich MD    Measurements  Intervals                                Axis  Rate:       108                          P:          46  IA:         168                          QRS:        160  QRSD:       86                           T:          0  QT:         332  QTc:        445    Interpretive Statements  SINUS TACHYCARDIA  LOW VOLTAGE THROUGHOUT  BORDERLINE T ABNORMALITIES, DIFFUSE LEADS  Compared to ECG 10/02/2018 12:59:58  There is no significant change  Electronically Signed On 10-8-2018 16:16:15 PDT by Bartolo Goodrich MD     ACCU-CHEK GLUCOSE    Collection Time: 10/08/18 12:48 PM   Result Value Ref Range    Glucose  - Accu-Ck 109 (H) 65 - 99 mg/dL   ACCU-CHEK GLUCOSE    Collection Time: 10/08/18  6:28 PM   Result Value Ref Range    Glucose - Accu-Ck 128 (H) 65 - 99 mg/dL   ACCU-CHEK GLUCOSE    Collection Time: 10/09/18 12:19 AM   Result Value Ref Range    Glucose - Accu-Ck 135 (H) 65 - 99 mg/dL   MAGNESIUM    Collection Time: 10/09/18  4:20 AM   Result Value Ref Range    Magnesium 2.1 1.5 - 2.5 mg/dL   CBC with Differential    Collection Time: 10/09/18  4:20 AM   Result Value Ref Range    WBC 9.2 4.8 - 10.8 K/uL    RBC 4.11 (L) 4.20 - 5.40 M/uL    Hemoglobin 11.4 (L) 12.0 - 16.0 g/dL    Hematocrit 37.5 37.0 - 47.0 %    MCV 91.2 81.4 - 97.8 fL    MCH 27.7 27.0 - 33.0 pg    MCHC 30.4 (L) 33.6 - 35.0 g/dL    RDW 66.8 (H) 35.9 - 50.0 fL    Platelet Count 339 164 - 446 K/uL    MPV 11.6 9.0 - 12.9 fL    Nucleated RBC 0.00 /100 WBC    NRBC (Absolute) 0.00 K/uL    Neutrophils-Polys 81.60 (H) 44.00 - 72.00 %    Lymphocytes 9.60 (L) 22.00 - 41.00 %    Monocytes 8.80 0.00 - 13.40 %    Eosinophils 0.00 0.00 - 6.90 %    Basophils 0.00 0.00 - 1.80 %    Neutrophils (Absolute) 7.51 (H) 2.00 - 7.15 K/uL    Lymphs (Absolute) 0.88 (L) 1.00 - 4.80 K/uL    Monos (Absolute) 0.81 0.00 - 0.85 K/uL    Eos (Absolute) 0.00 0.00 - 0.51 K/uL    Baso (Absolute) 0.00 0.00 - 0.12 K/uL    Anisocytosis 1+     Macrocytosis 1+    Phosphorus    Collection Time: 10/09/18  4:20 AM   Result Value Ref Range    Phosphorus 3.3 2.5 - 4.5 mg/dL   CREATINE KINASE    Collection Time: 10/09/18  4:20 AM   Result Value Ref Range    CPK Total 20 0 - 154 U/L   COMP METABOLIC PANEL    Collection Time: 10/09/18  4:20 AM   Result Value Ref Range    Sodium 148 (H) 135 - 145 mmol/L    Potassium 4.0 3.6 - 5.5 mmol/L    Chloride 106 96 - 112 mmol/L    Co2 35 (H) 20 - 33 mmol/L    Anion Gap 7.0 0.0 - 11.9    Glucose 173 (H) 65 - 99 mg/dL    Bun 34 (H) 8 - 22 mg/dL    Creatinine 0.37 (L) 0.50 - 1.40 mg/dL    Calcium 8.4 (L) 8.5 - 10.5 mg/dL    AST(SGOT) 53 (H) 12 - 45 U/L    ALT(SGPT) 73  (H) 2 - 50 U/L    Alkaline Phosphatase 72 30 - 99 U/L    Total Bilirubin 0.4 0.1 - 1.5 mg/dL    Albumin 2.7 (L) 3.2 - 4.9 g/dL    Total Protein 5.6 (L) 6.0 - 8.2 g/dL    Globulin 2.9 1.9 - 3.5 g/dL    A-G Ratio 0.9 g/dL   ESTIMATED GFR    Collection Time: 10/09/18  4:20 AM   Result Value Ref Range    GFR If African American >60 >60 mL/min/1.73 m 2    GFR If Non African American >60 >60 mL/min/1.73 m 2   DIFFERENTIAL MANUAL    Collection Time: 10/09/18  4:20 AM   Result Value Ref Range    Manual Diff Status PERFORMED    PERIPHERAL SMEAR REVIEW    Collection Time: 10/09/18  4:20 AM   Result Value Ref Range    Peripheral Smear Review see below    PLATELET ESTIMATE    Collection Time: 10/09/18  4:20 AM   Result Value Ref Range    Plt Estimation Normal    MORPHOLOGY    Collection Time: 10/09/18  4:20 AM   Result Value Ref Range    RBC Morphology Present     Polychromia 1+     Ovalocytes 1+    ACCU-CHEK GLUCOSE    Collection Time: 10/09/18  5:11 AM   Result Value Ref Range    Glucose - Accu-Ck 159 (H) 65 - 99 mg/dL   ACCU-CHEK GLUCOSE    Collection Time: 10/09/18  1:10 PM   Result Value Ref Range    Glucose - Accu-Ck 155 (H) 65 - 99 mg/dL       Imaging  X-Ray:  I have personally reviewed the images and compared with prior images. and My impression is: Improved edema    Assessment/Plan  * Acute hypoxemic respiratory failure (HCC)- (present on admission)   Assessment & Plan    Extubated on 10/7/2018  Started on Decadron for stridor  Continue close clinical monitoring in the ICU  Continue oxygen and RT protocol        Pleural effusion   Assessment & Plan    Status post thoracentesis on October 1    Monitoring diuresis needed        Pericardial effusion   Assessment & Plan    Cardiology following   Status post pericardial drain placement  Drain removed on 10/3/2018  Cytology negative fluid cultures negative  GENTRY negative RA factor positive CCP negative    Follow-up on repeat echocardiogram from today    I previously discussed  the case with  from rheumatology on 10/5/2018 he recommended checking for cryoglobulins SPEP  Her elevated RA factor is nonspecific he agrees with trial of steroids if no contraindications       Etiology is unclear and patient may eventually need pericardial biopsy for definitive diagnosis             Pulmonary edema- (present on admission)   Assessment & Plan    Monitor fluid status and follow-up on repeat echo  Diuresis as needed        Hypotension   Assessment & Plan    Relative adrenal insufficiency    Improved weaned off pressors  Continue Midodrin          History of non-Hodgkin's lymphoma   Assessment & Plan    Hx of         Dysphagia   Assessment & Plan    Evaluated by speech therapy with plan for FEES  Discussed with Dr. Banegas he is concerned about possible underlying neurologic disorder will consult neurology discussed with Dr. Leann Montoya        Hypernatremia   Assessment & Plan    Resume water flushes per NG if failed swallow eval          Hypomagnesemia   Assessment & Plan    Replete with mag sulfate        Hypokalemia   Assessment & Plan    Replete and monitor        Tobacco abuse- (present on admission)   Assessment & Plan    Continue NicoDerm   on cessation when mental status improved        Hypothyroidism- (present on admission)   Assessment & Plan    Continue levothyroxine             VTE:  Heparin  Ulcer: H2 Antagonist  Lines: Central Line  Ongoing indication addressed    I have performed a physical exam and reviewed and updated ROS and Plan today (10/9/2018). In review of yesterday's note (10/8/2018), there are no changes except as documented above.     I have assessed and reassessed her respiratory status with ventilator adjustments and spontaneous breathing trials, ventilator waveforms, airway mechanics, but pressure, hemodynamics, cardiovascular status and neurologic status with titration of propofol.  She is at increased risk for worsening respiratory and cardiovascular  system dysfunction.    Discussed patient condition and risk of morbidity and/or mortality with Hospitalist, Family, RN, RT, Pharmacy, Charge nurse / hot rounds and QA team  The patient remains ill but no longer critical    Patient appears safe to be managed in non-ICU setting while cardiology evaluates for pricadial bx. Also will e-check swallowing later this week. Origin of dyphagia may be related to intubation as well as global weakness. Pt appears safe to be monitored outside the ICU today.    Constantin Banegas MD  Pulmonary and Critical Care Medicine

## 2018-10-09 NOTE — PROGRESS NOTES
[facility] Cardiology Follow-up Consult Note  Date of note:    10/4/2018      Consulting Physician: Néstor Guillen M.D.    Patient ID:  Name:   Erin Quigley     YOB: 1940  Age:   78 y.o.  female   MRN:   [id number]    Chief Complaint   Patient presents with   • Shortness of Breath     3 weeks on and off   • Peripheral Edema     started 3 weeks ago in her feet and is now up to her thighs       Interim Events:  - No overnight events  - Titrating down on O2; tolerating NC  - HR: still tachycardiac but still sinus tach on tele  - Recommend to d/c midodrine (has not used in several days)  - Plan forthoracoscopic pericardial window with biopsy of pericardium once patient is stable  - FEES yesterday: erythematous pharynx        ROS  - Unable to obtain as patient has hoarse voice from being intubated. Denies any pain currently    Past medical, surgical, social, and family history reviewed and unchanged from admission except as noted in assessment and plan.    Medications: Reviewed in MAR  Current Facility-Administered Medications   Medication Dose Frequency Provider Last Rate Last Dose   • dexamethasone (DECADRON) tablet 4 mg  4 mg Q6HRS Constantin Banegas M.D.       • insulin regular (HUMULIN R) injection 2-9 Units  2-9 Units Q6HRS Shekhar Gonzalez M.D.   2 Units at 10/09/18 0513    And   • glucose 4 g chewable tablet 16 g  16 g Q15 MIN PRN Shekhar Gonzalez M.D.        And   • dextrose 50% (D50W) injection 25 mL  25 mL Q15 MIN PRN Shekhar Gonzalez M.D.       • oxyCODONE immediate-release (ROXICODONE) tablet 2.5 mg  2.5 mg Q4HRS PRN Jarvis Arnold M.D.        Or   • oxyCODONE immediate-release (ROXICODONE) tablet 5 mg  5 mg Q4HRS PRN Jarvis Arnold M.D.       • fentaNYL (SUBLIMAZE) injection  mcg   mcg Q HOUR PRN Jarvis Arnold M.D.   50 mcg at 10/07/18 1650   • racepinephrine (MICRONEFRIN) 2.25 % nebulizer solution 0.5 mL  0.5 mL Q4H PRN (RT) Jarvis Arnold,  M.D.   0.5 mL at 10/08/18 0737   • artificial tears 1.4 % ophthalmic solution 1 Drop  1 Drop Q2HRS PRN Jarvis Arnold M.D.       • heparin injection 5,000 Units  5,000 Units Q8HRS Néstor Guillen M.D.   5,000 Units at 10/09/18 0517   • acetaminophen (TYLENOL) tablet 650 mg  650 mg Q4HRS PRN Jarvis Arnold M.D.   650 mg at 10/07/18 2053    Or   • acetaminophen (TYLENOL) suppository 650 mg  650 mg Q4HRS PRN Jarvis Arnold M.D.       • Respiratory Care per Protocol   Continuous RT Alvaro Perez M.D.       • MD Alert...ICU Electrolyte Replacement per Pharmacy   pharmacy to dose Alvaro Perez M.D.       • Pharmacy Consult: Enteral tube feeding - review meds/change route/product selection   PRN Néstor Guillen M.D.       • gabapentin (NEURONTIN) capsule 300 mg  300 mg BID Néstor Guillen M.D.   300 mg at 10/09/18 0516   • levothyroxine (SYNTHROID) tablet 88 mcg  88 mcg AM ES Néstor Guillen M.D.   88 mcg at 10/09/18 0516   • senna-docusate (PERICOLACE or SENOKOT S) 8.6-50 MG per tablet 2 Tab  2 Tab BID Néstor Guillen M.D.   2 Tab at 10/09/18 0516    And   • polyethylene glycol/lytes (MIRALAX) PACKET 1 Packet  1 Packet QDAY PRN Néstor Guillen M.D.   1 Packet at 10/07/18 0852    And   • magnesium hydroxide (MILK OF MAGNESIA) suspension 30 mL  30 mL QDAY PRN Néstor Guillen M.D.        And   • bisacodyl (DULCOLAX) suppository 10 mg  10 mg QDAY PRN Néstor Guillen M.D.       • simvastatin (ZOCOR) tablet 20 mg  20 mg Q EVENING Néstor Guillen M.D.   20 mg at 10/08/18 2105   • ipratropium-albuterol (DUONEB) nebulizer solution  3 mL Q2HRS PRN (RT) Jarvis Arnold M.D.       • labetalol (NORMODYNE,TRANDATE) injection 10 mg  10 mg Q30 MIN PRN Constantin Self M.D.       • Influenza Vaccine High-Dose pf injection 0.5 mL  0.5 mL Once PRN Rula Teresa M.D.       • nicotine (NICODERM) 14 MG/24HR 14 mg  14 mg Daily-0600 Hazel Cruz M.D.   14 mg  "at 10/09/18 0516    And   • nicotine polacrilex (NICORETTE) 2 MG piece 2 mg  2 mg Q HOUR PRN Hazel Cruz M.D.         Last reviewed on 9/29/2018  4:47 PM by Jaylin Haynes T  No Known Allergies    Physical Exam  Body mass index is 29.88 kg/m². Blood pressure 133/79, pulse (!) 105, temperature 36.7 °C (98.1 °F), resp. rate 19, height 1.575 m (5' 2\"), weight 74.1 kg (163 lb 5.8 oz), SpO2 91 %, not currently breastfeeding.   Vitals:    10/09/18 0600 10/09/18 0700 10/09/18 0800 10/09/18 0900   BP:       Pulse: (!) 102 (!) 107 (!) 103 (!) 105   Resp: 13 15 20 19   Temp:   36.7 °C (98.1 °F)    SpO2: 96% 96% 96% 91%   Weight:       Height:        Oxygen Therapy:  Pulse Oximetry: 91 %, O2 (LPM): 2, O2 Delivery: Silicone Nasal Cannula    General: Sedated and intubated. Good color today  HEENT: Normocephalic, Atraumatic. Right subclavian in place. No stridor.   Cardiovascular: Normal heart rate, Normal rhythm. S1+, S2+ Unable to appreciate rub  Lungs: Diffuse wheeze throughout. Symmetric chest expansion  Abdomen:  Soft, No tenderness  Skin: No erythema, No rash  Lower limbs: no edema.   Neurologic: Alert, following commands. Moving all 4 extremities. Strength intact.   Other systems also examined, grossly normal.       Labs (personally reviewed and notable for):   Recent Results (from the past 24 hour(s))   ACCU-CHEK GLUCOSE    Collection Time: 10/08/18 12:48 PM   Result Value Ref Range    Glucose - Accu-Ck 109 (H) 65 - 99 mg/dL   ACCU-CHEK GLUCOSE    Collection Time: 10/08/18  6:28 PM   Result Value Ref Range    Glucose - Accu-Ck 128 (H) 65 - 99 mg/dL   ACCU-CHEK GLUCOSE    Collection Time: 10/09/18 12:19 AM   Result Value Ref Range    Glucose - Accu-Ck 135 (H) 65 - 99 mg/dL   MAGNESIUM    Collection Time: 10/09/18  4:20 AM   Result Value Ref Range    Magnesium 2.1 1.5 - 2.5 mg/dL   CBC with Differential    Collection Time: 10/09/18  4:20 AM   Result Value Ref Range    WBC 9.2 4.8 - 10.8 K/uL    RBC 4.11 (L) 4.20 - " 5.40 M/uL    Hemoglobin 11.4 (L) 12.0 - 16.0 g/dL    Hematocrit 37.5 37.0 - 47.0 %    MCV 91.2 81.4 - 97.8 fL    MCH 27.7 27.0 - 33.0 pg    MCHC 30.4 (L) 33.6 - 35.0 g/dL    RDW 66.8 (H) 35.9 - 50.0 fL    Platelet Count 339 164 - 446 K/uL    MPV 11.6 9.0 - 12.9 fL    Nucleated RBC 0.00 /100 WBC    NRBC (Absolute) 0.00 K/uL    Neutrophils-Polys 81.60 (H) 44.00 - 72.00 %    Lymphocytes 9.60 (L) 22.00 - 41.00 %    Monocytes 8.80 0.00 - 13.40 %    Eosinophils 0.00 0.00 - 6.90 %    Basophils 0.00 0.00 - 1.80 %    Neutrophils (Absolute) 7.51 (H) 2.00 - 7.15 K/uL    Lymphs (Absolute) 0.88 (L) 1.00 - 4.80 K/uL    Monos (Absolute) 0.81 0.00 - 0.85 K/uL    Eos (Absolute) 0.00 0.00 - 0.51 K/uL    Baso (Absolute) 0.00 0.00 - 0.12 K/uL    Anisocytosis 1+     Macrocytosis 1+    Phosphorus    Collection Time: 10/09/18  4:20 AM   Result Value Ref Range    Phosphorus 3.3 2.5 - 4.5 mg/dL   CREATINE KINASE    Collection Time: 10/09/18  4:20 AM   Result Value Ref Range    CPK Total 20 0 - 154 U/L   COMP METABOLIC PANEL    Collection Time: 10/09/18  4:20 AM   Result Value Ref Range    Sodium 148 (H) 135 - 145 mmol/L    Potassium 4.0 3.6 - 5.5 mmol/L    Chloride 106 96 - 112 mmol/L    Co2 35 (H) 20 - 33 mmol/L    Anion Gap 7.0 0.0 - 11.9    Glucose 173 (H) 65 - 99 mg/dL    Bun 34 (H) 8 - 22 mg/dL    Creatinine 0.37 (L) 0.50 - 1.40 mg/dL    Calcium 8.4 (L) 8.5 - 10.5 mg/dL    AST(SGOT) 53 (H) 12 - 45 U/L    ALT(SGPT) 73 (H) 2 - 50 U/L    Alkaline Phosphatase 72 30 - 99 U/L    Total Bilirubin 0.4 0.1 - 1.5 mg/dL    Albumin 2.7 (L) 3.2 - 4.9 g/dL    Total Protein 5.6 (L) 6.0 - 8.2 g/dL    Globulin 2.9 1.9 - 3.5 g/dL    A-G Ratio 0.9 g/dL   ESTIMATED GFR    Collection Time: 10/09/18  4:20 AM   Result Value Ref Range    GFR If African American >60 >60 mL/min/1.73 m 2    GFR If Non African American >60 >60 mL/min/1.73 m 2   DIFFERENTIAL MANUAL    Collection Time: 10/09/18  4:20 AM   Result Value Ref Range    Manual Diff Status PERFORMED     PERIPHERAL SMEAR REVIEW    Collection Time: 10/09/18  4:20 AM   Result Value Ref Range    Peripheral Smear Review see below    PLATELET ESTIMATE    Collection Time: 10/09/18  4:20 AM   Result Value Ref Range    Plt Estimation Normal    MORPHOLOGY    Collection Time: 10/09/18  4:20 AM   Result Value Ref Range    RBC Morphology Present     Polychromia 1+     Ovalocytes 1+    ACCU-CHEK GLUCOSE    Collection Time: 10/09/18  5:11 AM   Result Value Ref Range    Glucose - Accu-Ck 159 (H) 65 - 99 mg/dL       Cardiac Imaging and Procedures Review:    EKG and telemetry tracings personally reviewed    Impression and Medical Decision Making:  Principal Problem:    Acute hypoxemic respiratory failure (HCC) POA: Yes  Active Problems:    Pulmonary edema POA: Yes    Pericardial effusion POA: Unknown    Pleural effusion POA: Unknown    HTN (hypertension) POA: Yes    History of non-Hodgkin's lymphoma POA: Unknown      Overview: Nonhodgkins lymphoma  2000    Hypotension POA: Unknown    Hypothyroidism POA: Yes    Tobacco abuse POA: Yes    Hypokalemia POA: No    Hypomagnesemia POA: Unknown    Hypernatremia POA: Unknown    Dysphagia POA: Unknown  Resolved Problems:    Hypophosphatemia POA: Unknown    Acute encephalopathy POA: Unknown        Assessment and Plan  Pericardial Effusion with Tamponade  - Resolved  - Patient presented with large pericardial effusion. EKG with very low voltage on admission. Underwent drainage 10/1. 1000 cc of bloody pericardial fluid was obtained. Pericardial drain removed. She likely developed pericardial decompression syndrome post pericardiocentesis.  - Etiology not clear. Grossly was bloody (some traumatic component). Cytology negative for malignant cells. Unlikely to be infectious. GENTRY negative, RA elevated but CCP negative. Cryoglobulin pending  - Echo 10/8: small amount of pericardial fluid without hemodynamic compromise.    - Holding outpatient HCTZ and amlodipine. Resume when BP stabilizes   - Since  she is no longer hypotensive, recommend d/c midodrine.   - Plan forthoracoscopic pericardial window with biopsy of pericardium once patient is stable       Tachycardia  - has been normal sinus rhythm and rate during her hospitalization; has improved slightly from yesterday  - Echo did not demonstrate a hemodynamic significant pericardial effusion. EKG unremarkable for underlying etiology  - Unclear etiology at this point. Will monitor       Pleural Effusion  - S/p thoracentesis. 1.7 liters have been removed.   - Reviewed AM CXR: has persistent bilateral pleural effusions  - Plan forthoracoscopic pericardial window with biopsy of pericardium once patient is stable       Pulmonary Edema  - BNP not significantly elevated; troponins not significantly elevated  - Has been diuresed significantly since admission however subsequently became intravascularly dry; treated with IV fluids. She unfortunately likely developed pericardial decompression syndrome post pericardiocentesis.   - AM CXR pending  - Plan forthoracoscopic pericardial window with biopsy of pericardium once patient is stable       Hypernatremia  - Resolved with 1/2 NS. Slightly elevated again today.   - Continue free water flushes.   - Monitor with BMP      It is my pleasure to participate in the care of Ms. Quigley.  Please do not hesitate to contact me with questions or concerns. Will continue to follow

## 2018-10-09 NOTE — CARE PLAN
Problem: Ventilation Defect:  Goal: Ability to achieve and maintain unassisted ventilation or tolerate decreased levels of ventilator support    Intervention: Support and monitor invasive and noninvasive mechanical ventilation  3 L NC- tolerating well will continue to monitor  Has been off cool aerosol since this morning,  Mild stridor and racemic PRN given once.

## 2018-10-09 NOTE — CARE PLAN
Problem: Safety  Goal: Will remain free from falls  Outcome: PROGRESSING AS EXPECTED  Bed in locked and in low position, lower bed rails raised, bed alarm in use, call light within reach, treaded slipper socks on patient and patient room near nursing station.     Patient/family communicates and demonstrates understanding of how to use the call light when staff assistance is required.       Problem: Skin Integrity  Goal: Risk for impaired skin integrity will decrease  Outcome: PROGRESSING AS EXPECTED  Assess patient's skin at the beginning of the shift. Turn patient every two hours and monitor under all medical devices throughout entire shift. Maintain a clean, dry linen environment. Float heals and elbows. Provide appropriate wound prevention interventions as they apply. Implement pertinent wound protocols and document them. Please see wound flow sheet for further details.

## 2018-10-10 NOTE — PROGRESS NOTES
[facility] Hospitalist Progress Note    Date of Service: 10/10/2018    Chief Complaint    78 y.o. Female from [facility] admitted 2018 with shortness of breath and peripheral edema found to have significant pericardial effusion.    Pericardial effusion tapped 10/1  Intubated 10/2  Extubated 10/7      Interval Problem Update    Now on tele    Cardiology requests pericardial biopsy    I spoke with cardiac surgery who referred me to thoracic surgery (dr alvarez)    Dr alvarez  Consulted     at bedside, wants dr alvarez to talk to him prior to scheduling procedure    Ct chest requested and ordered  High sodium 148    Consultants/Specialty  Cardiology  pulmonology      Disposition  pending        Review of Systems   Constitutional: Positive for malaise/fatigue.   HENT: Negative for ear pain and sore throat.    Respiratory: Negative for shortness of breath.    Cardiovascular: Negative for chest pain and leg swelling.   Genitourinary: Negative.    Skin: Negative.    Neurological: Positive for weakness.   Psychiatric/Behavioral: Negative.    All other systems reviewed and are negative.     Physical Exam  Laboratory/Imaging   Hemodynamics  Temp (24hrs), Av.8 °C (98.2 °F), Min:36.3 °C (97.4 °F), Max:37.3 °C (99.2 °F)   Temperature: 36.7 °C (98.1 °F)  Pulse  Av.4  Min: 10  Max: 132 Heart Rate (Monitored): (!) 110  Blood Pressure : 137/104, NIBP: 112/75      Respiratory      Respiration: 18, Pulse Oximetry: 95 %     Work Of Breathing / Effort: Mild  RUL Breath Sounds: Transmitted Upper Airway Sound, RML Breath Sounds: Transmitted Upper Airway Sound, RLL Breath Sounds: Transmitted Upper Airway Sound, LORI Breath Sounds: Transmitted Upper Airway Sound, LLL Breath Sounds: Transmitted Upper Airway Sound    Fluids    Intake/Output Summary (Last 24 hours) at 10/10/18 1426  Last data filed at 10/10/18 1300   Gross per 24 hour   Intake             1489 ml   Output                0 ml   Net             1489 ml        Nutrition  Orders Placed This Encounter   Procedures   • Diet NPO     Standing Status:   Standing     Number of Occurrences:   1     Order Specific Question:   Restrict to:     Answer:   Strict [1]   • Diet NPO     Standing Status:   Standing     Number of Occurrences:   8     Order Specific Question:   Restrict to:     Answer:   Strict [1]     Physical Exam   Constitutional: She appears well-developed and well-nourished. No distress.   HENT:   Head: Normocephalic and atraumatic.   Neck: No JVD present.   Cardiovascular:   tachycardia   Pulmonary/Chest: Effort normal. No stridor. No respiratory distress. She has no wheezes.   Decreased bs at bases   Abdominal: Soft. There is no tenderness. There is no rebound and no guarding.   Musculoskeletal: She exhibits edema.   Neurological: She is alert.   O x 2   Skin: Skin is warm and dry. No rash noted. She is not diaphoretic.   Psychiatric: She has a normal mood and affect. Thought content normal.   Nursing note and vitals reviewed.      Recent Labs      10/08/18   0440  10/09/18   0420  10/10/18   0233   WBC  12.5*  9.2  11.7*   RBC  4.19*  4.11*  4.30   HEMOGLOBIN  11.9*  11.4*  12.0   HEMATOCRIT  38.1  37.5  40.0   MCV  90.9  91.2  93.0   MCH  28.4  27.7  27.9   MCHC  31.2*  30.4*  30.0*   RDW  67.2*  66.8*  67.5*   PLATELETCT  298  339  380   MPV  11.5  11.6  11.7     Recent Labs      10/08/18   0440  10/09/18   0420  10/10/18   0233   SODIUM  147*  148*  148*   POTASSIUM  3.9  4.0  4.0   CHLORIDE  104  106  104   CO2  38*  35*  39*   GLUCOSE  177*  173*  126*   BUN  28*  34*  35*   CREATININE  0.40*  0.37*  0.40*   CALCIUM  8.7  8.4*  8.2*                      Assessment/Plan     * Acute hypoxemic respiratory failure (HCC)- (present on admission)   Assessment & Plan    Extubated on 10/7/2018  on Decadron for stridor    Continue oxygen and RT protocol        Pleural effusion- (present on admission)   Assessment & Plan    Status post thoracentesis on October  1    Monitoring diuresis needed        Pericardial effusion- (present on admission)   Assessment & Plan    Cardiology following   Status post pericardial drain placement  Drain removed on 10/3/2018  Cytology negative fluid cultures negative  GENTRY negative RA factor positive CCP negative  Repeat Echo stable  Her elevated RA factor is nonspecific he agrees with trial of steroids if no contraindications       Etiology is unclear and patient may eventually need pericardial biopsy for definitive diagnosis             Pulmonary edema- (present on admission)   Assessment & Plan    Monitor fluid status and follow-up on repeat echo  Diuresis as needed        Hypotension   Assessment & Plan    Relative adrenal insufficiency    Improved weaned off pressors            History of non-Hodgkin's lymphoma- (present on admission)   Assessment & Plan    Hx of         HTN (hypertension)- (present on admission)   Assessment & Plan    All bp medications held        Dysphagia- (present on admission)   Assessment & Plan    Continue with speech therapy            Hypernatremia   Assessment & Plan    Resume water flushes , increased to 300 cc q6          Hypomagnesemia   Assessment & Plan    Replete with mag sulfate        Hypokalemia   Assessment & Plan    Replete and monitor        Tobacco abuse- (present on admission)   Assessment & Plan    Continue NicoDerm   on cessation when mental status improved        Hypothyroidism- (present on admission)   Assessment & Plan    Continue levothyroxine          Quality-Core Measures   Reviewed items::  EKG reviewed, Labs reviewed, Medications reviewed and Radiology images reviewed  DVT prophylaxis pharmacological::  Heparin  DVT prophylaxis - mechanical:  SCDs  Ulcer Prophylaxis::  Not indicated

## 2018-10-10 NOTE — PROGRESS NOTES
2 RN skin check completed with Little MAGDALENO:    Bilateral ears red/blanching. Waffle mattress and mepilex to lower back bony prominence for prevention. Continue q2h turns.

## 2018-10-10 NOTE — PROGRESS NOTES
[facility] Follow-up Consult Note  Date of note:    10/4/2018      Consulting Physician: Néstor Guillen M.D.    Patient ID:  Name:   Erin Quigley     YOB: 1940  Age:   78 y.o.  female   MRN:   [id number]    Chief Complaint   Patient presents with   • Shortness of Breath     3 weeks on and off   • Peripheral Edema     started 3 weeks ago in her feet and is now up to her thighs       Interim Events:  - No overnight events. Has been transferred out of the ICU  - Titrating down on O2; tolerating NC  - HR: still tachycardiac but still sinus tach on tele  - Plan forthoracoscopic pericardial window with biopsy of pericardium once patient is stable      ROS  - Unable to obtain as patient has hoarse voice from being intubated. Denies any pain currently    Past medical, surgical, social, and family history reviewed and unchanged from admission except as noted in assessment and plan.    Medications: Reviewed in MAR  Current Facility-Administered Medications   Medication Dose Frequency Provider Last Rate Last Dose   • oxyCODONE immediate-release (ROXICODONE) tablet 2.5 mg  2.5 mg Q4HRS PRN Ricardo Torres M.D.        Or   • oxyCODONE immediate-release (ROXICODONE) tablet 5 mg  5 mg Q4HRS PRN Ricardo Torres M.D.       • dexamethasone (DECADRON) tablet 4 mg  4 mg Q6HRS Ricardo Torres M.D.   4 mg at 10/10/18 1047   • insulin regular (HUMULIN R) injection 2-9 Units  2-9 Units Q6HRS Shekhar Gonzalez M.D.   2 Units at 10/10/18 1109    And   • glucose 4 g chewable tablet 16 g  16 g Q15 MIN PRN Shekhar Gonzalez M.D.        And   • dextrose 50% (D50W) injection 25 mL  25 mL Q15 MIN PRN Shekhar Gonzalez M.D.       • fentaNYL (SUBLIMAZE) injection  mcg   mcg Q HOUR PRN Jarvis Arnold M.D.   50 mcg at 10/07/18 1650   • racepinephrine (MICRONEFRIN) 2.25 % nebulizer solution 0.5 mL  0.5 mL Q4H PRN (RT) Jarvis Arnold M.D.   0.5 mL at 10/08/18 0737   • artificial tears 1.4 %  ophthalmic solution 1 Drop  1 Drop Q2HRS PRN Jarvis Arnold M.D.       • heparin injection 5,000 Units  5,000 Units Q8HRS Néstor Guillen M.D.   5,000 Units at 10/10/18 0612   • acetaminophen (TYLENOL) tablet 650 mg  650 mg Q4HRS PRN Jarvis Arnold M.D.   650 mg at 10/07/18 2053    Or   • acetaminophen (TYLENOL) suppository 650 mg  650 mg Q4HRS PRN Jarvis Arnold M.D.       • Respiratory Care per Protocol   Continuous RT Alvaro Perez M.D.       • Pharmacy Consult: Enteral tube feeding - review meds/change route/product selection   PRN Néstor Guillen M.D.       • gabapentin (NEURONTIN) capsule 300 mg  300 mg BID Néstor Guillen M.D.   300 mg at 10/10/18 0612   • levothyroxine (SYNTHROID) tablet 88 mcg  88 mcg AM ES Néstor Guillen M.D.   88 mcg at 10/10/18 0613   • senna-docusate (PERICOLACE or SENOKOT S) 8.6-50 MG per tablet 2 Tab  2 Tab BID Néstor Guillen M.D.   Stopped at 10/10/18 0600    And   • polyethylene glycol/lytes (MIRALAX) PACKET 1 Packet  1 Packet QDAY PRN Néstor Guillen M.D.   1 Packet at 10/07/18 0852    And   • magnesium hydroxide (MILK OF MAGNESIA) suspension 30 mL  30 mL QDAY PRN Néstor Guillen M.D.        And   • bisacodyl (DULCOLAX) suppository 10 mg  10 mg QDAY PRN Néstor Guillen M.D.       • simvastatin (ZOCOR) tablet 20 mg  20 mg Q EVENING Nésotr Guillen M.D.   20 mg at 10/09/18 1805   • ipratropium-albuterol (DUONEB) nebulizer solution  3 mL Q2HRS PRN (RT) Jarvis Arnold M.D.       • labetalol (NORMODYNE,TRANDATE) injection 10 mg  10 mg Q30 MIN PRN Constantin Self M.D.       • nicotine (NICODERM) 14 MG/24HR 14 mg  14 mg Daily-0600 Hazel Cruz M.D.   14 mg at 10/10/18 0612    And   • nicotine polacrilex (NICORETTE) 2 MG piece 2 mg  2 mg Q HOUR PRN Hazel Cruz M.D.         Last reviewed on 9/29/2018  4:47 PM by Sugey White  No Known Allergies    Physical Exam  Body mass index is 29.88 kg/m².  "Blood pressure 132/84, pulse (!) 107, temperature 36.9 °C (98.5 °F), resp. rate 18, height 1.575 m (5' 2\"), weight 74.1 kg (163 lb 5.8 oz), SpO2 93 %, not currently breastfeeding.   Vitals:    10/09/18 2014 10/10/18 0020 10/10/18 0420 10/10/18 0830   BP: 133/89 114/75 125/86 132/84   Pulse: (!) 109 (!) 108 (!) 116 (!) 107   Resp: 18 18 18 18   Temp: 36.3 °C (97.4 °F) 36.7 °C (98 °F) 36.6 °C (97.9 °F) 36.9 °C (98.5 °F)   SpO2: 94% 98% 98% 93%   Weight:       Height:        Oxygen Therapy:  Pulse Oximetry: 93 %, O2 (LPM): 1, O2 Delivery: Silicone Nasal Cannula    General: Sedated and intubated. Good color today  HEENT: Normocephalic, Atraumatic. Right subclavian in place. No stridor.   Cardiovascular: Normal heart rate, Normal rhythm. S1+, S2+ Unable to appreciate rub  Lungs: Diffuse wheeze throughout, crackles and rhonci. Symmetric chest expansion  Abdomen:  Soft, No tenderness  Skin: No erythema, No rash  Lower limbs: no edema.   Neurologic: Alert, following commands. Moving all 4 extremities. Strength intact.   Other systems also examined, grossly normal.       Labs (personally reviewed and notable for):   Recent Results (from the past 24 hour(s))   ACCU-CHEK GLUCOSE    Collection Time: 10/09/18  1:10 PM   Result Value Ref Range    Glucose - Accu-Ck 155 (H) 65 - 99 mg/dL   ACCU-CHEK GLUCOSE    Collection Time: 10/09/18  6:13 PM   Result Value Ref Range    Glucose - Accu-Ck 121 (H) 65 - 99 mg/dL   ACCU-CHEK GLUCOSE    Collection Time: 10/09/18 11:02 PM   Result Value Ref Range    Glucose - Accu-Ck 157 (H) 65 - 99 mg/dL   Basic Metabolic Panel (BMP)    Collection Time: 10/10/18  2:33 AM   Result Value Ref Range    Sodium 148 (H) 135 - 145 mmol/L    Potassium 4.0 3.6 - 5.5 mmol/L    Chloride 104 96 - 112 mmol/L    Co2 39 (H) 20 - 33 mmol/L    Glucose 126 (H) 65 - 99 mg/dL    Bun 35 (H) 8 - 22 mg/dL    Creatinine 0.40 (L) 0.50 - 1.40 mg/dL    Calcium 8.2 (L) 8.5 - 10.5 mg/dL    Anion Gap 5.0 0.0 - 11.9   CBC with " Differential    Collection Time: 10/10/18  2:33 AM   Result Value Ref Range    WBC 11.7 (H) 4.8 - 10.8 K/uL    RBC 4.30 4.20 - 5.40 M/uL    Hemoglobin 12.0 12.0 - 16.0 g/dL    Hematocrit 40.0 37.0 - 47.0 %    MCV 93.0 81.4 - 97.8 fL    MCH 27.9 27.0 - 33.0 pg    MCHC 30.0 (L) 33.6 - 35.0 g/dL    RDW 67.5 (H) 35.9 - 50.0 fL    Platelet Count 380 164 - 446 K/uL    MPV 11.7 9.0 - 12.9 fL    Nucleated RBC 0.00 /100 WBC    NRBC (Absolute) 0.00 K/uL    Neutrophils-Polys 78.30 (H) 44.00 - 72.00 %    Lymphocytes 10.40 (L) 22.00 - 41.00 %    Monocytes 8.70 0.00 - 13.40 %    Eosinophils 0.00 0.00 - 6.90 %    Basophils 0.00 0.00 - 1.80 %    Neutrophils (Absolute) 9.27 (H) 2.00 - 7.15 K/uL    Lymphs (Absolute) 1.22 1.00 - 4.80 K/uL    Monos (Absolute) 1.02 (H) 0.00 - 0.85 K/uL    Eos (Absolute) 0.00 0.00 - 0.51 K/uL    Baso (Absolute) 0.00 0.00 - 0.12 K/uL    Anisocytosis 1+    MAGNESIUM    Collection Time: 10/10/18  2:33 AM   Result Value Ref Range    Magnesium 2.1 1.5 - 2.5 mg/dL   Phosphorus    Collection Time: 10/10/18  2:33 AM   Result Value Ref Range    Phosphorus 2.4 (L) 2.5 - 4.5 mg/dL   ESTIMATED GFR    Collection Time: 10/10/18  2:33 AM   Result Value Ref Range    GFR If African American >60 >60 mL/min/1.73 m 2    GFR If Non African American >60 >60 mL/min/1.73 m 2   DIFFERENTIAL MANUAL    Collection Time: 10/10/18  2:33 AM   Result Value Ref Range    Bands-Stabs 0.90 0.00 - 10.00 %    Myelocytes 1.70 %    Manual Diff Status PERFORMED    PERIPHERAL SMEAR REVIEW    Collection Time: 10/10/18  2:33 AM   Result Value Ref Range    Peripheral Smear Review see below    PLATELET ESTIMATE    Collection Time: 10/10/18  2:33 AM   Result Value Ref Range    Plt Estimation Normal    MORPHOLOGY    Collection Time: 10/10/18  2:33 AM   Result Value Ref Range    RBC Morphology Present     Polychromia 1+     Ovalocytes 1+     Schistocytes 1+     Target Cells 2+    ACCU-CHEK GLUCOSE    Collection Time: 10/10/18  6:05 AM   Result Value  Ref Range    Glucose - Accu-Ck 148 (H) 65 - 99 mg/dL   ACCU-CHEK GLUCOSE    Collection Time: 10/10/18 11:07 AM   Result Value Ref Range    Glucose - Accu-Ck 155 (H) 65 - 99 mg/dL       Cardiac Imaging and Procedures Review:    EKG and telemetry tracings personally reviewed    Impression and Medical Decision Making:  Principal Problem:    Acute hypoxemic respiratory failure (HCC) POA: Yes  Active Problems:    Pulmonary edema POA: Yes    Pericardial effusion POA: Unknown    Pleural effusion POA: Unknown    HTN (hypertension) POA: Yes    History of non-Hodgkin's lymphoma POA: Unknown      Overview: Nonhodgkins lymphoma  2000    Hypotension POA: Unknown    Hypothyroidism POA: Yes    Tobacco abuse POA: Yes    Hypokalemia POA: No    Hypomagnesemia POA: Unknown    Hypernatremia POA: Unknown    Dysphagia POA: Unknown  Resolved Problems:    Hypophosphatemia POA: Unknown    Acute encephalopathy POA: Unknown        Assessment and Plan  Pericardial Effusion with Tamponade - Resolved  - Patient presented with large pericardial effusion. EKG with very low voltage on admission. Underwent drainage 10/1. 1000 cc of bloody pericardial fluid was obtained. Pericardial drain removed. She likely developed pericardial decompression syndrome post pericardiocentesis. Etiology not clear. Grossly was bloody (some traumatic component). Cytology negative for malignant cells. Unlikely to be infectious. GENTRY negative, RA elevated but CCP negative. Cryoglobulin pending  - Repeat Echo 10/8: small amount of pericardial fluid without hemodynamic compromise.    - Holding outpatient HCTZ and amlodipine. Resume when BP stabilizes   - Plan forthoracoscopic pericardial window with biopsy of pericardium once patient is stable       Tachycardia  - has been normal sinus rhythm and rate during her hospitalization however now appears to have inappropriate tachycardia.   - Echo did not demonstrate a hemodynamic significant pericardial effusion. EKG unremarkable  for underlying etiology  - Unclear etiology at this point. Will monitor       Pleural Effusion  - S/p thoracentesis. 1.7 liters have been removed.   - Plan forthoracoscopic pericardial window with biopsy of pericardium once patient is stable       Pulmonary Edema  - Has been diuresed significantly since admission however subsequently became intravascularly dry; treated with IV fluids. She unfortunately likely developed pericardial decompression syndrome post pericardiocentesis.   - Plan forthoracoscopic pericardial window with biopsy of pericardium      Hypernatremia  - Continue free water flushes.   - Monitor with BMP  - May need to consider further work-up for etiology of hypernatremia      It is my pleasure to participate in the care of Ms. Quigley.  Please do not hesitate to contact me with questions or concerns. Will continue to follow

## 2018-10-10 NOTE — CARE PLAN
Problem: Respiratory:  Goal: Respiratory status will improve  Outcome: PROGRESSING AS EXPECTED      Problem: Safety  Goal: Will remain free from injury  Outcome: PROGRESSING AS EXPECTED      Problem: Urinary Elimination:  Goal: Ability to reestablish a normal urinary elimination pattern will improve  Outcome: PROGRESSING AS EXPECTED      Problem: Pain Management  Goal: Pain level will decrease to patient's comfort goal  Outcome: PROGRESSING AS EXPECTED

## 2018-10-10 NOTE — PROGRESS NOTES
Assumed care of patient. Resting quietly in bed,  at bedside. Denies pain or difficulty breathing. Satting 96% on 1L. NG tube in place, tube feed infusing as ordered. Placement verified and residual checked, 0. Denies further needs at this time.    2245- Pt bed alarm going off, pt attempting to get out of bed to use bathroom. Incontinent of urine and stool down side of bed and floor. NG tube found to be tangled and slightly pulled. On hold at this time. Pt assisted to chair. Charge nurse Rivera at bedside to help assist pt back to bed and complete linen change and bath completed. NG tube appears to be pulled out 5cm. Re-advanced by Rivera MAGDALENO. Placement checked via air. Order for XR placed to confirm appropriate placement. TF on hold until verified. Pt resting quietly in bed. Re-educated that she needs to call for assistance before attempting to get out of bed. States understanding. No further needs at this time.    0215- XR results showing NG tip ending in distal esophagus, charge nurse Angeline notified, certified nurse to advance NG.

## 2018-10-10 NOTE — CARE PLAN
Problem: Respiratory:  Goal: Respiratory status will improve  Outcome: PROGRESSING AS EXPECTED  Tolerating 1L NC, no c/o SOB or difficulty breathing. Continue to wean as able.    Problem: Bowel/Gastric:  Goal: Normal bowel function is maintained or improved  Outcome: PROGRESSING AS EXPECTED  NG tube pulled slightly out, back in place and TF infusing as ordered.

## 2018-10-10 NOTE — PROGRESS NOTES
Assumed care of patient, bedside report received from Nj. Patient A&Ox4. Updated on POC, call light within reach and fall precautions in place. Bed locked and in lowest position. Patient instructed to call for assistance before getting out of bed. All questions answered, no other needs at this time.

## 2018-10-11 NOTE — THERAPY
PT tx attempted. RN asked to defer PT tx at this time, pt just placed in restraints and will be going for surgery later this afternoon. Will reattempt PT tx as able and appropriate.     Little Mahan, PT, DPT Pager: [phone]

## 2018-10-11 NOTE — DISCHARGE PLANNING
Care Transition Team Assessment    LSW met with pt's spouse at bedside, pt was asleep. Pt resides in [place], [place], with her , and pt's step-daughter will be moving into the home to provide care giving services. At this time, pt's d/c needs are unknown. Pt will be having bilateral chest tubes placed today and continues to have fluid accumulation.    Pt's , Isacc, is staying in [place] at a hotel, but after this week he will be staying in a vacation home near [employer] given it is about half the distance from there to [facility] vs his residence in [place]. At home pt has a cane, walker, shower chair, and raised toilet seat. Pt has never had O2 in the past. Prior to pt's admission (6 weeks before), she was independent with her ADL's, and Isacc states she would shop, clean, cook, engage in self-care, etc.     Information Source  Orientation : Oriented x 4 (confused to situations)  Information Given By: Spouse  Informant's Name: Isacc Gomes (ph# [phone])  Who is responsible for making decisions for patient? : Patient    Elopement Risk  Legal Hold: No  Ambulatory or Self Mobile in Wheelchair: No-Not an Elopement Risk  Disoriented: Situation-At Risk for Elopement  Psychiatric Symptoms: Impulsivity-at Risk for Elopement  History of Wandering: No  Elopement this Admit: No  Vocalizing Wanting to Leave: No  Displays Behaviors, Body Language Wanting to Leave: No-Not at Risk for Elopement  Elopement Risk: Not at Risk for Elopement  Personal Belongings: Hospital Clothing Only    Interdisciplinary Discharge Planning  Lives with - Patient's Self Care Capacity: Spouse  Patient or legal guardian wants to designate a caregiver (see row info): No  Housing / Facility: [address]  Prior Services: Home-Independent    Discharge Preparedness  What is your plan after discharge?: Uncertain - pending medical team collaboration  What are your discharge supports?: Child, Spouse  Prior Functional Level: Ambulatory, Independent  with Activities of Daily Living, Uses Cane, Uses Walker  Difficulity with ADLs: None  Difficulity with IADLs: None    Functional Assesment  Prior Functional Level: Ambulatory, Independent with Activities of Daily Living, Uses Cane, Uses Walker    Finances  Financial Barriers to Discharge: No  Prescription Coverage: Yes    Vision / Hearing Impairment  Vision Impairment : Yes  Right Eye Vision: Impaired, Wears Glasses  Left Eye Vision: Impaired, Wears Glasses  Hearing Impairment : No    Values / Beliefs / Concerns  Values / Beliefs Concerns : No    Domestic Abuse  Have you ever been the victim of abuse or violence?: No  Physical Abuse or Sexual Abuse: No  Verbal Abuse or Emotional Abuse: No  Possible Abuse Reported to:: Not Applicable    Discharge Risks or Barriers  Discharge risks or barriers?: No    Anticipated Discharge Information  Anticipated discharge disposition: Discharge needs currently unknown  Discharge Address: [address] [place] [id number]  Discharge Contact Phone Number: [phone]

## 2018-10-11 NOTE — PROGRESS NOTES
[facility] Cardiology Follow-up Consult Note  Date of note:    10/4/2018      Consulting Physician: Néstor Guillen M.D.    Patient ID:  Name:   Erin Quigley     YOB: 1940  Age:   78 y.o.  female   MRN:   [id number]    Chief Complaint   Patient presents with   • Shortness of Breath     3 weeks on and off   • Peripheral Edema     started 3 weeks ago in her feet and is now up to her thighs       Interim Events:  - Delirious this morning; in restraints due to pulling out lines and desatrating to 80s bc she is consistent pulling off her O2 mask  - Still tachycardiac  - Surgery on board: CT scan demonstrated moderate to large bilateral effusions with moderate pericardial effusion Plan for IR guided pigtail catheters bilaterally with hopefully thoracoscopic pericardial window with biopsy of pericardium next week.    - Etiology of effusions still unknown  - Bilateral drains to be placed today        ROS  Constitutional: Denies fevers or chills  Eyes: Denies changes in vision, no eye pain  Ears/Nose/Throat/Mouth: c/o hoarseness of voice. Dysphagia +   Cardiovascular: Denies chest pain or palpitations   Respiratory: Denies shortness of breath. Non-productive cough +  Gastrointestinal/Hepatic: Denies abdominal pain, nausea, vomiting, or diarrhea   Genitourinary: Denies dysuria  Neurological: Denies headache. Denied focal weakness/parasthesias  Psychiatric: Delirious this morning  All other systems were reviewed and are negative     Past medical, surgical, social, and family history reviewed and unchanged from admission except as noted in assessment and plan.    Medications: Reviewed in MAR  Current Facility-Administered Medications   Medication Dose Frequency Provider Last Rate Last Dose   • oxyCODONE immediate-release (ROXICODONE) tablet 2.5 mg  2.5 mg Q4HRS PRN Ricardo Torres M.D.        Or   • oxyCODONE immediate-release (ROXICODONE) tablet 5 mg  5 mg Q4HRS PRN Ricardo Torres M.D.       •  dexamethasone (DECADRON) tablet 4 mg  4 mg Q6HRS Ricardo Torres M.D.   4 mg at 10/11/18 0303   • ondansetron (ZOFRAN) syringe/vial injection 4 mg  4 mg Q4HRS PRN Ricardo Torres M.D.   4 mg at 10/10/18 1339   • phosphorus (K-PHOS-NEUTRAL, PHOSPHA 250 NEUTRAL) per tablet 1 Tab  1 Tab Q6HRS Ricardo Torres M.D.   Stopped at 10/11/18 0600   • insulin regular (HUMULIN R) injection 2-9 Units  2-9 Units Q6HRS Shekhar Gonzalez M.D.   Stopped at 10/11/18 0000    And   • glucose 4 g chewable tablet 16 g  16 g Q15 MIN PRN Shekhar Gonzalez M.D.        And   • dextrose 50% (D50W) injection 25 mL  25 mL Q15 MIN PRN Shekhar Gonzalez M.D.       • fentaNYL (SUBLIMAZE) injection  mcg   mcg Q HOUR PRN Jarvis Arnold M.D.   50 mcg at 10/07/18 1650   • racepinephrine (MICRONEFRIN) 2.25 % nebulizer solution 0.5 mL  0.5 mL Q4H PRN (RT) Jarvis Arnold M.D.   0.5 mL at 10/08/18 0737   • artificial tears 1.4 % ophthalmic solution 1 Drop  1 Drop Q2HRS PRN Jarvis Arnold M.D.       • heparin injection 5,000 Units  5,000 Units Q8HRS Néstor Guillen M.D.   5,000 Units at 10/11/18 0627   • acetaminophen (TYLENOL) tablet 650 mg  650 mg Q4HRS PRN Jarvis Arnold M.D.   650 mg at 10/07/18 2053    Or   • acetaminophen (TYLENOL) suppository 650 mg  650 mg Q4HRS PRN Jarvis Arnold M.D.       • Respiratory Care per Protocol   Continuous RT Alvaro Perez M.D.       • Pharmacy Consult: Enteral tube feeding - review meds/change route/product selection   PRN Néstor Guillen M.D.       • gabapentin (NEURONTIN) capsule 300 mg  300 mg BID Néstor Guillen M.D.   Stopped at 10/11/18 0600   • levothyroxine (SYNTHROID) tablet 88 mcg  88 mcg AM ES Néstor Guillen M.D.   Stopped at 10/11/18 0600   • senna-docusate (PERICOLACE or SENOKOT S) 8.6-50 MG per tablet 2 Tab  2 Tab BID Néstor Guillen M.D.   Stopped at 10/10/18 0600    And   • polyethylene glycol/lytes (MIRALAX) PACKET 1  "Packet  1 Packet QDAY PRN Néstor Guillen M.D.   1 Packet at 10/07/18 0852    And   • magnesium hydroxide (MILK OF MAGNESIA) suspension 30 mL  30 mL QDAY PRN Néstor Guillen M.D.        And   • bisacodyl (DULCOLAX) suppository 10 mg  10 mg QDAY PRN Néstor Guillen M.D.       • simvastatin (ZOCOR) tablet 20 mg  20 mg Q EVENING Néstor Guillen M.D.   20 mg at 10/10/18 1748   • ipratropium-albuterol (DUONEB) nebulizer solution  3 mL Q2HRS PRN (RT) Jarvis Arnold M.D.       • labetalol (NORMODYNE,TRANDATE) injection 10 mg  10 mg Q30 MIN PRN Constantin Self M.D.       • nicotine (NICODERM) 14 MG/24HR 14 mg  14 mg Daily-0600 Hazel Cruz M.D.   Stopped at 10/11/18 0600    And   • nicotine polacrilex (NICORETTE) 2 MG piece 2 mg  2 mg Q HOUR PRN Hazel Cruz M.D.         Last reviewed on 9/29/2018  4:47 PM by Sugey White  No Known Allergies    Physical Exam  Body mass index is 27.22 kg/m². Blood pressure 111/71, pulse (!) 106, temperature 36.2 °C (97.2 °F), resp. rate 18, height 1.575 m (5' 2\"), weight 67.5 kg (148 lb 13 oz), SpO2 91 %, not currently breastfeeding.   Vitals:    10/10/18 1610 10/10/18 2030 10/11/18 0103 10/11/18 0425   BP: 128/99 112/81 122/83 111/71   Pulse: (!) 140 (!) 107 (!) 111 (!) 106   Resp: 18 18 18 18   Temp: 36.7 °C (98 °F) 36.7 °C (98 °F) 36.6 °C (97.9 °F) 36.2 °C (97.2 °F)   SpO2: 96% 95% 92% 91%   Weight:  67.5 kg (148 lb 13 oz)     Height:        Oxygen Therapy:  Pulse Oximetry: 91 %, O2 (LPM): 2, O2 Delivery: Silicone Nasal Cannula      General: Agitated, but comfortable  HEENT: Normocephalic, Atraumatic. No stridor. Unable to evaluate JVD as patient is trying to get out of bed.   Cardiovascular: Normal heart rate, Normal rhythm. S1+, S2+ Unable to appreciate rub  Lungs: Diffuse wheeze throughout, crackles and rhonci. Symmetric chest expansion  Abdomen:  Soft, No tenderness  Skin: No erythema, No rash  Lower limbs: no edema.   Neurologic: Alert, but " delirious. Moving all 4 extremities. Strength intact.   Other systems also examined, grossly normal.       Labs (personally reviewed and notable for):   Recent Results (from the past 24 hour(s))   ACCU-CHEK GLUCOSE    Collection Time: 10/10/18 11:07 AM   Result Value Ref Range    Glucose - Accu-Ck 155 (H) 65 - 99 mg/dL   CREATINE KINASE    Collection Time: 10/10/18 12:38 PM   Result Value Ref Range    CPK Total 62 0 - 154 U/L   ACCU-CHEK GLUCOSE    Collection Time: 10/10/18  5:47 PM   Result Value Ref Range    Glucose - Accu-Ck 152 (H) 65 - 99 mg/dL   ACCU-CHEK GLUCOSE    Collection Time: 10/11/18 12:26 AM   Result Value Ref Range    Glucose - Accu-Ck 112 (H) 65 - 99 mg/dL   CBC WITHOUT DIFFERENTIAL    Collection Time: 10/11/18 12:55 AM   Result Value Ref Range    WBC 11.8 (H) 4.8 - 10.8 K/uL    RBC 4.02 (L) 4.20 - 5.40 M/uL    Hemoglobin 11.5 (L) 12.0 - 16.0 g/dL    Hematocrit 37.7 37.0 - 47.0 %    MCV 93.8 81.4 - 97.8 fL    MCH 28.6 27.0 - 33.0 pg    MCHC 30.5 (L) 33.6 - 35.0 g/dL    RDW 67.3 (H) 35.9 - 50.0 fL    Platelet Count 382 164 - 446 K/uL    MPV 11.5 9.0 - 12.9 fL   BASIC METABOLIC PANEL    Collection Time: 10/11/18 12:55 AM   Result Value Ref Range    Sodium 145 135 - 145 mmol/L    Potassium 4.1 3.6 - 5.5 mmol/L    Chloride 102 96 - 112 mmol/L    Co2 38 (H) 20 - 33 mmol/L    Glucose 122 (H) 65 - 99 mg/dL    Bun 34 (H) 8 - 22 mg/dL    Creatinine 0.44 (L) 0.50 - 1.40 mg/dL    Calcium 8.8 8.5 - 10.5 mg/dL    Anion Gap 5.0 0.0 - 11.9   ESTIMATED GFR    Collection Time: 10/11/18 12:55 AM   Result Value Ref Range    GFR If African American >60 >60 mL/min/1.73 m 2    GFR If Non African American >60 >60 mL/min/1.73 m 2       Cardiac Imaging and Procedures Review:    EKG and telemetry tracings personally reviewed    Impression and Medical Decision Making:  Principal Problem:    Acute hypoxemic respiratory failure (HCC) POA: Yes  Active Problems:    Pulmonary edema POA: Yes    Pericardial effusion POA: Yes     Pleural effusion POA: Yes    HTN (hypertension) POA: Yes    History of non-Hodgkin's lymphoma POA: Yes      Overview: Nonhodgkins lymphoma  2000    Hypotension POA: No    Hypothyroidism POA: Yes    Tobacco abuse POA: Yes    Hypokalemia POA: No    Hypomagnesemia POA: No    Hypernatremia POA: No    Dysphagia POA: Yes  Resolved Problems:    Hypophosphatemia POA: No    Acute encephalopathy POA: Unknown        Assessment and Plan  Pericardial Effusion with Tamponade   - Patient presented with large pericardial effusion. EKG with very low voltage on admission. Underwent drainage 10/1. 1000 cc of bloody pericardial fluid was obtained. Pericardial drain removed. She likely developed pericardial decompression syndrome post pericardiocentesis. Etiology not clear. Grossly was bloody (some traumatic component). Cytology negative for malignant cells. Unlikely to be infectious. GENTRY negative, RA elevated but CCP negative.  - Will get stat echo again as patient is persistently tachycardia and CT with significant effusion.   - Per Dr. Cain: semielective VATS pericardial biopsy sometime next week. She may need pericardial drain with pericardial window      Tachycardia  - has been normal sinus rhythm and rate during her hospitalization however now appears to have inappropriate tachycardia.   - repeating echo;   - Unclear etiology at this point. May be contributed by the effusions and some anxiety component. Will monitor       Pleural Effusion  - S/p thoracentesis. 1.7 liters have been removed.   - Surgery on board: plan for IR guided bilateral pigtail catheters with drains today       Pulmonary Edema  - Has been diuresed significantly since admission however subsequently became intravascularly dry; treated with IV fluids. She unfortunately likely developed pericardial decompression syndrome post pericardiocentesis.   - Plan forthoracoscopic pericardial window with biopsy of pericardium likely Monday  - Surgery on board: plan for IR  guided bilateral pigtail catheters with drains today       Hypernatremia  - Improving with free water flushes.   - Monitor with BMP  - May need to consider further work-up for etiology of hypernatremia      Hypertension  - Holding outpatient HCTZ and amlodipine. Resume when BP stabilizes     It is my pleasure to participate in the care of Ms. Quigley.  Please do not hesitate to contact me with questions or concerns. Will continue to follow

## 2018-10-11 NOTE — PROGRESS NOTES
Patient confused and restless, pulling off lines and tele.  frustrating her , desats when she takes O2 off to 80s continue to educate the importance of keeping her O2 on. NG tube feeds stopped at midnoc for surgery today. Patient inc. Encouraged sitting in chair and standing at bedside with poor effect. meds held this am due to NPO status. Non productive cough, chest PT therapy with oral suctioning and good mouth care provided.

## 2018-10-11 NOTE — CONSULTS
Surgical Consultation    Date: 10/10/2018    Requesting Physician: Dr. Torres  PCP: Jay Sanchez M.D.  Consulting Physician: Jarvis Cain M.D.    CC: Shortness of breath    HPI: This is a 78 y.o. female who is presenting with a history of approximate 6 weeks of [place] swelling bilaterally which then progressed to shortness of breath. She was referred to the emergency department in [facility] which is near where she lives where an echocardiogram was performed showing tamponade physiology. She was transferred here for further management. She did undergo pericardiocentesis with improvement. She has been evaluated for various etiologies and is currently on steroids. Her shortness of breath is improved and she has undergone diuresis with improvement of her edema. She remains on moderate flow nasal cannula, and is persistently tachycardic. Cardiology has requested evaluation for pericardial biopsy to help further delineate etiology. She currently denies any fever, chills, nausea, vomiting. She is unable to swallow well after extubation and is being fed via a nasogastric tube.      Past Medical History:   Diagnosis Date   • Arthritis    • Cancer (HCC)     Nonhodgkins lymphoma  2000   • Hypertension    • Unspecified disorder of thyroid     hypothyroid       Past Surgical History:   Procedure Laterality Date   • CERVICAL FUSION POSTERIOR  10/2/2009    Performed by RICARDO IYER at SURGERY [facility] ORS   • CERVICAL LAMINECTOMY POSTERIOR  10/2/2009    Performed by RICARDO IYER at SURGERY [facility] ORS   • GYN SURGERY      Hysterectomy  2003   • OTHER      colonoscopy and endoscopy       Current Facility-Administered Medications   Medication Dose Route Frequency Provider Last Rate Last Dose   • oxyCODONE immediate-release (ROXICODONE) tablet 2.5 mg  2.5 mg Feeding Tube Q4HRS PRTWYLA Torres M.D.        Or   • oxyCODONE immediate-release (ROXICODONE) tablet 5 mg  5 mg Feeding Tube Q4HRS PRN Ricardo Torres  M.D.       • dexamethasone (DECADRON) tablet 4 mg  4 mg Feeding Tube Q6HRS Ricardo Torres M.D.   4 mg at 10/10/18 1340   • ondansetron (ZOFRAN) syringe/vial injection 4 mg  4 mg Intravenous Q4HRS PRN Ricardo Torres M.D.   4 mg at 10/10/18 1339   • phosphorus (K-PHOS-NEUTRAL, PHOSPHA 250 NEUTRAL) per tablet 1 Tab  1 Tab Oral Q6HRS Ricardo Torres M.D.       • insulin regular (HUMULIN R) injection 2-9 Units  2-9 Units Subcutaneous Q6HRS Shekhar Gonzalez M.D.   2 Units at 10/10/18 1109    And   • glucose 4 g chewable tablet 16 g  16 g Oral Q15 MIN PRN Shekhar Gonzalez M.D.        And   • dextrose 50% (D50W) injection 25 mL  25 mL Intravenous Q15 MIN PRN Shekhar Gonzalez M.D.       • fentaNYL (SUBLIMAZE) injection  mcg   mcg Intravenous Q HOUR PRN Jarvis Arnold M.D.   50 mcg at 10/07/18 1650   • racepinephrine (MICRONEFRIN) 2.25 % nebulizer solution 0.5 mL  0.5 mL Nebulization Q4H PRN (RT) Jarvis Arnold M.D.   0.5 mL at 10/08/18 0737   • artificial tears 1.4 % ophthalmic solution 1 Drop  1 Drop Both Eyes Q2HRS PRN Jarvis Arnold M.D.       • heparin injection 5,000 Units  5,000 Units Subcutaneous Q8HRS Néstor Guillen M.D.   5,000 Units at 10/10/18 1339   • acetaminophen (TYLENOL) tablet 650 mg  650 mg Feeding Tube Q4HRS PRN Jarvis Arnold M.D.   650 mg at 10/07/18 2053    Or   • acetaminophen (TYLENOL) suppository 650 mg  650 mg Rectal Q4HRS PRN Jarvis Arnold M.D.       • Respiratory Care per Protocol   Nebulization Continuous RT Alvaro Perez M.D.       • Pharmacy Consult: Enteral tube feeding - review meds/change route/product selection   Other PRN Néstor Guillen M.D.       • gabapentin (NEURONTIN) capsule 300 mg  300 mg Per NG Tube BID Néstor Guillen M.D.   300 mg at 10/10/18 0612   • levothyroxine (SYNTHROID) tablet 88 mcg  88 mcg Per NG Tube AM ES Néstor Guillen M.D.   88 mcg at 10/10/18 0613   • senna-docusate (PERICOLACE or  "SENOKOT S) 8.6-50 MG per tablet 2 Tab  2 Tab Per NG Tube BID Néstor Guillen M.D.   Stopped at 10/10/18 0600    And   • polyethylene glycol/lytes (MIRALAX) PACKET 1 Packet  1 Packet Per NG Tube QDAY PRN Néstor Guillen M.D.   1 Packet at 10/07/18 0852    And   • magnesium hydroxide (MILK OF MAGNESIA) suspension 30 mL  30 mL Per NG Tube QDAY PRN Néstor Guillen M.D.        And   • bisacodyl (DULCOLAX) suppository 10 mg  10 mg Rectal QDAY PRN Néstor Guillen M.D.       • simvastatin (ZOCOR) tablet 20 mg  20 mg Per NG Tube Q EVENING Néstor Guillen M.D.   20 mg at 10/09/18 1805   • ipratropium-albuterol (DUONEB) nebulizer solution  3 mL Nebulization Q2HRS PRN (RT) Jarvis Arnold M.D.       • labetalol (NORMODYNE,TRANDATE) injection 10 mg  10 mg Intravenous Q30 MIN PRN Constantin Self M.D.       • nicotine (NICODERM) 14 MG/24HR 14 mg  14 mg Transdermal Daily-0600 Hazel Cruz M.D.   14 mg at 10/10/18 0612    And   • nicotine polacrilex (NICORETTE) 2 MG piece 2 mg  2 mg Oral Q HOUR PRN Hazel Cruz M.D.           Social History     Social History   • Marital status:      Spouse name: N/A   • Number of children: N/A   • Years of education: N/A     Occupational History   • Not on file.     Social History Main Topics   • Smoking status: Current Every Day Smoker     Types: Cigarettes   • Smokeless tobacco: Never Used   • Alcohol use Yes      Comment: occ   • Drug use: No   • Sexual activity: Not on file     Other Topics Concern   • Not on file     Social History Narrative   • No narrative on file       History reviewed. No pertinent family history.    Allergies:  Patient has no known allergies.    Review of Systems:  Negative except as noted above in the HPI on 10 point review    Physical Exam:  Blood pressure 128/99, pulse (!) 140, temperature 36.7 °C (98 °F), resp. rate 18, height 1.575 m (5' 2\"), weight 74.1 kg (163 lb 5.8 oz), SpO2 96 %, not currently " breastfeeding.    Constitutional: she is oriented to person, place, and time.  she appears fatigued and weak   Head: Normocephalic and atraumatic.   Neck: Normal range of motion. Neck supple. No JVD present. No tracheal deviation present.   Cardiovascular:  Mild tachycardia, regular rhythm  Pulmonary/Chest: Diminished breath sounds bilaterally, no overt respiratory distress, no stridor  Abdominal: Soft, nontender, nondistended.  Musculoskeletal: Normal range of motion. she exhibits minimal bilateral lower extremity edema and no tenderness.   Neurological: she is alert and oriented to person, place, and time. No gross deficits noted  Skin: Skin is warm and dry. No rash noted. she is not diaphoretic. No erythema.    Psychiatric: she has a normal mood and affect.  Behavior is normal.       Labs:  Recent Labs      10/08/18   0440  10/09/18   0420  10/10/18   0233   WBC  12.5*  9.2  11.7*   RBC  4.19*  4.11*  4.30   HEMOGLOBIN  11.9*  11.4*  12.0   HEMATOCRIT  38.1  37.5  40.0   MCV  90.9  91.2  93.0   MCH  28.4  27.7  27.9   MCHC  31.2*  30.4*  30.0*   RDW  67.2*  66.8*  67.5*   PLATELETCT  298  339  380   MPV  11.5  11.6  11.7     Recent Labs      10/08/18   0440  10/09/18   0420  10/10/18   0233   SODIUM  147*  148*  148*   POTASSIUM  3.9  4.0  4.0   CHLORIDE  104  106  104   CO2  38*  35*  39*   GLUCOSE  177*  173*  126*   BUN  28*  34*  35*   CREATININE  0.40*  0.37*  0.40*   CALCIUM  8.7  8.4*  8.2*         Recent Labs      10/08/18   0440  10/09/18   0420   ASTSGOT  57*  53*   ALTSGPT  59*  73*   TBILIRUBIN  0.4  0.4   ALKPHOSPHAT  71  72   GLOBULIN  2.9  2.9       Radiology:  CT-CHEST (THORAX) WITH   Final Result      1.  Moderate to large bilateral pleural effusions with bilateral atelectasis.      2.  Moderate pericardial effusion.      3.  Patchy groundglass opacities within the residual aerated upper lobes bilaterally.      4.  Fatty liver.      5.  Atherosclerotic vascular calcification.             DX-ABDOMEN FOR TUBE PLACEMENT   Final Result         1.  Nonspecific bowel gas pattern.   2.  Dobbhoff tube tip terminates overlying the expected location of the gastric body.   3.  Left lung base infiltrate and/or layering pleural effusion.      [id number] VIEW   Final Result         1.  Nonspecific bowel gas pattern.   2.  Dobbhoff tube tip terminates in the distal esophagus, recommend advancement.   3.  Bilateral lower lobe infiltrates and layering pleural effusions.   4.  Cardiomegaly.      DX-CHEST-LIMITED (1 VIEW)   Final Result      Bilateral pleural effusions with overlying atelectasis/consolidation are again seen.      Mild interstitial prominence may represent mild edema.      Atherosclerotic plaque.         DX-ABDOMEN FOR TUBE PLACEMENT   Final Result      Enteric tube projects over the distal stomach.         EC-ECHOCARDIOGRAM COMPLETE W/O CONT   Final Result      DX-CHEST-PORTABLE (1 VIEW)   Final Result      No significant interval change.      DX-CHEST-PORTABLE (1 VIEW)   Final Result      No significant interval change.      EC-ECHOCARDIOGRAM LTD W/O CONT   Final Result      DX-CHEST-PORTABLE (1 VIEW)   Final Result         1.  Pulmonary edema and/or infiltrates are identified, which are stable since the prior exam. Layering bilateral pleural effusions, greater on the left.   2.  Atherosclerosis               DX-CHEST-PORTABLE (1 VIEW)   Final Result         1.  Pulmonary edema and/or infiltrates are identified, which are stable since the prior exam.   2.  Small right pleural effusion   3.  Atherosclerosis            DX-CHEST-PORTABLE (1 VIEW)   Final Result         1.  Pulmonary edema and/or infiltrates are identified, which are stable since the prior exam.   2.  Small right pleural effusion   3.  Atherosclerosis         DX-CHEST-PORTABLE (1 VIEW)   Final Result         1.  Pulmonary edema and/or infiltrates are identified, which are stable since the prior exam.   2.  Small right pleural  effusion   3.  Atherosclerosis      DX-ABDOMEN FOR TUBE PLACEMENT   Final Result      Feeding tube placement with the tip projecting over the stomach body.      DX-ABDOMEN FOR TUBE PLACEMENT   Final Result      1.  Feeding tube appears looped within the stomach. The distal aspect of the tip is directed cephalad in the fundal region.      DX-CHEST-LIMITED (1 VIEW)   Final Result         1.  Pulmonary edema and/or infiltrates are identified, which are stable since the prior exam.   2.  Small right pleural effusion   3.  Atherosclerosis      DX-CHEST-LIMITED (1 VIEW)   Final Result         1.  Pulmonary edema and/or infiltrates are identified, which are stable since the prior exam.   2.  Atherosclerosis      DX-CHEST-PORTABLE (1 VIEW)   Final Result         1.  Pulmonary edema and/or infiltrates are identified, which are stable since the prior exam.   2.  Atherosclerosis      DX-CHEST-LIMITED (1 VIEW)   Final Result      1.  No pneumothorax identified status post right thoracentesis and pericardiocentesis.      2.  Small amount of pneumomediastinum is likely related to recent pericardiocentesis and drain placement      3.  Cardiomegaly      EC-ECHOCARDIOGRAM LTD W/O CONT   Final Result      DX-CHEST-PORTABLE (1 VIEW)   Final Result      1.  Evacuation large right pleural effusion status post thoracentesis.   2.  No definite right pneumothorax identified. Recommend interval follow-up chest x-ray.   3.  Small left pleural effusion and left perihilar/left lung base atelectasis.   4.  Findings discussed with the patient's nurse. Follow-up chest x-ray will be obtained in approximately 3:00 PM      US-THORACENTESIS PUNCTURE RIGHT   Final Result      1. Ultrasound guided right sided diagnostic and therapeutic thoracentesis.      2. 1700 mL of fluid withdrawn.      EC-ECHOCARDIOGRAM COMPLETE W/O CONT   Final Result      DX-CHEST-PORTABLE (1 VIEW)   Final Result         1.  Pulmonary edema and/or infiltrates are identified,  which are stable since the prior exam.   2.  Layering right pleural effusion, stable   3.  Cardiomegaly   4.  Atherosclerosis      DX-CHEST-2 VIEWS   Final Result      1.  Probable bilateral atelectasis      2.  Probable right pleural effusion which may be significant in size      3.  Limited assessment on one view portable radiography      OUTSIDE IMAGES-DX CHEST   Final Result      OUTSIDE IMAGES-DX CHEST   Final Result      EC-ECHOCARDIOGRAM LTD W/O CONT    (Results Pending)       Assessment: This is a 78 y.o. female admitted with bilateral pleural effusions and a pericardial effusion with temperature physiology requiring pericardiocentesis. So far, laboratory evaluation has not elicited a clear diagnosis. Possibilities include infectious, inflammatory, or possibly malignant causes. Based on CT chest today, she still has bilateral moderate pleural effusions and small to moderate pericardial effusion. No obvious masses noted. She is hypernatremic, but I would favor this to be related to diuresis. Paraneoplastic syndromes less likely as these were tender typically cause hyponatremia and/or hypercalcemia.  Patient is started on steroids.      Recommendations:   -Recommend continued medical optimization and supportive care, and management of hyponatremia  -Recommend bilateral image guided (IR) pigtail drain placement for the bilateral pleural effusions which are not small. These drains should be placed to bilateral Pleur-evac drain on -20 suction. Recommend sending fluid for cytologic and microbiologic analysis  -Recommend continued medical optimization prior to plans for semielective VATS pericardial biopsy, especially since the patient is already on steroids.  The soonest that this would be performed of the Monday of next week  -If the patient has recurrent temperature physiology, recommend pericardial drain placement, after which I would perform VATS pericardial window  -I discussed the above recommendations with  the patient and her , as well as Dr. Torres for the medicine team.    Jarvis Cain M.D.  [facility]  [phone]

## 2018-10-11 NOTE — CARE PLAN
Problem: Fluid Volume:  Goal: Will maintain balanced intake and output  Outcome: PROGRESSING AS EXPECTED  Patient NPO after midnoc for pigtail drains procedure    Problem: Safety  Goal: Will remain free from injury  Outcome: PROGRESSING AS EXPECTED

## 2018-10-11 NOTE — CARE PLAN
Problem: Safety  Goal: Will remain free from falls  Outcome: PROGRESSING AS EXPECTED      Problem: Knowledge Deficit  Goal: Knowledge of disease process/condition, treatment plan, diagnostic tests, and medications will improve  Outcome: PROGRESSING AS EXPECTED      Problem: Mobility  Goal: Risk for activity intolerance will decrease  Outcome: PROGRESSING AS EXPECTED      Problem: Psychosocial Needs:  Goal: Level of anxiety will decrease  Outcome: PROGRESSING AS EXPECTED

## 2018-10-11 NOTE — PROGRESS NOTES
[facility] Hospitalist Progress Note    Date of Service: 10/11/2018    Chief Complaint    78 y.o. Female from [facility] admitted 2018 with shortness of breath and peripheral edema found to have significant pericardial effusion.    Pericardial effusion tapped 10/1  Intubated 10/2  Extubated 10/7      Interval Problem Update    on tele    More confused today    CT chest from 10/10 shows moderate b/l pleural effusions and moderate pericardial effusions      Dr alvarez surgery- recommends b/l pigtail tube to address pleural effusions--> ordered        Sodium is better at 145    Consultants/Specialty  Cardiology  pulmonology      Disposition  pending        Review of Systems   Unable to perform ROS: Mental status change      Physical Exam  Laboratory/Imaging   Hemodynamics  Temp (24hrs), Av.3 °C (97.3 °F), Min:35.8 °C (96.4 °F), Max:36.7 °C (98 °F)   Temperature: 36.1 °C (97 °F)  Pulse  Av.7  Min: 10  Max: 140   Blood Pressure : 121/78      Respiratory      Respiration: 12, Pulse Oximetry: 90 %     Work Of Breathing / Effort: Mild  RUL Breath Sounds: Transmitted Upper Airway Sound, RML Breath Sounds: Transmitted Upper Airway Sound, RLL Breath Sounds: Diminished, LORI Breath Sounds: Transmitted Upper Airway Sound, LLL Breath Sounds: Diminished    Fluids    Intake/Output Summary (Last 24 hours) at 10/11/18 1611  Last data filed at 10/11/18 1000   Gross per 24 hour   Intake             1140 ml   Output              100 ml   Net             1040 ml       Nutrition  Orders Placed This Encounter   Procedures   • Diet NPO     Standing Status:   Standing     Number of Occurrences:   8     Order Specific Question:   Restrict to:     Answer:   Strict [1]     Physical Exam   Constitutional: She appears well-developed and well-nourished. No distress.   HENT:   Head: Normocephalic and atraumatic.   Neck: No JVD present.   Cardiovascular:   tachycardia   Pulmonary/Chest: Effort normal. No stridor. No respiratory distress.  She has no wheezes.   Decreased bs at bases   Abdominal: Soft. There is no tenderness. There is no rebound and no guarding.   Musculoskeletal: She exhibits edema.   Neurological: She is alert.   O x 2   Skin: Skin is warm and dry. No rash noted. She is not diaphoretic.   Psychiatric: She has a normal mood and affect. Thought content normal.   Nursing note and vitals reviewed.      Recent Labs      10/09/18   0420  10/10/18   0233  10/11/18   0055   WBC  9.2  11.7*  11.8*   RBC  4.11*  4.30  4.02*   HEMOGLOBIN  11.4*  12.0  11.5*   HEMATOCRIT  37.5  40.0  37.7   MCV  91.2  93.0  93.8   MCH  27.7  27.9  28.6   MCHC  30.4*  30.0*  30.5*   RDW  66.8*  67.5*  67.3*   PLATELETCT  339  380  382   MPV  11.6  11.7  11.5     Recent Labs      10/09/18   0420  10/10/18   0233  10/11/18   0055   SODIUM  148*  148*  145   POTASSIUM  4.0  4.0  4.1   CHLORIDE  106  104  102   CO2  35*  39*  38*   GLUCOSE  173*  126*  122*   BUN  34*  35*  34*   CREATININE  0.37*  0.40*  0.44*   CALCIUM  8.4*  8.2*  8.8                      Assessment/Plan     * Acute hypoxemic respiratory failure (HCC)- (present on admission)   Assessment & Plan    Extubated on 10/7/2018  on Decadron for stridor    Continue oxygen and RT protocol        Pleural effusion- (present on admission)   Assessment & Plan    Status post thoracentesis on October 1d    Diuresis as needed    B/l pigtail tubes to be placed        Pericardial effusion- (present on admission)   Assessment & Plan    Cardiology following   Status post pericardial drain placement  Drain removed on 10/3/2018  Cytology negative fluid cultures negative  GENTRY negative RA factor positive CCP negative     trial of steroids       Etiology is unclear and patient may eventually need pericardial biopsy for definitive diagnosis -> surgery is aware            Pulmonary edema- (present on admission)   Assessment & Plan    Monitor fluid status   Diuresis as needed        Hypotension   Assessment & Plan    Relative  adrenal insufficiency    Appears resolved for now            History of non-Hodgkin's lymphoma- (present on admission)   Assessment & Plan    Hx of         HTN (hypertension)- (present on admission)   Assessment & Plan    All bp medications held        Dysphagia- (present on admission)   Assessment & Plan    Continue with speech therapy            Hypernatremia   Assessment & Plan    water flushes , i300 cc q6          Hypomagnesemia   Assessment & Plan    Repleted        Hypokalemia   Assessment & Plan    Repleted        Tobacco abuse- (present on admission)   Assessment & Plan    Continue NicoDerm   on cessation when mental status improved        Hypothyroidism- (present on admission)   Assessment & Plan    Continue levothyroxine          Quality-Core Measures   Reviewed items::  EKG reviewed, Labs reviewed, Medications reviewed and Radiology images reviewed  [place] catheter::  No [place]  DVT prophylaxis pharmacological::  Heparin  DVT prophylaxis - mechanical:  SCDs  Ulcer Prophylaxis::  Not indicated      Check am cbc, bmp

## 2018-10-11 NOTE — PROGRESS NOTES
2 RN skin check with Berna  Ears red but blanching, foam pads applied. Heels dry and calloused. All pressure points blanching and no issues noted

## 2018-10-11 NOTE — PROGRESS NOTES
Assumed care of patient, bedside report received from Diana. Updated on POC, call light within reach and fall precautions in place. Bed locked and in lowest position. Patient instructed to call for assistance before getting out of bed. All questions answered, no other needs at this time.

## 2018-10-11 NOTE — PROGRESS NOTES
"In AM patient became agitated, pulling at lines and attempting to get out of bed. Patient was confused and refusing to stay in bed and continuing to try to pull out cortrak. Bilateral wrist soft restraints initiated for patients safety to prevent from pulling out lines and Cortrak.  came to bedside and removed restraints.  Isacc educated on the need for restraints but refused to listen. Stated \" if you put them back on ill take them right back off\".  Patient at the time is calm with  and no longer pulling at lines. Brian informed.  agreed to restraints when he leaves and after drains placed. Brian updated and too agreed.   "

## 2018-10-12 NOTE — CONSULTS
Internal Medicine Admitting History and Physical    Note Author: Benjamin Alatorre M.D.       Name Erin Quigley       1940   Age/Sex 78 y.o. female   MRN [id number]   Code Status FULL       Chief Complaint:  Chief Complaint   Patient presents with   • Shortness of Breath     3 weeks on and off   • Peripheral Edema     started 3 weeks ago in her feet and is now up to her thighs       HPI:   Pt is a 79 yo F presented to the ED on  for SOB. She was noted to have pericardial/pleural effusions which were believed to be the source of her symptoms. On 10/1 she underwent right thoracentesis and pericardiocentesis, a pericardial drain was placed at that time. Patient symptomatically worsened however and was transferred to the ICU where she required intubation on 10/2. Pericardial drain was removed on 10/3. Analysis of these fluids suggested exudate but cytology was not suggestive of malignancy. Rheumatologic workup has been negative aside from elevated rheumatoid factor. She improved and was extubated on 10/7. Cardiology was consulted who have been diuresing patient and following her pericardial effusion with repeat echos. CT on 10/10 showed worsening effusions and congestion as well as ground glass opacities in upper lobes b/l. Around this time pt had become considerably more delirious. Surgery was involved who recommended b/l pigtail catheters for her effusions, this was scheduled with IR however pt refused. She was rescheduled after becoming more agreeable and will possible undergo this in the near future. Surgery plans for pericardial window and bx next week for further workup. Pulmonary was consulted at the urging of the  and due to the patients recurring effusions.     Review of Systems   Unable to perform ROS: Mental status change             Past Medical History:   Past Medical History:   Diagnosis Date   • Arthritis    • Cancer (HCC)     Nonhodgkins lymphoma     • Hypertension    •  "Unspecified disorder of thyroid     hypothyroid       Past Surgical History:  Past Surgical History:   Procedure Laterality Date   • CERVICAL FUSION POSTERIOR  10/2/2009    Performed by RICARDO IYER at [facility]   • CERVICAL LAMINECTOMY POSTERIOR  10/2/2009    Performed by RICARDO IYER at [facility]   • GYN SURGERY      Hysterectomy  2003   • OTHER      colonoscopy and endoscopy       Current Outpatient Medications:  Home Medications     Reviewed by Analisa Dowling R.N. (Registered Nurse) on 09/29/18 at 0207  Med List Status: Partial   Medication Last Dose Status   levothyroxine (SYNTHROID) 88 MCG Tab 9/28/2018 Active   NON SPECIFIED 9/28/2018 Active   oxymetazoline, icn,  0.025% (AFRIN) 9/28/2018 Active                Medication Allergy/Sensitivities:  No Known Allergies      Family History:  History reviewed. No pertinent family history.    Social History:  Social History     Social History   • Marital status:      Spouse name: N/A   • Number of children: N/A   • Years of education: N/A     Occupational History   • Not on file.     Social History Main Topics   • Smoking status: Current Every Day Smoker     Types: Cigarettes   • Smokeless tobacco: Never Used   • Alcohol use Yes      Comment: occ   • Drug use: No   • Sexual activity: Not on file     Other Topics Concern   • Not on file     Social History Narrative   • No narrative on file     Living situation: Home with   PCP : Jay Sanchez M.D.      Physical Exam     Vitals:    10/11/18 2020 10/12/18 0010 10/12/18 0502 10/12/18 0821   BP:  129/82 108/57 122/74   Pulse:  (!) 101 62 70   Resp: 20 20 16 15   Temp: 36.5 °C (97.7 °F) 36.6 °C (97.8 °F) 36.4 °C (97.5 °F) 36.4 °C (97.5 °F)   SpO2: 93% 90% 94% 94%   Weight:       Height:         Body mass index is 27.22 kg/m².  /74   Pulse 70   Temp 36.4 °C (97.5 °F)   Resp 15   Ht 1.575 m (5' 2\")   Wt 67.5 kg (148 lb 13 oz)   SpO2 94%   Breastfeeding? No   BMI " 27.22 kg/m²   O2 therapy: Pulse Oximetry: 94 %, O2 (LPM): 3, O2 Delivery: Silicone Nasal Cannula    Physical Exam   Constitutional: She appears unhealthy.   HENT:   Head: Normocephalic and atraumatic.   Eyes: Pupils are equal, round, and reactive to light.   Cardiovascular: Regular rhythm.  Tachycardia present.    Pulmonary/Chest: She has decreased breath sounds in the right lower field, the left middle field and the left lower field.   Abdominal: Soft. Bowel sounds are normal. She exhibits no distension. There is no tenderness.   Musculoskeletal: She exhibits no edema or deformity.   Neurological: She is alert. She is agitated and disoriented.   Skin: Skin is warm and dry.       Data Review       Old Records Request:   Completed  Current Records review/summary: Completed    Lab Data Review:  Recent Results (from the past 24 hour(s))   ACCU-CHEK GLUCOSE    Collection Time: 10/11/18  5:36 PM   Result Value Ref Range    Glucose - Accu-Ck 103 (H) 65 - 99 mg/dL   ACCU-CHEK GLUCOSE    Collection Time: 10/12/18  5:20 AM   Result Value Ref Range    Glucose - Accu-Ck 130 (H) 65 - 99 mg/dL       Imaging/Procedures Review:    Independant Imaging Review: Completed  DX-CHEST-PORTABLE (1 VIEW)   Final Result         1. No significant interval change.      EC-ECHOCARDIOGRAM LTD W/O CONT   Final Result      CT-CHEST (THORAX) WITH   Final Result      1.  Moderate to large bilateral pleural effusions with bilateral atelectasis.      2.  Moderate pericardial effusion.      3.  Patchy groundglass opacities within the residual aerated upper lobes bilaterally.      4.  Fatty liver.      5.  Atherosclerotic vascular calcification.            DX-ABDOMEN FOR TUBE PLACEMENT   Final Result         1.  Nonspecific bowel gas pattern.   2.  Dobbhoff tube tip terminates overlying the expected location of the gastric body.   3.  Left lung base infiltrate and/or layering pleural effusion.      [id number] VIEW   Final Result         1.   Nonspecific bowel gas pattern.   2.  Dobbhoff tube tip terminates in the distal esophagus, recommend advancement.   3.  Bilateral lower lobe infiltrates and layering pleural effusions.   4.  Cardiomegaly.      DX-CHEST-LIMITED (1 VIEW)   Final Result      Bilateral pleural effusions with overlying atelectasis/consolidation are again seen.      Mild interstitial prominence may represent mild edema.      Atherosclerotic plaque.         DX-ABDOMEN FOR TUBE PLACEMENT   Final Result      Enteric tube projects over the distal stomach.         EC-ECHOCARDIOGRAM COMPLETE W/O CONT   Final Result      DX-CHEST-PORTABLE (1 VIEW)   Final Result      No significant interval change.      DX-CHEST-PORTABLE (1 VIEW)   Final Result      No significant interval change.      EC-ECHOCARDIOGRAM LTD W/O CONT   Final Result      DX-CHEST-PORTABLE (1 VIEW)   Final Result         1.  Pulmonary edema and/or infiltrates are identified, which are stable since the prior exam. Layering bilateral pleural effusions, greater on the left.   2.  Atherosclerosis               DX-CHEST-PORTABLE (1 VIEW)   Final Result         1.  Pulmonary edema and/or infiltrates are identified, which are stable since the prior exam.   2.  Small right pleural effusion   3.  Atherosclerosis            DX-CHEST-PORTABLE (1 VIEW)   Final Result         1.  Pulmonary edema and/or infiltrates are identified, which are stable since the prior exam.   2.  Small right pleural effusion   3.  Atherosclerosis         DX-CHEST-PORTABLE (1 VIEW)   Final Result         1.  Pulmonary edema and/or infiltrates are identified, which are stable since the prior exam.   2.  Small right pleural effusion   3.  Atherosclerosis      DX-ABDOMEN FOR TUBE PLACEMENT   Final Result      Feeding tube placement with the tip projecting over the stomach body.      DX-ABDOMEN FOR TUBE PLACEMENT   Final Result      1.  Feeding tube appears looped within the stomach. The distal aspect of the tip is  directed cephalad in the fundal region.      DX-CHEST-LIMITED (1 VIEW)   Final Result         1.  Pulmonary edema and/or infiltrates are identified, which are stable since the prior exam.   2.  Small right pleural effusion   3.  Atherosclerosis      DX-CHEST-LIMITED (1 VIEW)   Final Result         1.  Pulmonary edema and/or infiltrates are identified, which are stable since the prior exam.   2.  Atherosclerosis      DX-CHEST-PORTABLE (1 VIEW)   Final Result         1.  Pulmonary edema and/or infiltrates are identified, which are stable since the prior exam.   2.  Atherosclerosis      DX-CHEST-LIMITED (1 VIEW)   Final Result      1.  No pneumothorax identified status post right thoracentesis and pericardiocentesis.      2.  Small amount of pneumomediastinum is likely related to recent pericardiocentesis and drain placement      3.  Cardiomegaly      EC-ECHOCARDIOGRAM LTD W/O CONT   Final Result      DX-CHEST-PORTABLE (1 VIEW)   Final Result      1.  Evacuation large right pleural effusion status post thoracentesis.   2.  No definite right pneumothorax identified. Recommend interval follow-up chest x-ray.   3.  Small left pleural effusion and left perihilar/left lung base atelectasis.   4.  Findings discussed with the patient's nurse. Follow-up chest x-ray will be obtained in approximately 3:00 PM      US-THORACENTESIS PUNCTURE RIGHT   Final Result      1. Ultrasound guided right sided diagnostic and therapeutic thoracentesis.      2. 1700 mL of fluid withdrawn.      EC-ECHOCARDIOGRAM COMPLETE W/O CONT   Final Result      DX-CHEST-PORTABLE (1 VIEW)   Final Result         1.  Pulmonary edema and/or infiltrates are identified, which are stable since the prior exam.   2.  Layering right pleural effusion, stable   3.  Cardiomegaly   4.  Atherosclerosis      DX-CHEST-2 VIEWS   Final Result      1.  Probable bilateral atelectasis      2.  Probable right pleural effusion which may be significant in size      3.  Limited  assessment on one view portable radiography      OUTSIDE IMAGES-DX CHEST   Final Result      OUTSIDE IMAGES-DX CHEST   Final Result      IR-INSERT PLEURAL CATH W/ CUFF    (Results Pending)   EC-ECHOCARDIOGRAM LTD W/O CONT    (Results Pending)       EKG:   EKG Independant Review: Completed  QTc:445, HR: 108, Low voltage throughout, no ST/T changes     Records reviewed and summarized in current documentation :  Yes         Assessment/Plan     #AHRF  #Pleural Effusion  #Pericardial Effusion   -Recurrent and exudative, unclear etiology  -Slight leukocytosis but pt does not appear toxic with normal procal  -Hx of lymphoma ~20 yrs ago tx with chemo  -Rheum workup so far negative  -Pericardial bx planned for next week  -B/l pleural drains to be placed potential today/tomorrow, canceled previously due to pt declining however her capacity at this point is questionable due to delirium,  agrees that procedure is in best interest   Plan:  -High suspicion for malignancy being etiology, recommend repeat CT following drain placement to better assess lower lobes/mediastinum for pathology      Quality-Core Measures  PCP: Jay Sanchez M.D.

## 2018-10-12 NOTE — PROGRESS NOTES
Discussed result of SLP evaluation and recommendations, as well as discussion with  regarding patient's capacity to understand medical condition and consequences of aspirating on food/drink with Dr. Torres. See SLP note for further insight. Speech and bedside RN and charge RN feel capacity evaluation would be appropriate in this situation, and/or a discussion about code status should occur. MD made aware of concerns . No further orders at this time. PMA paged to update about concerns for aspiration.

## 2018-10-12 NOTE — CARE PLAN
Problem: Respiratory:  Goal: Respiratory status will improve  Outcome: NOT MET  Continues on 5L NC, plan for drain placement in IR in am.     Problem: Communication  Goal: The ability to communicate needs accurately and effectively will improve  Outcome: NOT MET  Patient confused     Problem: Safety  Goal: Will remain free from injury  Outcome: PROGRESSING AS EXPECTED

## 2018-10-12 NOTE — PROGRESS NOTES
" Patient per IR stated she did not want procedure planned for last night and returned patient to unit.  Patient resting able to fall asleep after hours of confusion and agitation during shift, difficult to distract and reorient. Pulling out NC and taking off tele box, attempts to pull out NG tube.stating she was \"scared and hungry.\"  Ambulated to bathroom using rolling walker , had 1 lg bm, then returned to bed. 1:1 time spent to ensure patient did not pull on lines, LS crackles, weak non productive cough. O2 5L NC at 95%. SR on tele HR 90s. NPO after midnoc with meds and flushes via NG Tube . Pt complained of heaviness on her chest and education was provided to explain pericardial effusion. Patient verbalized wanting the drains placed to help her breathing. Patient sun downs and was placed in restraints when she was pulling at lines. O2 would desat to low 70s .  wanting to speak with team in am to discuss next steps.   "

## 2018-10-12 NOTE — PROGRESS NOTES
"Patient constantly removing oxygen. Picking at lines, monitor. Patient states \"I want to eat.\" Patient's pad is soiled underneath her, but patient is refusing to be cleaned at this time. Many attempts at education for all above stated matters, but patient remains uncooperative, albeit oriented.  "

## 2018-10-12 NOTE — PROGRESS NOTES
Pt to IR in bilateral soft wrist restraints with tele RN and , pt alert and oriented x 4 but forgetful clearly agitated due to restraints  Pt and  explained need for procedure by MD Moseley,  with understanding but patient refusing. Spoke with NAM over phone and described situation and recommended to call and update hospitalist for further clarification. Spoke with MD Denis via telephone, Pt A&O x 4 with times of forgetfulness. Verbal from MD that patient could possibly be having delirium and at this time due to VSS O2 97% 5L NC and oriented that it would be best to remove restraints and allow the patient to get a good night sleep and to decided in the morning if the patient would like procedure performed.  updated and Floor RN Diana given report, Restraint removed in IR and patient calm and cooperative. Pt transported to room Via 2 IR RN's on tele monitor with receiving RASTA Ortiz in room

## 2018-10-12 NOTE — PROGRESS NOTES
"Patient SO Isacc at bedside. Discussed patient concerns/husbands concerns with POC. SLP paged at  request to \"get tube out of nose.\" Discussed with patient and Isacc the concern for aspiration and not being able to just pull the tube if the patient is still showing signs of aspiration. Reinforcement of all teaching needed for both patient and .  "

## 2018-10-12 NOTE — PROGRESS NOTES
Patient refusing all care until able to eat. Dr Torres notified and said to remove the coretrack  but no food until speech sees patient.

## 2018-10-12 NOTE — PROGRESS NOTES
[facility] Cardiology Follow-up Consult Note  Date of note:    10/4/2018      Consulting Physician: Néstor Guillen M.D.    Patient ID:  Name:   Erin Quigley     YOB: 1940  Age:   78 y.o.  female   MRN:   [id number]    Chief Complaint   Patient presents with   • Shortness of Breath     3 weeks on and off   • Peripheral Edema     started 3 weeks ago in her feet and is now up to her thighs       Interim Events:  - Very agitated this morning. Refusing interventions and medical treatments. Drains not placed due to patient refusing  - Still tachycardiac  - At this point, if patient is refusing drain placement, high risk of respiratory decompensation at this point given size of effusions.   - Surgery on board: CT scan demonstrated moderate to large bilateral effusions with moderate pericardial effusion Plan for IR guided pigtail catheters bilaterally with hopefully thoracoscopic pericardial window with biopsy of pericardium next week.    - Etiology of effusions still unknown  - Bilateral drains to be placed today        ROS  Constitutional: Denies fevers or chills  Eyes: Denies changes in vision, no eye pain  Ears/Nose/Throat/Mouth: c/o hoarseness of voice. Dysphagia +   Cardiovascular: Denies chest pain or palpitations   Respiratory: Denies shortness of breath. Non-productive cough +  Gastrointestinal/Hepatic: Denies abdominal pain, nausea, vomiting, or diarrhea   Genitourinary: Denies dysuria  Neurological: Denies headache. Denied focal weakness/parasthesias  Psychiatric: Delirious this morning  All other systems were reviewed and are negative     Past medical, surgical, social, and family history reviewed and unchanged from admission except as noted in assessment and plan.    Medications: Reviewed in MAR  Current Facility-Administered Medications   Medication Dose Frequency Provider Last Rate Last Dose   • insulin regular (HUMULIN R) injection 2-9 Units  2-9 Units 4X/DAY YU Torres M.D.         And   • glucose 4 g chewable tablet 16 g  16 g Q15 MIN PRN Ricardo Torres M.D.        And   • dextrose 50% (D50W) injection 25 mL  25 mL Q15 MIN PRN Ricardo Torres M.D.       • gabapentin (NEURONTIN) capsule 300 mg  300 mg BID Ricardo Torres M.D.       • [START ON 10/13/2018] levothyroxine (SYNTHROID) tablet 88 mcg  88 mcg AM ES Ricardo Torres M.D.       • phosphorus (K-PHOS-NEUTRAL, PHOSPHA 250 NEUTRAL) per tablet 1 Tab  1 Tab Q6HRS Ricardo Torres M.D.       • senna-docusate (PERICOLACE or SENOKOT S) 8.6-50 MG per tablet 2 Tab  2 Tab BID Ricardo Torres M.D.        And   • polyethylene glycol/lytes (MIRALAX) PACKET 1 Packet  1 Packet QDAY PRN Ricardo Torres M.D.        And   • magnesium hydroxide (MILK OF MAGNESIA) suspension 30 mL  30 mL QDAY PRN Ricardo Torres M.D.        And   • bisacodyl (DULCOLAX) suppository 10 mg  10 mg QDAY PRN Ricardo Torres M.D.       • simvastatin (ZOCOR) tablet 20 mg  20 mg Q EVENING Ricardo Torres M.D.       • acetaminophen (TYLENOL) tablet 650 mg  650 mg Q4HRS PRTWYLA Torres M.D.        Or   • acetaminophen (TYLENOL) suppository 650 mg  650 mg Q4HRS PRTWYLA Torres M.D.       • oxyCODONE immediate-release (ROXICODONE) tablet 2.5 mg  2.5 mg Q4HRS PRTWYLA Torres M.D.        Or   • oxyCODONE immediate-release (ROXICODONE) tablet 5 mg  5 mg Q4HRS PRTWYLA Torres M.D.       • diphenhydrAMINE (BENADRYL) tablet/capsule 25 mg  25 mg HS PRN Ricardo Torres M.D.       • ondansetron (ZOFRAN) syringe/vial injection 4 mg  4 mg Q4HRS PRTWYLA Torres M.D.   4 mg at 10/10/18 1339   • fentaNYL (SUBLIMAZE) injection  mcg   mcg Q HOUR PRN Jarvis Arnold M.D.   50 mcg at 10/07/18 1650   • racepinephrine (MICRONEFRIN) 2.25 % nebulizer solution 0.5 mL  0.5 mL Q4H PRN (RT) Jarvis Arnold M.D.   0.5 mL at 10/08/18 0737   • artificial tears 1.4 % ophthalmic solution 1 Drop  1 Drop Q2HRS PRN Jarvis Arnold M.D.   1 Drop at  "10/11/18 2117   • heparin injection 5,000 Units  5,000 Units Q8HRS Néstor Guillen M.D.   5,000 Units at 10/12/18 0545   • Respiratory Care per Protocol   Continuous RT Alvaro Perez M.D.       • ipratropium-albuterol (DUONEB) nebulizer solution  3 mL Q2HRS PRN (RT) Jarvis Arnold M.D.       • labetalol (NORMODYNE,TRANDATE) injection 10 mg  10 mg Q30 MIN PRN Constantin Self M.D.       • nicotine (NICODERM) 14 MG/24HR 14 mg  14 mg Daily-0600 Hazel Cruz M.D.   Stopped at 10/11/18 0600    And   • nicotine polacrilex (NICORETTE) 2 MG piece 2 mg  2 mg Q HOUR PRN Hazel Cruz M.D.         Last reviewed on 9/29/2018  4:47 PM by Jaylin Haynes, T  No Known Allergies    Physical Exam  Body mass index is 27.22 kg/m². Blood pressure 122/74, pulse 70, temperature 36.4 °C (97.5 °F), resp. rate 15, height 1.575 m (5' 2\"), weight 67.5 kg (148 lb 13 oz), SpO2 94 %, not currently breastfeeding.   Vitals:    10/11/18 2020 10/12/18 0010 10/12/18 0502 10/12/18 0821   BP:  129/82 108/57 122/74   Pulse:  (!) 101 62 70   Resp: 20 20 16 15   Temp: 36.5 °C (97.7 °F) 36.6 °C (97.8 °F) 36.4 °C (97.5 °F) 36.4 °C (97.5 °F)   SpO2: 93% 90% 94% 94%   Weight:       Height:        Oxygen Therapy:  Pulse Oximetry: 94 %, O2 (LPM): 3, O2 Delivery: Silicone Nasal Cannula      General: Very agitated,   HEENT: Normocephalic, Atraumatic. No stridor. Unable to evaluate JVD as patient is very agitated during exam  Cardiovascular: Normal heart rate, Normal rhythm. S1+, S2+ Unable to appreciate rub  Lungs: Diffuse wheeze throughout, crackles and rhonci. Symmetric chest expansion  Abdomen:  Soft, No tenderness  Skin: No erythema, No rash  Lower limbs: no edema.   Neurologic: Alert, waxing and waning mental status. Moving all 4 extremities. Strength intact.   Other systems also examined, grossly normal.       Labs (personally reviewed and notable for):   Recent Results (from the past 24 hour(s))   ACCU-CHEK GLUCOSE    Collection Time: " 10/11/18  5:36 PM   Result Value Ref Range    Glucose - Accu-Ck 103 (H) 65 - 99 mg/dL   ACCU-CHEK GLUCOSE    Collection Time: 10/12/18  5:20 AM   Result Value Ref Range    Glucose - Accu-Ck 130 (H) 65 - 99 mg/dL       Cardiac Imaging and Procedures Review:    EKG and telemetry tracings personally reviewed    Impression and Medical Decision Making:  Principal Problem:    Acute hypoxemic respiratory failure (HCC) POA: Yes  Active Problems:    Pulmonary edema POA: Yes    Pericardial effusion POA: Yes    Pleural effusion POA: Yes    HTN (hypertension) POA: Yes    History of non-Hodgkin's lymphoma POA: Yes      Overview: Nonhodgkins lymphoma  2000    Hypotension POA: No    Hypothyroidism POA: Yes    Tobacco abuse POA: Yes    Hypokalemia POA: No    Hypomagnesemia POA: No    Hypernatremia POA: No    Dysphagia POA: Yes  Resolved Problems:    Hypophosphatemia POA: No    Acute encephalopathy POA: Unknown        Assessment and Plan  Pericardial Effusion with Tamponade   - Patient presented with large pericardial effusion. EKG with very low voltage on admission. Underwent drainage 10/1. 1000 cc of bloody pericardial fluid was obtained. Pericardial drain removed. She likely developed pericardial decompression syndrome post pericardiocentesis. Grossly was bloody (some traumatic component). Cytology negative for malignant cells. Unlikely to be infectious. GENTRY negative, RA elevated but CCP negative. Etiology is still unclear.   - Echo done 10/11: small effusion without hemodynamic compromise  - Per Dr. Cain: semielective VATS pericardial biopsy sometime next week. She may need pericardial drain with pericardial window      Tachycardia  - has been normal sinus rhythm and rate during her hospitalization however now appears to have inappropriate tachycardia.   - Unclear etiology at this point. May be contributed by the effusions and some anxiety component. Will monitor       Pleural Effusion  - S/p thoracentesis. 1.7 liters have  been removed.   - Surgery on board: plan for IR guided bilateral pigtail catheters with drains however she refused the procedure. Currently refusing all procedures and medical treatment until she can eat. SLP to evaluate patient again to see if she has had any progress. If she and her  understand risks of aspiration despite failing swallow evaluate, probably ok to let her eat so we can get interventions done. Probably a good idea to get palliative care involved for discussion regarding goals of care.       Pulmonary Edema  - Has been diuresed significantly since admission however subsequently became intravascularly dry; treated with IV fluids. She unfortunately likely developed pericardial decompression syndrome post pericardiocentesis.   - Plan forthoracoscopic pericardial window with biopsy of pericardium likely Monday  - Surgery on board: plan for IR guided bilateral pigtail catheters with drains when patient is willing for procedure      Hypernatremia  - Improving with free water flushes.   - Monitor with BMP  - May need to consider further work-up for etiology of hypernatremia      Hypertension  - Holding outpatient HCTZ and amlodipine. Resume when BP stabilizes     It is my pleasure to participate in the care of Ms. Quigley.  Please do not hesitate to contact me with questions or concerns. Will continue to follow

## 2018-10-12 NOTE — PROGRESS NOTES
Received report from nightshift RN Diana, assumed care of patient. Patient is in and out of orientation, making attempts at self-ambulation, bed alarm placed on. Lap belt in place, patient demonstrated removal of lap belt. HOB at 30 degrees. Patient remains NPO at this time for planned drain placement. Safety measures in place.

## 2018-10-12 NOTE — PROGRESS NOTES
Pt no longer cooperative with  and tried to pull at lines and get out of bed. Patient then began kicking as well. Bilateral softs wrist restraints placed.

## 2018-10-12 NOTE — THERAPY
"Speech Language Therapy dysphagia treatment completed.   Functional Status:  Patient seen this date for dysphagia tx session per RN request, as patient's Cortrak was pulled, patient wants to eat, and is refusing other care until seen by SLP. Upon entering room, patient's  at bedside who appeared supportive. Patient agitated regarding NPO status despite extensive education regarding FEES results, dysphonic vocal quality, s/sx of aspiration, risks for pneumonia, previous SLP recs, and attempts at service recovery from this SLP. Patient also appeared angry at her , as he sat there quietly and she verbalized how she wanted to go home, looked at him, and said \"You fucking bastard!\"  then stepped out of the room, stating \"This might go better if I'm not here.\" Patient had mostly aphonic vocal quality w/ short amounts of phonation, but vocal quality still weak, hoarse, and dysphonic. Patient reported wanting asparagus and wanting to go home so she could be with her family and eat. Patient consumed PO trials of single ice chips, NTL via tsp and cup sip, and pudding. Patient reported she had cup sips of water yesterday and \"That went horribly.\" Patient presented with s/sx of severe oropharyngeal dysphagia, evidenced by immediate coughing greater than 2 minutes w/ face reddening and wet/gurgly vocal quality on one single ice chip. Patient had consistent coughing, throat clearing and wet/gurgly vocal quality on PO trials of NTL via tsp and cup sip, all of which are concerning for penetration/aspiration. Patient refused trials of applesauce. On pudding, patient had delayed coughing and wet/gurgly vocal quality. Patient still appears at HIGH risk for aspiration and does not appear safe for PO intake. Patient also could not answer questions accurately regarding aspiration risks w/ PO intake, what aspiration pneumonia is and the causes, how she would safely eat at home, and long-term goals of care. At this " "time, recommend patient continue strict NPO with replacement of Cortrak for supplemental nutrition. Strongly recommend palliative care consult to furhter determine patient's wishes and POC, as patient is still currently full code and this appears contraindicated with her current wishes. Also, strongly recommend psych consult for capacity, as patient does not appear capacitated to make medical decisions and understand full clinical picture at this time. Discussed with MD, RN, and . Will defer diet order to MD if patient wishes to eat despite risks. SLP is following based on POC.     Recommendations: 1.) At this time, recommend patient continue strict NPO with replacement of Cortrak for supplemental nutrition.     2.) Strongly recommend palliative care consult to furhter determine patient's wishes and POC, as patient is still currently full code and this appears contraindicated with her current wishes.   3.) Also, strongly recommend psych consult for capacity, as patient does not appear capacitated to make medical decisions and understand full clinical picture at this time.  4.) Will defer diet order to MD if patient wishes to eat despite risks.  Plan of Care: Will benefit from Speech Therapy 3 times per week  Post-Acute Therapy: Discharge to an inpatient transitional care facility for continued speech therapy services.    See \"Rehab Therapy-Acute\" Patient Summary Report for complete documentation.     "

## 2018-10-13 NOTE — DIETARY
Nutrition Services: Nutrition Support Assessment  Day 15 of admit.  Erin Quigley is a 78 y.o. female with admitting DX of Hypoxia     Current problem list:  1. Pericardial effusion  2. Pleural effusion  3. Pulmonary edema  4. Acute hypoxemic respiratory failure  5. Hypotension  6. History of non-Hodgkin's lymphoma  7. HTN  8. Hypernatremia  9. Dysphagia     Assessment:  Estimated Nutritional Needs: based on: Ht: 157.5 cm, Wt 10/12: 67.2 kg via bed scale, IBW: 49.9 kg, BMI: 27.09     Calculation/Equation: REE per MSJ x 1.2 = 1334 kcal/day  Total Calories / day: 1300 - 1600 (Calories / k - 24)  Total Grams Protein / day: 81 - 101 (Grams Protein / k.2 - 1.5)  Total Fluids ml / day: 1683 ml    Evaluation:   1. Consult received for TF assessment. Pt was previously on TF. Per SLP recommendations continue strict NPO with replacement of cortrak  2. Enteral access: Gastric cortrak.    3. Labs: Na 147, Glucose 103, BUN 30, Creatinine 0.42  4. Wt loss noted from wt on : 77.6 kg via bed scale. Wt loss percent is 13.4% in 2 weeks, which is severe. Suspect wt loss could be related to items on bed when weighed or fluids (per I/Os pt +4.2 L)  5. Meds: lasix, neurontin, humulin R, synthroid, k-phos-neutral, kdur, pericolace, zocor  6. Last BM: 10/13  7. Diabetisource AC appropriate to meet pt's estimated protein needs. Pt was also previously on Diabetisource      Recommendations/Plan:  Start Diabetisource AC @ 25 ml/hr and advance per protocol to 55 ml/hr (goal rate) to provide 1584 kcal (24 kcal/kg), 79 grams protein (1.2 gm/kg), and 1082 ml free water per day.   Fluids per MD.   Diet upgrades per SLP       RD following

## 2018-10-13 NOTE — CARE PLAN
Problem: Communication  Goal: The ability to communicate needs accurately and effectively will improve  Outcome: PROGRESSING SLOWER THAN EXPECTED    Intervention: Educate patient and significant other/support system about the plan of care, procedures, treatments, medications and allow for questions  Discussed daily POC with patient. Pt currently confused to place and slight confusion to situation. Reinforcement needed      Problem: Psychosocial Needs:  Goal: Level of anxiety will decrease  Outcome: PROGRESSING SLOWER THAN EXPECTED  Pt currently displaying withdrawn, emotionally upset behaviors with staff and family. Will continue to monitor.

## 2018-10-13 NOTE — PSYCHIATRY
BRIEF PSYCHIATRIC CONSULT NOTE: patient seen, Pt interviewed and spoke with  at bedside. Pt does NOT have capacity to make medical decisions at this time - full note to follow.  -Legal Hold:not indicated  -Sitter:no  -Restrictions:   -phone: may have   -visitors: may have   -personal items: may have   -finger foods required: no   -personal clothes: may have  -Orders Placed: routine  -Plan:signing off

## 2018-10-13 NOTE — PROGRESS NOTES
Assumed care of patient at bedside report from NOC RN. Updated on POC. Patient currently sleeping without signs or symptoms of discomfort or distress; on 5 L O2 nasal cannula; up x1 assist with a front wheel walker; cortrak at 71 in right nare;  in use; bilateral wrist restraints in use. Call light within reach. Fall precautions in place. Bed locked and in lowest position. All questions answered. No other needs indicated at this time.

## 2018-10-13 NOTE — PROGRESS NOTES
Assumed care of pt, report received with no questions at this time. At this time pt is alert and oriented x4 but lethargic. Oxygen saturation per continues monitor is 99% on 5L. Pts belongings transferred with her.Pt is agreeable to cortrak at this time, charge made aware. Pt educated about restraints and what is required for them to be discontinued, pt states understanding will continue to assess need for restraints. Call bell in reach.

## 2018-10-13 NOTE — PROGRESS NOTES
Pt off unit with charge RN for ultrasound procedure. Medicated per MAR for anxiety. Monitor room notified

## 2018-10-13 NOTE — CARE PLAN
Problem: Safety  Goal: Will remain free from falls  Outcome: PROGRESSING AS EXPECTED  Safety measures in place     Problem: Safety - Medical Restraint  Goal: Remains free of injury from restraints (Restraint for Interference with Medical Device)  INTERVENTIONS:  1. Determine that other, less restrictive measures have been tried or would not be effective before applying the restraint  2. Evaluate the patient's condition at the time of restraint application  3. Inform patient/family regarding the reason for restraint  4. Q2H: Monitor safety, psychosocial status, comfort, nutrition and hydration     Outcome: PROGRESSING AS EXPECTED   10/12/18 3404   Interventions Addressed    Addressed this shift: Remains free of injury from restraints (restraint for interference with medical device) Every 2 hours: Monitor safety, psychosocial status, comfort, nutrition and hydration

## 2018-10-13 NOTE — PROGRESS NOTES
The patient came down to the interventional radiology on 10/11/2018 for the bilateral pigtail catheter placement for the pleural effusion. However the patient clearly expressed that she does not want to undergo the procedure. Therefore the procedure was abandoned.

## 2018-10-13 NOTE — PROGRESS NOTES
[facility] Cardiology Follow-up Consult Note  Date of note:    10/4/2018      Consulting Physician: Néstor Guillen M.D.      Chief Complaint   Patient presents with   • Shortness of Breath     3 weeks on and off   • Peripheral Edema     started 3 weeks ago in her feet and is now up to her thighs       Interim Events:  - Mentation improved this morning, though only oriented to self. Restraints off  - Tele reviewed. Sinus tach and rare PVCs  - CXR show worsening effusions  - At this point, if patient is refusing drain placement, high risk of respiratory decompensation at this point given size of effusions.   - Surgery on board: CT scan demonstrated moderate to large bilateral effusions with moderate pericardial effusion Plan for IR guided pigtail catheters bilaterally with hopefully thoracoscopic pericardial window with biopsy of pericardium next week.    - Etiology of effusions still unknown  - Pulm were consulted and are following      ROS  Constitutional: Denies fevers or chills  Eyes: Denies changes in vision, no eye pain  Ears/Nose/Throat/Mouth: c/o hoarseness of voice. Dysphagia +   Cardiovascular: Denies chest pain or palpitations   Respiratory: Denies shortness of breath. Non-productive cough +  Gastrointestinal/Hepatic: Denies abdominal pain, nausea, vomiting, or diarrhea   Genitourinary: Denies dysuria  Neurological: Denies headache. Denied focal weakness/parasthesias  Psychiatric: Delirious this morning  All other systems were reviewed and are negative     Past medical, surgical, social, and family history reviewed and unchanged from admission except as noted in assessment and plan.    Medications: Reviewed in MAR  Current Facility-Administered Medications   Medication Dose Frequency Provider Last Rate Last Dose   • [START ON 10/14/2018] furosemide (LASIX) tablet 20 mg  20 mg Q DAY Ricardo Torres M.D.       • furosemide (LASIX) injection 40 mg  40 mg Once Cruzito Nagel M.D.       • insulin regular  (HUMULIN R) injection 2-9 Units  2-9 Units 4X/DAY ACHS Ricardo Torres M.D.   Stopped at 10/12/18 1700    And   • glucose 4 g chewable tablet 16 g  16 g Q15 MIN PRN Ricardo Torres M.D.        And   • dextrose 50% (D50W) injection 25 mL  25 mL Q15 MIN PRTWYLA Torres M.D.       • Pharmacy Consult: Enteral tube feeding - review meds/change route/product selection   PRN Ricardo Torres M.D.       • senna-docusate (PERICOLACE or SENOKOT S) 8.6-50 MG per tablet 2 Tab  2 Tab BID Ricardo Torres M.D.   Stopped at 10/13/18 0600    And   • polyethylene glycol/lytes (MIRALAX) PACKET 1 Packet  1 Packet QDAY PRN Ricardo Torres M.D.        And   • magnesium hydroxide (MILK OF MAGNESIA) suspension 30 mL  30 mL QDAY PRTWYLA Torres M.D.        And   • bisacodyl (DULCOLAX) suppository 10 mg  10 mg QDAY PRN Ricardo Torres M.D.       • oxyCODONE immediate-release (ROXICODONE) tablet 5 mg  5 mg Q4HRS PRTWYLA Torres M.D.        Or   • oxyCODONE immediate-release (ROXICODONE) tablet 2.5 mg  2.5 mg Q4HRS PRTWYLA Torres M.D.       • levothyroxine (SYNTHROID) tablet 88 mcg  88 mcg AM YOANA Torres M.D.   Stopped at 10/13/18 0600   • diphenhydrAMINE (BENADRYL) tablet/capsule 25 mg  25 mg HS PRTWYLA Torres M.D.       • acetaminophen (TYLENOL) tablet 650 mg  650 mg Q4HRS PRTWYLA Torres M.D.        Or   • acetaminophen (TYLENOL) suppository 650 mg  650 mg Q4HRS PRTWYLA Torres M.D.       • simvastatin (ZOCOR) tablet 20 mg  20 mg Q EVENING Ricardo Torres M.D.   Stopped at 10/12/18 1800   • phosphorus (K-PHOS-NEUTRAL, PHOSPHA 250 NEUTRAL) per tablet 1 Tab  1 Tab Q6HRS Ricardo Torres M.D.   Stopped at 10/12/18 1815   • gabapentin (NEURONTIN) capsule 300 mg  300 mg BID Ricardo Torres M.D.   Stopped at 10/12/18 1815   • potassium chloride SA (Kdur) tablet 20 mEq  20 mEq DAILY Ricardo Torres M.D.   Stopped at 10/13/18 0600   • ondansetron (ZOFRAN) syringe/vial injection 4 mg  4 mg Q4HRS  "PRN Ricardo Torres M.D.   4 mg at 10/10/18 1339   • fentaNYL (SUBLIMAZE) injection  mcg   mcg Q HOUR PRN Jarvis Arnold M.D.   50 mcg at 10/07/18 1650   • racepinephrine (MICRONEFRIN) 2.25 % nebulizer solution 0.5 mL  0.5 mL Q4H PRN (RT) Jarvis Arnold M.D.   0.5 mL at 10/08/18 0737   • artificial tears 1.4 % ophthalmic solution 1 Drop  1 Drop Q2HRS PRN Jarvis Arnold M.D.   1 Drop at 10/11/18 2117   • heparin injection 5,000 Units  5,000 Units Q8HRS Néstor Guillen M.D.   Stopped at 10/13/18 0600   • Respiratory Care per Protocol   Continuous RT Alvaro Perez M.D.       • ipratropium-albuterol (DUONEB) nebulizer solution  3 mL Q2HRS PRN (RT) Jarvis Arnold M.D.   3 mL at 10/13/18 0753   • labetalol (NORMODYNE,TRANDATE) injection 10 mg  10 mg Q30 MIN PRN Constantin Self M.D.       • nicotine (NICODERM) 14 MG/24HR 14 mg  14 mg Daily-0600 Hazel Cruz M.D.   Stopped at 10/11/18 0600    And   • nicotine polacrilex (NICORETTE) 2 MG piece 2 mg  2 mg Q HOUR PRN Hazel Cruz M.D.         Last reviewed on 9/29/2018  4:47 PM by Jaylin Haynes T  No Known Allergies    Physical Exam  Body mass index is 27.09 kg/m². Blood pressure 134/76, pulse 92, temperature 36.7 °C (98.1 °F), resp. rate 18, height 1.575 m (5' 2.01\"), weight 67.2 kg (148 lb 2.4 oz), SpO2 93 %, not currently breastfeeding.   Vitals:    10/13/18 0427 10/13/18 0500 10/13/18 0755 10/13/18 0908   BP:  147/83  134/76   Pulse: 94 82 90 92   Resp: 18 18 20 18   Temp:  35.9 °C (96.7 °F)  36.7 °C (98.1 °F)   SpO2: 99% 93% 94% 93%   Weight:       Height:        Oxygen Therapy:  Pulse Oximetry: 93 %, O2 (LPM): 5, O2 Delivery: Silicone Nasal Cannula      General: improved mental status today NAD   HEENT: Normocephalic, Atraumatic. No stridor. Unable to evaluate JVD as patient is very agitated during exam  Cardiovascular: Normal heart rate, Normal rhythm. S1+, S2+ Unable to appreciate rub  Lungs: Diffuse wheeze " throughout, crackles and rhonci. Symmetric chest expansion  Abdomen:  Soft, No tenderness  Skin: No erythema, No rash  Lower limbs: no edema.   Neurologic: Alert, waxing and waning mental status. Moving all 4 extremities. Strength intact.   Other systems also examined, grossly normal.       Labs (personally reviewed and notable for):   Recent Results (from the past 24 hour(s))   ACCU-CHEK GLUCOSE    Collection Time: 10/12/18 11:48 AM   Result Value Ref Range    Glucose - Accu-Ck 102 (H) 65 - 99 mg/dL   ACCU-CHEK GLUCOSE    Collection Time: 10/12/18  5:30 PM   Result Value Ref Range    Glucose - Accu-Ck 131 (H) 65 - 99 mg/dL   ACCU-CHEK GLUCOSE    Collection Time: 10/12/18  9:04 PM   Result Value Ref Range    Glucose - Accu-Ck 120 (H) 65 - 99 mg/dL   PHOSPHORUS    Collection Time: 10/13/18  2:37 AM   Result Value Ref Range    Phosphorus 3.4 2.5 - 4.5 mg/dL   CRP Quantitive (Non-Cardiac)    Collection Time: 10/13/18  2:37 AM   Result Value Ref Range    Stat C-Reactive Protein 0.70 0.00 - 0.75 mg/dL   Prealbumin    Collection Time: 10/13/18  2:37 AM   Result Value Ref Range    Pre-Albumin 17.0 (L) 18.0 - 38.0 mg/dL   BASIC METABOLIC PANEL    Collection Time: 10/13/18  2:37 AM   Result Value Ref Range    Sodium 147 (H) 135 - 145 mmol/L    Potassium 3.6 3.6 - 5.5 mmol/L    Chloride 103 96 - 112 mmol/L    Co2 39 (H) 20 - 33 mmol/L    Glucose 103 (H) 65 - 99 mg/dL    Bun 30 (H) 8 - 22 mg/dL    Creatinine 0.42 (L) 0.50 - 1.40 mg/dL    Calcium 8.1 (L) 8.5 - 10.5 mg/dL    Anion Gap 5.0 0.0 - 11.9   ESTIMATED GFR    Collection Time: 10/13/18  2:37 AM   Result Value Ref Range    GFR If African American >60 >60 mL/min/1.73 m 2    GFR If Non African American >60 >60 mL/min/1.73 m 2   ACCU-CHEK GLUCOSE    Collection Time: 10/13/18  5:12 AM   Result Value Ref Range    Glucose - Accu-Ck 84 65 - 99 mg/dL       Cardiac Imaging and Procedures Review:    EKG and telemetry tracings personally reviewed    Impression and Medical Decision  Making:  Principal Problem:    Acute hypoxemic respiratory failure (HCC) POA: Yes  Active Problems:    Pulmonary edema POA: Yes    Pericardial effusion POA: Yes    Pleural effusion POA: Yes    HTN (hypertension) POA: Yes    History of non-Hodgkin's lymphoma POA: Yes      Overview: Nonhodgkins lymphoma  2000    Hypotension POA: No    Hypothyroidism POA: Yes    Tobacco abuse POA: Yes    Hypokalemia POA: No    Hypomagnesemia POA: No    Hypernatremia POA: No    Dysphagia POA: Yes  Resolved Problems:    Hypophosphatemia POA: No    Acute encephalopathy POA: Unknown      Assessment and Plan    Pericardial Effusion with Tamponade   - Patient presented with large pericardial effusion. EKG with very low voltage on admission. Underwent drainage 10/1. 1000 cc of bloody pericardial fluid was obtained. Pericardial drain removed. She likely developed pericardial decompression syndrome post pericardiocentesis. Grossly was bloody (some traumatic component). Cytology negative for malignant cells. Unlikely to be infectious. GENTRY negative, RA elevated but CCP negative. Etiology is still unclear. Suspecting possible malignancy  - Echo done 10/11: small effusion without hemodynamic compromise  - Per Dr. Cain: semielective VATS pericardial biopsy sometime next week. She may need pericardial drain with pericardial window      Sinus Tachycardia  - has been normal sinus rhythm and rate during her hospitalization however now appears to have inappropriate tachycardia.   - Unclear etiology at this point. May be contributed by the effusions and some anxiety component. Will monitor       Pleural Effusion  - S/p thoracentesis. 1.7 liters have been removed.   - CXR 10/13 shows worsening bilateral effusions L>R with near collapse of L lung  - 40IV lasix given today once  - Surgery on board: plan for IR guided bilateral pigtail catheters with drains however she refused the procedure. Currently refusing all procedures and medical treatment until she  can eat. SLP to evaluate patient again to see if she has had any progress. If she and her  understand risks of aspiration despite failing swallow evaluate, probably ok to let her eat so we can get interventions done. Probably a good idea to get palliative care involved for discussion regarding goals of care.       Pulmonary Edema  - Has been diuresed significantly since admission however subsequently became intravascularly dry; treated with IV fluids. She unfortunately likely developed pericardial decompression syndrome post pericardiocentesis.   - Plan forthoracoscopic pericardial window with biopsy of pericardium likely Monday  - Surgery on board: plan for IR guided bilateral pigtail catheters with drains when patient is willing for procedure      Hypernatremia  - Improving with free water flushes.   - Monitor with BMP  - May need to consider further work-up for etiology of hypernatremia      Hypertension  - Holding outpatient HCTZ and amlodipine. Resume when BP stabilizes     DLD  - continue home simvastatin    It is my pleasure to participate in the care of Ms. Quigley.  Please do not hesitate to contact me with questions or concerns. Will continue to follow

## 2018-10-13 NOTE — PROGRESS NOTES
[facility] Hospitalist Progress Note    Date of Service: 10/12/2018    Chief Complaint    78 y.o. Female from [facility] admitted 2018 with shortness of breath and peripheral edema found to have significant pericardial effusion.    Pericardial effusion tapped 10/1  Intubated 10/2  Extubated 10/7      Interval Problem Update    on tele    Still confused    CT chest from 10/10 shows moderate b/l pleural effusions and moderate pericardial effusions    Dr alvarez surgery- recommends b/l pigtail tube to address pleural effusions--> ordered    Case d/w radiology- they feel uncomfortable placing chest tubes at this time due to patient adamant refusal , yet she is not competent to make medical decsions      Consultants/Specialty  Cardiology  pulmonology      Disposition  pending        Review of Systems   Unable to perform ROS: Mental status change      Physical Exam  Laboratory/Imaging   Hemodynamics  Temp (24hrs), Av.7 °C (98.1 °F), Min:36.4 °C (97.5 °F), Max:37.1 °C (98.8 °F)   Temperature: 37.1 °C (98.8 °F)  Pulse  Av.7  Min: 10  Max: 140   Blood Pressure : 122/85      Respiratory      Respiration: 17, Pulse Oximetry: 98 %     Work Of Breathing / Effort: Moderate;Shallow  RUL Breath Sounds: Rhonchi;Coarse Crackles, RML Breath Sounds: Diminished, RLL Breath Sounds: Diminished, LORI Breath Sounds: Rhonchi;Coarse Crackles, LLL Breath Sounds: Diminished    Fluids    Intake/Output Summary (Last 24 hours) at 10/12/18 2326  Last data filed at 10/12/18 0600   Gross per 24 hour   Intake              600 ml   Output                0 ml   Net              600 ml       Nutrition  No orders of the defined types were placed in this encounter.    Physical Exam   Constitutional: She appears well-developed and well-nourished. No distress.   HENT:   Head: Normocephalic and atraumatic.   Neck: No JVD present.   Cardiovascular:   tachycardia   Pulmonary/Chest: Effort normal. No stridor. No respiratory distress. She has no  wheezes.   Decreased bs at bases   Abdominal: Soft. There is no tenderness. There is no rebound and no guarding.   Musculoskeletal: She exhibits edema.   Neurological: She is alert.   O x 2   Skin: Skin is warm and dry. No rash noted. She is not diaphoretic.   Psychiatric: She has a normal mood and affect. Thought content normal.   Nursing note and vitals reviewed.      Recent Labs      10/10/18   0233  10/11/18   0055   WBC  11.7*  11.8*   RBC  4.30  4.02*   HEMOGLOBIN  12.0  11.5*   HEMATOCRIT  40.0  37.7   MCV  93.0  93.8   MCH  27.9  28.6   MCHC  30.0*  30.5*   RDW  67.5*  67.3*   PLATELETCT  380  382   MPV  11.7  11.5     Recent Labs      10/10/18   0233  10/11/18   0055   SODIUM  148*  145   POTASSIUM  4.0  4.1   CHLORIDE  104  102   CO2  39*  38*   GLUCOSE  126*  122*   BUN  35*  34*   CREATININE  0.40*  0.44*   CALCIUM  8.2*  8.8                      Assessment/Plan     * Acute hypoxemic respiratory failure (HCC)- (present on admission)   Assessment & Plan    Extubated on 10/7/2018  on Decadron for stridor    Continue oxygen and RT protocol        Pleural effusion- (present on admission)   Assessment & Plan    Status post thoracentesis on October 1d    Diuresis as needed    B/l pigtail tubes to be placed        Pericardial effusion- (present on admission)   Assessment & Plan    Cardiology following   Status post pericardial drain placement  Drain removed on 10/3/2018  Cytology negative fluid cultures negative  GENTRY negative RA factor positive CCP negative     trial of steroids       Etiology is unclear and patient may eventually need pericardial biopsy for definitive diagnosis -> surgery is aware            Pulmonary edema- (present on admission)   Assessment & Plan    Monitor fluid status   Diuresis as needed        Hypotension   Assessment & Plan    Relative adrenal insufficiency    Appears resolved for now            History of non-Hodgkin's lymphoma- (present on admission)   Assessment & Plan    Hx of          HTN (hypertension)- (present on admission)   Assessment & Plan    All bp medications held        Dysphagia- (present on admission)   Assessment & Plan    Continue with speech therapy            Hypernatremia   Assessment & Plan    water flushes , i300 cc q6          Hypomagnesemia   Assessment & Plan    Repleted        Hypokalemia   Assessment & Plan    Repleted        Tobacco abuse- (present on admission)   Assessment & Plan    Continue NicoDerm   on cessation when mental status improved        Hypothyroidism- (present on admission)   Assessment & Plan    Continue levothyroxine          Quality-Core Measures   Reviewed items::  EKG reviewed, Labs reviewed, Medications reviewed and Radiology images reviewed  Almonte catheter::  No Almonte  DVT prophylaxis pharmacological::  Heparin  DVT prophylaxis - mechanical:  SCDs  Ulcer Prophylaxis::  Not indicated      Check am cbc, bmp

## 2018-10-13 NOTE — PROGRESS NOTES
Patient transferred to private room T830-1. Bedside report given to RASTA Ellis. Patient appears comfortable and resting. B/L wrists restraints removed prior to transfer.

## 2018-10-13 NOTE — PROGRESS NOTES
Assumed care at 1900. Bedside report received from RASTA Bains. Patient's chart and MAR reviewed. Pt denies pain at this time. Pt is A & O x2 (self and place) unable to identify time/situation. Patient was updated on plan of care for the day. Questions answered and concerns addressed.  Pt denies any additional needs at this time. White board updated. Call light, phone and personal belongings within reach. Patient still requiring B/L wrist restraints.

## 2018-10-13 NOTE — OR SURGEON
EXAMINATION:  10/13/2018 2:05 PM    HISTORY/REASON FOR EXAM:  Shortness of breath  Left pleural effusion    TECHNIQUE/EXAM DESCRIPTION:   Ultrasound-guided thoracentesis.    Indication:  LEFT pleural fluid collection.    COMPARISON:  None    PROCEDURE:     Informed consent was obtained. A timeout was taken. A left pleural effusion was localized with real-time ultrasound guidance. The left posterior chest wall was prepped and draped in a sterile manner. Following local anesthesia with 1% lidocaine, a 5 Yoruba Yueh pigtail catheter was advanced into the pleural space with trocar technique and pleural fluid was drained. The patient tolerated the procedure well without evidence of complication. A post thoracentesis chest radiograph is forthcoming.    FINDINGS:  No blood loss.    Fluid was sent to the laboratory. 650 mL specimen.    Fluid character: straw colored    IMPRESSION:  1. Ultrasound guided left sided diagnostic and therapeutic thoracentesis.    2. 650 mL of fluid withdrawn.

## 2018-10-13 NOTE — PROGRESS NOTES
"Pulmonary Progress Note    Patient ID:   Name:             Erin Quigley     YOB: 1940  Age:                 78 y.o.  female   MRN:               [id number]                                                  Subjective: denies resp dificulty    Interval Changes:had Left thoracentesis in IR this Pm; patient desaturated in the 80s after the patient was given Ativan total of 2 mg rapid response was called and the patient became more awake and oxygen saturation improves in the 90s per nasal cannula.  Vital signs stable    Objective:   Vitals/ General Appearance:   Weight/BMI: Body mass index is 27.09 kg/m².  Blood pressure 134/76, pulse 88, temperature 36.7 °C (98.1 °F), resp. rate 16, height 1.575 m (5' 2.01\"), weight 67.2 kg (148 lb 2.4 oz), SpO2 93 %, not currently breastfeeding.  Vitals:    10/13/18 0500 10/13/18 0755 10/13/18 0908 10/13/18 1133   BP: 147/83  134/76    Pulse: 82 90 92 88   Resp: 18 20 18 16   Temp: 35.9 °C (96.7 °F)  36.7 °C (98.1 °F)    SpO2: 93% 94% 93% 93%   Weight:       Height:         Oxygen Therapy:  Pulse Oximetry: 93 %, O2 (LPM): 5, O2 Delivery: Silicone Nasal Cannula    Constitutional:   Well developed, Well nourished, No acute distress  HENMT:  Normocephalic, Atraumatic, Oropharynx moist mucous membranes, No oral exudates, Nose normal.  No thyromegaly.  Eyes:  EOMI, Conjunctiva normal, No discharge.  Neck:  Normal range of motion, No cervical tenderness,  no JVD.  Cardiovascular:  Normal heart rate, Normal rhythm, No murmurs, No rubs, No gallops.   Extremitites with intact distal pulses, no cyanosis, or edema.  Lungs:  Normal breath sounds, rales to auscultation bilaterally, mild expiratory wheezing.   Abdomen: Bowel sounds normal, Soft, No tenderness, No guarding, No rebound, No masses, No hepatosplenomegaly.  Skin: Warm, Dry, No erythema, No rash, no induration.  Neurologic: Alert & oriented x 3, No focal deficits noted, cranial nerves II through X are grossly " intact.  Psychiatric: Patient is anxious and became sedated or sleepy after Ativan 2 mg  Labs:  Recent Labs      10/11/18   0055   WBC  11.8*   RBC  4.02*   HEMOGLOBIN  11.5*   HEMATOCRIT  37.7   MCV  93.8   MCH  28.6   MCHC  30.5*   RDW  67.3*   PLATELETCT  382   MPV  11.5             Recent Labs      10/10/18   1238   CPKTOTAL  62     Recent Labs      10/10/18   1238  10/11/18   0055  10/13/18   0237   SODIUM   --   145  147*   POTASSIUM   --   4.1  3.6   CHLORIDE   --   102  103   CO2   --   38*  39*   GLUCOSE   --   122*  103*   BUN   --   34*  30*   CPKTOTAL  62   --    --      Recent Labs      10/11/18   0055  10/13/18   0237   SODIUM  145  147*   POTASSIUM  4.1  3.6   CHLORIDE  102  103   CO2  38*  39*   BUN  34*  30*   CREATININE  0.44*  0.42*   PHOSPHORUS   --   3.4   CALCIUM  8.8  8.1*     No results found for this or any previous visit.      Imaging:   DX-CHEST-PORTABLE (1 VIEW)   Final Result         Worsening large left pleural effusion with near collapsing of the left lung.      Moderate layering right pleural effusion, worse than prior as well.      DX-ABDOMEN FOR TUBE PLACEMENT   Final Result         Feeding tube with tip projecting over the expected area of the stomach.      DX-CHEST-PORTABLE (1 VIEW)   Final Result         1. No significant interval change.      EC-ECHOCARDIOGRAM LTD W/O CONT   Final Result      CT-CHEST (THORAX) WITH   Final Result      1.  Moderate to large bilateral pleural effusions with bilateral atelectasis.      2.  Moderate pericardial effusion.      3.  Patchy groundglass opacities within the residual aerated upper lobes bilaterally.      4.  Fatty liver.      5.  Atherosclerotic vascular calcification.            DX-ABDOMEN FOR TUBE PLACEMENT   Final Result         1.  Nonspecific bowel gas pattern.   2.  Dobbhoff tube tip terminates overlying the expected location of the gastric body.   3.  Left lung base infiltrate and/or layering pleural effusion.      [id number]  VIEW   Final Result         1.  Nonspecific bowel gas pattern.   2.  Dobbhoff tube tip terminates in the distal esophagus, recommend advancement.   3.  Bilateral lower lobe infiltrates and layering pleural effusions.   4.  Cardiomegaly.      DX-CHEST-LIMITED (1 VIEW)   Final Result      Bilateral pleural effusions with overlying atelectasis/consolidation are again seen.      Mild interstitial prominence may represent mild edema.      Atherosclerotic plaque.         DX-ABDOMEN FOR TUBE PLACEMENT   Final Result      Enteric tube projects over the distal stomach.         EC-ECHOCARDIOGRAM COMPLETE W/O CONT   Final Result      DX-CHEST-PORTABLE (1 VIEW)   Final Result      No significant interval change.      DX-CHEST-PORTABLE (1 VIEW)   Final Result      No significant interval change.      EC-ECHOCARDIOGRAM LTD W/O CONT   Final Result      DX-CHEST-PORTABLE (1 VIEW)   Final Result         1.  Pulmonary edema and/or infiltrates are identified, which are stable since the prior exam. Layering bilateral pleural effusions, greater on the left.   2.  Atherosclerosis               DX-CHEST-PORTABLE (1 VIEW)   Final Result         1.  Pulmonary edema and/or infiltrates are identified, which are stable since the prior exam.   2.  Small right pleural effusion   3.  Atherosclerosis            DX-CHEST-PORTABLE (1 VIEW)   Final Result         1.  Pulmonary edema and/or infiltrates are identified, which are stable since the prior exam.   2.  Small right pleural effusion   3.  Atherosclerosis         DX-CHEST-PORTABLE (1 VIEW)   Final Result         1.  Pulmonary edema and/or infiltrates are identified, which are stable since the prior exam.   2.  Small right pleural effusion   3.  Atherosclerosis      DX-ABDOMEN FOR TUBE PLACEMENT   Final Result      Feeding tube placement with the tip projecting over the stomach body.      DX-ABDOMEN FOR TUBE PLACEMENT   Final Result      1.  Feeding tube appears looped within the stomach. The  distal aspect of the tip is directed cephalad in the fundal region.      DX-CHEST-LIMITED (1 VIEW)   Final Result         1.  Pulmonary edema and/or infiltrates are identified, which are stable since the prior exam.   2.  Small right pleural effusion   3.  Atherosclerosis      DX-CHEST-LIMITED (1 VIEW)   Final Result         1.  Pulmonary edema and/or infiltrates are identified, which are stable since the prior exam.   2.  Atherosclerosis      DX-CHEST-PORTABLE (1 VIEW)   Final Result         1.  Pulmonary edema and/or infiltrates are identified, which are stable since the prior exam.   2.  Atherosclerosis      DX-CHEST-LIMITED (1 VIEW)   Final Result      1.  No pneumothorax identified status post right thoracentesis and pericardiocentesis.      2.  Small amount of pneumomediastinum is likely related to recent pericardiocentesis and drain placement      3.  Cardiomegaly      EC-ECHOCARDIOGRAM LTD W/O CONT   Final Result      DX-CHEST-PORTABLE (1 VIEW)   Final Result      1.  Evacuation large right pleural effusion status post thoracentesis.   2.  No definite right pneumothorax identified. Recommend interval follow-up chest x-ray.   3.  Small left pleural effusion and left perihilar/left lung base atelectasis.   4.  Findings discussed with the patient's nurse. Follow-up chest x-ray will be obtained in approximately 3:00 PM      US-THORACENTESIS PUNCTURE RIGHT   Final Result      1. Ultrasound guided right sided diagnostic and therapeutic thoracentesis.      2. 1700 mL of fluid withdrawn.      EC-ECHOCARDIOGRAM COMPLETE W/O CONT   Final Result      DX-CHEST-PORTABLE (1 VIEW)   Final Result         1.  Pulmonary edema and/or infiltrates are identified, which are stable since the prior exam.   2.  Layering right pleural effusion, stable   3.  Cardiomegaly   4.  Atherosclerosis      DX-CHEST-2 VIEWS   Final Result      1.  Probable bilateral atelectasis      2.  Probable right pleural effusion which may be significant  in size      3.  Limited assessment on one view portable radiography      OUTSIDE IMAGES-DX CHEST   Final Result      OUTSIDE IMAGES-DX CHEST   Final Result      IR-INSERT PLEURAL CATH W/ CUFF    (Results Pending)   EC-ECHOCARDIOGRAM LTD W/O CONT    (Results Pending)       Hospital Medications:    Current Facility-Administered Medications:   •  [START ON 10/14/2018] furosemide (LASIX) tablet 20 mg, 20 mg, Feeding Tube, Q DAY, Ricardo Torres M.D.  •  insulin regular (HUMULIN R) injection 2-9 Units, 2-9 Units, Subcutaneous, 4X/DAY ACHS, Stopped at 10/12/18 1700 **AND** Accu-Chek ACHS, , , Q AC AND BEDTIME(S) **AND** NOTIFY MD and PharmD, , , Once **AND** glucose 4 g chewable tablet 16 g, 16 g, Oral, Q15 MIN PRN **AND** dextrose 50% (D50W) injection 25 mL, 25 mL, Intravenous, Q15 MIN PRN, Ricardo Torres M.D.  •  Pharmacy Consult: Enteral tube feeding - review meds/change route/product selection, , Other, PRN, Ricardo Torres M.D.  •  senna-docusate (PERICOLACE or SENOKOT S) 8.6-50 MG per tablet 2 Tab, 2 Tab, Feeding Tube, BID, Stopped at 10/13/18 0600 **AND** polyethylene glycol/lytes (MIRALAX) PACKET 1 Packet, 1 Packet, Feeding Tube, QDAY PRN **AND** magnesium hydroxide (MILK OF MAGNESIA) suspension 30 mL, 30 mL, Feeding Tube, QDAY PRN **AND** bisacodyl (DULCOLAX) suppository 10 mg, 10 mg, Rectal, QDAY PRN, Ricardo Torres M.D.  •  oxyCODONE immediate-release (ROXICODONE) tablet 2.5 mg, 2.5 mg, Feeding Tube, Q4HRS PRN **OR** oxyCODONE immediate-release (ROXICODONE) tablet 5 mg, 5 mg, Feeding Tube, Q4HRS PRN, Ricardo Torres M.D.  •  levothyroxine (SYNTHROID) tablet 88 mcg, 88 mcg, Feeding Tube, AM ES, Ricardo Torres M.D., Stopped at 10/13/18 0600  •  diphenhydrAMINE (BENADRYL) tablet/capsule 25 mg, 25 mg, Feeding Tube, HS PRN, Ricardo Torres M.D.  •  acetaminophen (TYLENOL) tablet 650 mg, 650 mg, Feeding Tube, Q4HRS PRN **OR** acetaminophen (TYLENOL) suppository 650 mg, 650 mg, Rectal, Q4HRS PRN, Ricardo ALEXANDER  DALY Torres  •  simvastatin (ZOCOR) tablet 20 mg, 20 mg, Feeding Tube, Q EVENING, Ricardo Torres M.D., Stopped at 10/12/18 1800  •  phosphorus (K-PHOS-NEUTRAL, PHOSPHA 250 NEUTRAL) per tablet 1 Tab, 1 Tab, Feeding Tube, Q6HRS, Ricardo Torres M.D., Stopped at 10/12/18 1815  •  gabapentin (NEURONTIN) capsule 300 mg, 300 mg, Feeding Tube, BID, Ricardo Torres M.D., Stopped at 10/12/18 1815  •  potassium chloride SA (Kdur) tablet 20 mEq, 20 mEq, Feeding Tube, DAILY, Ricardo Torres M.D., Stopped at 10/13/18 0600  •  ondansetron (ZOFRAN) syringe/vial injection 4 mg, 4 mg, Intravenous, Q4HRS PRN, Ricardo Torres M.D., 4 mg at 10/10/18 1339  •  fentaNYL (SUBLIMAZE) injection  mcg,  mcg, Intravenous, Q HOUR PRN, Jarvis Arnold M.D., 50 mcg at 10/07/18 1650  •  racepinephrine (MICRONEFRIN) 2.25 % nebulizer solution 0.5 mL, 0.5 mL, Nebulization, Q4H PRN (RT), Jarvis Arnold M.D., 0.5 mL at 10/08/18 0737  •  artificial tears 1.4 % ophthalmic solution 1 Drop, 1 Drop, Both Eyes, Q2HRS PRN, Jarvis Arnold M.D., 1 Drop at 10/11/18 2117  •  heparin injection 5,000 Units, 5,000 Units, Subcutaneous, Q8HRS, Néstor Guillen M.D., Stopped at 10/13/18 0600  •  Respiratory Care per Protocol, , Nebulization, Continuous RT, Alvaro Perez M.D.  •  ipratropium-albuterol (DUONEB) nebulizer solution, 3 mL, Nebulization, Q2HRS PRN (RT), Jarvis Arnold M.D., 3 mL at 10/13/18 0753  •  labetalol (NORMODYNE,TRANDATE) injection 10 mg, 10 mg, Intravenous, Q30 MIN PRN, Constantin Self M.D.  •  nicotine (NICODERM) 14 MG/24HR 14 mg, 14 mg, Transdermal, Daily-0600, Stopped at 10/11/18 0600 **AND** Nicotine Replacement Patient Education Materials, , , Once **AND** nicotine polacrilex (NICORETTE) 2 MG piece 2 mg, 2 mg, Oral, Q HOUR PRN, Hazel Cruz M.D.    Current Outpatient Medications:  Prescriptions Prior to Admission   Medication Sig Dispense Refill Last Dose   • raNITidine (ZANTAC) 150 MG  Tab Take 150 mg by mouth 2 times a day.      • simvastatin (ZOCOR) 20 MG Tab Take 20 mg by mouth every evening.   9/27/2018 at 2100   • amLODIPine (NORVASC) 5 MG Tab Take 5 mg by mouth every day.   unknown at unknown   • hydroCHLOROthiazide (HYDRODIURIL) 25 MG Tab Take 12.5 mg by mouth every day.   unknown at unknown   • gabapentin (NEURONTIN) 300 MG Cap Take 300 mg by mouth 2 Times a Day.   unknown at unknown   • levothyroxine (SYNTHROID) 88 MCG Tab Take 88 mcg by mouth Every morning on an empty stomach.   9/28/2018 at UNKNOWN   • oxymetazoline, icn,  0.025% (AFRIN) Spray 1 Spray in nose 1 time daily as needed.   9/28/2018 at 1000   • albuterol 108 (90 Base) MCG/ACT Aero Soln inhalation aerosol Inhale 2 Puffs by mouth 4 times a day.   unknown at unknown       Medication Allergy:  No Known Allergies    Assessment/Plan    Acute hypoxemic respiratory failure (HCC)- (present on admission)   Assessment & Plan     Intubated 10/2  All appropriate ventilator bundles have been initiated  Continue vent support  SBT as tolerated   Thoracentesis Lt side done this PM         Pleural effusion   Assessment & Plan     S/P right thoracentesis with 1.7 L of fluid removed on 10/1  Transudative effusion          Pericardial effusion   Assessment & Plan     Pericardiocentesis done on 10/1 - 1 L of bloody fluid was removed  Pericardial drain removed on 10/3  Cytology negative for malignancy  CRP is markedly elevated  Rheumatoid factor positive, GENTRY negative, HIV negative, hepatitis panel negative  She needs a thoracic surgery consultation for pericardial biopsy when improved  Taper empiric hydrocortisone, 50 mg IV every 12 hours          Pulmonary edema- (present on admission)   Assessment & Plan     Improved with diuresis  I will give Lasix, 40 mg IV          Hypotension   Assessment & Plan     Undifferentiated hypotension  Improved  Remains off vasopressors  Cortisol level is low -Taper hydrocortisone, 50 mg IV every 12  hours  Continue midodrine, 5 mg 3 times daily          History of non-Hodgkin's lymphoma   Assessment & Plan     Diagnosed in 2000  Received chemotherapy in [place] and has been in remission per the           HTN (hypertension)- (present on admission)   Assessment & Plan     Monitor blood pressure          Dysphagia   Assessment & Plan     Continue enteral tube feedings          Hypernatremia   Assessment & Plan     Improved  Continue free water, 200 mL every 6 hours          Hypomagnesemia   Assessment & Plan     Replete magnesium          Hypokalemia   Assessment & Plan     Replete potassium          Tobacco abuse- (present on admission)   Assessment & Plan     Nicotine replacement protocol          Hypothyroidism- (present on admission)   Assessment & Plan     Continue Synthroid, 88 mcg daily                VTE:  Heparin  Ulcer: H2 Antagonist

## 2018-10-13 NOTE — PROGRESS NOTES
LT THORACENTESIS  RADIOLOGIST: DR. MARTINEZ  TECH: MERYL    PT WAS BROUGHT DOWN TO ULTRASOUND DEPARTMENT IN STABLE CONDITION VIA NURSE AT 1400. PT WAS CONSENTED BY  PREVIOUSLY ON THE FLOOR. PT WAS HEAVILY SEDATED FOR PROCEDURE PER HUSBANDS REQUEST. DR. MARTINEZ PREFORMED THE PROCEDURE AND PT TOLERATED PROCEDURE WELL. 650CC OF FLUID WERE DRAINED FROM THE LT LUNG AND SENT TO LAB AT 1420. PT VITALS WERE MONITORED BY TECH THROUGHOUT PROCEDURE AND REMAINED STABLE. A 1-VIEW CHEST DX WAS TAKEN POST PROCEDURE. PT WAS RETURNED TO FLOOR VIA TRANSPORT AT 1440 IN STABLE CONDITION.

## 2018-10-13 NOTE — PSYCHIATRY
"PSYCHIATRIC CONSULTATION:  Reason for admission: Hypoxia  Reason for consult:capacity   Requesting Physician: Ricardo Torres M.D.  Supervising Physician:Melanie Duggan MD         Legal status:  not applicable    Chief Complaint: \"I don't want to be here.\"    HPI: 77yo female with history of \"anxiety\" in the past was transferred to [facility] from [place], [place] with SOB and hypoxemia. Pt was found to have a pleural effusion and pericardial effusion which required draining. Pericardial effusion was drained on 10/1. Pt required intubation on 10/2 and was extubated on 10/7.   Psychiatry was consulted for capacity to make medical decisions.  Pt has been reported to be waxing and waning in mentation and has been increasingly irritable over the last few days. Pt was reluctant to speak with this writer and was disoriented to where she was stating that she was in \"[place]\" and that it was 2008. Pt was unable to give further capacity evaluation answers regarding her medical condition and risks benefits of procedures that her medical team had spoken with her about. Pt  was at bedside and reports that prior to her hospitalization, her cognition was sharp and that she had no other psychiatric symptoms. He reports that she had been on trials of sertraline in the past for \"anxiety\" but has not been symptomatic recently and has not been on medication for some time.      Psychiatric Review of Systems:current symptoms as reported by pt.  Unable to assess secondary to pt condition.      Medical Review of Systems: as reported by pt. All systems reviewed. Only those found to be + are noted below. All others are negative.   Neurological:     -Pt  denies any history of head trauma, strokes or seizures  Other medical symptoms:     -HTN and history of non-Hodgkin's lymphoma    Psychiatric Examination: observed phenomenon:  Vitals: /75   Pulse 95   Temp 36.9 °C (98.5 °F)   Resp 16   Ht 1.575 m (5' 2.01\")   Wt 67.2 kg (148 " "lb 2.4 oz)   SpO2 97%   Breastfeeding? No   BMI 27.09 kg/m²   Musculoskeletal: no psychomotor agitation or retardation; no tremors  Appearance: thin elderly female, appears stated age, poor hygiene and grooming, wearing hospital attire, cortrak in place  Behavior: irritable, uncooperative, poor eye contact. Pt was argumentative with  - out of proportion to his calm requests to her  Thought Process: Unable to assess secondary to pt condition  Abnormal Thoughts/Psychosis: Unable to assess secondary to pt condition  Associations: Unable to assess secondary to pt condition  Speech: spontaneous, regular rate, rhythm, tone, and volume; no stuttering or slurring of words, voice is hoarse - reportedly since extubation  Language: fluent in English  Mood/Affect:\"Terrible\"; affect is restricted, congruent to stated mood  SI/HI: Denies SI and HI  Attention: distractible   Memory: Unable to assess secondary to pt condition  Orientation: Alert and oriented to self only   Fund of Knowledge: Unable to assess secondary to pt condition  Insight and Judgment: poor/poor     Past Psychiatric Hx:   \"Anxiety\" - has tried sertraline in the past, per     Family Psychiatric Hx:  None per chart review    Social Hx:  Lives outside of [place], [place] with     Drug/Alcohol/Tobacco Hx:   Drugs:None per chart review   Alcohol: \"Yes\" per chart review   Tobacco:Current smoker, per chart review    Medical Hx: labs, MARS, medications, etc were reviewed. Only those findings of potential interest to psychiatry are noted below:  Medical Conditions:   Past Medical History:   Diagnosis Date   • Arthritis    • Cancer (HCC)     Nonhodgkins lymphoma  2000   • Hypertension    • Unspecified disorder of thyroid     hypothyroid     Allergies:   No Known Allergies  Medications (currently prescribed at [facility]):    Current Facility-Administered Medications:   •  [START ON 10/14/2018] furosemide (LASIX) tablet 20 mg, 20 mg, Feeding Tube, Q DAY, " Ricardo Torres M.D.  •  LORazepam (ATIVAN) injection 2 mg, 2 mg, Intravenous, Once, Ricardo Torres M.D.  •  insulin regular (HUMULIN R) injection 2-9 Units, 2-9 Units, Subcutaneous, 4X/DAY ACHS, Stopped at 10/12/18 1700 **AND** Accu-Chek ACHS, , , Q AC AND BEDTIME(S) **AND** NOTIFY MD and PharmD, , , Once **AND** glucose 4 g chewable tablet 16 g, 16 g, Oral, Q15 MIN PRN **AND** dextrose 50% (D50W) injection 25 mL, 25 mL, Intravenous, Q15 MIN PRN, Ricardo Torres M.D.  •  Pharmacy Consult: Enteral tube feeding - review meds/change route/product selection, , Other, PRN, Ricardo Torres M.D.  •  senna-docusate (PERICOLACE or SENOKOT S) 8.6-50 MG per tablet 2 Tab, 2 Tab, Feeding Tube, BID, Stopped at 10/13/18 0600 **AND** polyethylene glycol/lytes (MIRALAX) PACKET 1 Packet, 1 Packet, Feeding Tube, QDAY PRN **AND** magnesium hydroxide (MILK OF MAGNESIA) suspension 30 mL, 30 mL, Feeding Tube, QDAY PRN **AND** bisacodyl (DULCOLAX) suppository 10 mg, 10 mg, Rectal, QDAY PRN, Ricardo Torres M.D.  •  oxyCODONE immediate-release (ROXICODONE) tablet 2.5 mg, 2.5 mg, Feeding Tube, Q4HRS PRN **OR** oxyCODONE immediate-release (ROXICODONE) tablet 5 mg, 5 mg, Feeding Tube, Q4HRS PRN, Ricardo Torres M.D.  •  levothyroxine (SYNTHROID) tablet 88 mcg, 88 mcg, Feeding Tube, AM ES, Ricardo Torres M.D., Stopped at 10/13/18 0600  •  diphenhydrAMINE (BENADRYL) tablet/capsule 25 mg, 25 mg, Feeding Tube, HS PRN, Ricardo A Torres, M.D.  •  acetaminophen (TYLENOL) tablet 650 mg, 650 mg, Feeding Tube, Q4HRS PRN **OR** acetaminophen (TYLENOL) suppository 650 mg, 650 mg, Rectal, Q4HRS PRN, Ricardo Torres M.D.  •  simvastatin (ZOCOR) tablet 20 mg, 20 mg, Feeding Tube, Q EVENING, Ricardo Torres M.D., Stopped at 10/12/18 1800  •  phosphorus (K-PHOS-NEUTRAL, PHOSPHA 250 NEUTRAL) per tablet 1 Tab, 1 Tab, Feeding Tube, Q6HRS, Ricardo Torres M.D., Stopped at 10/12/18 1815  •  gabapentin (NEURONTIN) capsule 300 mg, 300 mg, Feeding Tube,  BID, Ricardo Torres M.D., Stopped at 10/12/18 1815  •  potassium chloride SA (Kdur) tablet 20 mEq, 20 mEq, Feeding Tube, DAILY, Ricardo Torres M.D., Stopped at 10/13/18 0600  •  ondansetron (ZOFRAN) syringe/vial injection 4 mg, 4 mg, Intravenous, Q4HRS PRN, Ricardo Torres M.D., 4 mg at 10/10/18 1339  •  fentaNYL (SUBLIMAZE) injection  mcg,  mcg, Intravenous, Q HOUR PRN, Jarvis Arnold M.D., 50 mcg at 10/07/18 1650  •  racepinephrine (MICRONEFRIN) 2.25 % nebulizer solution 0.5 mL, 0.5 mL, Nebulization, Q4H PRN (RT), Jarvis Arnold M.D., 0.5 mL at 10/08/18 0737  •  artificial tears 1.4 % ophthalmic solution 1 Drop, 1 Drop, Both Eyes, Q2HRS PRN, Jarvis Arnold M.D., 1 Drop at 10/11/18 2117  •  heparin injection 5,000 Units, 5,000 Units, Subcutaneous, Q8HRS, Néstor Guillen M.D., Stopped at 10/13/18 0600  •  Respiratory Care per Protocol, , Nebulization, Continuous RT, Alvaro Perez M.D.  •  ipratropium-albuterol (DUONEB) nebulizer solution, 3 mL, Nebulization, Q2HRS PRN (RT), Jarvis Arnold M.D., 3 mL at 10/13/18 0753  •  labetalol (NORMODYNE,TRANDATE) injection 10 mg, 10 mg, Intravenous, Q30 MIN PRN, Constantin Self M.D.  •  nicotine (NICODERM) 14 MG/24HR 14 mg, 14 mg, Transdermal, Daily-0600, Stopped at 10/11/18 0600 **AND** Nicotine Replacement Patient Education Materials, , , Once **AND** nicotine polacrilex (NICORETTE) 2 MG piece 2 mg, 2 mg, Oral, Q HOUR PRN, Hazel T. Cruz, M.D.  Labs:  Recent Labs      10/11/18   0055  10/13/18   0237   SODIUM  145  147*   POTASSIUM  4.1  3.6   CHLORIDE  102  103   CO2  38*  39*   BUN  34*  30*   CREATININE  0.44*  0.42*   PHOSPHORUS   --   3.4   CALCIUM  8.8  8.1*       Recent Labs      10/11/18   0055  10/13/18   0237   PREALBUMIN   --   17.0*   GLUCOSE  122*  103*       Recent Labs      10/11/18   0055   RBC  4.02*   HEMOGLOBIN  11.5*   HEMATOCRIT  37.7   PLATELETCT  382       Recent Labs      10/11/18   0055   WBC   11.8*       ECG: QTc = 445 on 10/08/2018    Cranial Imaging: reviewed  DX-CHEST-PORTABLE (1 VIEW)   Final Result         Worsening large left pleural effusion with near collapsing of the left lung.      Moderate layering right pleural effusion, worse than prior as well.      DX-ABDOMEN FOR TUBE PLACEMENT   Final Result         Feeding tube with tip projecting over the expected area of the stomach.      DX-CHEST-PORTABLE (1 VIEW)   Final Result         1. No significant interval change.      EC-ECHOCARDIOGRAM LTD W/O CONT   Final Result      CT-CHEST (THORAX) WITH   Final Result      1.  Moderate to large bilateral pleural effusions with bilateral atelectasis.      2.  Moderate pericardial effusion.      3.  Patchy groundglass opacities within the residual aerated upper lobes bilaterally.      4.  Fatty liver.      5.  Atherosclerotic vascular calcification.            DX-ABDOMEN FOR TUBE PLACEMENT   Final Result         1.  Nonspecific bowel gas pattern.   2.  Dobbhoff tube tip terminates overlying the expected location of the gastric body.   3.  Left lung base infiltrate and/or layering pleural effusion.      [id number] VIEW   Final Result         1.  Nonspecific bowel gas pattern.   2.  Dobbhoff tube tip terminates in the distal esophagus, recommend advancement.   3.  Bilateral lower lobe infiltrates and layering pleural effusions.   4.  Cardiomegaly.      DX-CHEST-LIMITED (1 VIEW)   Final Result      Bilateral pleural effusions with overlying atelectasis/consolidation are again seen.      Mild interstitial prominence may represent mild edema.      Atherosclerotic plaque.         DX-ABDOMEN FOR TUBE PLACEMENT   Final Result      Enteric tube projects over the distal stomach.         EC-ECHOCARDIOGRAM COMPLETE W/O CONT   Final Result      DX-CHEST-PORTABLE (1 VIEW)   Final Result      No significant interval change.      DX-CHEST-PORTABLE (1 VIEW)   Final Result      No significant interval change.       EC-ECHOCARDIOGRAM LTD W/O CONT   Final Result      DX-CHEST-PORTABLE (1 VIEW)   Final Result         1.  Pulmonary edema and/or infiltrates are identified, which are stable since the prior exam. Layering bilateral pleural effusions, greater on the left.   2.  Atherosclerosis               DX-CHEST-PORTABLE (1 VIEW)   Final Result         1.  Pulmonary edema and/or infiltrates are identified, which are stable since the prior exam.   2.  Small right pleural effusion   3.  Atherosclerosis            DX-CHEST-PORTABLE (1 VIEW)   Final Result         1.  Pulmonary edema and/or infiltrates are identified, which are stable since the prior exam.   2.  Small right pleural effusion   3.  Atherosclerosis         DX-CHEST-PORTABLE (1 VIEW)   Final Result         1.  Pulmonary edema and/or infiltrates are identified, which are stable since the prior exam.   2.  Small right pleural effusion   3.  Atherosclerosis      DX-ABDOMEN FOR TUBE PLACEMENT   Final Result      Feeding tube placement with the tip projecting over the stomach body.      DX-ABDOMEN FOR TUBE PLACEMENT   Final Result      1.  Feeding tube appears looped within the stomach. The distal aspect of the tip is directed cephalad in the fundal region.      DX-CHEST-LIMITED (1 VIEW)   Final Result         1.  Pulmonary edema and/or infiltrates are identified, which are stable since the prior exam.   2.  Small right pleural effusion   3.  Atherosclerosis      DX-CHEST-LIMITED (1 VIEW)   Final Result         1.  Pulmonary edema and/or infiltrates are identified, which are stable since the prior exam.   2.  Atherosclerosis      DX-CHEST-PORTABLE (1 VIEW)   Final Result         1.  Pulmonary edema and/or infiltrates are identified, which are stable since the prior exam.   2.  Atherosclerosis      DX-CHEST-LIMITED (1 VIEW)   Final Result      1.  No pneumothorax identified status post right thoracentesis and pericardiocentesis.      2.  Small amount of pneumomediastinum is  likely related to recent pericardiocentesis and drain placement      3.  Cardiomegaly      EC-ECHOCARDIOGRAM LTD W/O CONT   Final Result      DX-CHEST-PORTABLE (1 VIEW)   Final Result      1.  Evacuation large right pleural effusion status post thoracentesis.   2.  No definite right pneumothorax identified. Recommend interval follow-up chest x-ray.   3.  Small left pleural effusion and left perihilar/left lung base atelectasis.   4.  Findings discussed with the patient's nurse. Follow-up chest x-ray will be obtained in approximately 3:00 PM      US-THORACENTESIS PUNCTURE RIGHT   Final Result      1. Ultrasound guided right sided diagnostic and therapeutic thoracentesis.      2. 1700 mL of fluid withdrawn.      EC-ECHOCARDIOGRAM COMPLETE W/O CONT   Final Result      DX-CHEST-PORTABLE (1 VIEW)   Final Result         1.  Pulmonary edema and/or infiltrates are identified, which are stable since the prior exam.   2.  Layering right pleural effusion, stable   3.  Cardiomegaly   4.  Atherosclerosis      DX-CHEST-2 VIEWS   Final Result      1.  Probable bilateral atelectasis      2.  Probable right pleural effusion which may be significant in size      3.  Limited assessment on one view portable radiography      OUTSIDE IMAGES-DX CHEST   Final Result      OUTSIDE IMAGES-DX CHEST   Final Result      IR-INSERT PLEURAL CATH W/ CUFF    (Results Pending)   EC-ECHOCARDIOGRAM LTD W/O CONT    (Results Pending)   US-THORACENTESIS PUNCTURE LEFT    (Results Pending)         ASSESSMENT: (new dx, acuity level)  Delirium   -Pt appears to be in the midst of delirium, likely secondary to her multiple acute medical conditions and does NOT have capacity to make medical decisions at this time.     PLAN:  -Pt does NOT have capacity to make medical decisions at this time.   -Discussed situation with pt's primary attending Dr. Torres and with pt's .  Legal status: voluntary  Records reviewed  Signing off   Thank you for the consult.

## 2018-10-14 NOTE — RESPIRATORY CARE
Order received for ABG draw by RT on floor.  Advised RN x 2 to place lab order for ABG as RT does not draw on floor patients.  Will discontinue RT order at this time.

## 2018-10-14 NOTE — PROGRESS NOTES
Cortrak Placement    Tube Team verified patient name and medical record number prior to tube placement.  Cortrak tube (55 inches, 10 Swedish) placed at 75 cm in left nare.  Per Cortrak picture, tube appears to be in the stomach.  Nursing Instructions: Awaiting KUB to confirm placement before use for medications or feeding. Once placement confirmed, flush tube with 30 ml of water, and then remove and save stylet, in patient medication drawer.

## 2018-10-14 NOTE — CARE PLAN
Problem: Bowel/Gastric:  Goal: Will not experience complications related to bowel motility  Outcome: PROGRESSING SLOWER THAN EXPECTED  Pt is incontinent of stool and urine. Held stool softener. Will continue to change pt as needed.    Problem: Safety - Medical Restraint  Goal: Free from restraint(s) (Restraint for Interference with Medical Device)  INTERVENTIONS:  1. ONCE/SHIFT or MINIMUM Q12H: Assess and document the continuing need for restraints  2. Q24H: Continued use of restraint requires LIP to perform face to face examination and written order  3. Identify and implement measures to help patient regain control     Outcome: MET Date Met: 10/14/18  Pt is currently free from restraints. Pt reeducated often on importance of cortrax and not to pull it out. Pt room near nursing station.

## 2018-10-14 NOTE — PROGRESS NOTES
of pt, Cameron was notified on pt change in status this morning to bilateral wrist restraints. Pt  will not be in the see pt till this afternoon , will tell pt message for Cameron.

## 2018-10-14 NOTE — PROGRESS NOTES
Received report from Radha, at pt bedside. Pt venous CO 2 was 42 this morning, there is an order for ABG draw, MD Torres notified on results, Pt on 8-10 L mask SPO2 92-97%, Pt WBC count has been trending up since 10/9 will address with MD, Plan is for pt to have pericardial window and pigtail catheters placed Monday, per NOC nurse, Rapid team is rounding on pt, POC discussed. Call light and belongings within reach. Bed locked and in low position. Alarm on and fall precautions in place.

## 2018-10-14 NOTE — CODE DOCUMENTATION
Romazicon given at 1855. Pt Aox2, talking and eyes open at 1900. Following commands, and taking deep breaths

## 2018-10-14 NOTE — PROGRESS NOTES
Pt pulled out NG tube with bridal and took off oxygen. Pt refusing to follow direction. Pt placed in bilateral wrist restraints. MD Torres Notified. Will call POA and update on pt status.

## 2018-10-14 NOTE — PROGRESS NOTES
[facility] Cardiology Follow-up Consult Note  Date of note:    10/4/2018      Consulting Physician: Néstor Guillen M.D.      Chief Complaint   Patient presents with   • Shortness of Breath     3 weeks on and off   • Peripheral Edema     started 3 weeks ago in her feet and is now up to her thighs       Interim Events:  - Overnight, increasing O2 requirement. Psych has determined she does not have capacity to make medical decisions  - Underwent left-sided thoracentesis yesterday with 650 cc removed  - Worsening CO2 on labs  - 1 dose of IV lasix 40 mg today. Monitor electrolytes and renal function  - Surgery on board: plan for thoracoscopic pericardial window with biopsy of pericardium next week.    - Etiology of effusions still unknown  - Pulm were consulted and are following      ROS: unable to obtain; patient lethargic this morning      Past medical, surgical, social, and family history reviewed and unchanged from admission except as noted in assessment and plan.    Medications: Reviewed in MAR  Current Facility-Administered Medications   Medication Dose Frequency Provider Last Rate Last Dose   • furosemide (LASIX) tablet 20 mg  20 mg Q DAY Ricardo Torres M.D.   20 mg at 10/14/18 0510   • insulin regular (HUMULIN R) injection 2-9 Units  2-9 Units 4X/DAY YU Torres M.D.   2 Units at 10/14/18 0519    And   • glucose 4 g chewable tablet 16 g  16 g Q15 MIN PRN Ricardo Torres M.D.        And   • dextrose 50% (D50W) injection 25 mL  25 mL Q15 MIN PRN Ricardo Torres M.D.       • Pharmacy Consult: Enteral tube feeding - review meds/change route/product selection   PRN Ricardo Torres M.D.       • senna-docusate (PERICOLACE or SENOKOT S) 8.6-50 MG per tablet 2 Tab  2 Tab BID Ricardo Torres M.D.   Stopped at 10/13/18 0600    And   • polyethylene glycol/lytes (MIRALAX) PACKET 1 Packet  1 Packet QDAY PRN Ricardo Torres M.D.        And   • magnesium hydroxide (MILK OF MAGNESIA) suspension 30 mL  30 mL QDAY  PRN Ricardo Torres M.D.        And   • bisacodyl (DULCOLAX) suppository 10 mg  10 mg QDAY PRN Ricardo Torres M.D.       • oxyCODONE immediate-release (ROXICODONE) tablet 5 mg  5 mg Q4HRS PRN Ricardo Torres M.D.        Or   • oxyCODONE immediate-release (ROXICODONE) tablet 2.5 mg  2.5 mg Q4HRS PRN Ricardo Torres M.D.       • levothyroxine (SYNTHROID) tablet 88 mcg  88 mcg AM ES Ricardo Torres M.D.   88 mcg at 10/14/18 0600   • diphenhydrAMINE (BENADRYL) tablet/capsule 25 mg  25 mg HS PRN Ricardo Torres M.D.       • acetaminophen (TYLENOL) tablet 650 mg  650 mg Q4HRS PRN Ricardo Torres M.D.        Or   • acetaminophen (TYLENOL) suppository 650 mg  650 mg Q4HRS PRN Ricardo Torres M.D.       • simvastatin (ZOCOR) tablet 20 mg  20 mg Q EVENING Ricardo Torres M.D.   20 mg at 10/13/18 1958   • gabapentin (NEURONTIN) capsule 300 mg  300 mg BID Ricardo Torres M.D.   300 mg at 10/14/18 0509   • potassium chloride SA (Kdur) tablet 20 mEq  20 mEq DAILY Ricardo Torres M.D.   20 mEq at 10/14/18 0509   • ondansetron (ZOFRAN) syringe/vial injection 4 mg  4 mg Q4HRS PRN Ricardo Torres M.D.   4 mg at 10/10/18 1339   • fentaNYL (SUBLIMAZE) injection  mcg   mcg Q HOUR PRN Jarvis Arnold M.D.   50 mcg at 10/07/18 1650   • racepinephrine (MICRONEFRIN) 2.25 % nebulizer solution 0.5 mL  0.5 mL Q4H PRN (RT) Jarvis Arnold M.D.   0.5 mL at 10/08/18 0737   • artificial tears 1.4 % ophthalmic solution 1 Drop  1 Drop Q2HRS PRN Jarvis Arnold M.D.   1 Drop at 10/11/18 2117   • heparin injection 5,000 Units  5,000 Units Q8HRS Néstor Guillen M.D.   5,000 Units at 10/14/18 0509   • Respiratory Care per Protocol   Continuous RT Alvaro Perez M.D.       • ipratropium-albuterol (DUONEB) nebulizer solution  3 mL Q2HRS PRN (RT) Jarvis Arnold M.D.   3 mL at 10/13/18 1804   • labetalol (NORMODYNE,TRANDATE) injection 10 mg  10 mg Q30 MIN PRN Constantin Self M.D.       • nicotine  "(NICODERM) 14 MG/24HR 14 mg  14 mg Daily-0600 Hazel Cruz M.D.   Stopped at 10/11/18 0600    And   • nicotine polacrilex (NICORETTE) 2 MG piece 2 mg  2 mg Q HOUR PRN Hazel Cruz M.D.         Last reviewed on 9/29/2018  4:47 PM by Jaylin Haynes, PhT  No Known Allergies    Physical Exam  Body mass index is 27.09 kg/m². Blood pressure 124/80, pulse (!) 104, temperature 36.9 °C (98.4 °F), resp. rate 13, height 1.575 m (5' 2.01\"), weight 67.2 kg (148 lb 2.4 oz), SpO2 96 %, not currently breastfeeding.   Vitals:    10/13/18 1811 10/13/18 2045 10/14/18 0047 10/14/18 0436   BP: 121/77 131/77 108/61 124/80   Pulse: (!) 103 (!) 101 86 (!) 104   Resp: 16 13 15 13   Temp:  36.4 °C (97.6 °F) 36.8 °C (98.3 °F) 36.9 °C (98.4 °F)   SpO2: 96% 95% 95% 96%   Weight:  67.2 kg (148 lb 2.4 oz)     Height:        Oxygen Therapy:  Pulse Oximetry: 96 %, O2 (LPM): 10, O2 Delivery: Mask      General: improved mental status today NAD   HEENT: Normocephalic, Atraumatic. No stridor. Unable to evaluate JVD as patient is very agitated during exam  Cardiovascular: Normal heart rate, Normal rhythm. S1+, S2+ Unable to appreciate rub  Lungs: Diffuse wheeze throughout, crackles and rhonci. Symmetric chest expansion  Abdomen:  Soft, No tenderness  Skin: No erythema, No rash  Lower limbs: No edema  Neurologic: Alert, waxing and waning mental status. Moving all 4 extremities. Strength intact.   Other systems also examined, grossly normal.       Labs (personally reviewed and notable for):   Recent Results (from the past 24 hour(s))   ACCU-CHEK GLUCOSE    Collection Time: 10/13/18 11:22 AM   Result Value Ref Range    Glucose - Accu-Ck 72 65 - 99 mg/dL   FLUID PH    Collection Time: 10/13/18  2:20 PM   Result Value Ref Range    Fluid Type Thoracentesis     Ph Misc 8.00    FLUID TOTAL PROTEIN    Collection Time: 10/13/18  2:20 PM   Result Value Ref Range    Fluid Type Thoracentesis     Total Protein Fluid 1.7 g/dL   FLUID LDH    Collection Time: " 10/13/18  2:20 PM   Result Value Ref Range    Body Fluid LDH 82 U/L   FLUID GLUCOSE    Collection Time: 10/13/18  2:20 PM   Result Value Ref Range    Glucose, Fluid 110 mg/dL   FLUID CELL COUNT    Collection Time: 10/13/18  2:20 PM   Result Value Ref Range    Fluid Type Thoracentesis     Color-Body Fluid Yellow     Character-Body Fluid Hazy     Total RBC Count <2000 cells/uL    Total  cells/uL    Polys 9 %    Lymphs 43 %    Mononuclear Cells - Fluid 11 %    Mesothelial Cells - CSF 2 %    Fluid Histiocyte 35 %    Comments see below    FLUID AMYLASE    Collection Time: 10/13/18  2:20 PM   Result Value Ref Range    Body Fluid Amylase <10 U/L   GRAM STAIN    Collection Time: 10/13/18  2:20 PM   Result Value Ref Range    Significant Indicator .     Source BF     Site Thoracentesis Fluid     Gram Stain Result Rare WBCs.  No organisms seen.      CYTOLOGY    Collection Time: 10/13/18  3:42 PM   Result Value Ref Range    Cytology Reg See Path Report    ACCU-CHEK GLUCOSE    Collection Time: 10/13/18  5:42 PM   Result Value Ref Range    Glucose - Accu-Ck 116 (H) 65 - 99 mg/dL   ARTERIAL BLOOD GAS    Collection Time: 10/13/18  6:11 PM   Result Value Ref Range    Ph 7.44 7.40 - 7.50    Pco2 61.6 (HH) 26.0 - 37.0 mmHg    Po2 65.5 64.0 - 87.0 mmHg    O2 Saturation 91.1 (L) 93.0 - 99.0 %    Hco3 41 (H) 17 - 25 mmol/L    Base Excess 14 (H) -4 - 3 mmol/L    Body Temp see below Centigrade   ACCU-CHEK GLUCOSE    Collection Time: 10/13/18  8:10 PM   Result Value Ref Range    Glucose - Accu-Ck 102 (H) 65 - 99 mg/dL   ACCU-CHEK GLUCOSE    Collection Time: 10/14/18  5:13 AM   Result Value Ref Range    Glucose - Accu-Ck 160 (H) 65 - 99 mg/dL   CBC WITH DIFFERENTIAL    Collection Time: 10/14/18  6:23 AM   Result Value Ref Range    WBC 17.3 (H) 4.8 - 10.8 K/uL    RBC 4.29 4.20 - 5.40 M/uL    Hemoglobin 12.1 12.0 - 16.0 g/dL    Hematocrit 39.3 37.0 - 47.0 %    MCV 91.6 81.4 - 97.8 fL    MCH 28.2 27.0 - 33.0 pg    MCHC 30.8 (L) 33.6 -  35.0 g/dL    RDW 64.6 (H) 35.9 - 50.0 fL    Platelet Count 415 164 - 446 K/uL    MPV 10.8 9.0 - 12.9 fL    Neutrophils-Polys 85.20 (H) 44.00 - 72.00 %    Lymphocytes 7.90 (L) 22.00 - 41.00 %    Monocytes 3.90 0.00 - 13.40 %    Eosinophils 0.30 0.00 - 6.90 %    Basophils 0.30 0.00 - 1.80 %    Immature Granulocytes 2.40 (H) 0.00 - 0.90 %    Nucleated RBC 0.00 /100 WBC    Neutrophils (Absolute) 14.72 (H) 2.00 - 7.15 K/uL    Lymphs (Absolute) 1.37 1.00 - 4.80 K/uL    Monos (Absolute) 0.67 0.00 - 0.85 K/uL    Eos (Absolute) 0.06 0.00 - 0.51 K/uL    Baso (Absolute) 0.06 0.00 - 0.12 K/uL    Immature Granulocytes (abs) 0.42 (H) 0.00 - 0.11 K/uL    NRBC (Absolute) 0.00 K/uL   COMP METABOLIC PANEL    Collection Time: 10/14/18  6:23 AM   Result Value Ref Range    Sodium 146 (H) 135 - 145 mmol/L    Potassium 3.2 (L) 3.6 - 5.5 mmol/L    Chloride 99 96 - 112 mmol/L    Co2 42 (HH) 20 - 33 mmol/L    Anion Gap 5.0 0.0 - 11.9    Glucose 158 (H) 65 - 99 mg/dL    Bun 23 (H) 8 - 22 mg/dL    Creatinine 0.34 (L) 0.50 - 1.40 mg/dL    Calcium 7.9 (L) 8.5 - 10.5 mg/dL    AST(SGOT) 16 12 - 45 U/L    ALT(SGPT) 42 2 - 50 U/L    Alkaline Phosphatase 62 30 - 99 U/L    Total Bilirubin 0.5 0.1 - 1.5 mg/dL    Albumin 2.8 (L) 3.2 - 4.9 g/dL    Total Protein 5.2 (L) 6.0 - 8.2 g/dL    Globulin 2.4 1.9 - 3.5 g/dL    A-G Ratio 1.2 g/dL   ESTIMATED GFR    Collection Time: 10/14/18  6:23 AM   Result Value Ref Range    GFR If African American >60 >60 mL/min/1.73 m 2    GFR If Non African American >60 >60 mL/min/1.73 m 2       Cardiac Imaging and Procedures Review:    EKG and telemetry tracings personally reviewed    Impression and Medical Decision Making:  Principal Problem:    Acute hypoxemic respiratory failure (HCC) POA: Yes  Active Problems:    Pulmonary edema POA: Yes    Pericardial effusion POA: Yes    Pleural effusion POA: Yes    HTN (hypertension) POA: Yes    History of non-Hodgkin's lymphoma POA: Yes      Overview: Nonhodgkins lymphoma  2000     Hypotension POA: No    Hypothyroidism POA: Yes    Tobacco abuse POA: Yes    Hypokalemia POA: No    Hypomagnesemia POA: No    Hypernatremia POA: No    Dysphagia POA: Yes  Resolved Problems:    Hypophosphatemia POA: No    Acute encephalopathy POA: Unknown      Assessment and Plan    Pericardial Effusion with Tamponade   - Patient presented with large pericardial effusion. EKG with very low voltage on admission. Underwent drainage 10/1. 1000 cc of bloody pericardial fluid was obtained. Pericardial drain removed. She likely developed pericardial decompression syndrome post pericardiocentesis. Grossly was bloody (some traumatic component). Cytology negative for malignant cells. Unlikely to be infectious. GENTRY negative, RA elevated but CCP negative. Etiology is still unclear. Suspecting possible malignancy   - Echo done 10/11: small effusion without hemodynamic compromise  - Per Dr. Cain: semielective VATS pericardial biopsy sometime next week. She may need pericardial drain with pericardial window      Sinus Tachycardia  - has been normal sinus rhythm and rate during her hospitalization however now appears to have inappropriate tachycardia.   - Unclear etiology at this point. May be contributed by the effusions and some anxiety component. Will monitor       Pleural Effusion  - S/p right sided thoracentesis on 10/1. 1.7 liters have been removed.   -  Underwent left sided thoracentesis 10/13 with 650 cc removed.   - Surgery on board: semielective VATS pericardial biopsy sometime next week. She may need pericardial drain with pericardial window  Probably a good idea to get palliative care involved for discussion regarding goals of care.   - One dose of IV lasix 40 mg today. Monitor electrolytes and renal function      Pulmonary Edema  - Has been diuresed significantly since admission however subsequently became intravascularly dry; treated with IV fluids. She unfortunately likely developed pericardial decompression  syndrome post pericardiocentesis.   - One dose of IV lasix 40 mg today. Monitor electrolytes and renal function  - Plan forthoracoscopic pericardial window with biopsy of pericardium likely Monday      Hypernatremia  - Improving with free water flushes.   - Monitor with BMP  - May need to consider further work-up for etiology of hypernatremia      Hypertension  - Holding outpatient HCTZ and amlodipine. Resume when BP stabilizes     DLD  - continue home simvastatin    It is my pleasure to participate in the care of Ms. Quigley.  Please do not hesitate to contact me with questions or concerns. Will continue to follow

## 2018-10-14 NOTE — PROGRESS NOTES
2 RN skin assessment with RASTA Butcher.  Pt ears were red and blanching. Elbows red and boggy, placed bitiain pads on. Trace edema noted in LE. Heels red and boggy. Currently floating.

## 2018-10-14 NOTE — PROGRESS NOTES
[facility] Hospitalist Progress Note    Date of Service: 10/13/2018    Chief Complaint    78 y.o. Female from [facility] admitted 2018 with shortness of breath and peripheral edema found to have significant pericardial effusion.    Pericardial effusion tapped 10/1  Intubated 10/2  Extubated 10/7      Interval Problem Update    on tele    cortrack back in    She refused chest drains to be placed. radioloogy md not comfortable placing them in this setting    Patient deemed incapacitated by psychiatry    Patient is awake    Confused     at bedside wants us to proceed with throracentesis instead. He understands patient will need to be sedated      Consultants/Specialty  Cardiology  pulmonology      Disposition  pending        Review of Systems   Unable to perform ROS: Mental status change      Physical Exam  Laboratory/Imaging   Hemodynamics  Temp (24hrs), Av.6 °C (97.8 °F), Min:35.9 °C (96.7 °F), Max:37.1 °C (98.8 °F)   Temperature: 36.3 °C (97.3 °F)  Pulse  Av.8  Min: 10  Max: 140   Blood Pressure : 121/77      Respiratory      Respiration: 16, Pulse Oximetry: 96 % (10L oxy mask), O2 Daily Delivery Respiratory : Silicone Nasal Cannula     Given By:: Mask, PEP/CPT Method: Positive Airway Pressure Device, Work Of Breathing / Effort: Mild  RUL Breath Sounds: Transmitted Upper Airway Sound, RML Breath Sounds: Transmitted Upper Airway Sound, RLL Breath Sounds: Transmitted Upper Airway Sound, LORI Breath Sounds: Transmitted Upper Airway Sound, LLL Breath Sounds: Transmitted Upper Airway Sound    Fluids    Intake/Output Summary (Last 24 hours) at 10/13/18 1955  Last data filed at 10/13/18 1500   Gross per 24 hour   Intake               30 ml   Output             1000 ml   Net             -970 ml       Nutrition  No orders of the defined types were placed in this encounter.    Physical Exam   Constitutional: She appears well-developed and well-nourished. No distress.   HENT:   Head: Normocephalic and  atraumatic.   Neck: No JVD present.   Cardiovascular:   tachycardia   Pulmonary/Chest: Effort normal. No stridor. No respiratory distress. She has no wheezes.   Decreased bs at bases   Abdominal: Soft. There is no tenderness. There is no rebound and no guarding.   Musculoskeletal: She exhibits edema.   Neurological: She is alert.   O x 2   Skin: Skin is warm and dry. No rash noted. She is not diaphoretic.   Psychiatric: She has a normal mood and affect. Thought content normal.   Nursing note and vitals reviewed.      Recent Labs      10/11/18   0055   WBC  11.8*   RBC  4.02*   HEMOGLOBIN  11.5*   HEMATOCRIT  37.7   MCV  93.8   MCH  28.6   MCHC  30.5*   RDW  67.3*   PLATELETCT  382   MPV  11.5     Recent Labs      10/11/18   0055  10/13/18   0237   SODIUM  145  147*   POTASSIUM  4.1  3.6   CHLORIDE  102  103   CO2  38*  39*   GLUCOSE  122*  103*   BUN  34*  30*   CREATININE  0.44*  0.42*   CALCIUM  8.8  8.1*                      Assessment/Plan     * Acute hypoxemic respiratory failure (HCC)- (present on admission)   Assessment & Plan    Extubated on 10/7/2018  r    Continue oxygen and RT protocol        Pleural effusion- (present on admission)   Assessment & Plan    Status post thoracentesis on October 1d    Diuresis    Thoracentesis on left ordered on 10/13          Pericardial effusion- (present on admission)   Assessment & Plan    Cardiology following   Status post pericardial drain placement  Drain removed on 10/3/2018  Cytology negative fluid cultures negative  GENTRY negative RA factor positive CCP negative     trial of steroids .. Ineffective and stopped      Etiology is unclear and patient may eventually need pericardial biopsy for definitive diagnosis -> surgery is aware            Pulmonary edema- (present on admission)   Assessment & Plan    Monitor fluid status   Diuresis  Lasix 20mg po daily        Hypotension   Assessment & Plan    Relative adrenal insufficiency    Appears resolved for now             History of non-Hodgkin's lymphoma- (present on admission)   Assessment & Plan    Hx of         HTN (hypertension)- (present on admission)   Assessment & Plan    All bp medications held        Dysphagia- (present on admission)   Assessment & Plan    Continue with speech therapy            Hypernatremia   Assessment & Plan    water flushes , i300 cc q6          Hypomagnesemia   Assessment & Plan    Repleted        Hypokalemia   Assessment & Plan    Repleted        Tobacco abuse- (present on admission)   Assessment & Plan    Continue NicoDerm   on cessation when mental status improved        Hypothyroidism- (present on admission)   Assessment & Plan    Continue levothyroxine          Quality-Core Measures   Reviewed items::  EKG reviewed, Labs reviewed, Medications reviewed and Radiology images reviewed  Almonte catheter::  No Almonte  DVT prophylaxis pharmacological::  Heparin  DVT prophylaxis - mechanical:  SCDs  Ulcer Prophylaxis::  Not indicated      Check am cbc, bmp

## 2018-10-14 NOTE — PROGRESS NOTES
Assumed care of patient, bedside report received from RASTA Horta.  at bedside. Updated on POC, call light within reach and fall precautions in place. Bed locked and in lowest position. Patient instructed to call for assistance before getting out of bed. All questions answered, no other needs at this time.

## 2018-10-14 NOTE — PROGRESS NOTES
Pt became agitated. Pt began kicking and hitting spouse and staff members. Called staff for assistance to get pt back into bed. Called MD Torres for medication to calm pt, new orders received.

## 2018-10-14 NOTE — CARE PLAN
Problem: Respiratory:  Goal: Respiratory status will improve  Outcome: PROGRESSING AS EXPECTED  Pt able to maintain SPO2 above 90% on 1 to 2 L.     Problem: Fluid Volume:  Goal: Will maintain balanced intake and output  Outcome: PROGRESSING AS EXPECTED  Pt receiving lasix and urinating frequently with linin changes. Pt will still need pericardial window on Monday.     Problem: Communication  Goal: The ability to communicate needs accurately and effectively will improve  Outcome: PROGRESSING AS EXPECTED  Pt educated on POC and POA obdulia Barnes updated on changes in pt care.     Problem: Safety  Goal: Will remain free from injury  Outcome: PROGRESSING AS EXPECTED  Pt bed is locked in lowest position. Bed alarm is on. Call light within reach. Non skid yellow socks are on.     Problem: Skin Integrity  Goal: Risk for impaired skin integrity will decrease  Outcome: PROGRESSING AS EXPECTED  Pt changed with frequent linin changes to keep skin clean dry and intact.

## 2018-10-14 NOTE — PROGRESS NOTES
Tech from Lab called with critical result of PCO2 of 61.6 at 1848. Critical lab result read back to Tech.   Dr. Torres notified of critical lab result at 1851.  Critical lab result read back by Dr. Torres.

## 2018-10-15 NOTE — PROGRESS NOTES
[facility] Cardiology Follow-up Consult Note    JORI Crisostomo  And LEWIS Woods    Chief Complaint   Patient presents with   • Shortness of Breath     3 weeks on and off   • Peripheral Edema     started 3 weeks ago in her feet and is now up to her thighs       ID:  77 yo female with history of non-Hodgkin's lympoma that presented 9/28 from an outside facility for shortness of breath.  Found bilateral pleural effusion and pericardial effusion.  10/1, percardiocentesis with 1000cc bloody pericardial fluid drained.  10/1 right sided thoracentesis with 1700 cc drained.  10/2 intubated, 10/7 extubated.  10/13, left IR thoracentesis with 650 cc drained.  Cardiology consulted to aid in evaluation/management of pericardial effusion of unknown cause.       Interim Events:  -Overnight: No events.   -Mood:  Uncooperative/unresponsive at bedside.     -SOB:  Saturating well on 2 Ls.  -Hypernatremia:  Improved, Na 144  -Hypokalemia:  Stable K 3.2.  -Renal function:  Stable.  Cr 0.38, GFR >60    -General surgery:  Holding surgical intervention given concern for persistent swallowing dysfunction secondary to intubation.    -Pulm:  Continue diuresis for pulmonary edema    ROS: unable to obtain; patient lethargic this morning    Past medical, surgical, social, and family history reviewed and unchanged from admission except as noted in assessment and plan.    Medications: Reviewed in MAR  Current Facility-Administered Medications   Medication Dose Frequency Provider Last Rate Last Dose   • haloperidol lactate (HALDOL) injection 1 mg  1 mg Q6HRS PRN Ricardo Torres M.D.   1 mg at 10/14/18 1426   • insulin regular (HUMULIN R) injection 2-9 Units  2-9 Units Q6HRS Ricardo Torres M.D.   Stopped at 10/14/18 1800    And   • glucose 4 g chewable tablet 16 g  16 g Q15 MIN PRN Ricardo Torres M.D.        And   • dextrose 50% (D50W) injection 25 mL  25 mL Q15 MIN PRN Ricardo Torres M.D.       • potassium chloride SA (Kdur) tablet 20 mEq  20  mEq BID Ricardo Torres M.D.   20 mEq at 10/15/18 0528   • furosemide (LASIX) tablet 20 mg  20 mg Q DAY Ricardo Torres M.D.   20 mg at 10/15/18 0529   • Pharmacy Consult: Enteral tube feeding - review meds/change route/product selection   PRN Ricardo Torres M.D.       • senna-docusate (PERICOLACE or SENOKOT S) 8.6-50 MG per tablet 2 Tab  2 Tab BID Ricardo Torres M.D.   Stopped at 10/13/18 0600    And   • polyethylene glycol/lytes (MIRALAX) PACKET 1 Packet  1 Packet QDAY PRN Ricardo Torres M.D.        And   • magnesium hydroxide (MILK OF MAGNESIA) suspension 30 mL  30 mL QDAY PRN Ricardo Torres M.D.        And   • bisacodyl (DULCOLAX) suppository 10 mg  10 mg QDAY PRN Ricardo Torres M.D.       • oxyCODONE immediate-release (ROXICODONE) tablet 5 mg  5 mg Q4HRS PRTWYLA Torres M.D.        Or   • oxyCODONE immediate-release (ROXICODONE) tablet 2.5 mg  2.5 mg Q4HRS PRN Ricardo Torres M.D.       • levothyroxine (SYNTHROID) tablet 88 mcg  88 mcg AM ES Ricardo Torres M.D.   88 mcg at 10/15/18 0530   • diphenhydrAMINE (BENADRYL) tablet/capsule 25 mg  25 mg HS PRN Ricardo Torres M.D.       • acetaminophen (TYLENOL) tablet 650 mg  650 mg Q4HRS PRTWYLA Torres M.D.        Or   • acetaminophen (TYLENOL) suppository 650 mg  650 mg Q4HRS PRN Ricardo Torres M.D.       • simvastatin (ZOCOR) tablet 20 mg  20 mg Q EVENING Ricardo Torres M.D.   20 mg at 10/14/18 1747   • gabapentin (NEURONTIN) capsule 300 mg  300 mg BID Ricardo Torres M.D.   300 mg at 10/15/18 0528   • ondansetron (ZOFRAN) syringe/vial injection 4 mg  4 mg Q4HRS PRN Ricardo Torres M.D.   4 mg at 10/10/18 1339   • fentaNYL (SUBLIMAZE) injection  mcg   mcg Q HOUR PRN Jarvis Arnold M.D.   50 mcg at 10/07/18 1650   • racepinephrine (MICRONEFRIN) 2.25 % nebulizer solution 0.5 mL  0.5 mL Q4H PRN (RT) Jarvis Arnold M.D.   0.5 mL at 10/08/18 0742   • artificial tears 1.4 % ophthalmic solution 1 Drop  1 Drop  "Q2HRS PRN Jarvis Arnold M.D.   1 Drop at 10/11/18 2117   • heparin injection 5,000 Units  5,000 Units Q8HRS Néstor Guillen M.D.   5,000 Units at 10/14/18 2102   • Respiratory Care per Protocol   Continuous RT Alvaro Perez M.D.       • ipratropium-albuterol (DUONEB) nebulizer solution  3 mL Q2HRS PRN (RT) Jarvis Arnold M.D.   3 mL at 10/13/18 1804   • labetalol (NORMODYNE,TRANDATE) injection 10 mg  10 mg Q30 MIN PRN Constantin Self M.D.       • nicotine (NICODERM) 14 MG/24HR 14 mg  14 mg Daily-0600 Hazel Cruz M.D.   Stopped at 10/11/18 0600    And   • nicotine polacrilex (NICORETTE) 2 MG piece 2 mg  2 mg Q HOUR PRN Hazel Cruz M.D.         Last reviewed on 9/29/2018  4:47 PM by Jaylin Haynes, T  No Known Allergies    Physical Exam  Body mass index is 23.38 kg/m². Blood pressure 114/58, pulse 89, temperature 37.2 °C (98.9 °F), resp. rate 12, height 1.575 m (5' 2.01\"), weight 58 kg (127 lb 13.9 oz), SpO2 92 %, not currently breastfeeding.   Vitals:    10/14/18 2105 10/15/18 0000 10/15/18 0415 10/15/18 0815   BP: 115/68 114/63 118/60 114/58   Pulse: (!) 102 94 90 89   Resp: 18 14 16 12   Temp: 36.2 °C (97.2 °F) 36.4 °C (97.5 °F) 36.9 °C (98.4 °F) 37.2 °C (98.9 °F)   SpO2: 92% 94% 93% 92%   Weight: 58 kg (127 lb 13.9 oz)      Height:        Oxygen Therapy:  Pulse Oximetry: 92 %, O2 (LPM): 2, O2 Delivery: Silicone Nasal Cannula    General: Today patient uncooperative/nonresponsive to questioning.    HEENT: Normocephalic, Atraumatic. No stridor. Unable to evaluate JVD as patient is very agitated during exam  Cardiovascular: Normal heart rate, Normal rhythm. S1+, S2+ Unable to appreciate rub  Lungs: Diffuse wheeze throughout, crackles and rhonci. Symmetric chest expansion  Abdomen:  Soft, No tenderness  Skin: No erythema, No rash  Lower limbs: No edema  Neurologic: Alert, waxing and waning mental status. Moving all 4 extremities. Strength intact.   Other systems also examined, grossly " normal.       Labs (personally reviewed and notable for):   Recent Results (from the past 24 hour(s))   ACCU-CHEK GLUCOSE    Collection Time: 10/14/18 11:11 AM   Result Value Ref Range    Glucose - Accu-Ck 88 65 - 99 mg/dL   ACCU-CHEK GLUCOSE    Collection Time: 10/14/18  5:41 PM   Result Value Ref Range    Glucose - Accu-Ck 130 (H) 65 - 99 mg/dL   ACCU-CHEK GLUCOSE    Collection Time: 10/15/18 12:19 AM   Result Value Ref Range    Glucose - Accu-Ck 135 (H) 65 - 99 mg/dL   Comp Metabolic Panel (CMP) - Every Monday    Collection Time: 10/15/18  2:58 AM   Result Value Ref Range    Co2 37 (H) 20 - 33 mmol/L    Sodium 144 135 - 145 mmol/L    Potassium 3.2 (L) 3.6 - 5.5 mmol/L    Chloride 99 96 - 112 mmol/L    Anion Gap 8.0 0.0 - 11.9    Glucose 152 (H) 65 - 99 mg/dL    Bun 18 8 - 22 mg/dL    Creatinine 0.38 (L) 0.50 - 1.40 mg/dL    Calcium 8.4 (L) 8.5 - 10.5 mg/dL    AST(SGOT) 15 12 - 45 U/L    ALT(SGPT) 34 2 - 50 U/L    Alkaline Phosphatase 59 30 - 99 U/L    Total Bilirubin 0.4 0.1 - 1.5 mg/dL    Albumin 2.9 (L) 3.2 - 4.9 g/dL    Total Protein 5.4 (L) 6.0 - 8.2 g/dL    Globulin 2.5 1.9 - 3.5 g/dL    A-G Ratio 1.2 g/dL   ESTIMATED GFR    Collection Time: 10/15/18  2:58 AM   Result Value Ref Range    GFR If African American >60 >60 mL/min/1.73 m 2    GFR If Non African American >60 >60 mL/min/1.73 m 2   ACCU-CHEK GLUCOSE    Collection Time: 10/15/18  5:41 AM   Result Value Ref Range    Glucose - Accu-Ck 131 (H) 65 - 99 mg/dL       Cardiac Imaging and Procedures Review:    EKG and telemetry tracings personally reviewed    Impression and Medical Decision Making:  Principal Problem:    Acute hypoxemic respiratory failure (HCC) POA: Yes  Active Problems:    Pulmonary edema POA: Yes    Pericardial effusion POA: Yes    Pleural effusion POA: Yes    HTN (hypertension) POA: Yes    History of non-Hodgkin's lymphoma POA: Yes      Overview: Nonhodgkins lymphoma  2000    Hypotension POA: No    Hypothyroidism POA: Yes    Tobacco abuse  POA: Yes    Hypokalemia POA: No    Hypomagnesemia POA: No    Hypernatremia POA: No    Dysphagia POA: Yes  Resolved Problems:    Hypophosphatemia POA: No    Acute encephalopathy POA: Unknown      Assessment and Plan    Pericardial Effusion with Tamponade   Etiology is still unclear. Suspecting possible malignancy   -Patient presented with large pericardial effusion. EKG with very low voltage on admission.  -Underwent drainage 10/1. 1000 cc of bloody pericardial fluid was obtained. Pericardial drain removed. She likely developed pericardial decompression syndrome post pericardiocentesis. Grossly was bloody (some traumatic component). Cytology negative for malignant cells. Unlikely to be infectious.  -GENTRY negative, RA elevated but CCP negative.  -Echo done 10/11: small effusion without hemodynamic compromise    Plan:  -OK with gen surg holding intervension.  -Ok with diuresis with Lasix.  Likely minimally effective given flat jugular veins.  -Ordered repeat ECHO  -OK with right sided thoracentesis.      Sinus Tachycardia  -Has been normal sinus rhythm and rate during her hospitalization however now appears to have inappropriat tachycardia.   -Unclear etiology at this point. May be contributed by the effusions and some anxiety component.  -Plan:  Will continue to monitor     Pleural Effusion  -S/p right sided thoracentesis on 10/1. 1.7 liters have been removed.   -Underwent left sided thoracentesis 10/13 with 650 cc removed.   -Surgery on board: Possible semielective VATS pericardial biopsy with pericardial drain with pericardial window.  However holding intervention given high likelihood of operative compilations.      Plan:   -Probably a good idea to get palliative care involved for discussion regarding goals of care.   -OK with diuresis with Lasix.  Continue to monitor electrolytes and renal function.  -Recommend repletion of potassium    Pulmonary Edema  -Has been diuresed significantly since admission however  subsequently became intravascularly dry; treated with IV fluids.  -She unfortunately likely developed pericardial decompression syndrome post pericardiocentesis.     Plan:    -OK with diuresis with Lasix.  Continue to monitor electrolytes and renal function  -Recommend repletion of potassium  -Gen Surg:  Possible VATS for pericardial BX or pericardial window when patient improves.      Hypernatremia  -Monitor with BMP  -May need to consider further work-up for etiology of hypernatremia    Hypokalemia  -Plan: recommend repletion of potassium    Hypertension  -Holding outpatient HCTZ and amlodipine. Resume when BP stabilizes     DLD  -continue home simvastatin    It is my pleasure to participate in the care of Ms. Quigley.  Please do not hesitate to contact me with questions or concerns. Will continue to follow

## 2018-10-15 NOTE — PROGRESS NOTES
Pulmonary Progress Note     Patient ID:   Name:             Erin Quigley     YOB: 1940  Age:                 78 y.o.  female             MRN:               [id number]      HPI:              Pt is a 79 yo F presented to the ED on 9/28 for SOB. She was noted to have pericardial/pleural effusions which were believed to be the source of her symptoms. On 10/1 she underwent right thoracentesis and pericardiocentesis, a pericardial drain was placed at that time. Patient symptomatically worsened however and was transferred to the ICU where she required intubation on 10/2. Pericardial drain was removed on 10/3. Analysis of these fluids suggested exudate but cytology was not suggestive of malignancy. Rheumatologic workup has been negative aside from elevated rheumatoid factor. She improved and was extubated on 10/7. Cardiology was consulted who have been diuresing patient and following her pericardial effusion with repeat echos. CT on 10/10 showed worsening effusions and congestion as well as ground glass opacities in upper lobes b/l. Around this time pt had become considerably more delirious. Surgery was involved who recommended b/l pigtail catheters for her effusions, this was scheduled with IR however pt refused. She was rescheduled after becoming more agreeable and will possible undergo this in the near future. Surgery plans for pericardial window and bx next week for further workup. Pulmonary was consulted at the urging of the  and due to the patients recurring effusions.      Review of Systems   Unable to perform ROS: Mental status change                                                 10/15/18: Patient is non cooperative and refuses to talk with physicians. Keeps her eyes closed.    s/p left thoracentesis in IR on 10/13/18. Feeding via feeding tube.   Hospital Medications:     Current Facility-Administered Medications:   •  [START ON 10/14/2018] furosemide (LASIX) tablet 20 mg, 20 mg,  Feeding Tube, Q DAY, Ricardo Torres M.D.  •  insulin regular (HUMULIN R) injection 2-9 Units, 2-9 Units, Subcutaneous, 4X/DAY ACHS, Stopped at 10/12/18 1700 **AND** Accu-Chek ACHS, , , Q AC AND BEDTIME(S) **AND** NOTIFY MD and PharmD, , , Once **AND** glucose 4 g chewable tablet 16 g, 16 g, Oral, Q15 MIN PRN **AND** dextrose 50% (D50W) injection 25 mL, 25 mL, Intravenous, Q15 MIN PRN, Ricardo Torres M.D.  •  Pharmacy Consult: Enteral tube feeding - review meds/change route/product selection, , Other, PRN, Ricardo Torres M.D.  •  senna-docusate (PERICOLACE or SENOKOT S) 8.6-50 MG per tablet 2 Tab, 2 Tab, Feeding Tube, BID, Stopped at 10/13/18 0600 **AND** polyethylene glycol/lytes (MIRALAX) PACKET 1 Packet, 1 Packet, Feeding Tube, QDAY PRN **AND** magnesium hydroxide (MILK OF MAGNESIA) suspension 30 mL, 30 mL, Feeding Tube, QDAY PRN **AND** bisacodyl (DULCOLAX) suppository 10 mg, 10 mg, Rectal, QDAY PRN, Ricardo Torres M.D.  •  oxyCODONE immediate-release (ROXICODONE) tablet 2.5 mg, 2.5 mg, Feeding Tube, Q4HRS PRN **OR** oxyCODONE immediate-release (ROXICODONE) tablet 5 mg, 5 mg, Feeding Tube, Q4HRS PRN, Ricardo Torres M.D.  •  levothyroxine (SYNTHROID) tablet 88 mcg, 88 mcg, Feeding Tube, AM ES, Ricardo Torres M.D., Stopped at 10/13/18 0600  •  diphenhydrAMINE (BENADRYL) tablet/capsule 25 mg, 25 mg, Feeding Tube, HS PRN, Ricardo Torres M.D.  •  acetaminophen (TYLENOL) tablet 650 mg, 650 mg, Feeding Tube, Q4HRS PRN **OR** acetaminophen (TYLENOL) suppository 650 mg, 650 mg, Rectal, Q4HRS PRN, Ricardo Torres M.D.  •  simvastatin (ZOCOR) tablet 20 mg, 20 mg, Feeding Tube, Q EVENING, Ricardo Torres M.D., Stopped at 10/12/18 1800  •  phosphorus (K-PHOS-NEUTRAL, PHOSPHA 250 NEUTRAL) per tablet 1 Tab, 1 Tab, Feeding Tube, Q6HRS, Ricardo Torres M.D., Stopped at 10/12/18 1815  •  gabapentin (NEURONTIN) capsule 300 mg, 300 mg, Feeding Tube, BID, Ricardo Torres M.D., Stopped at 10/12/18 1815  •  potassium  "chloride SA (Kdur) tablet 20 mEq, 20 mEq, Feeding Tube, DAILY, Ricardo Torres M.D., Stopped at 10/13/18 0600  •  ondansetron (ZOFRAN) syringe/vial injection 4 mg, 4 mg, Intravenous, Q4HRS PRN, Ricardo Torres M.D., 4 mg at 10/10/18 1339  •  fentaNYL (SUBLIMAZE) injection  mcg,  mcg, Intravenous, Q HOUR PRN, Jarvis Arnold M.D., 50 mcg at 10/07/18 1650  •  racepinephrine (MICRONEFRIN) 2.25 % nebulizer solution 0.5 mL, 0.5 mL, Nebulization, Q4H PRN (RT), Jarvis Arnold M.D., 0.5 mL at 10/08/18 0737  •  artificial tears 1.4 % ophthalmic solution 1 Drop, 1 Drop, Both Eyes, Q2HRS PRN, Jarvis Arnold M.D., 1 Drop at 10/11/18 2117  •  heparin injection 5,000 Units, 5,000 Units, Subcutaneous, Q8HRS, Néstor Guillen M.D., Stopped at 10/13/18 0600  •  Respiratory Care per Protocol, , Nebulization, Continuous RT, Alvaro Perez M.D.  •  ipratropium-albuterol (DUONEB) nebulizer solution, 3 mL, Nebulization, Q2HRS PRN (RT), Jarvis Arnold M.D., 3 mL at 10/13/18 0753  •  labetalol (NORMODYNE,TRANDATE) injection 10 mg, 10 mg, Intravenous, Q30 MIN PRN, Constantin Self M.D.  •  nicotine (NICODERM) 14 MG/24HR 14 mg, 14 mg, Transdermal, Daily-0600, Stopped at 10/11/18 0600 **AND** Nicotine Replacement Patient Education Materials, , , Once **AND** nicotine polacrilex (NICORETTE) 2 MG piece 2 mg, 2 mg, Oral, Q HOUR PRN, Hazel Cruz M.D.    Medication Allergy:  No Known Allergies    Objective:   Vitals/ General Appearance:   Weight/BMI: Body mass index is 27.09 kg/m².  Blood pressure 118/60, pulse 80, temperature 36.9 °C (98.4 °F), resp. rate 16, height 1.575 m (5' 2.01\"), weight 67.2 kg (148 lb 2.4 oz), SpO2 95 %    Oxygen Therapy:  Pulse Oximetry: 95 %, O2 (LPM): 2, O2 Delivery: Silicone Nasal Cannula     Constitutional:   Well developed, Well nourished, No acute distress  HENMT:  Normocephalic, Atraumatic, Oropharynx moist mucous membranes, No oral exudates, Nose normal.  No " thyromegaly.  Eyes:  PERRLA, EOMI, Conjunctiva normal, No discharge.  Neck:  Normal range of motion, No cervical tenderness,  no JVD.  Cardiovascular:  Normal heart rate, Normal rhythm, No murmurs, No rubs, No gallops.   Extremitites with intact distal pulses, no cyanosis, or edema.  Lungs:  Diminished breath sounds bilaterally lower chest. No wheeze.   Abdomen: Bowel sounds normal, Soft, No tenderness, No guarding, No rebound, No masses, No hepatosplenomegaly.  Skin: Warm, Dry, No erythema, No rash, no induration.  Neurologic: Alert & orientatio cannot be assessed., No focal deficits noted, Tendon jerks intact    Labs:  Results for AVEL COLLAZO (MRN [id number]) as of 10/15/2018 08:02   Ref. Range 10/15/2018 02:58   Sodium Latest Ref Range: 135 - 145 mmol/L 144   Potassium Latest Ref Range: 3.6 - 5.5 mmol/L 3.2 (L)   Chloride Latest Ref Range: 96 - 112 mmol/L 99   Co2 Latest Ref Range: 20 - 33 mmol/L 37 (H)   Anion Gap Latest Ref Range: 0.0 - 11.9  8.0   Glucose Latest Ref Range: 65 - 99 mg/dL 152 (H)   Bun Latest Ref Range: 8 - 22 mg/dL 18   Creatinine Latest Ref Range: 0.50 - 1.40 mg/dL 0.38 (L)   GFR If  Latest Ref Range: >60 mL/min/1.73 m 2 >60   GFR If Non  Latest Ref Range: >60 mL/min/1.73 m 2 >60   Calcium Latest Ref Range: 8.5 - 10.5 mg/dL 8.4 (L)   AST(SGOT) Latest Ref Range: 12 - 45 U/L 15   ALT(SGPT) Latest Ref Range: 2 - 50 U/L 34   Alkaline Phosphatase Latest Ref Range: 30 - 99 U/L 59   Total Bilirubin Latest Ref Range: 0.1 - 1.5 mg/dL 0.4   Albumin Latest Ref Range: 3.2 - 4.9 g/dL 2.9 (L)   Total Protein Latest Ref Range: 6.0 - 8.2 g/dL 5.4 (L)   Globulin Latest Ref Range: 1.9 - 3.5 g/dL 2.5   A-G Ratio Latest Units: g/dL 1.2     Imaging:        10/15/2018 5:11 AM    HISTORY/REASON FOR EXAM: For indication of respiratory failure    TECHNIQUE/EXAM DESCRIPTION:  Single AP view of the chest.    COMPARISON: October 13, 2018    FINDINGS:    Position of medical devices  appears stable. Cardiomegaly is observed.    The mediastinal contour appears within normal limits.  The central  pulmonary vasculature appears prominent and indistinct.    The lungs appear well expanded bilaterally.  Diffuse scattered hazy pulmonary parenchymal opacities are seen.    Veil-like opacities are seen in the right lung base.    The bony structures appear age-appropriate.   Impression         1.  Pulmonary edema and/or infiltrates are identified, which are stable since the prior exam.   Reading Provider Reading Date   Kirit Vega M.D. Oct 15, 2018          Assessment/Plan         Acute hypoxemic respiratory failure (HCC)- (present on admission)   Assessment & Plan     Intubated 10/2 extubated 10/7  Thoracentesis Lt side done this 10/13          Pleural effusion   Assessment & Plan     S/P right thoracentesis with 1.7 L of fluid removed on 10/1  Transudative effusion  S/p left thoracentesis transudative   Recommend right therapeutic thoracentesis by IR       Pericardial effusion   Assessment & Plan     Pericardiocentesis done on 10/1 - 1 L of bloody fluid was removed  Pericardial drain removed on 10/3  Cytology negative for malignancy  CRP is markedly elevated  Rheumatoid factor positive, GENTRY negative, HIV negative, hepatitis panel negative  Thoracic surgery consultation for pericardial biopsy when improved  Taper empiric hydrocortisone, 50 mg IV every 12 hours          Pulmonary edema- (present on admission)   Assessment & Plan     Improved with diuresis   Continue Lasix, 40 mg IV          Hypotension resolved   Assessment & Plan     Improved  Remains off vasopressors  Cortisol level is low -Taper hydrocortisone, 50 mg IV every 12 hours  Continue midodrine, 5 mg 3 times daily          History of non-Hodgkin's lymphoma   Assessment & Plan     Diagnosed in 2000  Received chemotherapy in [place] and has been in remission per the           HTN (hypertension)- (present on admission)   Assessment  & Plan     Monitor blood pressure          Dysphagia   Assessment & Plan     Continue enteral tube feedings         Hypokalemia   Assessment & Plan     Replete potassium         Tobacco abuse- (present on admission)   Assessment & Plan     Nicotine replacement protocol         Hypothyroidism- (present on admission)   Assessment & Plan     Continue Synthroid, 88 mcg daily         VTE:  Heparin  Ulcer: H2 Antagonist

## 2018-10-15 NOTE — PROGRESS NOTES
Received report from night shift RN. Assumed care of Pt. Pt laying supine with no family present. Pt in bilateral soft wrist restraints. Discussed care with Pt,  Pt declines any needs at this time. Bed alarm on, bed locked in lowest position, and call light within reach.

## 2018-10-15 NOTE — THERAPY
PT treatment attempted, hold per RN, will monitor and reattempt as able and appropriate.    Melanie Brower, PT, DPT  [phone]

## 2018-10-15 NOTE — PROGRESS NOTES
[facility] Hospitalist Progress Note    Date of Service: 10/14/2018    Chief Complaint    78 y.o. Female from [facility] admitted 2018 with shortness of breath and peripheral edema found to have significant pericardial effusion.    Pericardial effusion tapped 10/1  Intubated 10/2  Extubated 10/7      Interval Problem Update    on tele    Patient deemed incapacitated by psychiatry    lethargy    Confused    Left thoracentesis done yesterday    Will give patient a days rest before we do the other side      Low potassium    Consultants/Specialty  Cardiology  pulmonology      Disposition  pending        Review of Systems   Unable to perform ROS: Mental status change      Physical Exam  Laboratory/Imaging   Hemodynamics  Temp (24hrs), Av.1 °C (98.7 °F), Min:36.8 °C (98.3 °F), Max:37.2 °C (98.9 °F)   Temperature: 37.1 °C (98.8 °F)  Pulse  Av  Min: 10  Max: 140   Blood Pressure : 128/72      Respiratory      Respiration: 20, Pulse Oximetry: 94 %, O2 Daily Delivery Respiratory : OxyMask     Work Of Breathing / Effort: Mild  RUL Breath Sounds: Coarse Crackles, RML Breath Sounds: Coarse Crackles, RLL Breath Sounds: Diminished, LORI Breath Sounds: Coarse Crackles, LLL Breath Sounds: Diminished    Fluids  No intake or output data in the 24 hours ending 10/14/18 2131    Nutrition  No orders of the defined types were placed in this encounter.    Physical Exam   Constitutional: She appears well-developed and well-nourished. No distress.   HENT:   Head: Normocephalic and atraumatic.   Neck: No JVD present.   Cardiovascular:   tachycardia   Pulmonary/Chest: Effort normal. No stridor. No respiratory distress. She has no wheezes.   Decreased bs at bases   Abdominal: Soft. There is no tenderness. There is no rebound and no guarding.   Musculoskeletal: She exhibits edema.   Neurological:   lethargy   Skin: Skin is warm and dry. No rash noted. She is not diaphoretic.   Psychiatric: She has a normal mood and affect. Thought  content normal.   Nursing note and vitals reviewed.      Recent Labs      10/14/18   0623   WBC  17.3*   RBC  4.29   HEMOGLOBIN  12.1   HEMATOCRIT  39.3   MCV  91.6   MCH  28.2   MCHC  30.8*   RDW  64.6*   PLATELETCT  415   MPV  10.8     Recent Labs      10/13/18   0237  10/14/18   0623   SODIUM  147*  146*   POTASSIUM  3.6  3.2*   CHLORIDE  103  99   CO2  39*  42*   GLUCOSE  103*  158*   BUN  30*  23*   CREATININE  0.42*  0.34*   CALCIUM  8.1*  7.9*                      Assessment/Plan     * Acute hypoxemic respiratory failure (HCC)- (present on admission)   Assessment & Plan    Extubated on 10/7/2018  r    Continue oxygen and RT protocol        Pleural effusion- (present on admission)   Assessment & Plan        Diuresis    Last Thoracentesis on left , done on 10/13          Pericardial effusion- (present on admission)   Assessment & Plan    Cardiology following   Status post pericardial drain placement  Drain removed on 10/3/2018  Cytology negative fluid cultures negative  GENTRY negative RA factor positive CCP negative     trial of steroids .. Ineffective and stopped      Etiology is unclear and patient may eventually need pericardial biopsy for definitive diagnosis -> surgery is aware            Pulmonary edema- (present on admission)   Assessment & Plan    Monitor fluid status   Diuresis  Lasix 20mg po daily        Hypotension   Assessment & Plan    Relative adrenal insufficiency    Appears resolved for now            History of non-Hodgkin's lymphoma- (present on admission)   Assessment & Plan    Hx of         HTN (hypertension)- (present on admission)   Assessment & Plan    All bp medications held        Dysphagia- (present on admission)   Assessment & Plan    Continue with speech therapy            Hypernatremia   Assessment & Plan    water flushes , 300 cc q6          Hypomagnesemia   Assessment & Plan    Repleted        Hypokalemia   Assessment & Plan    Replace today and BID        Tobacco abuse- (present on  admission)   Assessment & Plan    Continue NicoDerm   on cessation when mental status improved        Hypothyroidism- (present on admission)   Assessment & Plan    Continue levothyroxine          Quality-Core Measures   Reviewed items::  EKG reviewed, Labs reviewed, Medications reviewed and Radiology images reviewed  Almonte catheter::  No Almonte  DVT prophylaxis pharmacological::  Heparin  DVT prophylaxis - mechanical:  SCDs  Ulcer Prophylaxis::  Not indicated      Check am cbc, bmp

## 2018-10-15 NOTE — PROGRESS NOTES
Pulmonary Progress Note     HPI:              Pt is a 79 yo F presented to the ED on 9/28 for SOB. She was noted to have pericardial/pleural effusions which were believed to be the source of her symptoms. On 10/1 she underwent right thoracentesis and pericardiocentesis, a pericardial drain was placed at that time. Patient symptomatically worsened however and was transferred to the ICU where she required intubation on 10/2. Pericardial drain was removed on 10/3. Analysis of these fluids suggested exudate but cytology was not suggestive of malignancy. Rheumatologic workup has been negative aside from elevated rheumatoid factor. She improved and was extubated on 10/7. Cardiology was consulted who have been diuresing patient and following her pericardial effusion with repeat echos. CT on 10/10 showed worsening effusions and congestion as well as ground glass opacities in upper lobes b/l. Around this time pt had become considerably more delirious. Surgery was involved who recommended b/l pigtail catheters for her effusions, this was scheduled with IR however pt refused. She was rescheduled after becoming more agreeable and will possible undergo this in the near future. Surgery plans for pericardial window and bx next week for further workup. Pulmonary was consulted at the urging of the  and due to the patients recurring effusions.      Review of Systems   Unable to perform ROS: Mental status change                                                  10/15/18: Patient is non cooperative and refuses to talk with physicians. Keeps her eyes closed.    s/p left thoracentesis in IR on 10/13/18. Feeding via feeding tube.       Hospital Medications:     Furosemide 20 mg daily  Gabapentin 300 mg twice daily   Levothyroxine 88 mcg once daily  Potassium chloride 20 meq twice daily  Simvastatin 20 mg once daily  Heparin 5000 units q 8 hourly subcutaneusly    Medication Allergy:  No Known Allergies     Objective:   Vitals/  "General Appearance:   Weight/BMI: Body mass index is 27.09 kg/m².  Blood pressure 118/60, pulse 80, temperature 36.9 °C (98.4 °F), resp. rate 16, height 1.575 m (5' 2.01\"), weight 67.2 kg (148 lb 2.4 oz), SpO2 95 %     Oxygen Therapy:  Pulse Oximetry: 95 %, O2 (LPM): 2, O2 Delivery: Silicone Nasal Cannula     Constitutional:   Well developed, Well nourished, No acute distress  HENMT:  Normocephalic, Atraumatic, Oropharynx moist mucous membranes, No oral exudates, Nose normal.  No thyromegaly.  Eyes:  PERRLA, EOMI, Conjunctiva normal, No discharge.  Neck:  Normal range of motion, No cervical tenderness,  no JVD.  Cardiovascular:  Normal heart rate, Normal rhythm, No murmurs, No rubs, No gallops.   Extremitites with intact distal pulses, no cyanosis, or edema.  Lungs:  Diminished breath sounds bilaterally lower chest. No wheeze.   Abdomen: Bowel sounds normal, Soft, No tenderness, No guarding, No rebound, No masses, No hepatosplenomegaly.  Skin: Warm, Dry, No erythema, No rash, no induration.  Neurologic: Alert & orientatio cannot be assessed., No focal deficits noted, Tendon jerks intact     Labs:  Results for AVEL COLLAZO (MRN [id number]) as of 10/15/2018 08:02    Ref. Range 10/15/2018 02:58   Sodium Latest Ref Range: 135 - 145 mmol/L 144   Potassium Latest Ref Range: 3.6 - 5.5 mmol/L 3.2 (L)   Chloride Latest Ref Range: 96 - 112 mmol/L 99   Co2 Latest Ref Range: 20 - 33 mmol/L 37 (H)   Anion Gap Latest Ref Range: 0.0 - 11.9  8.0   Glucose Latest Ref Range: 65 - 99 mg/dL 152 (H)   Bun Latest Ref Range: 8 - 22 mg/dL 18   Creatinine Latest Ref Range: 0.50 - 1.40 mg/dL 0.38 (L)   GFR If  Latest Ref Range: >60 mL/min/1.73 m 2 >60   GFR If Non  Latest Ref Range: >60 mL/min/1.73 m 2 >60   Calcium Latest Ref Range: 8.5 - 10.5 mg/dL 8.4 (L)   AST(SGOT) Latest Ref Range: 12 - 45 U/L 15   ALT(SGPT) Latest Ref Range: 2 - 50 U/L 34   Alkaline Phosphatase Latest Ref Range: 30 - 99 U/L 59 "   Total Bilirubin Latest Ref Range: 0.1 - 1.5 mg/dL 0.4   Albumin Latest Ref Range: 3.2 - 4.9 g/dL 2.9 (L)   Total Protein Latest Ref Range: 6.0 - 8.2 g/dL 5.4 (L)   Globulin Latest Ref Range: 1.9 - 3.5 g/dL 2.5   A-G Ratio Latest Units: g/dL 1.2   Results for AVEL COLLAZO (MRN [id number]) as of 10/15/2018 08:02   Ref. Range 10/14/2018 06:23   WBC Latest Ref Range: 4.8 - 10.8 K/uL 17.3 (H)   RBC Latest Ref Range: 4.20 - 5.40 M/uL 4.29   Hemoglobin Latest Ref Range: 12.0 - 16.0 g/dL 12.1   Hematocrit Latest Ref Range: 37.0 - 47.0 % 39.3   MCV Latest Ref Range: 81.4 - 97.8 fL 91.6   MCH Latest Ref Range: 27.0 - 33.0 pg 28.2   MCHC Latest Ref Range: 33.6 - 35.0 g/dL 30.8 (L)   RDW Latest Ref Range: 35.9 - 50.0 fL 64.6 (H)   Platelet Count Latest Ref Range: 164 - 446 K/uL 415   MPV Latest Ref Range: 9.0 - 12.9 fL 10.8   Results for AVEL COLLAZO (MRN [id number]) as of 10/15/2018 08:02   Ref. Range 10/13/2018 14:20 10/13/2018 14:20   Fluid Type Unknown Thoracentesis Thoracentesis   Color-Body Fluid Unknown  Yellow   Character-Body Fluid Unknown  Hazy   Total WBC Latest Units: cells/uL  436   Total RBC Count Latest Units: cells/uL  <2000   Polys Latest Units: %  9   Lymphs Latest Units: %  43   Mononuclear Cells - Fluid Latest Units: %  11   Mesothelial Cells - CSF Latest Units: %  2   Fluid Histiocyte Latest Units: %  35   Comments Unknown  see below   Glucose, Fluid Latest Units: mg/dL  110   Total Protein Fluid Latest Units: g/dL  1.7   Body Fluid Amylase Latest Units: U/L  <10   Body Fluid LDH Latest Units: U/L  82   Ph Misc Unknown 8.00       Imaging:        10/15/2018 5:11 AM    HISTORY/REASON FOR EXAM: For indication of respiratory failure    TECHNIQUE/EXAM DESCRIPTION:  Single AP view of the chest.    COMPARISON: October 13, 2018    FINDINGS:    Position of medical devices appears stable. Cardiomegaly is observed.    The mediastinal contour appears within normal limits.  The central  pulmonary vasculature  appears prominent and indistinct.    The lungs appear well expanded bilaterally.  Diffuse scattered hazy pulmonary parenchymal opacities are seen.    Veil-like opacities are seen in the right lung base.    The bony structures appear age-appropriate.   Impression              1.  Pulmonary edema and/or infiltrates are identified, which are stable since the prior exam.   Reading Provider Reading Date   Kirit Vega M.D. Oct 15, 2018            Assessment/Plan           Acute hypoxemic respiratory failure (HCC)- (present on admission)   Assessment & Plan     Intubated 10/2 extubated 10/7  Thoracentesis Lt side done this 10/13          Pleural effusion   Assessment & Plan     S/P right thoracentesis with 1.7 L of fluid removed on 10/1  Transudative effusion  S/p left thoracentesis transudative   Recommend right therapeutic thoracentesis by IR       Pericardial effusion   Assessment & Plan     Pericardiocentesis done on 10/1 - 1 L of bloody fluid was removed  Pericardial drain removed on 10/3  Cytology negative for malignancy  CRP is markedly elevated  Rheumatoid factor positive, GENTRY negative, HIV negative, hepatitis panel negative  Thoracic surgery consultation for pericardial biopsy when improved  Taper empiric hydrocortisone, 50 mg IV every 12 hours          Pulmonary edema- (present on admission)   Assessment & Plan     Improved with diuresis   Continue Lasix, 40 mg IV          Hypotension resolved   Assessment & Plan     Improved  Remains off vasopressors  Cortisol level is low -Taper hydrocortisone, 50 mg IV every 12 hours  Continue midodrine, 5 mg 3 times daily          History of non-Hodgkin's lymphoma   Assessment & Plan     Diagnosed in 2000  Received chemotherapy in [place] and has been in remission per the           HTN (hypertension)- (present on admission)   Assessment & Plan     Monitor blood pressure          Dysphagia   Assessment & Plan     Continue enteral tube feedings              Hypokalemia   Assessment & Plan     Replete potassium             Tobacco abuse- (present on admission)   Assessment & Plan     Nicotine replacement protocol             Hypothyroidism- (present on admission)   Assessment & Plan     Continue Synthroid, 88 mcg daily         VTE:  Heparin  Ulcer: H2 Antagonist

## 2018-10-15 NOTE — PROGRESS NOTES
"Progress Note:  10/15/2018, 8:46 AM    S: No acute events.  Pt very confused since I last saw her.  Refusing RN/RT interventions, in restraints    O:  Blood pressure 114/58, pulse 89, temperature 37.2 °C (98.9 °F), resp. rate 12, height 1.575 m (5' 2.01\"), weight 58 kg (127 lb 13.9 oz), SpO2 92 %, not currently breastfeeding.    Awake, confused, slightly agitated  Breathing appears unlabored, 2L NC      A:   Active Hospital Problems    Diagnosis   • Pericardial effusion [I31.3]     Priority: High   • Pleural effusion [J90]     Priority: High   • Pulmonary edema [J81.1]     Priority: High   • Acute hypoxemic respiratory failure (HCC) [J96.01]     Priority: High   • Hypotension [I95.9]     Priority: Medium   • History of non-Hodgkin's lymphoma [Z85.72]     Priority: Medium     Nonhodgkins lymphoma  2000     • HTN (hypertension) [I10]     Priority: Medium   • Hypomagnesemia [E83.42]     Priority: Low   • Hypokalemia [E87.6]     Priority: Low   • Hypothyroidism [E03.9]     Priority: Low   • Tobacco abuse [Z72.0]     Priority: Low   • Hypernatremia [E87.0]   • Dysphagia [R13.10]         P:   -Recommend continued medical optimization  -Agree with drainage of R pleural effusion  -Agree with diuresis as able  -Recommend re-evaluation of pericardial effusion with echo.  At this point, the patient is at high risk for any surgical intervention and semi-elective pericardial biopsy may result in persistent swallowing dysfunction secondary to intubation, need for prolonged intubation, infection, dysrhythmia to name a few.  Please contact me if this procedure is deemed as urgent.  If it is, I would ask for a family meeting to be arranged with myself, the patient's significant other, and cardiology to be present    Jarvis Cain M.D.  [facility]  [phone]    "

## 2018-10-15 NOTE — PROGRESS NOTES
Assumed care of patient, bedside report received from RASTA Winters.  at bedside. Pt asleep. call light within reach and fall precautions in place. Bed locked and in lowest position.

## 2018-10-15 NOTE — DISCHARGE PLANNING
Callback from Sophia at [facility]. D must decline this family request as they are at capacity. Message left for Seema LOZA on T8 re same.

## 2018-10-15 NOTE — PROGRESS NOTES
750ml was removed from right chest by thoracentesis without problems.  Chest x ray was ordered and sample was sent to lab.

## 2018-10-15 NOTE — PROGRESS NOTES
2 RN skin assessment done with RN Inocente.  Pt heels red and boggy, floated with pillow. Biatain pads placed on elbows . Otherwise overall skin is intact and blanching.

## 2018-10-15 NOTE — ASSESSMENT & PLAN NOTE
Described on initial chest x-ray outside the hospital with serial x-rays here showing the same patchy infiltrates suggesting that this may in fact be fibrosis rather than interstitial edema since BNP's were consistently normal and the finding did not change with diuresis; she did receive methotrexate as part of her chemotherapy for lymphoma in the remote past as described above and her pulmonary infiltrates may be the residual effects of that.

## 2018-10-15 NOTE — PROGRESS NOTES
"Patient sounds very congested but refusing to allow RT to do breathing treatment of respiratory assistance at this time. Patient also refusing to allow RN to do assessment of her. Patient refusing to answer orientation questions for RN. Patient stating \"just leave me alone\". Patient educated by RN about the importance of letting us know her needs so that we can better assist her.   "

## 2018-10-15 NOTE — CARE PLAN
Problem: Respiratory:  Goal: Respiratory status will improve  Outcome: PROGRESSING AS EXPECTED  Pt o2 intake has decreased. Will continue to monitor O2 saturation.    Problem: Communication  Goal: The ability to communicate needs accurately and effectively will improve  Outcome: PROGRESSING SLOWER THAN EXPECTED  Pt lethargic and confused. Will continue to orient pt and monitor closely.

## 2018-10-15 NOTE — CARE PLAN
Problem: Nutritional:  Goal: Nutrition support tolerated and meeting greater than 85% of estimated needs  Outcome: MET Date Met: 10/15/18  TF at goal w/ Diabetisource AC @55ml/hr to provide pt w/ 100% of her estimated energy/protein needs.

## 2018-10-15 NOTE — DISCHARGE PLANNING
Family request for patient to transfer to [facility] rec'd from Dr. JOVANNA Torres. Call placed to [facility] transfer center ([phone]). They will present the case for review and will advise us if they can consider transfer.

## 2018-10-16 PROBLEM — F17.200 TOBACCO DEPENDENCE: Status: ACTIVE | Noted: 2018-01-01

## 2018-10-16 NOTE — PROGRESS NOTES
Pulmonary Progress Note     HPI:              Pt is a 77 yo F presented to the ED on 9/28 for SOB. She was noted to have pericardial/pleural effusions which were believed to be the source of her symptoms. On 10/1 she underwent right thoracentesis and pericardiocentesis, a pericardial drain was placed at that time. Patient symptomatically worsened however and was transferred to the ICU where she required intubation on 10/2. Pericardial drain was removed on 10/3. Analysis of these fluids suggested exudate but cytology was not suggestive of malignancy. Rheumatologic workup has been negative aside from elevated rheumatoid factor. She improved and was extubated on 10/7. Cardiology was consulted who have been diuresing patient and following her pericardial effusion with repeat echos. CT on 10/10 showed worsening effusions and congestion as well as ground glass opacities in upper lobes b/l. Around this time pt had become considerably more delirious. Surgery was involved who recommended b/l pigtail catheters for her effusions, this was scheduled with IR however pt refused. She was rescheduled after becoming more agreeable and will possible undergo this in the near future.       10/15/18: Patient is non cooperative and refuses to talk with physicians. Keeps her eyes closed.    s/p left thoracentesis in IR on 10/13/18. Feeding via feeding tube.    10/16/18: S/p thoracentesis right yesterday with removal of 750 ml of pleural fluid. Feels better after thoracentesis. More cooperative. Afebrile, denies pleuritic chest pain. Family want to take the patient to [place]. Hospitalist to arrange transfer       Review of Systems   Denies headache, fever, chills or sweating  Denies pleuritic chest pain, has productive cough. No hemoptysis  Admits to have dyspnea upon exertion, no orthopnea or anginal chest pain  Has sore throat and being fed via naso gastric tube  No abdominal pain,  vomiting                                            Hospital Medications:     Furosemide 20 mg daily  Gabapentin 300 mg twice daily   Levothyroxine 88 mcg once daily  Potassium chloride 20 meq twice daily  Simvastatin 20 mg once daily  Heparin 5000 units q 8 hourly subcutaneusly     Medication Allergy:  No Known Allergies     Objective:   Vitals/ General Appearance:   Blood pressure 119/65, pulse 89, temperature 37.2 °C (98.9 °F), resp. rate 16,SpO2 94 % on O2 @ 2 LPM,   O2 Delivery: Silicone Nasal Cannula     Constitutional:   Well developed, Well nourished, No acute distress  HENMT:  Normocephalic, Atraumatic, Oropharynx moist mucous membranes, No oral exudates, Nose normal.  No thyromegaly.  Eyes:  PERRLA, EOMI, Conjunctiva normal, No discharge.  Neck:  Normal range of motion, No cervical tenderness,  no JVD.  Cardiovascular:  Normal heart rate, Normal rhythm, No murmurs, No rubs, No gallops.   Extremitites with intact distal pulses, no cyanosis, or edema.  Lungs:  Bilateral breath sounds audible. No wheeze or pleural rub  Abdomen: Bowel sounds normal, Soft, No tenderness, No guarding, No rebound, No masses, No hepatosplenomegaly.  Skin: Warm, Dry, No erythema, No rash, no induration.  Neurologic: Alert & orientated., No focal deficits noted, Tendon jerks intact     Labs:  Results for AVEL COLLAZO (MRN [id number]) as of 10/16/2018 15:53   Ref. Range 10/16/2018 02:30   WBC Latest Ref Range: 4.8 - 10.8 K/uL 16.9 (H)   RBC Latest Ref Range: 4.20 - 5.40 M/uL 5.00   Hemoglobin Latest Ref Range: 12.0 - 16.0 g/dL 14.0   Hematocrit Latest Ref Range: 37.0 - 47.0 % 45.1   MCV Latest Ref Range: 81.4 - 97.8 fL 90.2   MCH Latest Ref Range: 27.0 - 33.0 pg 28.0   MCHC Latest Ref Range: 33.6 - 35.0 g/dL 31.0 (L)   RDW Latest Ref Range: 35.9 - 50.0 fL 65.7 (H)   Platelet Count Latest Ref Range: 164 - 446 K/uL 448 (H)   MPV Latest Ref Range: 9.0 - 12.9 fL 11.3     Results for AVEL COLLAZO (MRN [id number]) as of 10/15/2018  08:02    Ref. Range 10/13/2018 14:20 10/13/2018 14:20   Fluid Type Unknown Thoracentesis Thoracentesis   Color-Body Fluid Unknown   Yellow   Character-Body Fluid Unknown   Hazy   Total WBC Latest Units: cells/uL   436   Total RBC Count Latest Units: cells/uL   <2000   Polys Latest Units: %   9   Lymphs Latest Units: %   43   Mononuclear Cells - Fluid Latest Units: %   11   Mesothelial Cells - CSF Latest Units: %   2   Fluid Histiocyte Latest Units: %   35   Comments Unknown   see below   Glucose, Fluid Latest Units: mg/dL   110   Total Protein Fluid Latest Units: g/dL   1.7   Body Fluid Amylase Latest Units: U/L   <10   Body Fluid LDH Latest Units: U/L   82   Ph Misc Unknown 8.00     Results for AVEL COLLAZO (MRN [id number]) as of 10/16/2018 15:53   Ref. Range 10/16/2018 02:30   Sodium Latest Ref Range: 135 - 145 mmol/L 143   Potassium Latest Ref Range: 3.6 - 5.5 mmol/L 4.3   Chloride Latest Ref Range: 96 - 112 mmol/L 103   Co2 Latest Ref Range: 20 - 33 mmol/L 35 (H)   Anion Gap Latest Ref Range: 0.0 - 11.9  5.0   Glucose Latest Ref Range: 65 - 99 mg/dL 120 (H)   Bun Latest Ref Range: 8 - 22 mg/dL 19   Creatinine Latest Ref Range: 0.50 - 1.40 mg/dL 0.40 (L)   GFR If  Latest Ref Range: >60 mL/min/1.73 m 2 >60   GFR If Non  Latest Ref Range: >60 mL/min/1.73 m 2 >60   Calcium Latest Ref Range: 8.5 - 10.5 mg/dL 9.3      Imaging:      10/15/2018 4:31 PM    HISTORY/REASON FOR EXAM:  Right thoracentesis.      TECHNIQUE/EXAM DESCRIPTION AND NUMBER OF VIEWS:  Single portable view of the chest.    COMPARISON: 10/15/2018    FINDINGS:  No pneumothorax status post right thoracentesis.  Small residual right pleural effusion.    Left perihilar/left lung base atelectasis and pleural fluid.    Cardiomegaly.    Enteric tube projects over the stomach.     Impression       No pneumothorax status post right thoracentesis.  Small residual right pleural effusion and left lung base atelectasis.  Left  perihilar/left lung base atelectasis/possible infiltration and small left pleural effusion.   Reading Provider Reading Date   Jermain Silverio M.D. Oct 15, 2018     Assessment/Plan           Acute hypoxemic respiratory failure (HCC)- (present on admission)   Assessment & Plan     Intubated 10/2 extubated 10/7  Thoracentesis Lt side done this 10/13   Continue O2 supplementation and titrate to maintain SpO2 above 94%       Pleural effusion Bilateral   Assessment & Plan     S/P right thoracentesis with 1.7 L of fluid removed on 10/1  Transudate  S/p left thoracentesis transudative   Recommend right therapeutic thoracentesis by IR  repeat thoracentesis right 10/15       Pericardial effusion   Assessment & Plan     Pericardiocentesis done on 10/1 - 1 L of bloody fluid was removed  Pericardial drain removed on 10/3  Cytology negative for malignancy  CRP is markedly elevated  Rheumatoid factor positive, GENTRY negative, HIV negative, hepatitis panel negative       Pulmonary edema- (present on admission) resolved   Assessment & Plan     Improved with diuresis   Continue Lasix, 40 mg IV          Hypotension resolved   Assessment & Plan     Improved  Remains off vasopressors  Cortisol level is low -Taper hydrocortisone, 50 mg IV every 12 hours  Continue midodrine, 5 mg 3 times daily          History of non-Hodgkin's lymphoma   Assessment & Plan     Diagnosed in 2000  Received chemotherapy in [place] and has been in remission per the           HTN (hypertension)- (present on admission)   Assessment & Plan     Monitor blood pressure          Dysphagia   Assessment & Plan     Continue enteral tube feedings         Hypothyroidism- (present on admission)   Assessment & Plan     Continue Synthroid, 88 mcg daily        Tobacco abuse- (present on admission)   Assessment & Plan     Nicotine replacement protocol

## 2018-10-16 NOTE — CARE PLAN
Problem: Safety  Goal: Will remain free from injury  Outcome: PROGRESSING AS EXPECTED  Discussed with the patient the importance of calling for assistance prior to getting out of bed in order to help prevent falls since patient is unsteady and has moments of confusion. Patient accepting of the information but reinforcement may be required. All questions answered at this time.     Problem: Psychosocial Needs:  Goal: Level of anxiety will decrease  Outcome: PROGRESSING AS EXPECTED  Discussed with the patient the importance of voicing her feelings to staff so that we can be sure to help meet her needs. Patient accepting of the information. All questions answered at this time.

## 2018-10-16 NOTE — PROGRESS NOTES
"[facility] Cardiology Follow-up Consult Note    JORI Crisostomo  And LEWIS Woods    Chief Complaint   Patient presents with   • Shortness of Breath     3 weeks on and off   • Peripheral Edema     started 3 weeks ago in her feet and is now up to her thighs     ID:  79 yo female from [place], [place] with history of non-Hodgkin's lympoma that presented 9/28 from an outside facility for shortness of breath.  Found bilateral pleural effusion and pericardial effusion.  10/1, percardiocentesis with 1000cc bloody pericardial fluid drained.  She has received right and left thoracenteses for recurrent pleural effusions.  Cardiology consulted to aid in evaluation/management of pericardial effusion of unknown cause.      Interim Events:  -Yesterday:  Right thoracentesis, drained 750 cc.  Attempted transfer to [facility] per husbands request, unsuccessful.  -Overnight: No events.   -Mental status:  Appropriately conversant, alert and oriented to person, place, month, year, president.   Apathetic, \"I don't understand the point of this.\"  -SOB:  Saturating well on 2 Ls.  -Hypernatremia:  Improved, Na 143  -Hypokalemia:  Resolved, K 4.3  -Renal function:  Stable.  Cr 0.40, GFR >60  -ECHO today:  Ef 55%, small loculated pericardial effusion wo evidence of hemodynamic compromise.      Review of Systems   Constitutional: Negative for chills and fever.   HENT: Negative for hearing loss and sore throat.    Eyes: Negative for blurred vision and double vision.   Respiratory: Negative for cough and shortness of breath.    Cardiovascular: Negative for chest pain and orthopnea.   Gastrointestinal: Negative for abdominal pain, nausea and vomiting.   Genitourinary: Negative for dysuria and frequency.   Musculoskeletal: Negative for joint pain and myalgias.   Skin: Negative for itching and rash.   Neurological: Negative for dizziness and headaches.   Psychiatric/Behavioral: Negative for depression and suicidal ideas.     Past medical, surgical, social, " and family history reviewed and unchanged from admission except as noted in assessment and plan.    Medications: Reviewed in MAR  Current Facility-Administered Medications   Medication Dose Frequency Provider Last Rate Last Dose   • potassium chloride SA (Kdur) tablet 20 mEq  20 mEq TID Ricardo Torres M.D.   20 mEq at 10/16/18 0629   • haloperidol lactate (HALDOL) injection 1 mg  1 mg Q6HRS PRN Ricardo Torres M.D.   1 mg at 10/14/18 1426   • insulin regular (HUMULIN R) injection 2-9 Units  2-9 Units Q6HRS Ricardo Torres M.D.   Stopped at 10/15/18 1800    And   • glucose 4 g chewable tablet 16 g  16 g Q15 MIN PRN Ricardo Torres M.D.        And   • dextrose 50% (D50W) injection 25 mL  25 mL Q15 MIN PRN Ricardo Torres M.D.       • furosemide (LASIX) tablet 20 mg  20 mg Q DAY Ricardo Torres M.D.   20 mg at 10/16/18 0630   • Pharmacy Consult: Enteral tube feeding - review meds/change route/product selection   PRTWYLA Torres M.D.       • senna-docusate (PERICOLACE or SENOKOT S) 8.6-50 MG per tablet 2 Tab  2 Tab BID Ricardo Torres M.D.   Stopped at 10/13/18 0600    And   • polyethylene glycol/lytes (MIRALAX) PACKET 1 Packet  1 Packet QDAY PRTWYLA Torres M.D.        And   • magnesium hydroxide (MILK OF MAGNESIA) suspension 30 mL  30 mL QDAY PRTWYLA Trores M.D.        And   • bisacodyl (DULCOLAX) suppository 10 mg  10 mg QDAY PRTWYLA Torres M.D.       • oxyCODONE immediate-release (ROXICODONE) tablet 5 mg  5 mg Q4HRS PRTWYLA Torres M.D.   5 mg at 10/16/18 0336    Or   • oxyCODONE immediate-release (ROXICODONE) tablet 2.5 mg  2.5 mg Q4HRS PRTWYLA Torres M.D.       • levothyroxine (SYNTHROID) tablet 88 mcg  88 mcg AM ES Ricardo Torres M.D.   88 mcg at 10/16/18 0631   • diphenhydrAMINE (BENADRYL) tablet/capsule 25 mg  25 mg HS PRN Ricardo Torres M.D.       • acetaminophen (TYLENOL) tablet 650 mg  650 mg Q4HRS PRN Ricardo Torres M.D.   650 mg at 10/15/18 2021    Or   •  "acetaminophen (TYLENOL) suppository 650 mg  650 mg Q4HRS PRN Ricardo Torres M.D.       • simvastatin (ZOCOR) tablet 20 mg  20 mg Q EVENING Ricardo Torres M.D.   20 mg at 10/15/18 1712   • gabapentin (NEURONTIN) capsule 300 mg  300 mg BID Ricardo Torres M.D.   300 mg at 10/16/18 0640   • ondansetron (ZOFRAN) syringe/vial injection 4 mg  4 mg Q4HRS PRN Ricardo Torres M.D.   4 mg at 10/10/18 1339   • fentaNYL (SUBLIMAZE) injection  mcg   mcg Q HOUR PRN Jarvis Arnold M.D.   50 mcg at 10/07/18 1650   • racepinephrine (MICRONEFRIN) 2.25 % nebulizer solution 0.5 mL  0.5 mL Q4H PRN (RT) Jarvis Arnold M.D.   0.5 mL at 10/08/18 0737   • artificial tears 1.4 % ophthalmic solution 1 Drop  1 Drop Q2HRS PRN Jarvis Arnold M.D.   1 Drop at 10/11/18 2117   • heparin injection 5,000 Units  5,000 Units Q8HRS Néstor Guillen M.D.   5,000 Units at 10/16/18 0637   • Respiratory Care per Protocol   Continuous RT Alvaro Perez M.D.       • ipratropium-albuterol (DUONEB) nebulizer solution  3 mL Q2HRS PRN (RT) Jarvis Arnold M.D.   3 mL at 10/13/18 1804   • labetalol (NORMODYNE,TRANDATE) injection 10 mg  10 mg Q30 MIN PRN Constantin Self M.D.       • nicotine (NICODERM) 14 MG/24HR 14 mg  14 mg Daily-0600 Hazel Cruz M.D.   Stopped at 10/11/18 0600    And   • nicotine polacrilex (NICORETTE) 2 MG piece 2 mg  2 mg Q HOUR PRN Hazel Cruz M.D.         Last reviewed on 9/29/2018  4:47 PM by Sugey White  No Known Allergies    Physical Exam  Body mass index is 23.74 kg/m². Blood pressure 117/63, pulse 89, temperature 37.2 °C (99 °F), resp. rate 16, height 1.575 m (5' 2.01\"), weight 58.9 kg (129 lb 13.6 oz), SpO2 96 %, not currently breastfeeding.   Vitals:    10/16/18 0012 10/16/18 0140 10/16/18 0600 10/16/18 0740   BP: 109/60  122/64 117/63   Pulse: 95 88 89 89   Resp: 16 16 16 16   Temp: 37.2 °C (98.9 °F)  37.4 °C (99.4 °F) 37.2 °C (99 °F)   SpO2: 90% 94% 93% 96% " "  Weight:       Height:        Oxygen Therapy:  Pulse Oximetry: 96 %, O2 (LPM): 2, FiO2%: 35 %, O2 Delivery: Silicone Nasal Cannula    General: Appropriately conversant, alert and oriented to person, place, month, year, president.   Apathetic, \"I don't understand the point of this.\"    HEENT: Normocephalic, Atraumatic. No stridor. Unable to evaluate JVD as patient is very agitated during exam  Cardiovascular: Normal heart rate, Normal rhythm. S1+, S2+ Unable to appreciate rub  Lungs: Breath sounds improved with breath sounds at a more inferior position.  Diffuse wheeze throughout, crackles and rhonci. Symmetric chest expansion  Abdomen:  Soft, No tenderness  Skin: No erythema, No rash  Lower limbs: No edema  Neurologic: Alert, waxing and waning mental status. Moving all 4 extremities. Strength intact.   Other systems also examined, grossly normal.       Labs (personally reviewed and notable for):   Recent Results (from the past 24 hour(s))   ACCU-CHEK GLUCOSE    Collection Time: 10/15/18 12:15 PM   Result Value Ref Range    Glucose - Accu-Ck 160 (H) 65 - 99 mg/dL   FUNGAL CULTURE    Collection Time: 10/15/18  3:45 PM   Result Value Ref Range    Significant Indicator NEG     Source BF     Site Pleural Fluid     Fungal Culture Culture in progress.    GRAM STAIN    Collection Time: 10/15/18  3:45 PM   Result Value Ref Range    Significant Indicator .     Source BF     Site Pleural Fluid     Gram Stain Result No organisms seen.    CRP Quantitive (Non-Cardiac)    Collection Time: 10/15/18  4:58 PM   Result Value Ref Range    Stat C-Reactive Protein 1.76 (H) 0.00 - 0.75 mg/dL   Prealbumin    Collection Time: 10/15/18  4:58 PM   Result Value Ref Range    Pre-Albumin 13.0 (L) 18.0 - 38.0 mg/dL   ACCU-CHEK GLUCOSE    Collection Time: 10/15/18  5:26 PM   Result Value Ref Range    Glucose - Accu-Ck 97 65 - 99 mg/dL   CYTOLOGY    Collection Time: 10/15/18  6:25 PM   Result Value Ref Range    Cytology Reg See Path Report  "   ACCU-CHEK GLUCOSE    Collection Time: 10/16/18  1:13 AM   Result Value Ref Range    Glucose - Accu-Ck 122 (H) 65 - 99 mg/dL   CBC WITH DIFFERENTIAL    Collection Time: 10/16/18  2:30 AM   Result Value Ref Range    WBC 16.9 (H) 4.8 - 10.8 K/uL    RBC 5.00 4.20 - 5.40 M/uL    Hemoglobin 14.0 12.0 - 16.0 g/dL    Hematocrit 45.1 37.0 - 47.0 %    MCV 90.2 81.4 - 97.8 fL    MCH 28.0 27.0 - 33.0 pg    MCHC 31.0 (L) 33.6 - 35.0 g/dL    RDW 65.7 (H) 35.9 - 50.0 fL    Platelet Count 448 (H) 164 - 446 K/uL    MPV 11.3 9.0 - 12.9 fL    Nucleated RBC 0.00 /100 WBC    NRBC (Absolute) 0.00 K/uL    Neutrophils-Polys 88.70 (H) 44.00 - 72.00 %    Lymphocytes 10.40 (L) 22.00 - 41.00 %    Monocytes 0.90 0.00 - 13.40 %    Eosinophils 0.00 0.00 - 6.90 %    Basophils 0.00 0.00 - 1.80 %    Neutrophils (Absolute) 14.99 (H) 2.00 - 7.15 K/uL    Lymphs (Absolute) 1.76 1.00 - 4.80 K/uL    Monos (Absolute) 0.15 0.00 - 0.85 K/uL    Eos (Absolute) 0.00 0.00 - 0.51 K/uL    Baso (Absolute) 0.00 0.00 - 0.12 K/uL    Anisocytosis 1+     Macrocytosis 1+    BASIC METABOLIC PANEL    Collection Time: 10/16/18  2:30 AM   Result Value Ref Range    Sodium 143 135 - 145 mmol/L    Potassium 4.3 3.6 - 5.5 mmol/L    Chloride 103 96 - 112 mmol/L    Co2 35 (H) 20 - 33 mmol/L    Glucose 120 (H) 65 - 99 mg/dL    Bun 19 8 - 22 mg/dL    Creatinine 0.40 (L) 0.50 - 1.40 mg/dL    Calcium 9.3 8.5 - 10.5 mg/dL    Anion Gap 5.0 0.0 - 11.9   ESTIMATED GFR    Collection Time: 10/16/18  2:30 AM   Result Value Ref Range    GFR If African American >60 >60 mL/min/1.73 m 2    GFR If Non African American >60 >60 mL/min/1.73 m 2   DIFFERENTIAL MANUAL    Collection Time: 10/16/18  2:30 AM   Result Value Ref Range    Manual Diff Status PERFORMED    PERIPHERAL SMEAR REVIEW    Collection Time: 10/16/18  2:30 AM   Result Value Ref Range    Peripheral Smear Review see below    PLATELET ESTIMATE    Collection Time: 10/16/18  2:30 AM   Result Value Ref Range    Plt Estimation Increased     MORPHOLOGY    Collection Time: 10/16/18  2:30 AM   Result Value Ref Range    RBC Morphology Present     Polychromia 1+    ACCU-CHEK GLUCOSE    Collection Time: 10/16/18  6:35 AM   Result Value Ref Range    Glucose - Accu-Ck 117 (H) 65 - 99 mg/dL   EC-ECHOCARDIOGRAM LTD W/O CONT    Collection Time: 10/16/18  8:58 AM   Result Value Ref Range    Eject.Frac. MOD BP 57.65     Eject.Frac. MOD 4C 61.34     Eject.Frac. MOD 2C 53.11     Left Ventrical Ejection Fraction 55        Cardiac Imaging and Procedures Review:    EKG and telemetry tracings personally reviewed    Impression and Medical Decision Making:  Principal Problem:    Acute hypoxemic respiratory failure (HCC) POA: Yes  Active Problems:    Pulmonary edema POA: Yes    Pericardial effusion POA: Yes    Pleural effusion POA: Yes    HTN (hypertension) POA: Yes    History of non-Hodgkin's lymphoma POA: Yes      Overview: Nonhodgkins lymphoma  2000    Hypotension POA: No    Hypothyroidism POA: Yes    Tobacco abuse POA: Yes    Hypokalemia POA: No    Hypomagnesemia POA: No    Hypernatremia POA: No    Dysphagia POA: Yes  Resolved Problems:    Hypophosphatemia POA: No    Acute encephalopathy POA: Unknown      Assessment and Plan    Pericardial Effusion  Etiology is still unclear. Suspecting possible malignancy   -Patient presented with large pericardial effusion. EKG with very low voltage on admission.  -Underwent drainage 10/1. 1000 cc of bloody pericardial fluid was obtained. Pericardial drain removed. She likely developed pericardial decompression syndrome post pericardiocentesis. Grossly was bloody (some traumatic component). Cytology negative for malignant cells. Unlikely to be infectious.  -GENTRY negative, RA elevated but CCP negative.  -Echo done 10/11: small effusion without hemodynamic compromise  -Echo 10/16:  Ef 55%, small loculated pericardial effusion wo evidence of hemodynamic compromise.  Effusion mildly worsened in comparison to prior study.    Plan:  -OK  with gen surg holding intervension.  Recommend pericardial biopsy with general surgery to aid with diagnosis.  -Ok with diuresis with Lasix.   -Recommend palliative care involvement for discussion regarding goals of care.     Pleural Effusion  Recurrent  -Right sided thoracentesis on 10/1. 1.7 liters have been removed.   -Left sided thoracentesis 10/13 with 650 cc removed.   -Right thoracentisis 10/15 with 750 cc removed.      Plan:   -OK with diuresis with Lasix.  Continue to monitor electrolytes and renal function.    Pulmonary Edema vs pulmonary fibrosis  -Has been diuresed significantly since admission however subsequently became intravascularly dry; treated with IV fluids.  -She unfortunately likely developed pericardial decompression syndrome post pericardiocentesis.     Plan:    -OK with diuresis with Lasix.  Continue to monitor electrolytes and renal function    Sinus Tachycardia  Resolved  -Has been normal sinus rhythm and rate during her hospitalization however now appears to have inappropriat tachycardia.   -Unclear etiology at this point. May be contributed by the effusions and some anxiety component.  -Plan:  Will continue to monitor    Hypernatremia  Resolved  -Monitor with BMP    Hypokalemia  Resolved  -Plan: recommend repletion of potassium    Hypertension  -Holding outpatient HCTZ and amlodipine. Resume when BP stabilizes     DLD  -continue home simvastatin    It is my pleasure to participate in the care of Ms. Quigley.  Please do not hesitate to contact me with questions or concerns. Will continue to follow

## 2018-10-16 NOTE — PROGRESS NOTES
Assumed care of patient, bedside report received from RASTA Sanabria. Updated on POC, call light within reach and fall precautions in place. Bed locked and in lowest position. Patient instructed to call for assistance before getting out of bed. All questions answered, no other needs at this time.

## 2018-10-16 NOTE — THERAPY
"Speech Language Therapy dysphagia treatment completed.   Functional Status:  The patient was seen for dysphagia therapy this date. The patient was awake, alert and oriented to self and location but declined to answer other orientation questions aside from the year being \"2008.\" Patient was noted to have flat affect and initially limited participation in therapy, however, improved as session progressed. Patient was given single ice chip trial with overt cough/choke response and required mod cues to \"cough strong\" as after a while patient began to just clear her throat stating \"coughing hurts.\" The patient was given 1/4tsp trials of NTL via teaspoon with slight wet vocal quality and single small cup sip resulted in OVERT coughing and choke response. Patient continues to present with overt s/s highly concerning for penetration/aspriation. Of note, following coughing patient noted to have inhalation stridor. RN aware and MD aware. Stridor improved with laryngeal relaxation exercises (sustained low pitch \"m\" sound). Vocal hygiene education provided to patient and her . Laryngeal elevation exercises instructed and completed with patient this session however, MAX cues were needed due to confusion/difficulty with following directives. Patient may benefit from cognitive evaluation if confusion persists.     Recommendations: 1) patient remain strict NPO/TF. 2) Please encourage vocal hygiene exercises and dysphagia exercises. 3) consider cognitive evaluation as medically appropriate. 4) Please consider physiatry consult for appropriate transitional care placement following acute care stay.      Plan of Care: Will benefit from Speech Therapy 3 times per week    Post-Acute Therapy: Discharge to a inpatient transitional care facility for continued Speech therapy services.    See \"Rehab Therapy-Acute\" Patient Summary Report for complete documentation.     "

## 2018-10-16 NOTE — PROGRESS NOTES
2 RN skin assessment with RASTA Horta.  Pt bottom was pink and blanching. BL elbows red and blanching. Heels red and boggy, floated on pillow. Otherwise, overall skin is intact.

## 2018-10-16 NOTE — CARE PLAN
Problem: Respiratory:  Goal: Respiratory status will improve  Outcome: PROGRESSING AS EXPECTED      Problem: Safety  Goal: Will remain free from injury  Outcome: PROGRESSING AS EXPECTED  Educated pt about importance of leaving cortack in. Pt educated to call when needing assistance and to not get up without assistance.    Problem: Venous Thromboembolism (VTW)/Deep Vein Thrombosis (DVT) Prevention:  Goal: Patient will participate in Venous Thrombosis (VTE)/Deep Vein Thrombosis (DVT)Prevention Measures  Outcome: PROGRESSING SLOWER THAN EXPECTED  Pt refused SCD and heparin. Encouraged pt to comply with medication regimen.

## 2018-10-16 NOTE — PROGRESS NOTES
Received report from Zoraida MAGDALENO, pt is on monitor, resting in bed, bed alarm in place, call light within reach. POC discussed, no further questions at this time. Will continue to monitor.

## 2018-10-16 NOTE — THERAPY
"Occupational Therapy Treatment completed with focus on ADLs and ADL transfers.  Functional Status:  SPV seated grooming, CGA UB dressing, Min A stand pivot from EOB<>Chair for full linen change  Plan of Care: Will benefit from Occupational Therapy 3 times per week  Discharge Recommendations:  Equipment Will Continue to Assess for Equipment Needs. Post-acute therapy Discharge to a transitional care facility for continued skilled therapy services.    See \"Rehab Therapy-Acute\" Patient Summary Report for complete documentation.     Pt seen for OT tx on this date. Pt appeared to be more alert on this date than evaluation date. Pt demo'd 2 functional transfers, performed seated grooming, and UB dressing. Pt primarily limited by decreased activity tolerance, general deconditioning from being in hospital, decreased functional mobility and activity tolerance impacting her ability to perform BADLs independently and safely. Will continue to follow for Acute OT services. Recommend Subacute placement for continued skilled OT services.     "

## 2018-10-16 NOTE — OR SURGEON
Immediate Post- Operative Note        PostOp Diagnosis: R pleural effusion      Procedure(s): US guided R thoracentesis      Estimated Blood Loss: Less than 5 ml        Complications: None            10/15/2018     7:22 PM     Jermain Silverio

## 2018-10-16 NOTE — PROGRESS NOTES
[facility] Hospitalist Progress Note    Date of Service: 10/15/2018    Chief Complaint    78 y.o. Female from [facility] admitted 2018 with shortness of breath and peripheral edema found to have significant pericardial effusion.    Pericardial effusion tapped 10/1  Intubated 10/2  Extubated 10/7      Interval Problem Update    on tele    Patient deemed incapacitated by psychiatry    lethargy    Confused    Left thoracentesis done 2 days ago    Right thoracentesis done today( ativan 0.5mg given prior)      Low potassium     requests transfer to [facility]    Consultants/Specialty  Cardiology  pulmonology      Disposition  pending        Review of Systems   Unable to perform ROS: Mental status change      Physical Exam  Laboratory/Imaging   Hemodynamics  Temp (24hrs), Av.9 °C (98.4 °F), Min:36.4 °C (97.5 °F), Max:37.2 °C (98.9 °F)   Temperature: 37.2 °C (98.9 °F)  Pulse  Av.1  Min: 10  Max: 140   Blood Pressure : 121/66      Respiratory      Respiration: 15, Pulse Oximetry: 91 %        RUL Breath Sounds: Expiratory Wheezes;Diminished, RML Breath Sounds: Diminished, RLL Breath Sounds: Diminished, LORI Breath Sounds: Expiratory Wheezes;Inspiratory Wheezes, LLL Breath Sounds: Diminished    Fluids  No intake or output data in the 24 hours ending 10/15/18 2224    Nutrition  No orders of the defined types were placed in this encounter.    Physical Exam   Constitutional: She appears well-developed and well-nourished. No distress.   HENT:   Head: Normocephalic and atraumatic.   Neck: No JVD present.   Cardiovascular:   tachycardia   Pulmonary/Chest: Effort normal. No stridor. No respiratory distress. She has no wheezes.   Decreased bs at bases   Abdominal: Soft. There is no tenderness. There is no rebound and no guarding.   Musculoskeletal: She exhibits edema.   Neurological:   Awake    confused   Skin: Skin is warm and dry. No rash noted. She is not diaphoretic.   Psychiatric: She has a normal mood and  affect. Thought content normal.   Nursing note and vitals reviewed.      Recent Labs      10/14/18   0623   WBC  17.3*   RBC  4.29   HEMOGLOBIN  12.1   HEMATOCRIT  39.3   MCV  91.6   MCH  28.2   MCHC  30.8*   RDW  64.6*   PLATELETCT  415   MPV  10.8     Recent Labs      10/13/18   0237  10/14/18   0623  10/15/18   0258   SODIUM  147*  146*  144   POTASSIUM  3.6  3.2*  3.2*   CHLORIDE  103  99  99   CO2  39*  42*  37*   GLUCOSE  103*  158*  152*   BUN  30*  23*  18   CREATININE  0.42*  0.34*  0.38*   CALCIUM  8.1*  7.9*  8.4*                      Assessment/Plan     * Acute hypoxemic respiratory failure (HCC)- (present on admission)   Assessment & Plan    Extubated on 10/7/2018  r    Continue oxygen and RT protocol        Pleural effusion- (present on admission)   Assessment & Plan        Diuresis    Last Thoracentesis on left , done on 10/13    Right side 10/15          Pericardial effusion- (present on admission)   Assessment & Plan    Cardiology following   Status post pericardial drain placement  Drain removed on 10/3/2018  Cytology negative fluid cultures negative  GENTRY negative RA factor positive CCP negative     trial of steroids .. Ineffective and stopped      Etiology is unclear and patient may eventually need pericardial biopsy for definitive diagnosis -> surgery is aware            Pulmonary edema- (present on admission)   Assessment & Plan    Monitor fluid status   Diuresis  Lasix 20mg po daily        Hypotension   Assessment & Plan    Relative adrenal insufficiency    Appears resolved for now            History of non-Hodgkin's lymphoma- (present on admission)   Assessment & Plan    Hx of         HTN (hypertension)- (present on admission)   Assessment & Plan    All bp medications held        Dysphagia- (present on admission)   Assessment & Plan    Continue with speech therapy            Hypernatremia   Assessment & Plan    water flushes , 300 cc q6          Hypomagnesemia   Assessment & Plan    Repleted         Hypokalemia   Assessment & Plan    Replace today and BID        Tobacco abuse- (present on admission)   Assessment & Plan    Continue NicoDerm   on cessation when mental status improved        Hypothyroidism- (present on admission)   Assessment & Plan    Continue levothyroxine          Quality-Core Measures   Reviewed items::  EKG reviewed, Labs reviewed, Medications reviewed and Radiology images reviewed  Almonte catheter::  No Almonte  DVT prophylaxis pharmacological::  Heparin  DVT prophylaxis - mechanical:  SCDs  Ulcer Prophylaxis::  Not indicated      Check am cbc, bmp

## 2018-10-17 NOTE — PROGRESS NOTES
Patient's mentation waxes and wanes, pt now has increased agitation & attempting to get out of bed. Pt re-oriented, educated on need to use call light for assistance. No evidence of learning, bed alarm on.

## 2018-10-17 NOTE — PROGRESS NOTES
Bedside report received. No overnight events per night RN. Patient A&O x 4. VS'S. currently requiring 2 L via NC. No complaints of pain at this time, although patient did say she has intermittent chest pain with deep inspiration /cough. SR-ST up to 103 on telemetry monitor. POC discussed with patient. Pt verbalizes understanding. Call light and belongings with in reach. Bed locked and in lowest position, alarm and fall precautions in place.

## 2018-10-17 NOTE — DISCHARGE PLANNING
Spoke to [facility] [place]. If patient wants to transfer to a [facility] their insurance would not pay for that, the patient would need to pay for their transportation and hospital stay there. Patient's insurance can pay for a second opinion while patient is in [facility], meaning pay for another physician to evaluate and give their opinion on the patient's case. If patient would like to pay for their transportation and hospital stay at [facility] please reach out to the transfer center [phone].  We will be working with the patient's .

## 2018-10-17 NOTE — PROGRESS NOTES
Bedside report received.  at bedside. POC discussed with pt and family; all questions answered at this time. Call light within reach, fall precautions in place.

## 2018-10-17 NOTE — DISCHARGE PLANNING
Spoke to [facility] about patient requesting transfer for second opinion. [facility] transfer center will reach out to Dr. oK.

## 2018-10-17 NOTE — PROGRESS NOTES
[facility] Hospitalist Progress Note    Date of Service: 10/16/2018    Chief Complaint    78 y.o. Female from [facility] admitted 2018 with shortness of breath and peripheral edema found to have significant pericardial effusion.    Pericardial effusion tapped 10/1  Intubated 10/2  Extubated 10/7      Interval Problem Update  10 /16 patient is more comfortable today than yesterday after thoracentesis. patient wants to be transferred to [facility].  Contact transfer center to initiate another transfer since patient transfer request to [facility] has been declined.  Continue diuretics.    Consultants/Specialty  Cardiology  Pulmonology  Neurology      Disposition  pending        Review of Systems   Unable to perform ROS: Mental status change      Physical Exam  Laboratory/Imaging   Hemodynamics  Temp (24hrs), Av.2 °C (98.9 °F), Min:36.9 °C (98.4 °F), Max:37.4 °C (99.4 °F)   Temperature: 37.2 °C (98.9 °F)  Pulse  Av.1  Min: 10  Max: 140   Blood Pressure : 119/65      Respiratory    #Aerosol Therapy / Airway Management: Face Tent, Aerosol Humidity Temp (celsius):  (cool mist) Respiration: 16, Pulse Oximetry: 94 %, O2 Daily Delivery Respiratory :  (face tent)     Work Of Breathing / Effort: Mild;Shallow  RUL Breath Sounds: Expiratory Wheezes;Diminished, RML Breath Sounds: Diminished, RLL Breath Sounds: Diminished, LORI Breath Sounds: Expiratory Wheezes;Inspiratory Wheezes, LLL Breath Sounds: Diminished    Fluids  No intake or output data in the 24 hours ending 10/16/18 1809    Nutrition  No orders of the defined types were placed in this encounter.    Physical Exam   Constitutional: She appears well-developed and well-nourished. No distress.   HENT:   Head: Normocephalic and atraumatic.   Right Ear: External ear normal.   Left Ear: External ear normal.   Neck: No JVD present.   Cardiovascular: Normal rate, regular rhythm and normal heart sounds.  Exam reveals no gallop.    tachycardia   Pulmonary/Chest:  Effort normal. No stridor. No respiratory distress. She has no wheezes. She exhibits no tenderness.   Decreased bs at bases   Abdominal: Soft. Bowel sounds are normal. She exhibits no mass. There is no tenderness. There is no rebound.   Musculoskeletal: She exhibits edema.   Neurological:   Awake    confused   Skin: Skin is warm and dry. No rash noted. She is not diaphoretic.   Psychiatric: She has a normal mood and affect. Thought content normal.   Nursing note and vitals reviewed.      Recent Labs      10/14/18   0623  10/16/18   0230   WBC  17.3*  16.9*   RBC  4.29  5.00   HEMOGLOBIN  12.1  14.0   HEMATOCRIT  39.3  45.1   MCV  91.6  90.2   MCH  28.2  28.0   MCHC  30.8*  31.0*   RDW  64.6*  65.7*   PLATELETCT  415  448*   MPV  10.8  11.3     Recent Labs      10/14/18   0623  10/15/18   0258  10/16/18   0230   SODIUM  146*  144  143   POTASSIUM  3.2*  3.2*  4.3   CHLORIDE  99  99  103   CO2  42*  37*  35*   GLUCOSE  158*  152*  120*   BUN  23*  18  19   CREATININE  0.34*  0.38*  0.40*   CALCIUM  7.9*  8.4*  9.3                   EC-ECHOCARDIOGRAM LTD W/O CONT   Final Result      US-THORACENTESIS PUNCTURE RIGHT   Final Result      1. Ultrasound guided right sided diagnostic and therapeutic thoracentesis.      2. 750 mL of fluid withdrawn.      DX-CHEST-PORTABLE (1 VIEW)   Final Result      No pneumothorax status post right thoracentesis.   Small residual right pleural effusion and left lung base atelectasis.   Left perihilar/left lung base atelectasis/possible infiltration and small left pleural effusion.      DX-CHEST-PORTABLE (1 VIEW)   Final Result         1.  Pulmonary edema and/or infiltrates are identified, which are stable since the prior exam.      DX-ABDOMEN FOR TUBE PLACEMENT   Final Result      Feeding tube placement with the tip projecting over the distal stomach or duodenal bulb      US-THORACENTESIS PUNCTURE LEFT   Final Result      1. Ultrasound guided left sided diagnostic and therapeutic  thoracentesis.      2. 650 mL of fluid withdrawn.      DX-CHEST-LIMITED (1 VIEW)   Final Result      No pneumothorax following left thoracentesis      DX-CHEST-PORTABLE (1 VIEW)   Final Result         Worsening large left pleural effusion with near collapsing of the left lung.      Moderate layering right pleural effusion, worse than prior as well.      DX-ABDOMEN FOR TUBE PLACEMENT   Final Result         Feeding tube with tip projecting over the expected area of the stomach.      DX-CHEST-PORTABLE (1 VIEW)   Final Result         1. No significant interval change.      EC-ECHOCARDIOGRAM LTD W/O CONT   Final Result      CT-CHEST (THORAX) WITH   Final Result      1.  Moderate to large bilateral pleural effusions with bilateral atelectasis.      2.  Moderate pericardial effusion.      3.  Patchy groundglass opacities within the residual aerated upper lobes bilaterally.      4.  Fatty liver.      5.  Atherosclerotic vascular calcification.            DX-ABDOMEN FOR TUBE PLACEMENT   Final Result         1.  Nonspecific bowel gas pattern.   2.  Dobbhoff tube tip terminates overlying the expected location of the gastric body.   3.  Left lung base infiltrate and/or layering pleural effusion.      [id number] VIEW   Final Result         1.  Nonspecific bowel gas pattern.   2.  Dobbhoff tube tip terminates in the distal esophagus, recommend advancement.   3.  Bilateral lower lobe infiltrates and layering pleural effusions.   4.  Cardiomegaly.      DX-CHEST-LIMITED (1 VIEW)   Final Result      Bilateral pleural effusions with overlying atelectasis/consolidation are again seen.      Mild interstitial prominence may represent mild edema.      Atherosclerotic plaque.         DX-ABDOMEN FOR TUBE PLACEMENT   Final Result      Enteric tube projects over the distal stomach.         EC-ECHOCARDIOGRAM COMPLETE W/O CONT   Final Result      DX-CHEST-PORTABLE (1 VIEW)   Final Result      No significant interval change.       DX-CHEST-PORTABLE (1 VIEW)   Final Result      No significant interval change.      EC-ECHOCARDIOGRAM LTD W/O CONT   Final Result      DX-CHEST-PORTABLE (1 VIEW)   Final Result         1.  Pulmonary edema and/or infiltrates are identified, which are stable since the prior exam. Layering bilateral pleural effusions, greater on the left.   2.  Atherosclerosis               DX-CHEST-PORTABLE (1 VIEW)   Final Result         1.  Pulmonary edema and/or infiltrates are identified, which are stable since the prior exam.   2.  Small right pleural effusion   3.  Atherosclerosis            DX-CHEST-PORTABLE (1 VIEW)   Final Result         1.  Pulmonary edema and/or infiltrates are identified, which are stable since the prior exam.   2.  Small right pleural effusion   3.  Atherosclerosis         DX-CHEST-PORTABLE (1 VIEW)   Final Result         1.  Pulmonary edema and/or infiltrates are identified, which are stable since the prior exam.   2.  Small right pleural effusion   3.  Atherosclerosis      DX-ABDOMEN FOR TUBE PLACEMENT   Final Result      Feeding tube placement with the tip projecting over the stomach body.      DX-ABDOMEN FOR TUBE PLACEMENT   Final Result      1.  Feeding tube appears looped within the stomach. The distal aspect of the tip is directed cephalad in the fundal region.      DX-CHEST-LIMITED (1 VIEW)   Final Result         1.  Pulmonary edema and/or infiltrates are identified, which are stable since the prior exam.   2.  Small right pleural effusion   3.  Atherosclerosis      DX-CHEST-LIMITED (1 VIEW)   Final Result         1.  Pulmonary edema and/or infiltrates are identified, which are stable since the prior exam.   2.  Atherosclerosis      DX-CHEST-PORTABLE (1 VIEW)   Final Result         1.  Pulmonary edema and/or infiltrates are identified, which are stable since the prior exam.   2.  Atherosclerosis      DX-CHEST-LIMITED (1 VIEW)   Final Result      1.  No pneumothorax identified status post right  thoracentesis and pericardiocentesis.      2.  Small amount of pneumomediastinum is likely related to recent pericardiocentesis and drain placement      3.  Cardiomegaly      EC-ECHOCARDIOGRAM LTD W/O CONT   Final Result      DX-CHEST-PORTABLE (1 VIEW)   Final Result      1.  Evacuation large right pleural effusion status post thoracentesis.   2.  No definite right pneumothorax identified. Recommend interval follow-up chest x-ray.   3.  Small left pleural effusion and left perihilar/left lung base atelectasis.   4.  Findings discussed with the patient's nurse. Follow-up chest x-ray will be obtained in approximately 3:00 PM      US-THORACENTESIS PUNCTURE RIGHT   Final Result      1. Ultrasound guided right sided diagnostic and therapeutic thoracentesis.      2. 1700 mL of fluid withdrawn.      EC-ECHOCARDIOGRAM COMPLETE W/O CONT   Final Result      DX-CHEST-PORTABLE (1 VIEW)   Final Result         1.  Pulmonary edema and/or infiltrates are identified, which are stable since the prior exam.   2.  Layering right pleural effusion, stable   3.  Cardiomegaly   4.  Atherosclerosis      DX-CHEST-2 VIEWS   Final Result      1.  Probable bilateral atelectasis      2.  Probable right pleural effusion which may be significant in size      3.  Limited assessment on one view portable radiography      OUTSIDE IMAGES-DX CHEST   Final Result      OUTSIDE IMAGES-DX CHEST   Final Result      EC-ECHOCARDIOGRAM LTD W/O CONT    (Results Pending)        Assessment/Plan     * Acute hypoxemic respiratory failure (HCC)- (present on admission)   Assessment & Plan    Extubated on 10/7/2018  r    Continue oxygen and RT protocol        Pleural effusion- (present on admission)   Assessment & Plan        Diuresis    Last Thoracentesis on left , done on 10/13    Right side 10/15 tolerated very well and feeling better today          Pericardial effusion- (present on admission)   Assessment & Plan    Cardiology following   Status post pericardial  drain placement  Drain removed on 10/3/2018  Cytology negative fluid cultures negative  GENTRY negative RA factor positive CCP negative    off steroids .. Ineffective and stopped    Etiology is unclear and patient may eventually need pericardial biopsy for definitive diagnosis -> surgery is aware however at this moment patient wants to be transferred to [facility] for regional referral center.  Transfer center notified and working on this transfer.            Pulmonary edema- (present on admission)   Assessment & Plan    Monitor fluid status   Diuresis  Lasix 20mg po daily        Hypotension   Assessment & Plan    Relative adrenal insufficiency    Appears resolved for now            History of non-Hodgkin's lymphoma- (present on admission)   Assessment & Plan    Hx of         HTN (hypertension)- (present on admission)   Assessment & Plan    All bp medications held  Cont to monitor        Dysphagia- (present on admission)   Assessment & Plan    Continue with speech therapy   Continue tube feeding, patient also continued to have cough, start patient on cough syrup and also throat spray for comfort.           Hypernatremia   Assessment & Plan    water flushes , 300 cc q6          Hypomagnesemia   Assessment & Plan    Repleted        Hypokalemia   Assessment & Plan    Replace today and BID        Tobacco dependence- (present on admission)   Assessment & Plan    - Smoking cessation education provided  - Nicotine patch          Hypothyroidism- (present on admission)   Assessment & Plan    Continue levothyroxine          Quality-Core Measures   Reviewed items::  EKG reviewed, Labs reviewed, Medications reviewed and Radiology images reviewed  Almonte catheter::  No Almonte  DVT prophylaxis pharmacological::  Heparin  DVT prophylaxis - mechanical:  SCDs  Ulcer Prophylaxis::  Not indicated   For complexity-based billing, please refer to the history, exam, and decison making above. I spent >35 minutes caring for the patient  today.    I have discussed with RN and CM and SW and other consultants about patient's plan.      Check am cbc, bmp

## 2018-10-17 NOTE — DISCHARGE PLANNING
Anticipated Discharge Disposition: TBD    Action: LSW was notified by bedside RN that pt's spouse is requesting pt to be transferred to a facility closer to her daughter. He asked for pt to be sent to [facility] in [place]. LSW spoke with MD who will contact the transfer center to see if [facility] will accept the transfer request due to family request.     Barriers to Discharge: None    Plan: Transfer center to work on referral for [facility] after MD speaks with the transfer center.

## 2018-10-17 NOTE — PROGRESS NOTES
"[facility] Cardiology Follow-up Consult Note  Consulting physician: Lia Ann M.D.    Chief Complaint   Patient presents with   • Shortness of Breath     3 weeks on and off   • Peripheral Edema     started 3 weeks ago in her feet and is now up to her thighs     ID:  77 yo female from [place], [place] with history of non-Hodgkin's lympoma status post chemotherapy with CHOP and MTX that presented 9/28 from an outside facility for shortness of breath.  Found bilateral pleural effusion and pericardial effusion.  10/1, percardiocentesis with 1000cc bloody pericardial fluid drained.  She has received right and left thoracenteses for recurrent pleural effusions.  Cardiology consulted to aid in evaluation/management of pericardial effusion of unknown cause.      Interim Events:  -No acute events overnight.  Reports feeling \"ornery.\"  -Clinical cardiac status stable.  -HR 80-100s, -120s/60-80s.  SPO2 99% on 2 L O2 NC.  -Declined by [facility] for transfer.  working on possible transfer to [facility]--patient would have to pay for transfer.  -ECHO 10/16:  EF 55%, small loculated pericardial effusion without evidence of hemodynamic compromise.      Review of Systems   Constitutional: Negative for chills and fever.   HENT: Positive for sore throat. Negative for hearing loss.    Eyes: Negative for blurred vision and double vision.   Respiratory: Positive for cough. Negative for sputum production and shortness of breath.    Cardiovascular: Negative for chest pain, palpitations, orthopnea and leg swelling.   Gastrointestinal: Negative for abdominal pain, constipation, diarrhea, nausea and vomiting.   Genitourinary: Negative for dysuria and frequency.   Musculoskeletal: Negative for joint pain and myalgias.   Skin: Negative for itching and rash.   Neurological: Negative for dizziness, focal weakness and headaches.   Psychiatric/Behavioral: Negative for depression. The patient is not nervous/anxious.      Past medical, " surgical, social, and family history reviewed and unchanged from admission except as noted in assessment and plan.    Medications: Reviewed in MAR  Current Facility-Administered Medications   Medication Dose Frequency Provider Last Rate Last Dose   • sore throat spray (CHLORASEPTIC) 1 Spray  1 Spray Q2HRS PRTWYLA Ann M.D.   1 Spray at 10/16/18 1631   • guaiFENesin (ROBITUSSIN) 100 MG/5ML solution 200 mg  10 mL Q6HRS PRN Lia Ann M.D.   200 mg at 10/16/18 1709   • potassium chloride SA (Kdur) tablet 20 mEq  20 mEq TID Ricardo Torres M.D.   20 mEq at 10/17/18 0602   • haloperidol lactate (HALDOL) injection 1 mg  1 mg Q6HRS PRN Ricardo Torres M.D.   1 mg at 10/14/18 1426   • insulin regular (HUMULIN R) injection 2-9 Units  2-9 Units Q6HRS Ricardo Torres M.D.   Stopped at 10/15/18 1800    And   • glucose 4 g chewable tablet 16 g  16 g Q15 MIN PRN Ricardo Torres M.D.        And   • dextrose 50% (D50W) injection 25 mL  25 mL Q15 MIN PRN Ricardo Torres M.D.       • furosemide (LASIX) tablet 20 mg  20 mg Q DAY Ricardo Torres M.D.   20 mg at 10/17/18 0600   • Pharmacy Consult: Enteral tube feeding - review meds/change route/product selection   PRTWYLA Torres M.D.       • senna-docusate (PERICOLACE or SENOKOT S) 8.6-50 MG per tablet 2 Tab  2 Tab BID Ricardo Torres M.D.   Stopped at 10/13/18 0600    And   • polyethylene glycol/lytes (MIRALAX) PACKET 1 Packet  1 Packet QDAY PRN Ricardo Torres M.D.        And   • magnesium hydroxide (MILK OF MAGNESIA) suspension 30 mL  30 mL QDAY PRTWYLA Torres M.D.        And   • bisacodyl (DULCOLAX) suppository 10 mg  10 mg QDAY PRN Ricardo Torres M.D.       • oxyCODONE immediate-release (ROXICODONE) tablet 5 mg  5 mg Q4HRS PRN Ricardo Torres M.D.   5 mg at 10/16/18 0336    Or   • oxyCODONE immediate-release (ROXICODONE) tablet 2.5 mg  2.5 mg Q4HRS PRN Ricardo Torres M.D.       • levothyroxine (SYNTHROID) tablet 88 mcg  88 mcg AM ES Ricardo Torres M.D.    "88 mcg at 10/17/18 0602   • diphenhydrAMINE (BENADRYL) tablet/capsule 25 mg  25 mg HS PRN Ricardo Torres M.D.       • acetaminophen (TYLENOL) tablet 650 mg  650 mg Q4HRS PRN Ricardo Torres M.D.   650 mg at 10/15/18 2021    Or   • acetaminophen (TYLENOL) suppository 650 mg  650 mg Q4HRS PRN Ricardo Torres M.D.       • simvastatin (ZOCOR) tablet 20 mg  20 mg Q EVENING Ricardo Torres M.D.   20 mg at 10/16/18 1709   • gabapentin (NEURONTIN) capsule 300 mg  300 mg BID Ricardo Torres M.D.   300 mg at 10/17/18 0600   • ondansetron (ZOFRAN) syringe/vial injection 4 mg  4 mg Q4HRS PRN Ricardo Torres M.D.   4 mg at 10/10/18 1339   • fentaNYL (SUBLIMAZE) injection  mcg   mcg Q HOUR PRN Jarvis Arnold M.D.   50 mcg at 10/07/18 1650   • racepinephrine (MICRONEFRIN) 2.25 % nebulizer solution 0.5 mL  0.5 mL Q4H PRN (RT) Jarvis Arnold M.D.   0.5 mL at 10/08/18 0737   • artificial tears 1.4 % ophthalmic solution 1 Drop  1 Drop Q2HRS PRN Jarvis Arnold M.D.   1 Drop at 10/11/18 2117   • heparin injection 5,000 Units  5,000 Units Q8HRS Néstor Guillen M.D.   5,000 Units at 10/16/18 0637   • Respiratory Care per Protocol   Continuous RT Alvaro Perez M.D.       • ipratropium-albuterol (DUONEB) nebulizer solution  3 mL Q2HRS PRN (RT) Jarvis Arnold M.D.   3 mL at 10/13/18 1804   • labetalol (NORMODYNE,TRANDATE) injection 10 mg  10 mg Q30 MIN PRN Constantin Self M.D.       • nicotine (NICODERM) 14 MG/24HR 14 mg  14 mg Daily-0600 Hazel Cruz M.D.   Stopped at 10/11/18 0600    And   • nicotine polacrilex (NICORETTE) 2 MG piece 2 mg  2 mg Q HOUR PRN Hazel Cruz M.D.         Last reviewed on 9/29/2018  4:47 PM by Jaylin Haynes, T  No Known Allergies    Physical Exam  Body mass index is 23.74 kg/m². Blood pressure 116/77, pulse 89, temperature 37.3 °C (99.1 °F), resp. rate 16, height 1.575 m (5' 2.01\"), weight 58.9 kg (129 lb 13.6 oz), SpO2 99 %, not currently " breastfeeding.   Vitals:    10/16/18 2206 10/17/18 0102 10/17/18 0549 10/17/18 0811   BP: 124/77 112/87 128/67 116/77   Pulse: 97 (!) 103 96 89   Resp: 16 16 16 16   Temp: 37.3 °C (99.1 °F) 37.3 °C (99.2 °F) 36.7 °C (98 °F) 37.3 °C (99.1 °F)   SpO2: 96% 92% 95% 99%   Weight:       Height:        Oxygen Therapy:  Pulse Oximetry: 99 %, O2 (LPM): 2, O2 Delivery: Silicone Nasal Cannula    Physical Exam   Constitutional: She is oriented to person, place, and time. She appears well-developed. No distress.   Slightly disheveled appearing.  No apparent distress.   HENT:   Head: Normocephalic and atraumatic.   Right Ear: External ear normal.   Left Ear: External ear normal.   Nose: Nose normal.   Mouth/Throat: Oropharynx is clear and moist. No oropharyngeal exudate.   Cortrak in place   Eyes: Pupils are equal, round, and reactive to light. Conjunctivae and EOM are normal. Right eye exhibits no discharge. Left eye exhibits no discharge. No scleral icterus.   Neck: Neck supple. No JVD present. No tracheal deviation present. No thyromegaly present.   Cardiovascular: Normal rate, regular rhythm, normal heart sounds and intact distal pulses.  Exam reveals no gallop and no friction rub.    No murmur heard.  Pulmonary/Chest: Effort normal and breath sounds normal. No respiratory distress. She has no wheezes. She has no rales.   Abdominal: Soft. Bowel sounds are normal. She exhibits no distension. There is no tenderness. There is no rebound and no guarding.   Musculoskeletal: She exhibits no edema, tenderness or deformity.   Lymphadenopathy:     She has no cervical adenopathy.   Neurological: She is alert and oriented to person, place, and time. No cranial nerve deficit or sensory deficit. She exhibits normal muscle tone.   Skin: Skin is warm and dry. No rash noted. She is not diaphoretic. No erythema. No pallor.   Psychiatric: Her behavior is normal. Judgment and thought content normal.   Dysphoric mood   Nursing note and vitals  reviewed.        Labs (personally reviewed and notable for):   Recent Results (from the past 24 hour(s))   ACCU-CHEK GLUCOSE    Collection Time: 10/16/18  1:13 PM   Result Value Ref Range    Glucose - Accu-Ck 129 (H) 65 - 99 mg/dL   ACCU-CHEK GLUCOSE    Collection Time: 10/16/18  5:04 PM   Result Value Ref Range    Glucose - Accu-Ck 132 (H) 65 - 99 mg/dL   ACCU-CHEK GLUCOSE    Collection Time: 10/16/18 11:53 PM   Result Value Ref Range    Glucose - Accu-Ck 106 (H) 65 - 99 mg/dL   BASIC METABOLIC PANEL    Collection Time: 10/17/18  2:22 AM   Result Value Ref Range    Sodium 146 (H) 135 - 145 mmol/L    Potassium 4.5 3.6 - 5.5 mmol/L    Chloride 106 96 - 112 mmol/L    Co2 34 (H) 20 - 33 mmol/L    Glucose 115 (H) 65 - 99 mg/dL    Bun 20 8 - 22 mg/dL    Creatinine 0.39 (L) 0.50 - 1.40 mg/dL    Calcium 9.3 8.5 - 10.5 mg/dL    Anion Gap 6.0 0.0 - 11.9   ESTIMATED GFR    Collection Time: 10/17/18  2:22 AM   Result Value Ref Range    GFR If African American >60 >60 mL/min/1.73 m 2    GFR If Non African American >60 >60 mL/min/1.73 m 2   ACCU-CHEK GLUCOSE    Collection Time: 10/17/18  5:48 AM   Result Value Ref Range    Glucose - Accu-Ck 117 (H) 65 - 99 mg/dL       Cardiac Imaging and Procedures Review:    EKG and telemetry tracings personally reviewed    Impression and Medical Decision Making:  Principal Problem:    Acute hypoxemic respiratory failure (HCC) POA: Yes  Active Problems:    Pulmonary edema POA: Yes    Pericardial effusion POA: Yes    Pleural effusion POA: Yes    HTN (hypertension) POA: Yes    History of non-Hodgkin's lymphoma POA: Yes      Overview: Nonhodgkins lymphoma  2000    Hypotension POA: No    Hypothyroidism POA: Yes    Tobacco dependence POA: Yes    Hypokalemia POA: No    Hypomagnesemia POA: No    Hypernatremia POA: No    Dysphagia POA: Yes  Resolved Problems:    Hypophosphatemia POA: No    Acute encephalopathy POA: Yes      Assessment and Plan    Pericardial Effusion  No clinical change in cardiac  status.  Etiology is still unclear. Suspecting possible malignancy.  May be related to previous chemotherapy with CHOP and MTX.  -Patient presented with large pericardial effusion. EKG with very low voltage on admission.  -Underwent drainage 10/1. 1000 cc of bloody pericardial fluid was obtained. Pericardial drain removed. She likely developed pericardial decompression syndrome post pericardiocentesis. Grossly was bloody (some traumatic component). Cytology negative for malignant cells. Unlikely to be infectious.  -GENTRY negative, RA elevated but CCP negative.  -Echo done 10/11: small effusion without hemodynamic compromise  -Echo 10/16:  Ef 55%, small loculated pericardial effusion wo evidence of hemodynamic compromise.  Effusion mildly worsened in comparison to prior study.    Plan:  -Pericardial biopsy recommended for further workup but by declined by patient and .  -Ok with diuresis with furosemide.   -Recommend palliative care involvement for discussion regarding goals of care.   -Recommend monitoring strict ins and outs.    Pleural Effusion  Recurrent. Currently stable.  -Right sided thoracentesis on 10/1. 1.7 liters have been removed.   -Left sided thoracentesis 10/13 with 650 cc removed.   -Right thoracentisis 10/15 with 750 cc removed.      Plan:   -OK with diuresis with Lasix.  Continue to monitor electrolytes and renal function.    Pulmonary Edema vs pulmonary fibrosis  -Has been diuresed significantly since admission however subsequently became intravascularly dry; treated with IV fluids.  -She unfortunately likely developed pericardial decompression syndrome post pericardiocentesis.     Plan:    -OK with diuresis with Lasix.  Continue to monitor electrolytes and renal function    Sinus Tachycardia  Resolved  -Has been normal sinus rhythm and rate during her hospitalization however now appears to have inappropriat tachycardia.   -Unclear etiology at this point. May be contributed by the effusions  and some anxiety component.  -Plan:  Will continue to monitor    Hypernatremia  Sodium slightly increased to 146. Asymptomatic.  -Monitor with BMP    Hypokalemia  Resolved  -Plan: recommend repletion of potassium    Hypertension  -Holding outpatient HCTZ and amlodipine. Resume when BP stabilizes     DLD  -continue home simvastatin    It is my pleasure to participate in the care of Ms. Quigley.  Please do not hesitate to contact me with questions or concerns. Will continue to follow

## 2018-10-17 NOTE — PROGRESS NOTES
[facility] Hospitalist Progress Note    Date of Service: 10/17/2018    Chief Complaint    78 y.o. Female from [facility] admitted 2018 with shortness of breath and peripheral edema found to have significant pericardial effusion.    Pericardial effusion tapped 10/1  Intubated 10/2  Extubated 10/7      Interval Problem Update  10 /16 patient is more comfortable today than yesterday after thoracentesis. patient wants to be transferred to [facility].  Contact transfer center to initiate another transfer since patient transfer request to [facility] has been declined.  Continue diuretics.  10/17 patient today is more pleasant.  However still we will not give any information in terms of review of system.  Family still wants to transfer to [place] for second opinion    Consultants/Specialty  Cardiology  Pulmonology  Neurology      Disposition  pending        Review of Systems   Unable to perform ROS: Mental status change      Physical Exam  Laboratory/Imaging   Hemodynamics  Temp (24hrs), Av.1 °C (98.8 °F), Min:36.7 °C (98 °F), Max:37.3 °C (99.2 °F)   Temperature: 37.2 °C (98.9 °F)  Pulse  Av.3  Min: 10  Max: 140   Blood Pressure : 116/77      Respiratory      Respiration: 16, Pulse Oximetry: 94 %     Given By:: Mask, Work Of Breathing / Effort: Mild  RUL Breath Sounds: Expiratory Wheezes, RML Breath Sounds: Rhonchi, RLL Breath Sounds: Rhonchi, LORI Breath Sounds: Expiratory Wheezes, LLL Breath Sounds: Rhonchi    Fluids    Intake/Output Summary (Last 24 hours) at 10/17/18 1642  Last data filed at 10/17/18 0812   Gross per 24 hour   Intake                0 ml   Output              500 ml   Net             -500 ml       Nutrition  No orders of the defined types were placed in this encounter.    Physical Exam   Constitutional: She appears well-developed and well-nourished. No distress.   HENT:   Head: Normocephalic and atraumatic.   Right Ear: External ear normal.   Left Ear: External ear normal.   Neck: No  JVD present.   Cardiovascular: Normal rate, regular rhythm and normal heart sounds.  Exam reveals no gallop.    tachycardia   Pulmonary/Chest: Effort normal. No stridor. No respiratory distress. She has no wheezes. She exhibits no tenderness.   Decreased bs at bases   Abdominal: Soft. Bowel sounds are normal. She exhibits no mass. There is no tenderness. There is no rebound.   Musculoskeletal: She exhibits edema.   Neurological:   Awake    confused   Skin: Skin is warm and dry. No rash noted. She is not diaphoretic.   Psychiatric: She has a normal mood and affect. Thought content normal.   Nursing note and vitals reviewed.      Recent Labs      10/16/18   0230   WBC  16.9*   RBC  5.00   HEMOGLOBIN  14.0   HEMATOCRIT  45.1   MCV  90.2   MCH  28.0   MCHC  31.0*   RDW  65.7*   PLATELETCT  448*   MPV  11.3     Recent Labs      10/15/18   0258  10/16/18   0230  10/17/18   0222   SODIUM  144  143  146*   POTASSIUM  3.2*  4.3  4.5   CHLORIDE  99  103  106   CO2  37*  35*  34*   GLUCOSE  152*  120*  115*   BUN  18  19  20   CREATININE  0.38*  0.40*  0.39*   CALCIUM  8.4*  9.3  9.3                   EC-ECHOCARDIOGRAM LTD W/O CONT   Final Result      US-THORACENTESIS PUNCTURE RIGHT   Final Result      1. Ultrasound guided right sided diagnostic and therapeutic thoracentesis.      2. 750 mL of fluid withdrawn.      DX-CHEST-PORTABLE (1 VIEW)   Final Result      No pneumothorax status post right thoracentesis.   Small residual right pleural effusion and left lung base atelectasis.   Left perihilar/left lung base atelectasis/possible infiltration and small left pleural effusion.      DX-CHEST-PORTABLE (1 VIEW)   Final Result         1.  Pulmonary edema and/or infiltrates are identified, which are stable since the prior exam.      DX-ABDOMEN FOR TUBE PLACEMENT   Final Result      Feeding tube placement with the tip projecting over the distal stomach or duodenal bulb      US-THORACENTESIS PUNCTURE LEFT   Final Result      1.  Ultrasound guided left sided diagnostic and therapeutic thoracentesis.      2. 650 mL of fluid withdrawn.      DX-CHEST-LIMITED (1 VIEW)   Final Result      No pneumothorax following left thoracentesis      DX-CHEST-PORTABLE (1 VIEW)   Final Result         Worsening large left pleural effusion with near collapsing of the left lung.      Moderate layering right pleural effusion, worse than prior as well.      DX-ABDOMEN FOR TUBE PLACEMENT   Final Result         Feeding tube with tip projecting over the expected area of the stomach.      DX-CHEST-PORTABLE (1 VIEW)   Final Result         1. No significant interval change.      EC-ECHOCARDIOGRAM LTD W/O CONT   Final Result      CT-CHEST (THORAX) WITH   Final Result      1.  Moderate to large bilateral pleural effusions with bilateral atelectasis.      2.  Moderate pericardial effusion.      3.  Patchy groundglass opacities within the residual aerated upper lobes bilaterally.      4.  Fatty liver.      5.  Atherosclerotic vascular calcification.            DX-ABDOMEN FOR TUBE PLACEMENT   Final Result         1.  Nonspecific bowel gas pattern.   2.  Dobbhoff tube tip terminates overlying the expected location of the gastric body.   3.  Left lung base infiltrate and/or layering pleural effusion.      [id number] VIEW   Final Result         1.  Nonspecific bowel gas pattern.   2.  Dobbhoff tube tip terminates in the distal esophagus, recommend advancement.   3.  Bilateral lower lobe infiltrates and layering pleural effusions.   4.  Cardiomegaly.      DX-CHEST-LIMITED (1 VIEW)   Final Result      Bilateral pleural effusions with overlying atelectasis/consolidation are again seen.      Mild interstitial prominence may represent mild edema.      Atherosclerotic plaque.         DX-ABDOMEN FOR TUBE PLACEMENT   Final Result      Enteric tube projects over the distal stomach.         EC-ECHOCARDIOGRAM COMPLETE W/O CONT   Final Result      DX-CHEST-PORTABLE (1 VIEW)   Final Result       No significant interval change.      DX-CHEST-PORTABLE (1 VIEW)   Final Result      No significant interval change.      EC-ECHOCARDIOGRAM LTD W/O CONT   Final Result      DX-CHEST-PORTABLE (1 VIEW)   Final Result         1.  Pulmonary edema and/or infiltrates are identified, which are stable since the prior exam. Layering bilateral pleural effusions, greater on the left.   2.  Atherosclerosis               DX-CHEST-PORTABLE (1 VIEW)   Final Result         1.  Pulmonary edema and/or infiltrates are identified, which are stable since the prior exam.   2.  Small right pleural effusion   3.  Atherosclerosis            DX-CHEST-PORTABLE (1 VIEW)   Final Result         1.  Pulmonary edema and/or infiltrates are identified, which are stable since the prior exam.   2.  Small right pleural effusion   3.  Atherosclerosis         DX-CHEST-PORTABLE (1 VIEW)   Final Result         1.  Pulmonary edema and/or infiltrates are identified, which are stable since the prior exam.   2.  Small right pleural effusion   3.  Atherosclerosis      DX-ABDOMEN FOR TUBE PLACEMENT   Final Result      Feeding tube placement with the tip projecting over the stomach body.      DX-ABDOMEN FOR TUBE PLACEMENT   Final Result      1.  Feeding tube appears looped within the stomach. The distal aspect of the tip is directed cephalad in the fundal region.      DX-CHEST-LIMITED (1 VIEW)   Final Result         1.  Pulmonary edema and/or infiltrates are identified, which are stable since the prior exam.   2.  Small right pleural effusion   3.  Atherosclerosis      DX-CHEST-LIMITED (1 VIEW)   Final Result         1.  Pulmonary edema and/or infiltrates are identified, which are stable since the prior exam.   2.  Atherosclerosis      DX-CHEST-PORTABLE (1 VIEW)   Final Result         1.  Pulmonary edema and/or infiltrates are identified, which are stable since the prior exam.   2.  Atherosclerosis      DX-CHEST-LIMITED (1 VIEW)   Final Result      1.  No  pneumothorax identified status post right thoracentesis and pericardiocentesis.      2.  Small amount of pneumomediastinum is likely related to recent pericardiocentesis and drain placement      3.  Cardiomegaly      EC-ECHOCARDIOGRAM LTD W/O CONT   Final Result      DX-CHEST-PORTABLE (1 VIEW)   Final Result      1.  Evacuation large right pleural effusion status post thoracentesis.   2.  No definite right pneumothorax identified. Recommend interval follow-up chest x-ray.   3.  Small left pleural effusion and left perihilar/left lung base atelectasis.   4.  Findings discussed with the patient's nurse. Follow-up chest x-ray will be obtained in approximately 3:00 PM      US-THORACENTESIS PUNCTURE RIGHT   Final Result      1. Ultrasound guided right sided diagnostic and therapeutic thoracentesis.      2. 1700 mL of fluid withdrawn.      EC-ECHOCARDIOGRAM COMPLETE W/O CONT   Final Result      DX-CHEST-PORTABLE (1 VIEW)   Final Result         1.  Pulmonary edema and/or infiltrates are identified, which are stable since the prior exam.   2.  Layering right pleural effusion, stable   3.  Cardiomegaly   4.  Atherosclerosis      DX-CHEST-2 VIEWS   Final Result      1.  Probable bilateral atelectasis      2.  Probable right pleural effusion which may be significant in size      3.  Limited assessment on one view portable radiography      OUTSIDE IMAGES-DX CHEST   Final Result      OUTSIDE IMAGES-DX CHEST   Final Result      EC-ECHOCARDIOGRAM LTD W/O CONT    (Results Pending)        Assessment/Plan     * Acute hypoxemic respiratory failure (HCC)- (present on admission)   Assessment & Plan    Extubated on 10/7/2018  r    Continue oxygen and RT protocol        Pleural effusion- (present on admission)   Overview    Chemistry suggesting transudate     Assessment & Plan        Patient is currently euvolemic and no signs of worsening Of pleural effusion or pericardial effusion  Discontinue diuretics    Last Thoracentesis on left ,  done on 10/13    Right side 10/15 tolerated very well and feeling better currently with decreased requirement of oxygen          Pericardial effusion- (present on admission)   Overview    Fluid protein suggesting exudate but all other cytology and chemistry was apparently lost.     Assessment & Plan    Cardiology following   Status post pericardial drain placement  Drain removed on 10/3/2018  Cytology negative fluid cultures negative  GENTRY negative RA factor positive CCP negative    off steroids .. Ineffective and stopped    Etiology is unclear and patient may eventually need pericardial biopsy for definitive diagnosis -> surgery is aware however at this moment patient wants to be transferred to [facility] for regional referral center for second opinion.  Transfer center notified and working on this transfer.            Pulmonary edema- (present on admission)   Assessment & Plan    Monitor fluid status   Diuresis  Lasix 20mg po daily        Hypotension   Assessment & Plan    Relative adrenal insufficiency    Appears resolved for now            History of non-Hodgkin's lymphoma- (present on admission)   Overview    Nonhodgkins lymphoma  2000, treated with CHOP and MTX but never radiation therapy     Assessment & Plan    Hx of         HTN (hypertension)- (present on admission)   Assessment & Plan    All bp medications held  Cont to monitor        Dysphagia- (present on admission)   Assessment & Plan    Continue with speech therapy   Continue tube feeding, patient also continued to have cough, start patient on cough syrup and also throat spray for comfort.  Currently still complains of sore throat  May need prolonged time to recover to regain swallow function        Hypernatremia   Assessment & Plan    water flushes , 300 cc q6          Hypomagnesemia   Assessment & Plan    Repleted        Hypokalemia   Assessment & Plan    Replace today and BID        Tobacco dependence- (present on admission)   Assessment & Plan     - Smoking cessation education provided  - Nicotine patch          Hypothyroidism- (present on admission)   Assessment & Plan    Continue levothyroxine          Quality-Core Measures   Reviewed items::  EKG reviewed, Labs reviewed, Medications reviewed and Radiology images reviewed  [place] catheter::  No [place]  DVT prophylaxis pharmacological::  Heparin  DVT prophylaxis - mechanical:  SCDs  Ulcer Prophylaxis::  Not indicated   For complexity-based billing, please refer to the history, exam, and decison making above. I spent >35 minutes caring for the patient today.    I have discussed with RN and CM and SW and other consultants about patient's plan.      Check am cbc, bmp

## 2018-10-17 NOTE — PROGRESS NOTES
Pulmonary Progress Note     History of Present Illness:  Pt is a 79 yo F with h/o non hodgkins lymphoma in remission presented to the ED on 9/28 for SOB. She was noted to have pericardial/pleural effusions which were believed to be the source of her symptoms. On 10/1 she underwent right thoracentesis and pericardiocentesis, a pericardial drain was placed at that time. Patient symptomatically worsened however and was transferred to the ICU where she required intubation on 10/2. Pericardial drain was removed on 10/3. Analysis of pericardial fluid suggested exudate but cytology was not suggestive of malignancy. Rheumatologic workup has been negative aside from elevated rheumatoid factor. She improved and was extubated on 10/7. Cardiology was consulted who have been diuresing patient and following her pericardial effusion with repeat echos. CT on 10/10 showed worsening effusions and congestion as well as ground glass opacities in upper lobes b/l. Around this time pt had become considerably more delirious. Surgery was involved who recommended b/l pigtail catheters for her effusions, this was scheduled with IR however pt refused. Patient refused pericardial biopsy too.      10/15/18: Patient is non cooperative and refuses to talk with physicians. Keeps her eyes closed.    s/p left thoracentesis in IR on 10/13/18. Feeding via feeding tube.     10/16/18: S/p thoracentesis right yesterday with removal of 750 ml of pleural fluid. Feels better after thoracentesis. More cooperative. Afebrile, denies pleuritic chest pain. Family want to take the patient to [place]. Hospitalist to arrange transfer     10/17/18: Has developed hoarseness of voice and dry cough. No pleuritic chest pain. Afebrile. Pulse oximetry is 99% on O2 @ 2LPM.     Review of Systems   Denies headache, fever, chills or sweating  Denies pleuritic chest pain, has cough and hoarseness of voice. No hemoptysis  Admits to have dyspnea upon exertion, no orthopnea or  anginal chest pain  Has sore throat and being fed via naso gastric tube  No abdominal pain, vomiting                                             Hospital Medications:     Furosemide 20 mg daily  Gabapentin 300 mg twice daily   Levothyroxine 88 mcg once daily  Potassium chloride 20 meq twice daily  Simvastatin 20 mg once daily  Heparin 5000 units q 8 hourly subcutaneusly     Medication Allergy:  No Known Allergies     Objective:   Vitals/ General Appearance:   Blood pressure 116/77, pulse 89, temperature 37.3 °C (99.1 °F), resp. rate 16,SpO2 99 % on O2 @ 2 LPM,   O2 Delivery: Silicone Nasal Cannula     Constitutional:   Well developed, Well nourished, Not in respiratory distress  HENMT:  Normocephalic, Atraumatic, Oropharynx moist mucous membranes, No oral exudates, Nose normal.  No thyromegaly.  Eyes:  PERRLA, EOMI, Conjunctiva normal, No discharge.  Neck:  Normal range of motion, No cervical tenderness,  no JVD.  Cardiovascular:  Normal heart rate, Normal rhythm, No murmurs, No rubs, No gallops.   Extremitites with intact distal pulses, no cyanosis, or edema.  Lungs:  Bilateral breath sounds audible. Transmitted inspiratory sounds audible bilaterally. No wheeze or pleural rub  Abdomen: Bowel sounds normal, Soft, No tenderness, No guarding, No rebound, No masses, No hepatosplenomegaly.  Skin: Warm, Dry, No erythema, No rash, no induration.  Neurologic: Alert & orientated., No focal deficits noted, Tendon jerks intact     Labs:   Results for AVEL COLLAZO (MRN [id number]) as of 10/17/2018 11:18   Ref. Range 10/17/2018 02:22   Sodium Latest Ref Range: 135 - 145 mmol/L 146 (H)   Potassium Latest Ref Range: 3.6 - 5.5 mmol/L 4.5   Chloride Latest Ref Range: 96 - 112 mmol/L 106   Co2 Latest Ref Range: 20 - 33 mmol/L 34 (H)   Anion Gap Latest Ref Range: 0.0 - 11.9  6.0   Glucose Latest Ref Range: 65 - 99 mg/dL 115 (H)   Bun Latest Ref Range: 8 - 22 mg/dL 20   Creatinine Latest Ref Range: 0.50 - 1.40 mg/dL 0.39 (L)   GFR  If  Latest Ref Range: >60 mL/min/1.73 m 2 >60   GFR If Non  Latest Ref Range: >60 mL/min/1.73 m 2 >60   Calcium Latest Ref Range: 8.5 - 10.5 mg/dL 9.3   Results for AVEL COLLAZO (MRN [id number]) as of 10/17/2018 11:18   Ref. Range 10/16/2018 02:30   WBC Latest Ref Range: 4.8 - 10.8 K/uL 16.9 (H)   RBC Latest Ref Range: 4.20 - 5.40 M/uL 5.00   Hemoglobin Latest Ref Range: 12.0 - 16.0 g/dL 14.0   Hematocrit Latest Ref Range: 37.0 - 47.0 % 45.1   MCV Latest Ref Range: 81.4 - 97.8 fL 90.2   MCH Latest Ref Range: 27.0 - 33.0 pg 28.0   MCHC Latest Ref Range: 33.6 - 35.0 g/dL 31.0 (L)   RDW Latest Ref Range: 35.9 - 50.0 fL 65.7 (H)   Platelet Count Latest Ref Range: 164 - 446 K/uL 448 (H)   MPV Latest Ref Range: 9.0 - 12.9 fL 11.3     Results for AVEL COLLAZO (MRN [id number]) as of 10/15/2018 08:02    Ref. Range 10/13/2018 14:20 10/13/2018 14:20   Fluid Type Unknown Thoracentesis Thoracentesis   Color-Body Fluid Unknown   Yellow   Character-Body Fluid Unknown   Hazy   Total WBC Latest Units: cells/uL   436   Total RBC Count Latest Units: cells/uL   <2000   Polys Latest Units: %   9   Lymphs Latest Units: %   43   Mononuclear Cells - Fluid Latest Units: %   11   Mesothelial Cells - CSF Latest Units: %   2   Fluid Histiocyte Latest Units: %   35   Comments Unknown   see below   Glucose, Fluid Latest Units: mg/dL   110   Total Protein Fluid Latest Units: g/dL   1.7   Body Fluid Amylase Latest Units: U/L   <10   Body Fluid LDH Latest Units: U/L   82   Ph Misc Unknown 8.00     Imaging:      10/15/2018 4:31 PM    HISTORY/REASON FOR EXAM:  Right thoracentesis.      TECHNIQUE/EXAM DESCRIPTION AND NUMBER OF VIEWS:  Single portable view of the chest.    COMPARISON: 10/15/2018    FINDINGS:  No pneumothorax status post right thoracentesis.  Small residual right pleural effusion.    Left perihilar/left lung base atelectasis and pleural fluid.    Cardiomegaly.    Enteric tube projects over the  stomach.      Impression            No pneumothorax status post right thoracentesis.  Small residual right pleural effusion and left lung base atelectasis.  Left perihilar/left lung base atelectasis/possible infiltration and small left pleural effusion.   Reading Provider Reading Date   Jermain Silverio M.D. Oct 15, 2018      Assessment/Plan           Acute hypoxemic respiratory failure (HCC)- (present on admission)   Assessment & Plan     Intubated 10/2 extubated 10/7  Thoracentesis Lt side  10/13 right side10/15  Continue O2 supplementation and titrate to maintain SpO2 above 94%       Pleural effusion Bilateral   Assessment & Plan     S/P right thoracentesis with 1.7 L of fluid removed on 10/1 and 750 ml removed on 10/15  Transudate  S/p left thoracentesis transudative       Pericardial effusion   Assessment & Plan     Pericardiocentesis done on 10/1 - 1 L of bloody fluid was removed  Pericardial drain removed on 10/3  Cytology negative for malignancy  CRP is markedly elevated  Rheumatoid factor positive, GENTRY negative, HIV negative, hepatitis panel negative  Cardiology service following       Pulmonary edema- (present on admission) resolved   Assessment & Plan     Improved with diuresis   Continue Lasix, 40 mg IV      Dysphagia with new hoarseness of voice   Assessment & Plan     Continue enteral tube feedings   racemic epinephrine via nebulizer stat      History of non-Hodgkin's lymphoma   Assessment & Plan     Diagnosed in 2000  Received chemotherapy in [place] and has been in remission per the

## 2018-10-17 NOTE — CARE PLAN
Problem: Safety  Goal: Will remain free from injury  Bed locked and in lowest position. Bed alarm on. Treaded socks. Call light and belongings within reach. Patient educated to call for assistance. Pt verbalized understanding. Hourly rounding in place.     Problem: Skin Integrity  Goal: Risk for impaired skin integrity will decrease  Skin assessment completed, Q 2 turns in place. Pillows in use for positioning and to float heels, silicone oxygen tubing in use. Frequent linen changes to ensure patient stays dry. Hourly rounding in place.

## 2018-10-18 NOTE — PROGRESS NOTES
Rhonchi heard throughout patients lung fields. Cortrack found to be at 70 CM. Chest xray ordered per protocol. Placed patient on 2L NC. Tube feeding held temorarily. RT paged. No PRNs administered. Dr. Moreira notified. No new orders received.

## 2018-10-18 NOTE — CARE PLAN
Problem: Safety  Goal: Will remain free from injury  Safety precautions reviewed with pt and family, pt and family verbalized understanding and denies questions. Bed alarm in use as patient is impulsive at times.      Problem: Pain Management  Goal: Pain level will decrease to patient's comfort goal  Q4 pain assessments in place. Pt educated on pain scale. RN aware of pt's goal pain. PRN medication orders followed.

## 2018-10-18 NOTE — THERAPY
"Speech Language Therapy dysphagia treatment completed.   Functional Status:  The patient was seen for dysphagia therapy this date. The patient was awake, alert but demonstrating confusion and difficulty maintaining attention to task this session despite max cues and keeping dysphagia exercises simple. Patient was noted to have increased upper airway congestion, congested sounding cough and weak almost aphonic vocal quality. These findings appear worse than previous SLP session. Patient was unable to complete laryngeal relaxation exercises this session. Patient required MAX 1:1 cues to complete 5 laryngeal elevation/pharyngeal squeeze exercises. PO trials were held this session due to congestion and change in vocal quality. Discussed with MD regarding possible ENT consult given worsening of vocal quality with increased inhalation stridor and >10days post extubation. At this time, recommend patient remain strict NPO/TF. Please encourage vocal hygiene exercises and dysphagia exercises. Appreciate ENT recommendations. SLP following for repeat FEES if medically appropriate.     Recommendations: 1)  Strict NPO/TF. 2) SLP following for repeat FEES if medically appropriate.     Plan of Care: Will benefit from Speech Therapy 3 times per week    Post-Acute Therapy: Discharge to an inpatient transitional care facility for continued skilled therapy services.    See \"Rehab Therapy-Acute\" Patient Summary Report for complete documentation.     "

## 2018-10-18 NOTE — PROGRESS NOTES
[facility] Cardiology Follow-up Consult Note  Consulting physician: Lia Ann M.D.    Chief Complaint   Patient presents with   • Shortness of Breath     3 weeks on and off   • Peripheral Edema     started 3 weeks ago in her feet and is now up to her thighs     ID:  79 yo female from [place], [place] with history of non-Hodgkin's lympoma status post chemotherapy with CHOP and MTX that presented 9/28 from an outside facility for shortness of breath.  Found bilateral pleural effusion and pericardial effusion.  10/1, percardiocentesis with 1000cc bloody pericardial fluid drained.  She has received right and left thoracenteses for recurrent pleural effusions.  Cardiology consulted to aid in evaluation/management of pericardial effusion of unknown cause.      Interim Events:  -No events overnight  -Mental status:  At bedside patient unresponsive, but follows commands.   -Telemetry:  SR/ST   -Vitals:  Afebrile, occasional tachycardia, saturating in the high 90s on 2-3 L of O2.  Systolic -126  -Dysphagia:  CT soft tissue neck performed today for dysphagia.  Showed no pharyngeal/laryngeal masses or adenopathy; multiple small bilateral thyroid nodules.  -Electrolytes:  No CMP today  -Renal function:  No CMP today.    -Disposition:   indicates desire to hold any invasive procedures while at [facility].  He would like to transfer her to a hospital in the [place] area.  Per Benjamin Moody RN, insurance will not pay for transfer to [facility], because there is no clear need for higher level of care.      Review of Systems   Reason unable to perform ROS: Patient refusing to respond to questions.     Past medical, surgical, social, and family history reviewed and unchanged from admission except as noted in assessment and plan.    Medications: Reviewed in MAR  Current Facility-Administered Medications   Medication Dose Frequency Provider Last Rate Last Dose   • racepinephrine (MICRONEFRIN) 2.25 % nebulizer  solution 0.5 mL  0.5 mL Q4H PRN (RT) Muhammad K Gondal, M.D.   0.5 mL at 10/17/18 1218   • guaiFENesin (ROBITUSSIN) 100 MG/5ML solution 200 mg  10 mL Q6HRS PRN Lia Ann M.D.   200 mg at 10/18/18 1317   • sore throat spray (CHLORASEPTIC) 1 Spray  1 Spray Q2HRS PRN Lia Ann M.D.   1 Spray at 10/17/18 1441   • haloperidol lactate (HALDOL) injection 1 mg  1 mg Q6HRS PRN Ricardo Torres M.D.   1 mg at 10/14/18 1426   • insulin regular (HUMULIN R) injection 2-9 Units  2-9 Units Q6HRS Ricardo Torres M.D.   Stopped at 10/15/18 1800    And   • glucose 4 g chewable tablet 16 g  16 g Q15 MIN PRN Ricardo Torres M.D.        And   • dextrose 50% (D50W) injection 25 mL  25 mL Q15 MIN PRTWYLA Torres M.D.       • Pharmacy Consult: Enteral tube feeding - review meds/change route/product selection   PRN Ricardo Torres M.D.       • senna-docusate (PERICOLACE or SENOKOT S) 8.6-50 MG per tablet 2 Tab  2 Tab BID Ricardo Torres M.D.   Stopped at 10/13/18 0600    And   • polyethylene glycol/lytes (MIRALAX) PACKET 1 Packet  1 Packet QDAY PRTWYLA Torres M.D.        And   • magnesium hydroxide (MILK OF MAGNESIA) suspension 30 mL  30 mL QDAY PRTWYLA Torres M.D.        And   • bisacodyl (DULCOLAX) suppository 10 mg  10 mg QDAY PRTWYLA Torres M.D.       • oxyCODONE immediate-release (ROXICODONE) tablet 5 mg  5 mg Q4HRS PRTWYLA Torres M.D.   5 mg at 10/16/18 0336    Or   • oxyCODONE immediate-release (ROXICODONE) tablet 2.5 mg  2.5 mg Q4HRS PRTWYLA Torres M.D.       • levothyroxine (SYNTHROID) tablet 88 mcg  88 mcg AM ES Ricardo Torres M.D.   88 mcg at 10/18/18 0615   • diphenhydrAMINE (BENADRYL) tablet/capsule 25 mg  25 mg HS PRN Ricardo Torres M.D.       • acetaminophen (TYLENOL) tablet 650 mg  650 mg Q4HRS PRN Ricardo Torres M.D.   650 mg at 10/15/18 2021    Or   • acetaminophen (TYLENOL) suppository 650 mg  650 mg Q4HRS PRN Ricardo Torres M.D.       • simvastatin (ZOCOR) tablet 20 mg  20  "mg Q EVENING Ricardo Torres M.D.   20 mg at 10/17/18 1800   • gabapentin (NEURONTIN) capsule 300 mg  300 mg BID Ricardo Torres M.D.   300 mg at 10/18/18 0615   • ondansetron (ZOFRAN) syringe/vial injection 4 mg  4 mg Q4HRS PRN Ricardo Torres M.D.   4 mg at 10/10/18 1339   • fentaNYL (SUBLIMAZE) injection  mcg   mcg Q HOUR PRN Jarvis Arnold M.D.   50 mcg at 10/07/18 1650   • racepinephrine (MICRONEFRIN) 2.25 % nebulizer solution 0.5 mL  0.5 mL Q4H PRN (RT) Jarvis Arnold M.D.   0.5 mL at 10/08/18 0737   • artificial tears 1.4 % ophthalmic solution 1 Drop  1 Drop Q2HRS PRN Jarvis Arnold M.D.   1 Drop at 10/11/18 2117   • heparin injection 5,000 Units  5,000 Units Q8HRS Néstor Guillen M.D.   5,000 Units at 10/18/18 1317   • Respiratory Care per Protocol   Continuous RT Alvaro Perez M.D.       • ipratropium-albuterol (DUONEB) nebulizer solution  3 mL Q2HRS PRN (RT) Jarvis Arnold M.D.   3 mL at 10/13/18 1804   • labetalol (NORMODYNE,TRANDATE) injection 10 mg  10 mg Q30 MIN PRN Constantin Self M.D.       • nicotine (NICODERM) 14 MG/24HR 14 mg  14 mg Daily-0600 Hazel Cruz M.D.   14 mg at 10/18/18 0632    And   • nicotine polacrilex (NICORETTE) 2 MG piece 2 mg  2 mg Q HOUR PRN Hazel Cruz M.D.         Last reviewed on 9/29/2018  4:47 PM by Jaylin Haynes, PhT  No Known Allergies    Physical Exam  Body mass index is 22.17 kg/m². Blood pressure 121/60, pulse 99, temperature 37.1 °C (98.7 °F), resp. rate 20, height 1.575 m (5' 2.01\"), weight 55 kg (121 lb 4.1 oz), SpO2 99 %, not currently breastfeeding.   Vitals:    10/17/18 2100 10/17/18 2228 10/17/18 2355 10/18/18 0328   BP: 125/88  126/94 121/60   Pulse: (!) 104 84 (!) 104 99   Resp: 20 16 20 20   Temp: 37.6 °C (99.7 °F)  36.5 °C (97.7 °F) 37.1 °C (98.7 °F)   SpO2: 90% 99% 95% 99%   Weight: 55 kg (121 lb 4.1 oz)      Height:        Oxygen Therapy:  Pulse Oximetry: 99 %, O2 (LPM): (P) 3, O2 Delivery: (P) " Silicone Nasal Cannula    Physical Exam   Constitutional: She is oriented to person, place, and time. She appears well-developed. No distress.   Slightly disheveled appearing.  No apparent distress.   HENT:   Head: Normocephalic and atraumatic.   Right Ear: External ear normal.   Left Ear: External ear normal.   Nose: Nose normal.   Mouth/Throat: Oropharynx is clear and moist. No oropharyngeal exudate.   Cortrak in place   Eyes: Pupils are equal, round, and reactive to light. Conjunctivae and EOM are normal. Right eye exhibits no discharge. Left eye exhibits no discharge. No scleral icterus.   Neck: Neck supple. No JVD present. No tracheal deviation present. No thyromegaly present.   Cardiovascular: Normal rate, regular rhythm, normal heart sounds and intact distal pulses.  Exam reveals no gallop and no friction rub.    No murmur heard.  Pulmonary/Chest: Effort normal and breath sounds normal. No respiratory distress. She has no wheezes. She has no rales.   Abdominal: Soft. Bowel sounds are normal. She exhibits no distension. There is no tenderness. There is no rebound and no guarding.   Musculoskeletal: She exhibits no edema, tenderness or deformity.   Lymphadenopathy:     She has no cervical adenopathy.   Neurological: She is alert and oriented to person, place, and time. No cranial nerve deficit or sensory deficit. She exhibits normal muscle tone.   Skin: Skin is warm and dry. No rash noted. She is not diaphoretic. No erythema. No pallor.   Psychiatric: Her behavior is normal. Judgment and thought content normal.   Dysphoric mood   Nursing note and vitals reviewed.        Labs (personally reviewed and notable for):   Recent Results (from the past 24 hour(s))   ACCU-CHEK GLUCOSE    Collection Time: 10/17/18  4:52 PM   Result Value Ref Range    Glucose - Accu-Ck 124 (H) 65 - 99 mg/dL   ACCU-CHEK GLUCOSE    Collection Time: 10/18/18 12:43 AM   Result Value Ref Range    Glucose - Accu-Ck 109 (H) 65 - 99 mg/dL    ACCU-CHEK GLUCOSE    Collection Time: 10/18/18  6:14 AM   Result Value Ref Range    Glucose - Accu-Ck 109 (H) 65 - 99 mg/dL   ACCU-CHEK GLUCOSE    Collection Time: 10/18/18 11:59 AM   Result Value Ref Range    Glucose - Accu-Ck 112 (H) 65 - 99 mg/dL       Cardiac Imaging and Procedures Review:    EKG and telemetry tracings personally reviewed    Impression and Medical Decision Making:  Principal Problem:    Acute hypoxemic respiratory failure (HCC) POA: Yes  Active Problems:    Pulmonary edema POA: Yes    Pericardial effusion POA: Yes      Overview: Fluid protein suggesting exudate but all other cytology and chemistry was       apparently lost.    Pleural effusion POA: Yes      Overview: Chemistry suggesting transudate    HTN (hypertension) POA: Yes    History of non-Hodgkin's lymphoma POA: Yes      Overview: Nonhodgkins lymphoma  2000, treated with CHOP and MTX but never radiation       therapy    Hypotension POA: No    Hypothyroidism POA: Yes    Tobacco dependence POA: Yes    Hypokalemia POA: No    Hypomagnesemia POA: No    Hypernatremia POA: No    Dysphagia POA: Yes  Resolved Problems:    Hypophosphatemia POA: No    Acute encephalopathy POA: Yes      Assessment and Plan    Pericardial Effusion  No clinical change in cardiac status.  -Etiology is still unclear. Suspecting possible malignancy.  May be related to previous chemotherapy with CHOP and MTX.  -Patient presented with large pericardial effusion. EKG with very low voltage on admission.  -Underwent drainage 10/1. 1000 cc of bloody pericardial fluid was obtained. Pericardial drain removed. She likely developed pericardial decompression syndrome post pericardiocentesis. Grossly was bloody (some traumatic component). Cytology negative for malignant cells. Unlikely to be infectious.  -GENTRY negative, RA elevated but CCP negative.  -Echo 10/11: small effusion without hemodynamic compromise  -Echo 10/16:  Ef 55%, small loculated pericardial effusion wo evidence of  hemodynamic compromise.  Effusion mildly worsened in comparison to prior study.    Plan:  -Pericardial biopsy recommended for further workup but by declined by patient and .  -Ok with diuresis with furosemide if needed.   -Recommend palliative care involvement for discussion regarding goals of care.   -Recommend monitoring strict ins and outs.    Pleural Effusion  Recurrent. Currently stable.  -Right sided thoracentesis on 10/1. 1.7 liters have been removed.   -Left sided thoracentesis 10/13 with 650 cc removed.   -Right thoracentisis 10/15 with 750 cc removed.      Plan:   -OK with diuresis with Lasix.  Continue to monitor electrolytes and renal function.    Pulmonary Edema vs pulmonary fibrosis  -Has been diuresed significantly since admission however subsequently became intravascularly dry; treated with IV fluids.  -She unfortunately likely developed pericardial decompression syndrome post pericardiocentesis.   -History of reaction to methotrexate and CHOP    Plan:    -OK with diuresis with Lasix if needed.  Continue to monitor electrolytes and renal function    Sinus Tachycardia  Resolved  -Has been normal sinus rhythm and rate during her hospitalization however now appears to have inappropriat tachycardia.   -Unclear etiology at this point. May be contributed by the effusions and some anxiety component.  -Plan:  Will continue to monitor    Hypernatremia  Sodium slightly increased to 146. Asymptomatic.  -Monitor with BMP    Hypokalemia  Resolved  -Plan: recommend monitoring and repletion as needed.    Hypertension  -Holding outpatient HCTZ and amlodipine. Resume when BP stabilizes     DLD  -continue home simvastatin    It is my pleasure to participate in the care of Ms. Quigley.  Please do not hesitate to contact me with questions or concerns. Will continue to follow

## 2018-10-18 NOTE — THERAPY
"Physical Therapy Treatment completed.   Bed Mobility:  Supine to Sit: Minimal Assist  Transfers: Sit to Stand: Contact Guard Assist  Gait: Level Of Assist: Contact Guard Assist with Front-Wheel Walker       Plan of Care: Will benefit from Physical Therapy 4 times per week  Discharge Recommendations: Equipment: Will Continue to Assess for Equipment Needs.    Pt was seated upon entry with  in room. Pt transfered sit to stand with FWW and CGA with stand pivot transfer to bed. She showed increased activity participation with treatment today. Pt exhibited decreased activity tolerance with impaired functional mobility. She required min VC for sequencing. Pt stood pivot transfer to bed in preparation for transport. Pt exhibited no s/s of fatigue with exertional activity. Continue to recommend d/c to skilled nursing facility 2' current impaired functional mobility.     See \"Rehab Therapy-Acute\" Patient Summary Report for complete documentation.       "

## 2018-10-18 NOTE — WOUND TEAM
There is a scab on patient's right ear lobe. It is reported that there was a  O2 monitor probe taped to that ear lobe. The wound is most likely from tape stripping due to removal of the adhesive  O2 probe. It is not pressure. It does not need advanced wound care. Left open to air.

## 2018-10-18 NOTE — DISCHARGE PLANNING
Anticipated Discharge Disposition: Unknown at this time.     Action: LSW spoke with the transfer center ([id number]) who stated that in order for pt to transport to [facility], pt will have to pay out of pocket for transportation and stay. LSW informed pt and pt's , Cameron, who stated he is unable to afford this. LSW provided support to Cameron.     Cameron requested to talk with LSW. LSW spoke with Cameron who stated that pt's daughter called the transfer center at [facility] who has no record of working with the transfer center at [facility].      Barriers to Discharge: Pt is requesting transfer to [facility] is unable to accept pt without out of pocket costs.     Plan: Follow-up with  on potential transport.

## 2018-10-18 NOTE — CARE PLAN
Problem: Communication  Goal: The ability to communicate needs accurately and effectively will improve  Outcome: PROGRESSING SLOWER THAN EXPECTED  Communication board updated. All pt questions answered. No further questions at this time. Pt encouraged to voice feelings and ask questions as they arise.    Problem: Knowledge Deficit  Goal: Knowledge of the prescribed therapeutic regimen will improve  Outcome: PROGRESSING SLOWER THAN EXPECTED  Discussed POC and medications with patient, patient verbalized understanding.

## 2018-10-18 NOTE — FLOWSHEET NOTE
10/17/18 2228   Events/Summary/Plan   Events/Summary/Plan RN called for PRN, tx not indicated   Therapy Not Performed   Type of Therapy Not Performed SVN   Reason Therapy Not Performed PRN, No Indication   Respiratory WDL   Respiratory (WDL) X   Chest Exam   Work Of Breathing / Effort Mild   Respiration 16   Pulse 84   Breath Sounds   Pre/Post Intervention Pre Intervention Assessment   RUL Breath Sounds Rhonchi   RML Breath Sounds Rhonchi   RLL Breath Sounds Rhonchi   LORI Breath Sounds Rhonchi   LLL Breath Sounds Rhonchi   Secretions   Cough Productive   How Sputum Obtained Cough on Request   Sputum Amount Unable to Evaluate  (pt swalloed)   Oxygen   Pulse Oximetry 99 %   O2 (LPM) 2   O2 Daily Delivery Respiratory  Silicone Nasal Cannula

## 2018-10-18 NOTE — DISCHARGE PLANNING
Spoke to DENIA Engle and Dr. Ann about possible transfer for second opinion. Their is no clear need for a higher level of care that could be provided at another facility. Patient's insurance will not pay for a transfer to another facility for a second opinion. This is the conclusion that [facility] transfer center reached last Tuesday and the case was closed for transfer to [facility] unless patient can pay for transfer and stay there.

## 2018-10-18 NOTE — PROGRESS NOTES
Received report from night staff. Assumed pt care. Pain level 0/10. AOX2-3. POC discussed. Call light within reach, bed in lowest position, and personal items accessible. Bed alarm in use. Family also updated on the plan of care and questions answered.

## 2018-10-18 NOTE — PROGRESS NOTES
Pulmonary Progress Note     History of Present Illness:  Pt is a 77 yo F with h/o non hodgkins lymphoma in remission presented to the ED on 9/28 for SOB. She was noted to have pericardial/pleural effusions which were believed to be the source of her symptoms. On 10/1 she underwent right thoracentesis and pericardiocentesis, a pericardial drain was placed at that time. Patient symptomatically worsened however and was transferred to the ICU where she required intubation on 10/2. Pericardial drain was removed on 10/3. Analysis of pericardial fluid suggested exudate but cytology was not suggestive of malignancy. Rheumatologic workup has been negative aside from elevated rheumatoid factor. She improved and was extubated on 10/7. Cardiology was consulted who have been diuresing patient and following her pericardial effusion with repeat echos. CT on 10/10 showed worsening effusions and congestion as well as ground glass opacities in upper lobes b/l. Around this time pt had become considerably more delirious. Surgery was involved who recommended b/l pigtail catheters for her effusions, this was scheduled with IR however pt refused. Patient refused pericardial biopsy too.      10/15/18: Patient is non cooperative and refuses to talk with physicians. Keeps her eyes closed.    s/p left thoracentesis in IR on 10/13/18. Feeding via feeding tube.     10/16/18: S/p thoracentesis right yesterday with removal of 750 ml of pleural fluid. Feels better after thoracentesis. More cooperative. Afebrile, denies pleuritic chest pain. Family want to take the patient to [place]. Hospitalist to arrange transfer     10/17/18: Has developed hoarseness of voice and dry cough. No pleuritic chest pain. Afebrile. Pulse oximetry is 99% on O2 @ 2LPM.    10/18/18: Patient sitting in chair, denies pleuritic chest pain, complains of sore throat and hoarse cough, no expectoration. CXR done last night shows no consolidation or effusion with no  cardiomegaly. The distal end of feeding tube was not visible.     Review of Systems   Denies headache, fever, chills or sweating  Denies pleuritic chest pain, has cough and hoarseness of voice. No hemoptysis  Admits to have dyspnea upon exertion, no orthopnea or anginal chest pain  Has sore throat and being fed via naso gastric tube  No abdominal pain, vomiting                                             Hospital Medications:  Gabapentin 300 mg twice daily   Levothyroxine 88 mcg once daily  Simvastatin 20 mg once daily  Heparin 5000 units q 8 hourly subcutaneusly  Nicoderm     Medication Allergy:  No Known Allergies     Objective:   Vitals/ General Appearance: alert and responsive.   Blood pressure 121/60, pulse 99, temperature 37.1 °C (98.7 °F), resp. rate 20, SpO2 99 % on O2 @ 2 LPM,   O2 Delivery: Silicone Nasal Cannula     Constitutional:   Well developed, Well nourished, Not in respiratory distress  HENMT:  Normocephalic, Atraumatic, Oropharynx moist mucous membranes, No oral exudates, Nose normal.  No thyromegaly.  Eyes:  PERRLA, EOMI, Conjunctiva normal, No discharge.  Neck:  Normal range of motion, No cervical tenderness,  no JVD.  Cardiovascular:  Normal heart rate, Normal rhythm, No murmurs, No rubs, No gallops.     Extremities: No cyanosis, or edema. No clubbing. Peripheral pulses palpable  Lungs:  Bilateral breath sounds audible. Transmitted inspiratory sounds audible bilaterally. No wheeze or pleural rub  Abdomen: Bowel sounds normal, Soft, No tenderness, No guarding, No rebound, No masses, No hepatosplenomegaly.  Skin: Warm, Dry, No erythema, No rash, no induration.  Neurologic: Alert & orientated., No focal deficits noted, Tendon jerks intact     Labs:   Results for AVEL COLLAZO (MRN [id number]) as of 10/17/2018 11:18    Ref. Range 10/17/2018 02:22   Sodium Latest Ref Range: 135 - 145 mmol/L 146 (H)   Potassium Latest Ref Range: 3.6 - 5.5 mmol/L 4.5   Chloride Latest Ref Range: 96 - 112 mmol/L 106    Co2 Latest Ref Range: 20 - 33 mmol/L 34 (H)   Anion Gap Latest Ref Range: 0.0 - 11.9  6.0   Glucose Latest Ref Range: 65 - 99 mg/dL 115 (H)   Bun Latest Ref Range: 8 - 22 mg/dL 20   Creatinine Latest Ref Range: 0.50 - 1.40 mg/dL 0.39 (L)   GFR If  Latest Ref Range: >60 mL/min/1.73 m 2 >60   GFR If Non  Latest Ref Range: >60 mL/min/1.73 m 2 >60   Calcium Latest Ref Range: 8.5 - 10.5 mg/dL 9.3   Results for AVEL COLLAZO (MRN [id number]) as of 10/17/2018 11:18    Ref. Range 10/16/2018 02:30   WBC Latest Ref Range: 4.8 - 10.8 K/uL 16.9 (H)   RBC Latest Ref Range: 4.20 - 5.40 M/uL 5.00   Hemoglobin Latest Ref Range: 12.0 - 16.0 g/dL 14.0   Hematocrit Latest Ref Range: 37.0 - 47.0 % 45.1   MCV Latest Ref Range: 81.4 - 97.8 fL 90.2   MCH Latest Ref Range: 27.0 - 33.0 pg 28.0   MCHC Latest Ref Range: 33.6 - 35.0 g/dL 31.0 (L)   RDW Latest Ref Range: 35.9 - 50.0 fL 65.7 (H)   Platelet Count Latest Ref Range: 164 - 446 K/uL 448 (H)   MPV Latest Ref Range: 9.0 - 12.9 fL 11.3      Results for AVEL COLLAZO (MRN [id number]) as of 10/15/2018 08:02    Ref. Range 10/13/2018 14:20 10/13/2018 14:20   Fluid Type Unknown Thoracentesis Thoracentesis   Color-Body Fluid Unknown   Yellow   Character-Body Fluid Unknown   Hazy   Total WBC Latest Units: cells/uL   436   Total RBC Count Latest Units: cells/uL   <2000   Polys Latest Units: %   9   Lymphs Latest Units: %   43   Mononuclear Cells - Fluid Latest Units: %   11   Mesothelial Cells - CSF Latest Units: %   2   Fluid Histiocyte Latest Units: %   35   Comments Unknown   see below   Glucose, Fluid Latest Units: mg/dL   110   Total Protein Fluid Latest Units: g/dL   1.7   Body Fluid Amylase Latest Units: U/L   <10   Body Fluid LDH Latest Units: U/L   82   Ph Misc Unknown 8.00       Pleural fluid Culture: No growth at 72 hours    SURGICAL PATHOLOGY CONSULTATION    FINAL DIAGNOSIS:    A. Right pleural fluid:         No evidence of malignancy.          The ThinPrep and cell block slides demonstrate histiocytes,          mesothelial cells, small lymphocytes and a few neutrophils.         No atypical or malignant cells are seen.                                          Diagnosis performed by:                                      SHRAVAN OAKES MD      Imaging:    10/17/2018 11:12 PM    HISTORY/REASON FOR EXAM:  Dobbhoff tube placement    TECHNIQUE/EXAM DESCRIPTION:  Single AP view of the chest.    COMPARISON: October 15, 2018    FINDINGS:    Dobbhoff tube is seen, may be coiled back within the esophagus, the distal portion is not visualized. The cardiac silhouette appears within normal limits.    Atherosclerotic calcification of the aorta is noted.  The central  pulmonary vasculature appears prominent and indistinct.    The lungs appear well expanded bilaterally.  Diffuse scattered hazy pulmonary parenchymal opacities are seen.    No significant pleural effusions are identified.    The bony structures appear age-appropriate.   Impression         1.  Pulmonary edema and/or infiltrates are identified, which are stable since the prior exam.  2.  Dobbhoff tube is seen, may be coiled back within the stomach terminating above the upper margin of the film versus exterior tube overlying the midline. The distal segment is not visualized. View of the abdomen would be required for definitive   evaluation of tube position.  3.  Cardiomegaly  4.  Atherosclerosis   Reading Provider Reading Date   Kirit Vega M.D. Oct 18, 2018         Assessment/Plan           Acute hypoxemic respiratory failure (HCC)- (present on admission)   Assessment & Plan     Intubated 10/2 extubated 10/7  Thoracentesis Lt side  10/13 right side10/15  Continue O2 supplementation and titrate to maintain SpO2 above 94%       Pleural Effusion Bilateral resolved   Assessment & Plan     S/P right thoracentesis with 1.7 L of fluid removed on 10/1 and 750 ml removed on 10/15  Transudate  S/p left thoracentesis  transudative  Pleural fluid cytology negative for malignant cells       Pericardial effusion resolved   Assessment & Plan     Pericardiocentesis done on 10/1 - 1 L of bloody fluid was removed  Pericardial drain removed on 10/3  Cytology negative for malignancy  CRP is markedly elevated  Rheumatoid factor positive, GENTRY negative, HIV negative, hepatitis panel negative  Cardiology service following       Pulmonary edema- (present on admission) resolved   Assessment & Plan    Resolved with diuresis          Dysphagia with new hoarseness of voice   Assessment & Plan     Continue enteral tube feedings  Swallow evaluation/speech evaluation recommended          History of non-Hodgkin's lymphoma   Assessment & Plan     Diagnosed in 2000  Received chemotherapy in [place] and has been in remission per the             Patient and her  are anxious to be transferred to some hospital in [place].

## 2018-10-18 NOTE — PROGRESS NOTES
Assumed care at 1900, bedside report received from day shift RN. Pt is SR on the telemetry monitor. Patient is AO x 3 and is resting comfortably in bed with  at bedside. No signs of respiratory distress at this time. Initial assessment completed and orders reviewed. POC addressed with patient. Call light within reach and hourly rounding in place. No further questions at this time. Fall precautions in place.

## 2018-10-19 NOTE — PROGRESS NOTES
Assumed care at 1900, bedside report received from day shift RN. Pt is SR/ST on the telemetry monitor. Patient is AO x 2 and is resting in the chair with the  at bedside. Initial assessment completed and orders reviewed. POC addressed with patient. Call light within reach and hourly rounding in place. No further questions at this time. Patient transferred back to bed. Fall precautions in place.

## 2018-10-19 NOTE — CONSULTS
DATE OF SERVICE:  10/19/2018    PRINCIPAL COMPLAINT:  Hoarseness.    HISTORY OF PRESENT ILLNESS:  The patient is a 78-year-old female who was   admitted on 09/28 with shortness of breath.  She has a history of   non-Hodgkin's lymphoma.  She is known to have pericardial and pleural   effusions at that time, which were drained.  She did have a pericardial drain   placed.  She got worse over the next few days and was sent to the ICU where   she was intubated on 10/02.  She was left intubated at that time until the   7th.  Since that time, she has had hoarseness and dysphagia.  She does have a   left nasal feeding tube.  She was seen by speech with concern that she was not   improving 12 days after extubation, showing edema and poor opening and   closing of vocal cords.    PAST MEDICAL HISTORY:  Arthritis, non-Hodgkin's lymphoma in 2000,   hypertension, and hypothyroidism.    PAST SURGICAL HISTORY:  She has had a spine surgery.    ALLERGIES:  She has no known drug allergies.    On initial examination, she is sitting in a chair with feeding tube out left   nose.  She is leaning over, kind of frail and coughing.  Her oxygenation is   running from 89-92.  She does have nasal cannula 1.5 liters, but she is really   not keeping this in her nose.  She does have a hoarse voice and occasional   stridor with inspiration, but actually not significantly.  A flexible scope   was passed through the right nostril, which was normal.  The feeding tube can be seen.  The   larynx shows vocal cord at the end with white exudate along the posterior   portions of the vocal cord.  The vocal cords themselves are erythematous as   well as the posterior glottis.  She has movement of both vocal cords, but very   poor opening and poor closing.    IMPRESSION:  What looks like post intubation granulation and furrows.  This can   be improved with some steroids.  It might benefit her if she can get them to   get Decadron 12 mg every 8 hours x3 doses  if there is no contraindication to   that.  This should improve with time.  We would encourage continue with speech   therapy and call me if anything worsens.       ____________________________________     MD DANGELO Ron / DEBBY    DD:  10/19/2018 14:30:27  DT:  10/19/2018 16:49:56    D#:  [id number]  Job#:  [id number]

## 2018-10-19 NOTE — CARE PLAN
Problem: Infection  Goal: Will remain free from infection  Pt has no active s/s of infection at this time.      Problem: Bowel/Gastric:  Goal: Normal bowel function is maintained or improved  Pt having normal/regular BMs per pt baseline

## 2018-10-19 NOTE — DISCHARGE PLANNING
"Anticipated Discharge Disposition: TBD    Action: Met with pt's spouse at bedside to address concerns. He expressed frustration regarding response from [facility]. He states he called Medicare and was notified they will cover stay at another hospital for second opinion. Spouse states if transport is the issue he is willing to pay out of pocket for it if insurance declines to pay. Spouse stating they want pt transferred to be closer to family and for official diagnosis, as spouse states [facility] has been unable to provide diagnosis.   LSW discussed with transfer center who states facility determined they won't accept pt unless family is willing to pay cost if insurance declines the stay. Notified spouse who stated he will discuss with his daughter who has \"also been working on this\".     Barriers to Discharge: None    Plan: Disposition undetermined. Further work up pending.   "

## 2018-10-19 NOTE — THERAPY
"Speech Language Therapy FEES completed.  Functional Status: The patient was seen for FEES evaluation this date. FEES procedure completed. Upon insertion of scope, patient was noted to once again have erythema throughout pharynx including the post cricoid space, arytenoids and vocal folds bilaterally with bilateral moderate sized space occupying lesion on posterior aspect. Of note, irritation in posterior vocal folds appears worse than during previous FEES dated 10/8/18.  Vocal folds appeared to have increased difficulty adducting and abduction once again appeared to be decreased from \"typical\" abduction. Presentation of PO included ice chips, nectars, and purees. The patient presented with moderate to severe oropharyngeal dysphagia as evidenced by penetration before, suspected during and after the swallow on ice chip, nectars and purees. The patient exhibited delayed throat clear response in 25% of penetration episodes with noticeable wet vocal quality this session however, aspiration episodes were silent. At this time, patient remains high risk for aspiration with PO alimentation and given current status of airway, would strongly recommend patient remain NPO/TF. Discussed results and recommendations with family and MD including consideration of ENT consult. At this time, recommend patient remain NPO with NG for nutrition. Patient and family once again provided education on vocal rest and vocal hygiene. SLP following.     Recommendations - Diet: NPO, Pre-Feeding Trials with SLP Only                          Strategies: Monitor during meals and Head of Bed at 90 Degrees                          Medication Administration: NG tube.   Plan of Care: Will benefit from Speech Therapy 3 times per week  Post-Acute Therapy: Discharge to a transitional care facility for continued skilled therapy services.    See \"Rehab Therapy-Acute\" Patient Summary Report for complete documentation.   "

## 2018-10-19 NOTE — PROGRESS NOTES
2 RN skin check done with RASTA Flaherty  Bilateral ears intact, no redness. Nares are intact with no redness. Elbows red and blanching bilaterally, both with mepilex lites on as protection. Sacrum red and blanching, waffle mattress in place. Feet dry and flaky.

## 2018-10-19 NOTE — DIETARY
Nutrition Support Update: consult received regarding pt's weight loss   Spoke with pt's  at bedside regarding brief wt hx. Pt states that prior to admit pt weighed ~155# - but admitted heavier d/t fluid retention. Currently, pt weighs 53.3 kg (117.5#) per stand up scale. Using reported wt pta (155#), pt has had a 37.5# (24%) wt loss since admit. Will reassess nutritional needs to promote weight gain.     Current TF via duodenal Cortrak is Diabetisource AC, goal rate 55 ml/hr, providing 1584 kcals, 79 grams protein, 1082 mL free water.    Calculations/Equations: MSJ x 1.4 = 1361 kcal/d (wt used in calculations = 53.3 kg)  Calories: 1333 - 1866 kcal/d (25-35 kcal/kg)  Protein: 64 - 80 gm/d (1.2 - 1.5 gm/kg)    Plan/Recommendations:  1. Increase Diabetisource to new goal rate of 60 ml/hr to provide 1728 kcal, 86 gm protein (1.6 gm/kg), and 1181 ml of free water per day  2. Pt originally placed on Diabetisource d/t poor glycemic control, which has improved since change to CHO controlled formula, therefore will continue current formula  3. Pt is to have FEES with SLP per discussion with  and RN - will monitor for diet advancement  4. If PO diet is started please also order nutrition supplements    RD following

## 2018-10-19 NOTE — PROGRESS NOTES
Pulmonary Progress Note     History of Present Illness:  Pt is a 77 yo F with h/o non hodgkins lymphoma in remission presented to the ED on 9/28 for SOB. She was noted to have pericardial/pleural effusions which were believed to be the source of her symptoms. On 10/1 she underwent right thoracentesis and pericardiocentesis, a pericardial drain was placed at that time. Patient symptomatically worsened however and was transferred to the ICU where she required intubation on 10/2. Pericardial drain was removed on 10/3. Analysis of pericardial fluid suggested exudate but cytology was not suggestive of malignancy. Rheumatologic workup has been negative aside from elevated rheumatoid factor. She improved and was extubated on 10/7. Cardiology was consulted who have been diuresing patient and following her pericardial effusion with repeat echos. CT on 10/10 showed worsening effusions and congestion as well as ground glass opacities in upper lobes b/l. Around this time pt had become considerably more delirious. Surgery was involved who recommended b/l pigtail catheters for her effusions, this was scheduled with IR however pt refused. Patient refused pericardial biopsy too.      10/15/18: Patient is non cooperative and refuses to talk with physicians. Keeps her eyes closed.    s/p left thoracentesis in IR on 10/13/18. Feeding via feeding tube.     10/16/18: S/p thoracentesis right yesterday with removal of 750 ml of pleural fluid. Feels better after thoracentesis. More cooperative. Afebrile, denies pleuritic chest pain. Family want to take the patient to [place]. Hospitalist to arrange transfer     10/17/18: Has developed hoarseness of voice and dry cough. No pleuritic chest pain. Afebrile. Pulse oximetry is 99% on O2 @ 2LPM.     10/18/18: Patient sitting in chair, denies pleuritic chest pain, complains of sore throat and hoarse cough, no expectoration. CXR done last night shows no consolidation or effusion with no  cardiomegaly. The distal end of feeding tube was not visible.    10/19/18: No new development. No pleuritic chest pain or dyspnea. Has dry cough and hoarseness of voice.     Review of Systems   Denies headache, fever, chills or sweating  Denies pleuritic chest pain, has cough and hoarseness of voice. No hemoptysis  Admits to have dyspnea upon exertion, no orthopnea or anginal chest pain  Has sore throat with hoarseness and being fed via naso gastric tube  No abdominal pain, vomiting                                             Hospital Medications:  Furosemide 20 mg twice daily  Gabapentin 300 mg twice daily   Levothyroxine 88 mcg once daily  Simvastatin 20 mg once daily  Heparin 5000 units q 8 hourly subcutaneusly  Nicoderm     Medication Allergy:  No Known Allergies     Objective:   Vitals/ General Appearance: alert and responsive.   Blood pressure 127/68, pulse 99, temperature 37.4 °C (99.4 °F), resp. rate 20, SpO2 94 % on O2 @ 2 LPM,   O2 Delivery: Silicone Nasal Cannula     Constitutional:   Well developed, Well nourished, Not in respiratory distress  HENMT:  Normocephalic, Atraumatic, Oropharynx moist mucous membranes, No oral exudates, Nose normal.  No thyromegaly.  Eyes:  PERRLA, EOMI, Conjunctiva normal, No discharge.  Neck:  Normal range of motion, No cervical tenderness,  no JVD.  Cardiovascular:  Normal heart rate, Normal rhythm, No murmurs, No rubs, No gallops.     Extremities: No cyanosis, or edema. No clubbing. Peripheral pulses palpable  Lungs:  Bilateral breath sounds audible. Transmitted inspiratory sounds audible bilaterally. No wheeze or pleural rub  Abdomen: Bowel sounds normal, Soft, No tenderness, No guarding, No rebound, No masses, No hepatosplenomegaly.  Skin: Warm, Dry, No erythema, No rash, no induration.  Neurologic: Alert & orientated., No focal deficits noted, Tendon jerks intact     Labs:       Results for VALE AVEL (MRN [id number]) as of 10/15/2018 08:02    Ref. Range 10/13/2018  14:20 10/13/2018 14:20   Fluid Type Unknown Thoracentesis Thoracentesis   Color-Body Fluid Unknown   Yellow   Character-Body Fluid Unknown   Hazy   Total WBC Latest Units: cells/uL   436   Total RBC Count Latest Units: cells/uL   <2000   Polys Latest Units: %   9   Lymphs Latest Units: %   43   Mononuclear Cells - Fluid Latest Units: %   11   Mesothelial Cells - CSF Latest Units: %   2   Fluid Histiocyte Latest Units: %   35   Comments Unknown   see below   Glucose, Fluid Latest Units: mg/dL   110   Total Protein Fluid Latest Units: g/dL   1.7   Body Fluid Amylase Latest Units: U/L   <10   Body Fluid LDH Latest Units: U/L   82   Ph Misc Unknown 8.00        Pleural fluid Culture: No growth at 72 hours     SURGICAL PATHOLOGY CONSULTATION    FINAL DIAGNOSIS:    A. Right pleural fluid:         No evidence of malignancy.         The ThinPrep and cell block slides demonstrate histiocytes,          mesothelial cells, small lymphocytes and a few neutrophils.         No atypical or malignant cells are seen.                                          Diagnosis performed by:                                      SHRAVAN OAKES MD        Imaging: I have reviewed the CXR personally and compared with prior CXR    10/18/2018 6:06 PM    HISTORY/REASON FOR EXAM: Shortness of Breath.    TECHNIQUE/EXAM DESCRIPTION AND NUMBER OF VIEWS:  Single AP view of the chest.    COMPARISON: Yesterday    FINDINGS:  Cervical fixation hardware is incompletely visualized.  One screw may be separate from the construct on the left    Enteric tube terminates below the radiograph.    Lungs: Some increasing left midlung zone opacity. Minor fissure is also again seen    Pleura:  No pleural space process is seen.    Heart and mediastinum: There is stable cardiac silhouette enlargement.   Impression       Increasing left midlung zone opacity could indicate consolidation charges atelectasis    Mild interstitial edema, similar to comparison    Stable cardiac  silhouette enlargement   Reading Provider Reading Date   Catarino Goldman M.D. Oct 18, 2018           Assessment/Plan           Acute hypoxemic respiratory failure (HCC)- (present on admission)   Assessment & Plan     Intubated 10/2 extubated 10/7  Thoracentesis Lt side  10/13 right side10/15  Improved  Continue O2 supplementation and titrate to maintain SpO2 above 94%       Pleural Effusion Bilateral resolved   Assessment & Plan     S/P right thoracentesis with 1.7 L of fluid removed on 10/1 and 750 ml removed on 10/15  Transudate  S/p left thoracentesis transudative  Pleural fluid cytology negative for malignant cells       Pericardial effusion resolved   Assessment & Plan     Pericardiocentesis done on 10/1 - 1 L of bloody fluid was removed  Pericardial drain removed on 10/3  Cytology negative for malignancy  CRP is markedly elevated  Rheumatoid factor positive, GENTRY negative, HIV negative, hepatitis panel negative  Cardiology service following  Patient refused pericardial biopsy       Pulmonary edema- (present on admission) resolved   Assessment & Plan     Resolved with diuresis            Dysphagia with new hoarseness of voice   Assessment & Plan     Continue enteral tube feedings  Swallow evaluation/speech evaluation recommended            History of non-Hodgkin's lymphoma   Assessment & Plan     Diagnosed in 2000  Received chemotherapy in [place] and has been in remission per the             Patient and her  are anxious to be transferred to some hospital in [place].     Will sign off from pulmonary service. Kindly call if there is recurrence of pleural effusion

## 2018-10-19 NOTE — THERAPY
"Occupational Therapy Evaluation completed.   Functional Status:  CGA LB dressing, CGA UB dressing 2/2 line management, CGA standing grooming and SPV seated grooming - performed oral care w/ suction toothbrush.   Plan of Care: Will benefit from Occupational Therapy 3 times per week  Discharge Recommendations:  Equipment: Will Continue to Assess for Equipment Needs. Post-acute therapy Discharge to a transitional care facility for continued skilled therapy services.    See \"Rehab Therapy-Acute\" Patient Summary Report for complete documentation.    Pt seen for Acute OT tx on this date. She was willing to participate and making good progress towards OT goals. Pt primarily limited by confusion, poor safety awareness, difficulty sequencing, impaired balance, and general deconditioning. Pt required max verbal and tactile cues when performing functional mobility for safety and environmental awareness. Will continue to follow for Acute OT services.     "

## 2018-10-19 NOTE — PROGRESS NOTES
[facility] Hospitalist Progress Note    Date of Service: 10/19/2018    Chief Complaint    78 y.o. Female from [facility] admitted 2018 with shortness of breath and peripheral edema found to have significant pericardial effusion.    Pericardial effusion tapped 10/1  Intubated 10/2  Extubated 10/7      Interval Problem Update  10 /16 patient is more comfortable today than yesterday after thoracentesis. patient wants to be transferred to [facility].  Contact transfer center to initiate another transfer since patient transfer request to [facility] has been declined.  Continue diuretics.  10/17 patient today is more pleasant.  However still we will not give any information in terms of review of system.  Family still wants to transfer to [place] for second opinion  10/18 patient still complains of sore throat.  Unable to swallow secretion.  Order CT neck to rule out airway compromise.  May need ENT evaluation.  Swallow evaluation tomorrow.  10/19 patient developed respiratory distress last night, start Lasix again, chest x-ray showing a little bit worsening signs of pleural effusion.  Will recheck tomorrow.  Also will repeat echocardiogram per cardiology recommendation tomorrow.  Patient was seen by ENT, need to continue work with speech therapy.    Consultants/Specialty  Cardiology  Pulmonology  Neurology      Disposition  pending        Review of Systems   Unable to perform ROS: Mental status change      Physical Exam  Laboratory/Imaging   Hemodynamics  Temp (24hrs), Av.3 °C (99.2 °F), Min:37.2 °C (98.9 °F), Max:37.5 °C (99.5 °F)   Temperature: 37.2 °C (98.9 °F)  Pulse  Av.5  Min: 10  Max: 140   Blood Pressure : 118/67      Respiratory      Respiration: 18, Pulse Oximetry: 92 %     Work Of Breathing / Effort: Mild  RUL Breath Sounds: Rhonchi;Pleural Rub, RML Breath Sounds: Rhonchi;Pleural Rub, RLL Breath Sounds: Pleural Rub;Rhonchi;Fine Crackles, LORI Breath Sounds: Rhonchi;Pleural Rub, LLL Breath  Sounds: Pleural Rub;Rhonchi;Fine Crackles    Fluids    Intake/Output Summary (Last 24 hours) at 10/19/18 1626  Last data filed at 10/18/18 2300   Gross per 24 hour   Intake              120 ml   Output                0 ml   Net              120 ml       Nutrition  No orders of the defined types were placed in this encounter.    Physical Exam   Constitutional: No distress.   HENT:   Head: Normocephalic and atraumatic.   Right Ear: External ear normal.   Left Ear: External ear normal.   Neck: Normal range of motion. Neck supple. No JVD present. No tracheal deviation present. No thyromegaly present.   Cardiovascular: Normal rate, regular rhythm and normal heart sounds.  Exam reveals no gallop.    No murmur heard.  tachycardia   Pulmonary/Chest: Effort normal. Stridor present. She has no wheezes. She has rales. She exhibits no tenderness.   Decreased bs at bases   Abdominal: Soft. Bowel sounds are normal. She exhibits no mass. There is no tenderness. There is no rebound and no guarding.   Musculoskeletal: Normal range of motion. She exhibits edema.   Neurological:   Awake    confused   Skin: Skin is warm and dry. No rash noted. She is not diaphoretic.   Psychiatric: She has a normal mood and affect. Thought content normal.   Nursing note and vitals reviewed.      Recent Labs      10/19/18   1009   WBC  12.1*   RBC  4.50   HEMOGLOBIN  12.6   HEMATOCRIT  40.7   MCV  90.4   MCH  28.0   MCHC  31.0*   RDW  65.8*   PLATELETCT  339   MPV  11.2     Recent Labs      10/17/18   0222  10/19/18   1009   SODIUM  146*  141   POTASSIUM  4.5  3.9   CHLORIDE  106  102   CO2  34*  37*   GLUCOSE  115*  132*   BUN  20  25*   CREATININE  0.39*  0.41*   CALCIUM  9.3  9.4                   DX-CHEST-PORTABLE (1 VIEW)   Final Result      Increasing left midlung zone opacity could indicate consolidation charges atelectasis      Mild interstitial edema, similar to comparison      Stable cardiac silhouette enlargement      CT-SOFT TISSUE NECK WITH    Final Result      1.  No evidence of pharyngeal or laryngeal mass or evidence of adenopathy.      2.  Multiple small bilateral thyroid nodules.      DX-ABDOMEN FOR TUBE PLACEMENT   Final Result         1.  Nonspecific bowel gas pattern.   2.  Dobbhoff tube tip overlying the expected location of the pylorus or first duodenal segment.   3.  Cardiomegaly      DX-CHEST-LIMITED (1 VIEW)   Final Result         1.  Pulmonary edema and/or infiltrates are identified, which are stable since the prior exam.   2.  Dobbhoff tube is seen, may be coiled back within the stomach terminating above the upper margin of the film versus exterior tube overlying the midline. The distal segment is not visualized. View of the abdomen would be required for definitive    evaluation of tube position.   3.  Cardiomegaly   4.  Atherosclerosis      EC-ECHOCARDIOGRAM LTD W/O CONT   Final Result      US-THORACENTESIS PUNCTURE RIGHT   Final Result      1. Ultrasound guided right sided diagnostic and therapeutic thoracentesis.      2. 750 mL of fluid withdrawn.      DX-CHEST-PORTABLE (1 VIEW)   Final Result      No pneumothorax status post right thoracentesis.   Small residual right pleural effusion and left lung base atelectasis.   Left perihilar/left lung base atelectasis/possible infiltration and small left pleural effusion.      DX-CHEST-PORTABLE (1 VIEW)   Final Result         1.  Pulmonary edema and/or infiltrates are identified, which are stable since the prior exam.      DX-ABDOMEN FOR TUBE PLACEMENT   Final Result      Feeding tube placement with the tip projecting over the distal stomach or duodenal bulb      US-THORACENTESIS PUNCTURE LEFT   Final Result      1. Ultrasound guided left sided diagnostic and therapeutic thoracentesis.      2. 650 mL of fluid withdrawn.      DX-CHEST-LIMITED (1 VIEW)   Final Result      No pneumothorax following left thoracentesis      DX-CHEST-PORTABLE (1 VIEW)   Final Result         Worsening large left  pleural effusion with near collapsing of the left lung.      Moderate layering right pleural effusion, worse than prior as well.      DX-ABDOMEN FOR TUBE PLACEMENT   Final Result         Feeding tube with tip projecting over the expected area of the stomach.      DX-CHEST-PORTABLE (1 VIEW)   Final Result         1. No significant interval change.      EC-ECHOCARDIOGRAM LTD W/O CONT   Final Result      CT-CHEST (THORAX) WITH   Final Result      1.  Moderate to large bilateral pleural effusions with bilateral atelectasis.      2.  Moderate pericardial effusion.      3.  Patchy groundglass opacities within the residual aerated upper lobes bilaterally.      4.  Fatty liver.      5.  Atherosclerotic vascular calcification.            DX-ABDOMEN FOR TUBE PLACEMENT   Final Result         1.  Nonspecific bowel gas pattern.   2.  Dobbhoff tube tip terminates overlying the expected location of the gastric body.   3.  Left lung base infiltrate and/or layering pleural effusion.      [id number] VIEW   Final Result         1.  Nonspecific bowel gas pattern.   2.  Dobbhoff tube tip terminates in the distal esophagus, recommend advancement.   3.  Bilateral lower lobe infiltrates and layering pleural effusions.   4.  Cardiomegaly.      DX-CHEST-LIMITED (1 VIEW)   Final Result      Bilateral pleural effusions with overlying atelectasis/consolidation are again seen.      Mild interstitial prominence may represent mild edema.      Atherosclerotic plaque.         DX-ABDOMEN FOR TUBE PLACEMENT   Final Result      Enteric tube projects over the distal stomach.         EC-ECHOCARDIOGRAM COMPLETE W/O CONT   Final Result      DX-CHEST-PORTABLE (1 VIEW)   Final Result      No significant interval change.      DX-CHEST-PORTABLE (1 VIEW)   Final Result      No significant interval change.      EC-ECHOCARDIOGRAM LTD W/O CONT   Final Result      DX-CHEST-PORTABLE (1 VIEW)   Final Result         1.  Pulmonary edema and/or infiltrates are  identified, which are stable since the prior exam. Layering bilateral pleural effusions, greater on the left.   2.  Atherosclerosis               DX-CHEST-PORTABLE (1 VIEW)   Final Result         1.  Pulmonary edema and/or infiltrates are identified, which are stable since the prior exam.   2.  Small right pleural effusion   3.  Atherosclerosis            DX-CHEST-PORTABLE (1 VIEW)   Final Result         1.  Pulmonary edema and/or infiltrates are identified, which are stable since the prior exam.   2.  Small right pleural effusion   3.  Atherosclerosis         DX-CHEST-PORTABLE (1 VIEW)   Final Result         1.  Pulmonary edema and/or infiltrates are identified, which are stable since the prior exam.   2.  Small right pleural effusion   3.  Atherosclerosis      DX-ABDOMEN FOR TUBE PLACEMENT   Final Result      Feeding tube placement with the tip projecting over the stomach body.      DX-ABDOMEN FOR TUBE PLACEMENT   Final Result      1.  Feeding tube appears looped within the stomach. The distal aspect of the tip is directed cephalad in the fundal region.      DX-CHEST-LIMITED (1 VIEW)   Final Result         1.  Pulmonary edema and/or infiltrates are identified, which are stable since the prior exam.   2.  Small right pleural effusion   3.  Atherosclerosis      DX-CHEST-LIMITED (1 VIEW)   Final Result         1.  Pulmonary edema and/or infiltrates are identified, which are stable since the prior exam.   2.  Atherosclerosis      DX-CHEST-PORTABLE (1 VIEW)   Final Result         1.  Pulmonary edema and/or infiltrates are identified, which are stable since the prior exam.   2.  Atherosclerosis      DX-CHEST-LIMITED (1 VIEW)   Final Result      1.  No pneumothorax identified status post right thoracentesis and pericardiocentesis.      2.  Small amount of pneumomediastinum is likely related to recent pericardiocentesis and drain placement      3.  Cardiomegaly      EC-ECHOCARDIOGRAM LTD W/O CONT   Final Result       DX-CHEST-PORTABLE (1 VIEW)   Final Result      1.  Evacuation large right pleural effusion status post thoracentesis.   2.  No definite right pneumothorax identified. Recommend interval follow-up chest x-ray.   3.  Small left pleural effusion and left perihilar/left lung base atelectasis.   4.  Findings discussed with the patient's nurse. Follow-up chest x-ray will be obtained in approximately 3:00 PM      US-THORACENTESIS PUNCTURE RIGHT   Final Result      1. Ultrasound guided right sided diagnostic and therapeutic thoracentesis.      2. 1700 mL of fluid withdrawn.      EC-ECHOCARDIOGRAM COMPLETE W/O CONT   Final Result      DX-CHEST-PORTABLE (1 VIEW)   Final Result         1.  Pulmonary edema and/or infiltrates are identified, which are stable since the prior exam.   2.  Layering right pleural effusion, stable   3.  Cardiomegaly   4.  Atherosclerosis      DX-CHEST-2 VIEWS   Final Result      1.  Probable bilateral atelectasis      2.  Probable right pleural effusion which may be significant in size      3.  Limited assessment on one view portable radiography      OUTSIDE IMAGES-DX CHEST   Final Result      OUTSIDE IMAGES-DX CHEST   Final Result      EC-ECHOCARDIOGRAM LTD W/O CONT    (Results Pending)   EC-ECHOCARDIOGRAM LTD W/O CONT    (Results Pending)        Assessment/Plan     * Acute hypoxemic respiratory failure (HCC)- (present on admission)   Assessment & Plan    Extubated on 10/7/2018  Close monitor closely  Continue oxygen and RT protocol        Pleural effusion- (present on admission)   Overview    Chemistry suggesting transudate     Assessment & Plan        Patient is currently euvolemic and no signs of worsening Of pleural effusion or pericardial effusion  Discontinue diuretics    Last Thoracentesis on left , done on 10/13    Right side 10/15 tolerated very well and feeling better currently with decreased requirement of oxygen  No signs of infection or malignancy from fluid analysis  Repeat chest  x-ray tomorrow if reaccumulate need to have thoracentesis again        Pericardial effusion- (present on admission)   Overview    Fluid protein suggesting exudate but all other cytology and chemistry was apparently lost.     Assessment & Plan    Cardiology following   Status post pericardial drain placement  Drain removed on 10/3/2018  Cytology negative fluid cultures negative  GENTRY negative RA factor positive CCP negative    off steroids .. Ineffective and stopped    Etiology is unclear and patient may eventually need pericardial biopsy for definitive diagnosis -> surgery is aware however at this moment patient wants to be transferred to [facility] for regional referral center for second opinion.  Transfer center notified and working on this transfer.  cytology results negative for malignancy   Likely need to be biopsy to establish definitive diagnosis repeat echocardiogram tomorrow              Pulmonary edema- (present on admission)   Assessment & Plan    Monitor fluid status   Diuresis  Lasix 20mg iv daily restarted due to signs of fluid overload        Hypotension   Assessment & Plan    Relative adrenal insufficiency    Appears resolved for now            History of non-Hodgkin's lymphoma- (present on admission)   Overview    Nonhodgkins lymphoma  2000, treated with CHOP and MTX but never radiation therapy     Assessment & Plan    Hx of         HTN (hypertension)- (present on admission)   Assessment & Plan    All bp medications held  Cont to monitor        Dysphagia- (present on admission)   Assessment & Plan    Continue with speech therapy   Continue tube feeding, patient also continued to have cough, start patient on cough syrup and also throat spray for comfort.  Currently still complains of sore throat  May need prolonged time to recover to regain swallow function  CT neck evaluation today.  Possible need ENT consultation in the future.        Hypernatremia   Assessment & Plan    water flushes , 300 cc  q6          Hypomagnesemia   Assessment & Plan    Repleted        Hypokalemia   Assessment & Plan    Replace today and BID        Tobacco dependence- (present on admission)   Assessment & Plan    - Smoking cessation education provided  - Nicotine patch          Hypothyroidism- (present on admission)   Assessment & Plan    Continue levothyroxine          Quality-Core Measures   Reviewed items::  EKG reviewed, Labs reviewed, Medications reviewed and Radiology images reviewed  Al[place] catheter::  No [place]  DVT prophylaxis pharmacological::  Heparin  DVT prophylaxis - mechanical:  SCDs  Ulcer Prophylaxis::  Not indicated   For complexity-based billing, please refer to the history, exam, and decison making above. I spent >35 minutes caring for the patient today.    I have discussed with RN and CM and SW and other consultants about patient's plan.      Check am cbc, bmp

## 2018-10-19 NOTE — PROGRESS NOTES
Received report from night staff. Assumed pt care. Pain level 0/10. AOX3. POC discussed. Call light within reach, bed in lowest position, and personal items accessible.

## 2018-10-19 NOTE — PROGRESS NOTES
Dr Ann paged about pt lung sounds sounding more rubbing in nature. Pt seeming to have more difficulty breathing. New orders for stat chest xray and IV lasix to be given now and daily.

## 2018-10-19 NOTE — PROGRESS NOTES
2 RN skin check with RASTA Grimaldo  Elbows red and blanching  mepilex applied  Ears red and blanching, grey foam applied  Scab to right ear  Heels red and blanching ,floated on pillow  IAD to buttocks, barrier cream applied.

## 2018-10-19 NOTE — PROGRESS NOTES
[facility] Hospitalist Progress Note    Date of Service: 10/18/2018    Chief Complaint    78 y.o. Female from [facility] admitted 2018 with shortness of breath and peripheral edema found to have significant pericardial effusion.    Pericardial effusion tapped 10/1  Intubated 10/2  Extubated 10/7      Interval Problem Update  10 /16 patient is more comfortable today than yesterday after thoracentesis. patient wants to be transferred to [facility].  Contact transfer center to initiate another transfer since patient transfer request to [facility] has been declined.  Continue diuretics.  10/17 patient today is more pleasant.  However still we will not give any information in terms of review of system.  Family still wants to transfer to [place] for second opinion  10/18 patient still complains of sore throat.  Unable to swallow secretion.  Order CT neck to rule out airway compromise.  May need ENT evaluation.  Swallow evaluation tomorrow.    Consultants/Specialty  Cardiology  Pulmonology  Neurology      Disposition  pending        Review of Systems   Unable to perform ROS: Mental status change      Physical Exam  Laboratory/Imaging   Hemodynamics  Temp (24hrs), Av.1 °C (98.7 °F), Min:36.5 °C (97.7 °F), Max:37.6 °C (99.7 °F)   Temperature: 37.1 °C (98.7 °F)  Pulse  Av.4  Min: 10  Max: 140   Blood Pressure : 121/60      Respiratory      Respiration: 20, Pulse Oximetry: 99 %, O2 Daily Delivery Respiratory : Silicone Nasal Cannula     Work Of Breathing / Effort: Mild  RUL Breath Sounds: Rhonchi;Expiratory Wheezes, RML Breath Sounds: Rhonchi;Expiratory Wheezes, RLL Breath Sounds: Fine Crackles;Rhonchi, LORI Breath Sounds: Rhonchi;Expiratory Wheezes, LLL Breath Sounds: Fine Crackles;Rhonchi    Fluids  No intake or output data in the 24 hours ending 10/18/18 1703    Nutrition  No orders of the defined types were placed in this encounter.    Physical Exam   Constitutional: She appears well-developed and  well-nourished. No distress.   HENT:   Head: Normocephalic and atraumatic.   Right Ear: External ear normal.   Left Ear: External ear normal.   Neck: Normal range of motion. Neck supple. No JVD present. No thyromegaly present.   Cardiovascular: Normal rate, regular rhythm and normal heart sounds.  Exam reveals no gallop and no friction rub.    tachycardia   Pulmonary/Chest: Effort normal. Stridor present. No respiratory distress. She has no rales. She exhibits no tenderness.   Decreased bs at bases   Abdominal: Soft. Bowel sounds are normal. She exhibits no mass. There is no tenderness. There is no rebound and no guarding.   Musculoskeletal: She exhibits edema.   Neurological:   Awake    confused   Skin: Skin is warm and dry. No rash noted. She is not diaphoretic.   Psychiatric: She has a normal mood and affect. Thought content normal.   Nursing note and vitals reviewed.      Recent Labs      10/16/18   0230   WBC  16.9*   RBC  5.00   HEMOGLOBIN  14.0   HEMATOCRIT  45.1   MCV  90.2   MCH  28.0   MCHC  31.0*   RDW  65.7*   PLATELETCT  448*   MPV  11.3     Recent Labs      10/16/18   0230  10/17/18   0222   SODIUM  143  146*   POTASSIUM  4.3  4.5   CHLORIDE  103  106   CO2  35*  34*   GLUCOSE  120*  115*   BUN  19  20   CREATININE  0.40*  0.39*   CALCIUM  9.3  9.3                   CT-SOFT TISSUE NECK WITH   Final Result      1.  No evidence of pharyngeal or laryngeal mass or evidence of adenopathy.      2.  Multiple small bilateral thyroid nodules.      DX-ABDOMEN FOR TUBE PLACEMENT   Final Result         1.  Nonspecific bowel gas pattern.   2.  Dobbhoff tube tip overlying the expected location of the pylorus or first duodenal segment.   3.  Cardiomegaly      DX-CHEST-LIMITED (1 VIEW)   Final Result         1.  Pulmonary edema and/or infiltrates are identified, which are stable since the prior exam.   2.  Dobbhoff tube is seen, may be coiled back within the stomach terminating above the upper margin of the film  versus exterior tube overlying the midline. The distal segment is not visualized. View of the abdomen would be required for definitive    evaluation of tube position.   3.  Cardiomegaly   4.  Atherosclerosis      EC-ECHOCARDIOGRAM LTD W/O CONT   Final Result      US-THORACENTESIS PUNCTURE RIGHT   Final Result      1. Ultrasound guided right sided diagnostic and therapeutic thoracentesis.      2. 750 mL of fluid withdrawn.      DX-CHEST-PORTABLE (1 VIEW)   Final Result      No pneumothorax status post right thoracentesis.   Small residual right pleural effusion and left lung base atelectasis.   Left perihilar/left lung base atelectasis/possible infiltration and small left pleural effusion.      DX-CHEST-PORTABLE (1 VIEW)   Final Result         1.  Pulmonary edema and/or infiltrates are identified, which are stable since the prior exam.      DX-ABDOMEN FOR TUBE PLACEMENT   Final Result      Feeding tube placement with the tip projecting over the distal stomach or duodenal bulb      US-THORACENTESIS PUNCTURE LEFT   Final Result      1. Ultrasound guided left sided diagnostic and therapeutic thoracentesis.      2. 650 mL of fluid withdrawn.      DX-CHEST-LIMITED (1 VIEW)   Final Result      No pneumothorax following left thoracentesis      DX-CHEST-PORTABLE (1 VIEW)   Final Result         Worsening large left pleural effusion with near collapsing of the left lung.      Moderate layering right pleural effusion, worse than prior as well.      DX-ABDOMEN FOR TUBE PLACEMENT   Final Result         Feeding tube with tip projecting over the expected area of the stomach.      DX-CHEST-PORTABLE (1 VIEW)   Final Result         1. No significant interval change.      EC-ECHOCARDIOGRAM LTD W/O CONT   Final Result      CT-CHEST (THORAX) WITH   Final Result      1.  Moderate to large bilateral pleural effusions with bilateral atelectasis.      2.  Moderate pericardial effusion.      3.  Patchy groundglass opacities within the  residual aerated upper lobes bilaterally.      4.  Fatty liver.      5.  Atherosclerotic vascular calcification.            DX-ABDOMEN FOR TUBE PLACEMENT   Final Result         1.  Nonspecific bowel gas pattern.   2.  Dobbhoff tube tip terminates overlying the expected location of the gastric body.   3.  Left lung base infiltrate and/or layering pleural effusion.      [id number] VIEW   Final Result         1.  Nonspecific bowel gas pattern.   2.  Dobbhoff tube tip terminates in the distal esophagus, recommend advancement.   3.  Bilateral lower lobe infiltrates and layering pleural effusions.   4.  Cardiomegaly.      DX-CHEST-LIMITED (1 VIEW)   Final Result      Bilateral pleural effusions with overlying atelectasis/consolidation are again seen.      Mild interstitial prominence may represent mild edema.      Atherosclerotic plaque.         DX-ABDOMEN FOR TUBE PLACEMENT   Final Result      Enteric tube projects over the distal stomach.         EC-ECHOCARDIOGRAM COMPLETE W/O CONT   Final Result      DX-CHEST-PORTABLE (1 VIEW)   Final Result      No significant interval change.      DX-CHEST-PORTABLE (1 VIEW)   Final Result      No significant interval change.      EC-ECHOCARDIOGRAM LTD W/O CONT   Final Result      DX-CHEST-PORTABLE (1 VIEW)   Final Result         1.  Pulmonary edema and/or infiltrates are identified, which are stable since the prior exam. Layering bilateral pleural effusions, greater on the left.   2.  Atherosclerosis               DX-CHEST-PORTABLE (1 VIEW)   Final Result         1.  Pulmonary edema and/or infiltrates are identified, which are stable since the prior exam.   2.  Small right pleural effusion   3.  Atherosclerosis            DX-CHEST-PORTABLE (1 VIEW)   Final Result         1.  Pulmonary edema and/or infiltrates are identified, which are stable since the prior exam.   2.  Small right pleural effusion   3.  Atherosclerosis         DX-CHEST-PORTABLE (1 VIEW)   Final Result         1.   Pulmonary edema and/or infiltrates are identified, which are stable since the prior exam.   2.  Small right pleural effusion   3.  Atherosclerosis      DX-ABDOMEN FOR TUBE PLACEMENT   Final Result      Feeding tube placement with the tip projecting over the stomach body.      DX-ABDOMEN FOR TUBE PLACEMENT   Final Result      1.  Feeding tube appears looped within the stomach. The distal aspect of the tip is directed cephalad in the fundal region.      DX-CHEST-LIMITED (1 VIEW)   Final Result         1.  Pulmonary edema and/or infiltrates are identified, which are stable since the prior exam.   2.  Small right pleural effusion   3.  Atherosclerosis      DX-CHEST-LIMITED (1 VIEW)   Final Result         1.  Pulmonary edema and/or infiltrates are identified, which are stable since the prior exam.   2.  Atherosclerosis      DX-CHEST-PORTABLE (1 VIEW)   Final Result         1.  Pulmonary edema and/or infiltrates are identified, which are stable since the prior exam.   2.  Atherosclerosis      DX-CHEST-LIMITED (1 VIEW)   Final Result      1.  No pneumothorax identified status post right thoracentesis and pericardiocentesis.      2.  Small amount of pneumomediastinum is likely related to recent pericardiocentesis and drain placement      3.  Cardiomegaly      EC-ECHOCARDIOGRAM LTD W/O CONT   Final Result      DX-CHEST-PORTABLE (1 VIEW)   Final Result      1.  Evacuation large right pleural effusion status post thoracentesis.   2.  No definite right pneumothorax identified. Recommend interval follow-up chest x-ray.   3.  Small left pleural effusion and left perihilar/left lung base atelectasis.   4.  Findings discussed with the patient's nurse. Follow-up chest x-ray will be obtained in approximately 3:00 PM      US-THORACENTESIS PUNCTURE RIGHT   Final Result      1. Ultrasound guided right sided diagnostic and therapeutic thoracentesis.      2. 1700 mL of fluid withdrawn.      EC-ECHOCARDIOGRAM COMPLETE W/O CONT   Final  Result      DX-CHEST-PORTABLE (1 VIEW)   Final Result         1.  Pulmonary edema and/or infiltrates are identified, which are stable since the prior exam.   2.  Layering right pleural effusion, stable   3.  Cardiomegaly   4.  Atherosclerosis      DX-CHEST-2 VIEWS   Final Result      1.  Probable bilateral atelectasis      2.  Probable right pleural effusion which may be significant in size      3.  Limited assessment on one view portable radiography      OUTSIDE IMAGES-DX CHEST   Final Result      OUTSIDE IMAGES-DX CHEST   Final Result      EC-ECHOCARDIOGRAM LTD W/O CONT    (Results Pending)        Assessment/Plan     * Acute hypoxemic respiratory failure (HCC)- (present on admission)   Assessment & Plan    Extubated on 10/7/2018  Close monitor closely  Continue oxygen and RT protocol        Pleural effusion- (present on admission)   Overview    Chemistry suggesting transudate     Assessment & Plan        Patient is currently euvolemic and no signs of worsening Of pleural effusion or pericardial effusion  Discontinue diuretics    Last Thoracentesis on left , done on 10/13    Right side 10/15 tolerated very well and feeling better currently with decreased requirement of oxygen  No signs of infection or malignancy from fluid analysis        Pericardial effusion- (present on admission)   Overview    Fluid protein suggesting exudate but all other cytology and chemistry was apparently lost.     Assessment & Plan    Cardiology following   Status post pericardial drain placement  Drain removed on 10/3/2018  Cytology negative fluid cultures negative  GENTRY negative RA factor positive CCP negative    off steroids .. Ineffective and stopped    Etiology is unclear and patient may eventually need pericardial biopsy for definitive diagnosis -> surgery is aware however at this moment patient wants to be transferred to [facility] for regional referral center for second opinion.  Transfer center notified and working on this  transfer.  cytology results negative for malignancy             Pulmonary edema- (present on admission)   Assessment & Plan    Monitor fluid status   Diuresis  Lasix 20mg po daily previously currently euvolemic discontinue.        Hypotension   Assessment & Plan    Relative adrenal insufficiency    Appears resolved for now            History of non-Hodgkin's lymphoma- (present on admission)   Overview    Nonhodgkins lymphoma  2000, treated with CHOP and MTX but never radiation therapy     Assessment & Plan    Hx of         HTN (hypertension)- (present on admission)   Assessment & Plan    All bp medications held  Cont to monitor        Dysphagia- (present on admission)   Assessment & Plan    Continue with speech therapy   Continue tube feeding, patient also continued to have cough, start patient on cough syrup and also throat spray for comfort.  Currently still complains of sore throat  May need prolonged time to recover to regain swallow function  CT neck evaluation today.  Possible need ENT consultation in the future.        Hypernatremia   Assessment & Plan    water flushes , 300 cc q6          Hypomagnesemia   Assessment & Plan    Repleted        Hypokalemia   Assessment & Plan    Replace today and BID        Tobacco dependence- (present on admission)   Assessment & Plan    - Smoking cessation education provided  - Nicotine patch          Hypothyroidism- (present on admission)   Assessment & Plan    Continue levothyroxine          Quality-Core Measures   Reviewed items::  EKG reviewed, Labs reviewed, Medications reviewed and Radiology images reviewed  Almonte catheter::  No Al[place]te  DVT prophylaxis pharmacological::  Heparin  DVT prophylaxis - mechanical:  SCDs  Ulcer Prophylaxis::  Not indicated   For complexity-based billing, please refer to the history, exam, and decison making above. I spent >35 minutes caring for the patient today.    I have discussed with RN and EMMA and SW and other consultants about patient's  plan.      Check am cbc, bmp

## 2018-10-19 NOTE — PROGRESS NOTES
Paged hospitalist to discuss prophylactic ABX at daughter Iman's request. Hospitalist refused stating that prophylactic ABX were unnecessary at this time and stated it could be discussed with morning rounds.

## 2018-10-19 NOTE — PROGRESS NOTES
Daughter Iman called concerned about nutrition status and possibly of PNA setting in the setting of this hospital stay. Daughter spoke with charge RN. Reassured Iman that it will be discussed with hospitalist regarding prophylactic ABX use and a nutritional consult will be placed. Therapeutic communication used during this interaction.

## 2018-10-19 NOTE — PROGRESS NOTES
[facility] Cardiology Follow-up Consult Note  Consulting physician: Lia Ann M.D.    Chief Complaint   Patient presents with   • Shortness of Breath     3 weeks on and off   • Peripheral Edema     started 3 weeks ago in her feet and is now up to her thighs     ID:  79 yo female from [place], [place] with history of non-Hodgkin's lympoma status post chemotherapy with CHOP and MTX that presented 9/28 from an outside facility for shortness of breath.  Found bilateral pleural effusion and pericardial effusion.  10/1, percardiocentesis with 1000cc bloody pericardial fluid drained.  She has received right and left thoracenteses for recurrent pleural effusions.  Cardiology consulted to aid in evaluation/management of pericardial effusion of unknown cause.      Interim Events:  -Yesterday:  Patient said she was unresponsive because she was upset with her .  -No events overnight  -Mental status:  Greatly improved.  Seen sitting in a chair, alert, appropriately conversant.  -Telemetry:  Overnight, sinus rhythm   -Vitals:  Afebrile, saturating in the low 90s on 1.5L  -Pericardial effusion:  Repeat echocardiogram pending  -Pleural effusions:  Last portable xray showed no pleural effusion.  Ordered AP/lateral CXR for tomorrow.    -Disposition:   indicates desire to hold any invasive procedures while at [facility].  He would like to transfer her to a hospital in the [place] area.  Per Benjamin Moody RN, insurance will not pay for transfer to [facility], because there is no clear need for higher level of care.      Review of Systems   Constitutional: Negative for chills and fever.   Respiratory: Positive for sputum production. Negative for shortness of breath.    Cardiovascular: Negative for chest pain and palpitations.     Past medical, surgical, social, and family history reviewed and unchanged from admission except as noted in assessment and plan.    Medications: Reviewed in MAR  Current  Facility-Administered Medications   Medication Dose Frequency Provider Last Rate Last Dose   • furosemide (LASIX) injection 20 mg  20 mg Q DAY Lia Ann M.D.   20 mg at 10/18/18 1752   • racepinephrine (MICRONEFRIN) 2.25 % nebulizer solution 0.5 mL  0.5 mL Q4H PRN (RT) Muhammad K Gondal, M.D.   0.5 mL at 10/17/18 1218   • guaiFENesin (ROBITUSSIN) 100 MG/5ML solution 200 mg  10 mL Q6HRS PRN Lia Ann M.D.   200 mg at 10/19/18 0459   • sore throat spray (CHLORASEPTIC) 1 Spray  1 Spray Q2HRS PRN Lia Ann M.D.   1 Spray at 10/19/18 0458   • haloperidol lactate (HALDOL) injection 1 mg  1 mg Q6HRS PRN Ricardo Torres M.D.   1 mg at 10/14/18 1426   • insulin regular (HUMULIN R) injection 2-9 Units  2-9 Units Q6HRS Ricardo Torres M.D.   Stopped at 10/15/18 1800    And   • glucose 4 g chewable tablet 16 g  16 g Q15 MIN PRN Ricardo Torres M.D.        And   • dextrose 50% (D50W) injection 25 mL  25 mL Q15 MIN PRTWYLA Torres M.D.       • Pharmacy Consult: Enteral tube feeding - review meds/change route/product selection   PRTWYLA Torres M.D.       • senna-docusate (PERICOLACE or SENOKOT S) 8.6-50 MG per tablet 2 Tab  2 Tab BID Ricardo Torres M.D.   Stopped at 10/13/18 0600    And   • polyethylene glycol/lytes (MIRALAX) PACKET 1 Packet  1 Packet QDAY PRN Ricardo Torres M.D.        And   • magnesium hydroxide (MILK OF MAGNESIA) suspension 30 mL  30 mL QDAY PRTWYLA Torres M.D.        And   • bisacodyl (DULCOLAX) suppository 10 mg  10 mg QDAY PRTWYLA Torres M.D.       • oxyCODONE immediate-release (ROXICODONE) tablet 5 mg  5 mg Q4HRS PRTWYLA Torres M.D.   5 mg at 10/16/18 0336    Or   • oxyCODONE immediate-release (ROXICODONE) tablet 2.5 mg  2.5 mg Q4HRS PRN Ricardo Torres M.D.       • levothyroxine (SYNTHROID) tablet 88 mcg  88 mcg AM ES Ricardo Torres M.D.   88 mcg at 10/19/18 0459   • diphenhydrAMINE (BENADRYL) tablet/capsule 25 mg  25 mg HS PRN Ricardo Torres M.D.   25 mg at  "10/18/18 2012   • acetaminophen (TYLENOL) tablet 650 mg  650 mg Q4HRS PRN Ricardo Torres M.D.   650 mg at 10/15/18 2021    Or   • acetaminophen (TYLENOL) suppository 650 mg  650 mg Q4HRS PRN Ricardo Torres M.D.       • simvastatin (ZOCOR) tablet 20 mg  20 mg Q EVENING Ricardo Torres M.D.   20 mg at 10/18/18 1707   • gabapentin (NEURONTIN) capsule 300 mg  300 mg BID Ricardo Torres M.D.   300 mg at 10/19/18 0459   • ondansetron (ZOFRAN) syringe/vial injection 4 mg  4 mg Q4HRS PRN Ricardo Torres M.D.   4 mg at 10/10/18 1339   • fentaNYL (SUBLIMAZE) injection  mcg   mcg Q HOUR PRN Jarvis Arnold M.D.   50 mcg at 10/07/18 1650   • racepinephrine (MICRONEFRIN) 2.25 % nebulizer solution 0.5 mL  0.5 mL Q4H PRN (RT) Jarvis Arnold M.D.   0.5 mL at 10/08/18 0737   • artificial tears 1.4 % ophthalmic solution 1 Drop  1 Drop Q2HRS PRN Jarvis Arnold M.D.   1 Drop at 10/11/18 2117   • heparin injection 5,000 Units  5,000 Units Q8HRS Néstor Guillen M.D.   5,000 Units at 10/19/18 1251   • Respiratory Care per Protocol   Continuous RT Alvaro Perez M.D.       • ipratropium-albuterol (DUONEB) nebulizer solution  3 mL Q2HRS PRN (RT) Jarvis Arnold M.D.   3 mL at 10/13/18 1804   • labetalol (NORMODYNE,TRANDATE) injection 10 mg  10 mg Q30 MIN PRN Constantin Self M.D.       • nicotine (NICODERM) 14 MG/24HR 14 mg  14 mg Daily-0600 Hazel Cruz M.D.   14 mg at 10/19/18 0458    And   • nicotine polacrilex (NICORETTE) 2 MG piece 2 mg  2 mg Q HOUR PRN Hazel Cruz M.D.         Last reviewed on 9/29/2018  4:47 PM by Jaylin Haynes, T  No Known Allergies    Physical Exam  Body mass index is 21.28 kg/m². Blood pressure 118/67, pulse 98, temperature 37.2 °C (98.9 °F), resp. rate 18, height 1.575 m (5' 2.01\"), weight 52.8 kg (116 lb 6.5 oz), SpO2 92 %, not currently breastfeeding.   Vitals:    10/19/18 0027 10/19/18 0400 10/19/18 0830 10/19/18 1205   BP: 126/63 121/68 127/68 " 118/67   Pulse: 99 88 100 98   Resp: 20 20 20 18   Temp: 37.4 °C (99.4 °F) 37.2 °C (99 °F) 37.4 °C (99.4 °F) 37.2 °C (98.9 °F)   SpO2: 90% 88% 89% 92%   Weight:       Height:        Oxygen Therapy:  Pulse Oximetry: 92 %, O2 (LPM): 1.5, O2 Delivery: Silicone Nasal Cannula    Physical Exam   Constitutional: She is oriented to person, place, and time. She appears well-developed. No distress.   Slightly disheveled appearing.  No apparent distress.   HENT:   Head: Normocephalic and atraumatic.   Right Ear: External ear normal.   Left Ear: External ear normal.   Nose: Nose normal.   Mouth/Throat: Oropharynx is clear and moist. No oropharyngeal exudate.   Cortrak in place   Eyes: Pupils are equal, round, and reactive to light. Conjunctivae and EOM are normal. Right eye exhibits no discharge. Left eye exhibits no discharge. No scleral icterus.   Neck: Neck supple. No JVD present. No tracheal deviation present. No thyromegaly present.   Cardiovascular: Normal rate, regular rhythm, normal heart sounds and intact distal pulses.  Exam reveals no gallop and no friction rub.    No murmur heard.  Pulmonary/Chest: Effort normal. No respiratory distress. She has no wheezes. She has no rales.   Course upper air way sounds   Abdominal: Soft. Bowel sounds are normal. She exhibits no distension. There is no tenderness. There is no rebound and no guarding.   Musculoskeletal: She exhibits no edema, tenderness or deformity.   Lymphadenopathy:     She has no cervical adenopathy.   Neurological: She is alert and oriented to person, place, and time. No cranial nerve deficit or sensory deficit. She exhibits normal muscle tone.   Skin: Skin is warm and dry. No rash noted. She is not diaphoretic. No erythema. No pallor.   Psychiatric: Her behavior is normal. Judgment and thought content normal.   Dysphoric mood   Nursing note and vitals reviewed.        Labs (personally reviewed and notable for):   Recent Results (from the past 24 hour(s))    ACCU-CHEK GLUCOSE    Collection Time: 10/18/18  5:10 PM   Result Value Ref Range    Glucose - Accu-Ck 112 (H) 65 - 99 mg/dL   ACCU-CHEK GLUCOSE    Collection Time: 10/19/18 12:25 AM   Result Value Ref Range    Glucose - Accu-Ck 114 (H) 65 - 99 mg/dL   ACCU-CHEK GLUCOSE    Collection Time: 10/19/18  5:16 AM   Result Value Ref Range    Glucose - Accu-Ck 115 (H) 65 - 99 mg/dL   CBC WITH DIFFERENTIAL    Collection Time: 10/19/18 10:09 AM   Result Value Ref Range    WBC 12.1 (H) 4.8 - 10.8 K/uL    RBC 4.50 4.20 - 5.40 M/uL    Hemoglobin 12.6 12.0 - 16.0 g/dL    Hematocrit 40.7 37.0 - 47.0 %    MCV 90.4 81.4 - 97.8 fL    MCH 28.0 27.0 - 33.0 pg    MCHC 31.0 (L) 33.6 - 35.0 g/dL    RDW 65.8 (H) 35.9 - 50.0 fL    Platelet Count 339 164 - 446 K/uL    MPV 11.2 9.0 - 12.9 fL    Neutrophils-Polys 85.30 (H) 44.00 - 72.00 %    Lymphocytes 8.60 (L) 22.00 - 41.00 %    Monocytes 1.70 0.00 - 13.40 %    Eosinophils 1.70 0.00 - 6.90 %    Basophils 0.90 0.00 - 1.80 %    Immature Granulocytes 1.20 (H) 0.00 - 0.90 %    Nucleated RBC 0.00 /100 WBC    Neutrophils (Absolute) 10.43 (H) 2.00 - 7.15 K/uL    Lymphs (Absolute) 1.04 1.00 - 4.80 K/uL    Monos (Absolute) 0.21 0.00 - 0.85 K/uL    Eos (Absolute) 0.21 0.00 - 0.51 K/uL    Baso (Absolute) 0.11 0.00 - 0.12 K/uL    Immature Granulocytes (abs) 0.15 (H) 0.00 - 0.11 K/uL    NRBC (Absolute) 0.00 K/uL    Anisocytosis 1+     Macrocytosis 1+    BASIC METABOLIC PANEL    Collection Time: 10/19/18 10:09 AM   Result Value Ref Range    Sodium 141 135 - 145 mmol/L    Potassium 3.9 3.6 - 5.5 mmol/L    Chloride 102 96 - 112 mmol/L    Co2 37 (H) 20 - 33 mmol/L    Glucose 132 (H) 65 - 99 mg/dL    Bun 25 (H) 8 - 22 mg/dL    Creatinine 0.41 (L) 0.50 - 1.40 mg/dL    Calcium 9.4 8.5 - 10.5 mg/dL    Anion Gap 2.0 0.0 - 11.9   ESTIMATED GFR    Collection Time: 10/19/18 10:09 AM   Result Value Ref Range    GFR If African American >60 >60 mL/min/1.73 m 2    GFR If Non African American >60 >60 mL/min/1.73 m 2    DIFFERENTIAL MANUAL    Collection Time: 10/19/18 10:09 AM   Result Value Ref Range    Bands-Stabs 0.90 0.00 - 10.00 %    Metamyelocytes 0.90 %    Manual Diff Status PERFORMED    PERIPHERAL SMEAR REVIEW    Collection Time: 10/19/18 10:09 AM   Result Value Ref Range    Peripheral Smear Review see below    PLATELET ESTIMATE    Collection Time: 10/19/18 10:09 AM   Result Value Ref Range    Plt Estimation Normal    MORPHOLOGY    Collection Time: 10/19/18 10:09 AM   Result Value Ref Range    RBC Morphology Present     Ovalocytes 1+     Target Cells 1+     Echinocytes 1+    ACCU-CHEK GLUCOSE    Collection Time: 10/19/18 12:52 PM   Result Value Ref Range    Glucose - Accu-Ck 112 (H) 65 - 99 mg/dL       Cardiac Imaging and Procedures Review:    EKG and telemetry tracings personally reviewed    Impression and Medical Decision Making:  Principal Problem:    Acute hypoxemic respiratory failure (HCC) POA: Yes  Active Problems:    Pulmonary edema POA: Yes    Pericardial effusion POA: Yes      Overview: Fluid protein suggesting exudate but all other cytology and chemistry was       apparently lost.    Pleural effusion POA: Yes      Overview: Chemistry suggesting transudate    HTN (hypertension) POA: Yes    History of non-Hodgkin's lymphoma POA: Yes      Overview: Nonhodgkins lymphoma  2000, treated with CHOP and MTX but never radiation       therapy    Hypotension POA: No    Hypothyroidism POA: Yes    Tobacco dependence POA: Yes    Hypokalemia POA: No    Hypomagnesemia POA: No    Hypernatremia POA: No    Dysphagia POA: Yes  Resolved Problems:    Hypophosphatemia POA: No    Acute encephalopathy POA: Yes      Assessment and Plan    Pericardial Effusion  No clinical change in cardiac status.  -Etiology is still unclear. Suspecting possible malignancy.  May be related to previous chemotherapy with CHOP and MTX.  -Patient presented with large pericardial effusion. EKG with very low voltage on admission.  -Underwent drainage 10/1.  1000 cc of bloody pericardial fluid was obtained. Pericardial drain removed. She likely developed pericardial decompression syndrome post pericardiocentesis. Grossly was bloody (some traumatic component). Cytology negative for malignant cells. Unlikely to be infectious.  -GENTRY negative, RA elevated but CCP negative.  -Echo 10/11: small effusion without hemodynamic compromise  -Echo 10/16:  Ef 55%, small loculated pericardial effusion wo evidence of hemodynamic compromise.  Effusion mildly worsened in comparison to prior study.    Plan:  -Pericardial biopsy recommended for further workup but by declined by patient and .  -Ok with diuresis with furosemide if needed.   -Recommend palliative care involvement for discussion regarding goals of care.   -Recommend monitoring strict ins and outs.  -Repeat echo ordered and pending.    Pleural Effusion  Recurrent. Currently stable.  -Right sided thoracentesis on 10/1. 1.7 liters have been removed.   -Left sided thoracentesis 10/13 with 650 cc removed.   -Right thoracentisis 10/15 with 750 cc removed.      Plan:   -OK with diuresis with Lasix.  Continue to monitor electrolytes and renal function.  Ordered AP/lat CXR    Pulmonary Edema vs pulmonary fibrosis  -Has been diuresed significantly since admission however subsequently became intravascularly dry; treated with IV fluids.  -She unfortunately likely developed pericardial decompression syndrome post pericardiocentesis.   -History of reaction to methotrexate and CHOP    Plan:    -OK with diuresis with Lasix if needed.  Continue to monitor electrolytes and renal function    Sinus Tachycardia  Resolved  -Has been normal sinus rhythm and rate during her hospitalization however now appears to have inappropriat tachycardia.   -Unclear etiology at this point. May be contributed by the effusions and some anxiety component.  -Plan:  Will continue to monitor    Hypernatremia  Sodium slightly increased to 146.  Asymptomatic.  -Monitor with BMP    Hypokalemia  Resolved  -Plan: recommend monitoring and repletion as needed.    Hypertension  -Holding outpatient HCTZ and amlodipine. Resume when BP stabilizes     DLD  -continue home simvastatin    It is my pleasure to participate in the care of Ms. Quigley.  Please do not hesitate to contact me with questions or concerns. Will continue to follow

## 2018-10-20 NOTE — DISCHARGE PLANNING
Call from Adi MAGDALENO at [facility]. Per her RN Clinical Coordinator/supervisor: [facility] is declining this request because it is a lateral transfer with no higher medical need.   DENIA Garay on T8 notified of this outcome. She will d/w Dr. Ann.

## 2018-10-20 NOTE — DISCHARGE PLANNING
Family request for lateral transfer to [facility] in [place] reopened via the [facility] ([phone]). Facesheet faxed to [phone] per their request.

## 2018-10-20 NOTE — DISCHARGE PLANNING
"Anticipated Discharge Disposition: acute transfer    Action: Notified by  pt's daughter was requesting to speak with DENIA. DENIA called Iman Gomes at [phone] to discuss pt's transfer. Iman requested update on transfer to [facility]. Notified Iman that per notes, transfer was declined and case was closed on Tuesday. Iman became upset stating she requested transfer be initiated again yesterday. She stated her father called Medicare and was informed the transfer was \"approved\" and that it would be covered. She repeatedly stated \"this is a lateral transfer\" stating she doesn't understand why it's becoming a hassle if lateral transfers happen \"all the time\". Iman upset stating SW's are telling her elderly father that he will need to pay $5000-$10,000 per day and that payment will need to be provided upfront prior to transfer. Notified Iman notes don't indicate this was said to her father at any time, as hospital's don't bill up front. Iman increasingly frustrated accusing SW of not doing what's best for her mother and not acting as an advocate for her transfer to obtain better care. Iman stating hospital staff should be calling Medicare to make sure this is approved, rather than having her elderly father making the phone calls. DENIA validated Iman's frustration. Informed Iman DENIA met with father earlier today, who was going to discuss with her prior to requesting a transfer again. Discussed SW role with Iman in regards to discharge planning and that [facility] will determine whether they will accept pt as a lateral transfer or not. Iman frustrated stating she called [facility] transfer center directly and was notified they don't have any record of pt. She states she's very disappointed in pt's care at [facility]. She states physicians aren't doing what's in the best interest for her mother. She states they aren't providing the medical treatment she needs and are refusing to run \"simple blood " "tests\" on her. She states the physicians are inconsistent and she hasn't been able to speak with any of them. She states hospitals in [place] are able to provide \"more comprehensive care\". Iman later apologetic, but requested transfer be initiated to [facility] again.     Barriers to Discharge: Acceptance at [facility]    Plan: F/U with transfer center. Iman requested updates on transfer status.   "

## 2018-10-20 NOTE — CARE PLAN
Problem: Communication  Goal: The ability to communicate needs accurately and effectively will improve  Outcome: PROGRESSING AS EXPECTED  Communication board updated. All pt questions answered. No further questions at this time. Pt encouraged to voice feelings and ask questions as they arise.    Problem: Knowledge Deficit  Goal: Knowledge of disease process/condition, treatment plan, diagnostic tests, and medications will improve  Outcome: PROGRESSING AS EXPECTED  Discussed POC and medications with patient, patient verbalized understanding.

## 2018-10-20 NOTE — PROGRESS NOTES
Assumed care at 1900, bedside report received from day shift RN. Pt is SR on the telemetry monitor. Patient is AO x 2 and is resting in bed with even respirations . Voice is hoarse and patient has a moist cough. Initial assessment completed and orders reviewed. POC addressed with patient. Call light within reach and hourly rounding in place. No further questions at this time. Fall precautions in place.    Patient impulsive this evening and attempting to get out of bed without the help of staff. Lap belt placed for patient safety. Patient able to demonstrate the ability to remove lap belt. Bed alarm in place, non-skid socks on, fall risk bracelet in place and signage on door indicating fall risk.

## 2018-10-20 NOTE — DISCHARGE PLANNING
"Anticipated Discharge Disposition: TBD    Action: Called pt's daughter Iman to update on transfer status. Explained denial d/t no higher medical need. Iman voiced understanding and states she's spoke with her father and they are now more comfortable staying at [facility]. She states they are more content with current treatment team and don't want to stress pt by moving her to a different location at this time. She states she's hoping they can better work and communication with team at this time. She states if things get \"uncomfortable again\" they will request transfer.     Barriers to Discharge: None    Plan: Family agreeable to having pt remain at [facility] for now. They state if pt needs more invasive procedures they may request transfer.   "

## 2018-10-20 NOTE — PROGRESS NOTES
[facility] Hospitalist Progress Note    Date of Service: 10/20/2018    Chief Complaint    78 y.o. Female from [facility] admitted 9/28/2018 with shortness of breath and peripheral edema found to have significant pericardial effusion.    Pericardial effusion tapped 10/1  Intubated 10/2  Extubated 10/7      Interval Problem Update  10 /16 patient is more comfortable today than yesterday after thoracentesis. patient wants to be transferred to [place] area.  Contact transfer center to initiate another transfer since patient transfer request to [facility] has been declined.  Continue diuretics.  10/17 patient today is more pleasant.  However still we will not give any information in terms of review of system.  Family still wants to transfer to [place] for second opinion  10/18 patient still complains of sore throat.  Unable to swallow secretion.  Order CT neck to rule out airway compromise.  May need ENT evaluation.  Swallow evaluation tomorrow.  10/19 patient developed respiratory distress last night, start Lasix again, chest x-ray showing a little bit worsening signs of pleural effusion.  Will recheck tomorrow.  Also will repeat echocardiogram per cardiology recommendation tomorrow.  Patient was seen by ENT, need to continue work with speech therapy.  10/20 patient will be given 3 dose of Decadron 12 mg.  Apparently patient started feeling slightly improved.  Visited by family and patient is very pleasant today.  Chest x-ray today did not show reaccumulation of pleural effusion.  Echocardiogram still pending.  Patient more euvolemic today, discontinue Lasix.  Patient's family does not seem to be agreeable with each other in terms of transfer.  Patient's son and daughter today both do not want patient to be transferred however patient's other daughter like patient to be transferred to [place].    Consultants/Specialty  Cardiology  Pulmonology  Neurology      Disposition  pending        Review of Systems   Unable  to perform ROS: Mental status change      Physical Exam  Laboratory/Imaging   Hemodynamics  Temp (24hrs), Av.7 °C (98.1 °F), Min:36.2 °C (97.2 °F), Max:37.4 °C (99.3 °F)   Temperature: 36.4 °C (97.6 °F)  Pulse  Av.8  Min: 10  Max: 140   Blood Pressure : 132/62      Respiratory      Respiration: 15, Pulse Oximetry: 90 %     Work Of Breathing / Effort: Mild  RUL Breath Sounds: Rhonchi, RML Breath Sounds: Rhonchi, RLL Breath Sounds: Diminished, LORI Breath Sounds: Rhonchi, LLL Breath Sounds: Diminished    Fluids    Intake/Output Summary (Last 24 hours) at 10/20/18 1622  Last data filed at 10/20/18 1600   Gross per 24 hour   Intake              420 ml   Output                0 ml   Net              420 ml       Nutrition  No orders of the defined types were placed in this encounter.    Physical Exam   Constitutional: No distress.   HENT:   Head: Normocephalic and atraumatic.   Neck: Normal range of motion. Neck supple. No JVD present.   Cardiovascular: Normal rate, regular rhythm and normal heart sounds.  Exam reveals no gallop and no friction rub.    tachycardia   Pulmonary/Chest: Effort normal. No respiratory distress. She has rales. She exhibits no tenderness.   Decreased bs at bases   Abdominal: Soft. Bowel sounds are normal. She exhibits no distension and no mass. There is no tenderness. There is no guarding.   Musculoskeletal: Normal range of motion. She exhibits edema.   Neurological:   Awake    confused   Skin: Skin is warm and dry. No rash noted. She is not diaphoretic.   Psychiatric: She has a normal mood and affect. Thought content normal.   Nursing note and vitals reviewed.      Recent Labs      10/19/18   1009   WBC  12.1*   RBC  4.50   HEMOGLOBIN  12.6   HEMATOCRIT  40.7   MCV  90.4   MCH  28.0   MCHC  31.0*   RDW  65.8*   PLATELETCT  339   MPV  11.2     Recent Labs      10/19/18   1009  10/20/18   0721   SODIUM  141  142   POTASSIUM  3.9  4.4   CHLORIDE  102  100   CO2  37*  32   GLUCOSE  132*   155*   BUN  25*  26*   CREATININE  0.41*  0.36*   CALCIUM  9.4  9.6                   DX-CHEST-2 VIEWS   Final Result      Lingular and left basilar opacity is increased and likely represents atelectasis.      Small hiatal hernia may be present.      Stable cardiomegaly.      Minimal blunting of the costophrenic angle posteriorly may be related to pleural thickening or trace pleural fluid.               DX-ABDOMEN FOR TUBE PLACEMENT   Final Result      Enteric tube projects over the stomach.         DX-CHEST-PORTABLE (1 VIEW)   Final Result      Increasing left midlung zone opacity could indicate consolidation charges atelectasis      Mild interstitial edema, similar to comparison      Stable cardiac silhouette enlargement      CT-SOFT TISSUE NECK WITH   Final Result      1.  No evidence of pharyngeal or laryngeal mass or evidence of adenopathy.      2.  Multiple small bilateral thyroid nodules.      DX-ABDOMEN FOR TUBE PLACEMENT   Final Result         1.  Nonspecific bowel gas pattern.   2.  Dobbhoff tube tip overlying the expected location of the pylorus or first duodenal segment.   3.  Cardiomegaly      DX-CHEST-LIMITED (1 VIEW)   Final Result         1.  Pulmonary edema and/or infiltrates are identified, which are stable since the prior exam.   2.  Dobbhoff tube is seen, may be coiled back within the stomach terminating above the upper margin of the film versus exterior tube overlying the midline. The distal segment is not visualized. View of the abdomen would be required for definitive    evaluation of tube position.   3.  Cardiomegaly   4.  Atherosclerosis      EC-ECHOCARDIOGRAM LTD W/O CONT   Final Result      US-THORACENTESIS PUNCTURE RIGHT   Final Result      1. Ultrasound guided right sided diagnostic and therapeutic thoracentesis.      2. 750 mL of fluid withdrawn.      DX-CHEST-PORTABLE (1 VIEW)   Final Result      No pneumothorax status post right thoracentesis.   Small residual right pleural effusion  and left lung base atelectasis.   Left perihilar/left lung base atelectasis/possible infiltration and small left pleural effusion.      DX-CHEST-PORTABLE (1 VIEW)   Final Result         1.  Pulmonary edema and/or infiltrates are identified, which are stable since the prior exam.      DX-ABDOMEN FOR TUBE PLACEMENT   Final Result      Feeding tube placement with the tip projecting over the distal stomach or duodenal bulb      US-THORACENTESIS PUNCTURE LEFT   Final Result      1. Ultrasound guided left sided diagnostic and therapeutic thoracentesis.      2. 650 mL of fluid withdrawn.      DX-CHEST-LIMITED (1 VIEW)   Final Result      No pneumothorax following left thoracentesis      DX-CHEST-PORTABLE (1 VIEW)   Final Result         Worsening large left pleural effusion with near collapsing of the left lung.      Moderate layering right pleural effusion, worse than prior as well.      DX-ABDOMEN FOR TUBE PLACEMENT   Final Result         Feeding tube with tip projecting over the expected area of the stomach.      DX-CHEST-PORTABLE (1 VIEW)   Final Result         1. No significant interval change.      EC-ECHOCARDIOGRAM LTD W/O CONT   Final Result      CT-CHEST (THORAX) WITH   Final Result      1.  Moderate to large bilateral pleural effusions with bilateral atelectasis.      2.  Moderate pericardial effusion.      3.  Patchy groundglass opacities within the residual aerated upper lobes bilaterally.      4.  Fatty liver.      5.  Atherosclerotic vascular calcification.            DX-ABDOMEN FOR TUBE PLACEMENT   Final Result         1.  Nonspecific bowel gas pattern.   2.  Dobbhoff tube tip terminates overlying the expected location of the gastric body.   3.  Left lung base infiltrate and/or layering pleural effusion.      [id number] VIEW   Final Result         1.  Nonspecific bowel gas pattern.   2.  Dobbhoff tube tip terminates in the distal esophagus, recommend advancement.   3.  Bilateral lower lobe infiltrates and  layering pleural effusions.   4.  Cardiomegaly.      DX-CHEST-LIMITED (1 VIEW)   Final Result      Bilateral pleural effusions with overlying atelectasis/consolidation are again seen.      Mild interstitial prominence may represent mild edema.      Atherosclerotic plaque.         DX-ABDOMEN FOR TUBE PLACEMENT   Final Result      Enteric tube projects over the distal stomach.         EC-ECHOCARDIOGRAM COMPLETE W/O CONT   Final Result      DX-CHEST-PORTABLE (1 VIEW)   Final Result      No significant interval change.      DX-CHEST-PORTABLE (1 VIEW)   Final Result      No significant interval change.      EC-ECHOCARDIOGRAM LTD W/O CONT   Final Result      DX-CHEST-PORTABLE (1 VIEW)   Final Result         1.  Pulmonary edema and/or infiltrates are identified, which are stable since the prior exam. Layering bilateral pleural effusions, greater on the left.   2.  Atherosclerosis               DX-CHEST-PORTABLE (1 VIEW)   Final Result         1.  Pulmonary edema and/or infiltrates are identified, which are stable since the prior exam.   2.  Small right pleural effusion   3.  Atherosclerosis            DX-CHEST-PORTABLE (1 VIEW)   Final Result         1.  Pulmonary edema and/or infiltrates are identified, which are stable since the prior exam.   2.  Small right pleural effusion   3.  Atherosclerosis         DX-CHEST-PORTABLE (1 VIEW)   Final Result         1.  Pulmonary edema and/or infiltrates are identified, which are stable since the prior exam.   2.  Small right pleural effusion   3.  Atherosclerosis      DX-ABDOMEN FOR TUBE PLACEMENT   Final Result      Feeding tube placement with the tip projecting over the stomach body.      DX-ABDOMEN FOR TUBE PLACEMENT   Final Result      1.  Feeding tube appears looped within the stomach. The distal aspect of the tip is directed cephalad in the fundal region.      DX-CHEST-LIMITED (1 VIEW)   Final Result         1.  Pulmonary edema and/or infiltrates are identified, which are  stable since the prior exam.   2.  Small right pleural effusion   3.  Atherosclerosis      DX-CHEST-LIMITED (1 VIEW)   Final Result         1.  Pulmonary edema and/or infiltrates are identified, which are stable since the prior exam.   2.  Atherosclerosis      DX-CHEST-PORTABLE (1 VIEW)   Final Result         1.  Pulmonary edema and/or infiltrates are identified, which are stable since the prior exam.   2.  Atherosclerosis      DX-CHEST-LIMITED (1 VIEW)   Final Result      1.  No pneumothorax identified status post right thoracentesis and pericardiocentesis.      2.  Small amount of pneumomediastinum is likely related to recent pericardiocentesis and drain placement      3.  Cardiomegaly      EC-ECHOCARDIOGRAM LTD W/O CONT   Final Result      DX-CHEST-PORTABLE (1 VIEW)   Final Result      1.  Evacuation large right pleural effusion status post thoracentesis.   2.  No definite right pneumothorax identified. Recommend interval follow-up chest x-ray.   3.  Small left pleural effusion and left perihilar/left lung base atelectasis.   4.  Findings discussed with the patient's nurse. Follow-up chest x-ray will be obtained in approximately 3:00 PM      US-THORACENTESIS PUNCTURE RIGHT   Final Result      1. Ultrasound guided right sided diagnostic and therapeutic thoracentesis.      2. 1700 mL of fluid withdrawn.      EC-ECHOCARDIOGRAM COMPLETE W/O CONT   Final Result      DX-CHEST-PORTABLE (1 VIEW)   Final Result         1.  Pulmonary edema and/or infiltrates are identified, which are stable since the prior exam.   2.  Layering right pleural effusion, stable   3.  Cardiomegaly   4.  Atherosclerosis      DX-CHEST-2 VIEWS   Final Result      1.  Probable bilateral atelectasis      2.  Probable right pleural effusion which may be significant in size      3.  Limited assessment on one view portable radiography      OUTSIDE IMAGES-DX CHEST   Final Result      OUTSIDE IMAGES-DX CHEST   Final Result      EC-ECHOCARDIOGRAM LTD W/O  CONT    (Results Pending)   EC-ECHOCARDIOGRAM LTD W/O CONT    (Results Pending)        Assessment/Plan     * Acute hypoxemic respiratory failure (HCC)- (present on admission)   Assessment & Plan    Extubated on 10/7/2018  Close monitor closely  Continue oxygen and RT protocol  Continue oxygen currently improving        Pleural effusion- (present on admission)   Overview    Chemistry suggesting transudate     Assessment & Plan        Patient is currently euvolemic and no signs of worsening Of pleural effusion or pericardial effusion  Discontinue diuretics    Last Thoracentesis on left , done on 10/13    Right side 10/15 tolerated very well and feeling better currently with decreased requirement of oxygen  No signs of infection or malignancy from fluid analysis  Repeat chest x-ray clear today.        Pericardial effusion- (present on admission)   Overview    Fluid protein suggesting exudate but all other cytology and chemistry was apparently lost.     Assessment & Plan    Cardiology following   Status post pericardial drain placement  Drain removed on 10/3/2018  Cytology negative fluid cultures negative  GENTRY negative RA factor positive CCP negative    off steroids .. Ineffective and stopped    Etiology is unclear and patient may eventually need pericardial biopsy for definitive diagnosis -> surgery is aware however at this moment patient wants to be transferred to [facility] for regional referral center for second opinion.  Transfer center notified and working on this transfer.  cytology results negative for malignancy   Likely need to be biopsy to establish definitive diagnosis   repeat echocardiogram pending        Pulmonary edema- (present on admission)   Assessment & Plan    Monitor fluid status   Diuresis  Lasix 20mg iv daily for the past 2 days, more euvolemic and will discontinue for now.       Hypotension   Assessment & Plan    Relative adrenal insufficiency    Appears resolved for now            History  of non-Hodgkin's lymphoma- (present on admission)   Overview    Nonhodgkins lymphoma  2000, treated with CHOP and MTX but never radiation therapy     Assessment & Plan    Hx of         HTN (hypertension)- (present on admission)   Assessment & Plan    All bp medications held  Cont to monitor        Dysphagia- (present on admission)   Assessment & Plan    Continue with speech therapy   Continue tube feeding, patient also continued to have cough, start patient on cough syrup and also throat spray for comfort.  Currently still complains of sore throat  May need prolonged time to recover to regain swallow function  CT neck evaluation (-)  ENT recommend Decadron 12 mg 3 times total.  Will finish by the end of today.  Patient tolerated.        Hypernatremia   Assessment & Plan    water flushes , 300 cc q6          Hypomagnesemia   Assessment & Plan    Repleted        Hypokalemia   Assessment & Plan    Replace today and BID        Tobacco dependence- (present on admission)   Assessment & Plan    - Smoking cessation education provided  - Nicotine patch          Hypothyroidism- (present on admission)   Assessment & Plan    Continue levothyroxine          Quality-Core Measures   Reviewed items::  EKG reviewed, Labs reviewed, Medications reviewed and Radiology images reviewed  [place] catheter::  No [place]  DVT prophylaxis pharmacological::  Heparin  DVT prophylaxis - mechanical:  SCDs  Ulcer Prophylaxis::  Not indicated   For complexity-based billing, please refer to the history, exam, and decison making above. I spent >35 minutes caring for the patient today.    I have discussed with RN and EMMA and SW and other consultants about patient's plan.      Check am cbc, bmp

## 2018-10-20 NOTE — PROGRESS NOTES
[facility] Cardiology Follow-up Consult Note  Consulting physician: Lia Ann M.D.    Chief Complaint   Patient presents with   • Shortness of Breath     3 weeks on and off   • Peripheral Edema     started 3 weeks ago in her feet and is now up to her thighs     ID:  79 yo female from [place], [place] with history of non-Hodgkin's lympoma status post chemotherapy with CHOP and MTX that presented 9/28 from an outside facility for shortness of breath.  Found bilateral pleural effusion and pericardial effusion.  10/1, percardiocentesis with 1000cc bloody pericardial fluid drained.  She has received right and left thoracenteses for recurrent pleural effusions.  Cardiology consulted to aid in evaluation/management of pericardial effusion of unknown cause.      Interim Events:  -Overnight:  Patient was agitated and pulled cortrak out.  She received 1mg Haldol  -Mental status:  No agitation.  Confused, believed incentive spirometer was a piece of cheese.   -Telemetry:  Overnight, sinus rhythm   -Vitals:  Afebrile, saturating in the low 90s on 2L  -Pericardial effusion:  Repeat echocardiogram pending  -Pleural effusions:  PA/L CXR done today showed costophrenic angle blunting posteriorly Last portable xray showed no pleural effusion.  -Family visit:  Patient's daughter Ainsley and family friend Reilly visited.  They were given an update on her pericardial effusion and pleural effusion.  Option of pericardial biopsy discussed.    -Disposition:   indicates desire to hold any invasive procedures while at [facility].  He would like to transfer her to a hospital in the [place] area.  Per Benjamin Moody RN, insurance will not pay for transfer to [facility], because there is no clear need for higher level of care.      Review of Systems   Constitutional: Negative for chills and fever.   Respiratory: Positive for sputum production. Negative for shortness of breath.    Cardiovascular: Negative for chest pain and  palpitations.     Past medical, surgical, social, and family history reviewed and unchanged from admission except as noted in assessment and plan.    Medications: Reviewed in MAR  Current Facility-Administered Medications   Medication Dose Frequency Provider Last Rate Last Dose   • dexamethasone (DECADRON) injection 12 mg  12 mg Q8HRS Lia Ann M.D.   12 mg at 10/20/18 0630   • furosemide (LASIX) injection 20 mg  20 mg Q DAY Lia Ann M.D.   20 mg at 10/20/18 0634   • racepinephrine (MICRONEFRIN) 2.25 % nebulizer solution 0.5 mL  0.5 mL Q4H PRN (RT) Muhammad K Gondal, M.D.   0.5 mL at 10/17/18 1218   • guaiFENesin (ROBITUSSIN) 100 MG/5ML solution 200 mg  10 mL Q6HRS PRN Lia Ann M.D.   200 mg at 10/20/18 1154   • sore throat spray (CHLORASEPTIC) 1 Spray  1 Spray Q2HRS PRN Lia Ann M.D.   1 Spray at 10/19/18 0458   • haloperidol lactate (HALDOL) injection 1 mg  1 mg Q6HRS PRN Ricardo Torres M.D.   1 mg at 10/19/18 1751   • insulin regular (HUMULIN R) injection 2-9 Units  2-9 Units Q6HRS Ricardo Torres M.D.   Stopped at 10/15/18 1800    And   • glucose 4 g chewable tablet 16 g  16 g Q15 MIN PRN Ricardo Torres M.D.        And   • dextrose 50% (D50W) injection 25 mL  25 mL Q15 MIN PRTWYLA Torres M.D.       • Pharmacy Consult: Enteral tube feeding - review meds/change route/product selection   PRTWYLA Torres M.D.       • senna-docusate (PERICOLACE or SENOKOT S) 8.6-50 MG per tablet 2 Tab  2 Tab BID Ricardo Torres M.D.   Stopped at 10/13/18 0600    And   • polyethylene glycol/lytes (MIRALAX) PACKET 1 Packet  1 Packet QDAY PRN Ricardo Torres M.D.        And   • magnesium hydroxide (MILK OF MAGNESIA) suspension 30 mL  30 mL QDAY PRTWYLA Torres M.D.        And   • bisacodyl (DULCOLAX) suppository 10 mg  10 mg QDAY PRN Ricardo Torres M.D.       • oxyCODONE immediate-release (ROXICODONE) tablet 5 mg  5 mg Q4HRS PRN Ricardo Torres M.D.   5 mg at 10/16/18 0336    Or   • oxyCODONE  immediate-release (ROXICODONE) tablet 2.5 mg  2.5 mg Q4HRS PRN Ricardo Torres M.D.       • levothyroxine (SYNTHROID) tablet 88 mcg  88 mcg AM ES Ricardo Torres M.D.   88 mcg at 10/20/18 1152   • diphenhydrAMINE (BENADRYL) tablet/capsule 25 mg  25 mg HS PRN Ricardo Torres M.D.   25 mg at 10/19/18 1944   • acetaminophen (TYLENOL) tablet 650 mg  650 mg Q4HRS PRN Ricardo Torres M.D.   650 mg at 10/15/18 2021    Or   • acetaminophen (TYLENOL) suppository 650 mg  650 mg Q4HRS PRN Ricardo Torres M.D.       • simvastatin (ZOCOR) tablet 20 mg  20 mg Q EVENING Ricardo Torres M.D.   Stopped at 10/19/18 1800   • gabapentin (NEURONTIN) capsule 300 mg  300 mg BID Ricardo Torres M.D.   300 mg at 10/20/18 1154   • ondansetron (ZOFRAN) syringe/vial injection 4 mg  4 mg Q4HRS PRN Ricardo Torres M.D.   4 mg at 10/10/18 1339   • fentaNYL (SUBLIMAZE) injection  mcg   mcg Q HOUR PRN Jarvis Arnold M.D.   50 mcg at 10/07/18 1650   • racepinephrine (MICRONEFRIN) 2.25 % nebulizer solution 0.5 mL  0.5 mL Q4H PRN (RT) Jarvis Arnold M.D.   0.5 mL at 10/08/18 0737   • artificial tears 1.4 % ophthalmic solution 1 Drop  1 Drop Q2HRS PRN Jarvis Arnold M.D.   1 Drop at 10/11/18 2117   • heparin injection 5,000 Units  5,000 Units Q8HRS Néstor Guillen M.D.   5,000 Units at 10/20/18 0635   • Respiratory Care per Protocol   Continuous RT Alvaro Perez M.D.       • ipratropium-albuterol (DUONEB) nebulizer solution  3 mL Q2HRS PRN (RT) Jarvis Arnold M.D.   3 mL at 10/13/18 1804   • labetalol (NORMODYNE,TRANDATE) injection 10 mg  10 mg Q30 MIN PRN Constantin Self M.D.       • nicotine (NICODERM) 14 MG/24HR 14 mg  14 mg Daily-0600 Hazel Cruz M.D.   14 mg at 10/20/18 0628    And   • nicotine polacrilex (NICORETTE) 2 MG piece 2 mg  2 mg Q HOUR PRN Hazel Cruz M.D.         Last reviewed on 9/29/2018  4:47 PM by Sugey White  No Known Allergies    Physical Exam  Body mass  "index is 21.61 kg/m². Blood pressure 116/67, pulse 86, temperature 36.3 °C (97.4 °F), resp. rate 17, height 1.575 m (5' 2.01\"), weight 53.6 kg (118 lb 2.7 oz), SpO2 94 %, not currently breastfeeding.   Vitals:    10/19/18 2000 10/20/18 0000 10/20/18 0400 10/20/18 0756   BP: 138/74 125/60 108/53 116/67   Pulse: (!) 101 99 86 86   Resp: 18 18 18 17   Temp: 37.1 °C (98.8 °F) 37.4 °C (99.3 °F) 36.2 °C (97.2 °F) 36.3 °C (97.4 °F)   SpO2: 91% 99% 93% 94%   Weight: 53.6 kg (118 lb 2.7 oz)      Height:        Oxygen Therapy:  Pulse Oximetry: 94 %, O2 (LPM): 2, O2 Delivery: Silicone Nasal Cannula    Physical Exam   Constitutional: She is oriented to person, place, and time. She appears well-developed. No distress.   Slightly disheveled appearing.  No apparent distress.   HENT:   Head: Normocephalic and atraumatic.   Right Ear: External ear normal.   Left Ear: External ear normal.   Nose: Nose normal.   Mouth/Throat: Oropharynx is clear and moist. No oropharyngeal exudate.   Cortrak in place   Eyes: Pupils are equal, round, and reactive to light. Conjunctivae and EOM are normal. Right eye exhibits no discharge. Left eye exhibits no discharge. No scleral icterus.   Neck: Neck supple. No JVD present. No tracheal deviation present. No thyromegaly present.   Cardiovascular: Normal rate, regular rhythm, normal heart sounds and intact distal pulses.  Exam reveals no gallop and no friction rub.    No murmur heard.  Pulmonary/Chest: Effort normal. No respiratory distress. She has no wheezes. She has no rales.   Coarse upper air way sounds   Abdominal: Soft. Bowel sounds are normal. She exhibits no distension. There is no tenderness. There is no rebound and no guarding.   Musculoskeletal: She exhibits no edema, tenderness or deformity.   Lymphadenopathy:     She has no cervical adenopathy.   Neurological: She is alert and oriented to person, place, and time. No cranial nerve deficit or sensory deficit. She exhibits normal muscle tone. "   Skin: Skin is warm and dry. No rash noted. She is not diaphoretic. No erythema. No pallor.   Psychiatric: Her behavior is normal. Judgment and thought content normal.   Dysphoric mood   Nursing note and vitals reviewed.        Labs (personally reviewed and notable for):   Recent Results (from the past 24 hour(s))   ACCU-CHEK GLUCOSE    Collection Time: 10/19/18 12:52 PM   Result Value Ref Range    Glucose - Accu-Ck 112 (H) 65 - 99 mg/dL   ACCU-CHEK GLUCOSE    Collection Time: 10/20/18  6:15 AM   Result Value Ref Range    Glucose - Accu-Ck 131 (H) 65 - 99 mg/dL   BASIC METABOLIC PANEL    Collection Time: 10/20/18  7:21 AM   Result Value Ref Range    Sodium 142 135 - 145 mmol/L    Potassium 4.4 3.6 - 5.5 mmol/L    Chloride 100 96 - 112 mmol/L    Co2 32 20 - 33 mmol/L    Glucose 155 (H) 65 - 99 mg/dL    Bun 26 (H) 8 - 22 mg/dL    Creatinine 0.36 (L) 0.50 - 1.40 mg/dL    Calcium 9.6 8.5 - 10.5 mg/dL    Anion Gap 10.0 0.0 - 11.9   ESTIMATED GFR    Collection Time: 10/20/18  7:21 AM   Result Value Ref Range    GFR If African American >60 >60 mL/min/1.73 m 2    GFR If Non African American >60 >60 mL/min/1.73 m 2   ACCU-CHEK GLUCOSE    Collection Time: 10/20/18 11:35 AM   Result Value Ref Range    Glucose - Accu-Ck 95 65 - 99 mg/dL       Cardiac Imaging and Procedures Review:    EKG and telemetry tracings personally reviewed    Impression and Medical Decision Making:  Principal Problem:    Acute hypoxemic respiratory failure (HCC) POA: Yes  Active Problems:    Pulmonary edema POA: Yes    Pericardial effusion POA: Yes      Overview: Fluid protein suggesting exudate but all other cytology and chemistry was       apparently lost.    Pleural effusion POA: Yes      Overview: Chemistry suggesting transudate    HTN (hypertension) POA: Yes    History of non-Hodgkin's lymphoma POA: Yes      Overview: Nonhodgkins lymphoma  2000, treated with CHOP and MTX but never radiation       therapy    Hypotension POA: No    Hypothyroidism  POA: Yes    Tobacco dependence POA: Yes    Hypokalemia POA: No    Hypomagnesemia POA: No    Hypernatremia POA: No    Dysphagia POA: Yes  Resolved Problems:    Hypophosphatemia POA: No    Acute encephalopathy POA: Yes      Assessment and Plan    Pericardial Effusion  No clinical change in cardiac status.  -Etiology is still unclear. Suspecting possible malignancy.  May be related to previous chemotherapy with CHOP and MTX.  -Patient presented with large pericardial effusion. EKG with very low voltage on admission.  -Underwent drainage 10/1. 1000 cc of bloody pericardial fluid was obtained. Pericardial drain removed. She likely developed pericardial decompression syndrome post pericardiocentesis. Grossly was bloody (some traumatic component). Cytology negative for malignant cells. Unlikely to be infectious.  -GENTRY negative, RA elevated but CCP negative.  -Echo 10/11: small effusion without hemodynamic compromise  -Echo 10/16:  Ef 55%, small loculated pericardial effusion wo evidence of hemodynamic compromise.  Effusion mildly worsened in comparison to prior study.    Plan:  -Pericardial biopsy recommended for further workup but by declined by patient and .  -Ok with diuresis with furosemide if needed.   -Recommend palliative care involvement for discussion regarding goals of care.   -Recommend monitoring strict ins and outs.  -Repeat echo ordered and pending.    Pleural Effusion  Recurrent. Currently stable.  -Right sided thoracentesis on 10/1. 1.7 liters have been removed.   -Left sided thoracentesis 10/13 with 650 cc removed.   -Right thoracentisis 10/15 with 750 cc removed.    -10/20 PA/L CXR showed blunting of costophrenic angles posteriorly.    -Plan: OK with diuresis with Lasix.  Continue to monitor electrolytes and renal function.    Pulmonary Edema vs pulmonary fibrosis  -Has been diuresed significantly since admission however subsequently became intravascularly dry; treated with IV fluids.  -She  unfortunately likely developed pericardial decompression syndrome post pericardiocentesis.   -History of reaction to methotrexate and CHOP  -Plan:  OK with diuresis with Lasix if needed.  Continue to monitor electrolytes and renal function    Sinus Tachycardia  Resolved  -Has been normal sinus rhythm and rate during her hospitalization however now appears to have inappropriat tachycardia.   -Unclear etiology at this point. May be contributed by the effusions and some anxiety component.  -Plan:  Will continue to monitor    Hypernatremia  Sodium slightly increased to 146. Asymptomatic.  -Monitor with BMP    Hypokalemia  Resolved  -Plan: recommend monitoring and repletion as needed.    Hypertension  -Holding outpatient HCTZ and amlodipine. Resume when BP stabilizes     DLD  -continue home simvastatin    It is my pleasure to participate in the care of Ms. Quigley.  Please do not hesitate to contact me with questions or concerns. Will continue to follow

## 2018-10-20 NOTE — PROGRESS NOTES
2 RN skin check with RASTA Rider  Right elbow red and blanching, mepilex lite on elbows bilaterally  Ears no longer red and intact  Sacrum red and blanching      Heels dry and flaky, floated on pillows

## 2018-10-20 NOTE — PROGRESS NOTES
Patient pulled out coretrack and pulling off oxygen tubing and oxygen saturation probe while continuing to get out of bed despite instability on feet. Paged Dr. Moreira regarding  patients increasing agitation. Orders received to give 1mg of Halodol stat.

## 2018-10-21 NOTE — PROGRESS NOTES
Pt alert and oriented to self and date.  Pt had a very good day.  Slept a little this morning, and has been visiting with family all afternoon.  Chest and abd x-ray done today.  Restarted tube feeds at 1200, tolerating well.  Family  States they will be here for the majority of the evening.  Pt has been calm and cooperative today.  Daughter used a shampoo cap on pt.  Spoke with  twice and updated him on pt condition.  Bed alarm set, call light within reach.  Will continue to monitor.

## 2018-10-21 NOTE — PROGRESS NOTES
"0715: Bedside report received.  Pt resting in bed.  Denies any needs at this time.  Call light and personal belongings within reach.  Bed locked in lowest position.  Family at bedside.  Bed alarm on.    1145: Pt refusing FSBG.     1245: Echo tech at bedside for repeat exho--pt refusing. Pt states \"dont f* touch me\".  Pt verbally abusive.     1250: Refusing VS. Family at bedside.--MD paged regarding pt still refusing FSBG and VS.    1255: MD at bedside--ok with refusals at this time.  No haldol to be administered at this time--discussed with MD.  Pt is not combative just frustrated with being in the hospital.  Family updated.    1315: Pt pulled cortrak out.  Pt refusing the insertion of a new cortrak.      1330: Spoke to MD--ok to leave cortrak out as pt is refusing placement of a new one.  Spoke to SLP and pt will be reevaluated tomorrow (FEES).  Son and daughter in law updated. Pt continues to be verbally abusive to this RN, CNA and family (son specifically).  Pt intermittently tearful, wants to go home, frustrated with being in the hospital.  Emotional support provided to both pt and pts family.  Son and daughter in law to go home to rest for the day.    1450: Pt agitated in room, hitting at this RN, pulling off tele.  MD notified and tele discontinued. Haldol administered per eMAR.    1545:  Pt banging her amandeep on bed rail, asked pt to please stop and she attempted to throw it at this RN.  She continued to bang it on the bed and then threw it at this RN.  The amandeep was picked up (the screen picture is broken) and the amandeep was placed on the shelf in her room.  Pt then told this RN and CNA that \"I can hang myself in so many ways in this room, just go so I can end it\", pt states \"just let me die\".  MD notified, legal hold initiated. Psych consult placed.  SAD score: 6, 1:1 observation by this RN. All personal belongings, phone and call light removed from pts room.    1600: Sitter at bedside for 1:1 observation. " "         1700: Teresa updated via phone.    1740: Familia and wife updated via phon--asking if pt can go outside to smoke.  Educated that smoking cessatin is encouraged and that pt is on a legal hold and cannot leave the hospital.  Family again asked this RN to please ask MD that this would make pt feel like \"she has some control\". --MD informed and declined the pt being allowed to smoke.    1740: Call placed to pts spouse (Cameron)--updated ON POC/days events.                                                                                                                                                                                                                                                                                                                                                                                                                                                                                                                                                                                                                                                                                                                                                                      "

## 2018-10-21 NOTE — PROGRESS NOTES
Assumed care at 1900, bedside report received from day shift RN. Pt is SR on the telemetry monitor. Patient is AO x 2 and is resting in bed with family at bedside. Patient  Initial assessment completed and orders reviewed. POC addressed with patient. Call light within reach and hourly rounding in place. No further questions at this time. Fall precautions in place.

## 2018-10-21 NOTE — PROGRESS NOTES
[facility] Cardiology Follow-up Consult Note  Consulting physician: Lia Ann M.D.    Chief Complaint   Patient presents with   • Shortness of Breath     3 weeks on and off   • Peripheral Edema     started 3 weeks ago in her feet and is now up to her thighs     ID:  77 yo female from [place], [place] with history of non-Hodgkin's lympoma status post chemotherapy with CHOP and MTX that presented 9/28 from an outside facility for shortness of breath.  Found bilateral pleural effusion and pericardial effusion.  10/1, percardiocentesis with 1000cc bloody pericardial fluid drained.  She has received right and left thoracenteses for recurrent pleural effusions.  Cardiology consulted to aid in evaluation/management of pericardial effusion of unknown cause.      Interim Events:  -Overnight:  Patient slept well  -Mental status: Irritable and refusing exam.  Mood sometimes helped with the presence of family.    -Telemetry:  Overnight, sinus rhythm 89-98  -Vitals:  Afebrile, saturating in the low 90s on 1L  -Pericardial effusion:  Repeat echocardiogram still pending  -Pleural effusions: Oxygen demand improving.  Pleural effusions mostly stable based on last CXR 10/20.  -Family visit:  Today, patients son and sigificant other present.    -Disposition:   indicates desire to hold any invasive procedures while at [facility].  He would like to transfer her to a hospital in the [place] area.  Per Benjamin Moody RN, insurance will not pay for transfer to [facility], because there is no clear need for higher level of care.      Review of Systems   Constitutional: Negative for chills and fever.   Respiratory: Positive for sputum production. Negative for shortness of breath.    Cardiovascular: Negative for chest pain and palpitations.     Past medical, surgical, social, and family history reviewed and unchanged from admission except as noted in assessment and plan.    Medications: Reviewed in MAR  Current  Facility-Administered Medications   Medication Dose Frequency Provider Last Rate Last Dose   • racepinephrine (MICRONEFRIN) 2.25 % nebulizer solution 0.5 mL  0.5 mL Q4H PRN (RT) Muhammad K Gondal, M.D.   0.5 mL at 10/17/18 1218   • guaiFENesin (ROBITUSSIN) 100 MG/5ML solution 200 mg  10 mL Q6HRS PRN Lia Ann M.D.   200 mg at 10/21/18 0604   • sore throat spray (CHLORASEPTIC) 1 Spray  1 Spray Q2HRS PRN Lia Ann M.D.   1 Spray at 10/19/18 0458   • haloperidol lactate (HALDOL) injection 1 mg  1 mg Q6HRS PRN Ricardo Torres M.D.   1 mg at 10/19/18 1751   • insulin regular (HUMULIN R) injection 2-9 Units  2-9 Units Q6HRS Ricardo Torres M.D.   Stopped at 10/21/18 0600    And   • glucose 4 g chewable tablet 16 g  16 g Q15 MIN PRN Ricardo Torres M.D.        And   • dextrose 50% (D50W) injection 25 mL  25 mL Q15 MIN PRTWYLA Torres M.D.       • Pharmacy Consult: Enteral tube feeding - review meds/change route/product selection   PRTYWLA Torres M.D.       • senna-docusate (PERICOLACE or SENOKOT S) 8.6-50 MG per tablet 2 Tab  2 Tab BID Ricardo Torres M.D.   Stopped at 10/13/18 0600    And   • polyethylene glycol/lytes (MIRALAX) PACKET 1 Packet  1 Packet QDAY PRTWYLA Torres M.D.        And   • magnesium hydroxide (MILK OF MAGNESIA) suspension 30 mL  30 mL QDAY PRTWYLA Torres M.D.        And   • bisacodyl (DULCOLAX) suppository 10 mg  10 mg QDAY PRTWYLA Torres M.D.       • oxyCODONE immediate-release (ROXICODONE) tablet 5 mg  5 mg Q4HRS PRTWYLA Torres M.D.   5 mg at 10/16/18 0336    Or   • oxyCODONE immediate-release (ROXICODONE) tablet 2.5 mg  2.5 mg Q4HRS PRN Ricardo Torres M.D.       • levothyroxine (SYNTHROID) tablet 88 mcg  88 mcg AM ES Ricardo Torres M.D.   88 mcg at 10/20/18 1152   • diphenhydrAMINE (BENADRYL) tablet/capsule 25 mg  25 mg HS PRN Ricardo Torres M.D.   25 mg at 10/20/18 1944   • acetaminophen (TYLENOL) tablet 650 mg  650 mg Q4HRS PRN Ricardo Torres M.D.    "650 mg at 10/15/18 2021    Or   • acetaminophen (TYLENOL) suppository 650 mg  650 mg Q4HRS PRN Ricardo Torres M.D.       • simvastatin (ZOCOR) tablet 20 mg  20 mg Q EVENING Ricardo Torres M.D.   20 mg at 10/20/18 1744   • gabapentin (NEURONTIN) capsule 300 mg  300 mg BID Ricardo Torres M.D.   300 mg at 10/21/18 0605   • ondansetron (ZOFRAN) syringe/vial injection 4 mg  4 mg Q4HRS PRN Ricardo Torres M.D.   4 mg at 10/10/18 1339   • fentaNYL (SUBLIMAZE) injection  mcg   mcg Q HOUR PRN Jarvis Arnold M.D.   50 mcg at 10/07/18 1650   • racepinephrine (MICRONEFRIN) 2.25 % nebulizer solution 0.5 mL  0.5 mL Q4H PRN (RT) Jarvis Arnold M.D.   0.5 mL at 10/08/18 0737   • artificial tears 1.4 % ophthalmic solution 1 Drop  1 Drop Q2HRS PRN Jarvis Arnold M.D.   1 Drop at 10/11/18 2117   • heparin injection 5,000 Units  5,000 Units Q8HRS Néstor Guillen M.D.   5,000 Units at 10/21/18 0604   • Respiratory Care per Protocol   Continuous RT Alvaro Perez M.D.       • ipratropium-albuterol (DUONEB) nebulizer solution  3 mL Q2HRS PRN (RT) Jarvis Arnold M.D.   3 mL at 10/13/18 1804   • labetalol (NORMODYNE,TRANDATE) injection 10 mg  10 mg Q30 MIN PRN Constantin Self M.D.       • nicotine (NICODERM) 14 MG/24HR 14 mg  14 mg Daily-0600 Hazel Cruz M.D.   14 mg at 10/21/18 0604    And   • nicotine polacrilex (NICORETTE) 2 MG piece 2 mg  2 mg Q HOUR PRN Hazel Cruz M.D.         Last reviewed on 9/29/2018  4:47 PM by Jaylin Haynes, PhT  No Known Allergies    Physical Exam  Body mass index is 23.1 kg/m². Blood pressure 115/55, pulse 83, temperature 37 °C (98.6 °F), resp. rate 16, height 1.575 m (5' 2.01\"), weight 57.3 kg (126 lb 5.2 oz), SpO2 96 %, not currently breastfeeding.   Vitals:    10/21/18 0200 10/21/18 0600 10/21/18 0628 10/21/18 0925   BP: 139/75 (!) 90/53 124/62 115/55   Pulse: (!) 104 85  83   Resp: 16 16  16   Temp: 36.9 °C (98.4 °F) 36.7 °C (98.1 °F)  37 °C " (98.6 °F)   SpO2: 92% 92%  96%   Weight:       Height:        Oxygen Therapy:  Pulse Oximetry: 96 %, O2 (LPM): 1, O2 Delivery: Silicone Nasal Cannula    Physical Exam   Constitutional: She is oriented to person, place, and time. She appears well-developed. No distress.   Slightly disheveled appearing.  No apparent distress.   HENT:   Head: Normocephalic and atraumatic.   Right Ear: External ear normal.   Left Ear: External ear normal.   Nose: Nose normal.   Mouth/Throat: Oropharynx is clear and moist. No oropharyngeal exudate.   Cortrak in place   Eyes: Pupils are equal, round, and reactive to light. Conjunctivae and EOM are normal. Right eye exhibits no discharge. Left eye exhibits no discharge. No scleral icterus.   Neck: Neck supple. No JVD present. No tracheal deviation present. No thyromegaly present.   Cardiovascular: Normal rate, regular rhythm, normal heart sounds and intact distal pulses.  Exam reveals no gallop and no friction rub.    No murmur heard.  Pulmonary/Chest: Effort normal. No respiratory distress. She has no wheezes. She has no rales.   Coarse upper air way sounds   Abdominal: Soft. Bowel sounds are normal. She exhibits no distension. There is no tenderness. There is no rebound and no guarding.   Musculoskeletal: She exhibits no edema, tenderness or deformity.   Lymphadenopathy:     She has no cervical adenopathy.   Neurological: She is alert and oriented to person, place, and time. No cranial nerve deficit or sensory deficit. She exhibits normal muscle tone.   Skin: Skin is warm and dry. No rash noted. She is not diaphoretic. No erythema. No pallor.   Psychiatric: Her behavior is normal. Judgment and thought content normal.   Dysphoric mood   Nursing note and vitals reviewed.        Labs (personally reviewed and notable for):   Recent Results (from the past 24 hour(s))   ACCU-CHEK GLUCOSE    Collection Time: 10/20/18 11:35 AM   Result Value Ref Range    Glucose - Accu-Ck 95 65 - 99 mg/dL    ACCU-CHEK GLUCOSE    Collection Time: 10/20/18  5:06 PM   Result Value Ref Range    Glucose - Accu-Ck 178 (H) 65 - 99 mg/dL   BASIC METABOLIC PANEL    Collection Time: 10/21/18  3:20 AM   Result Value Ref Range    Sodium 144 135 - 145 mmol/L    Potassium 4.3 3.6 - 5.5 mmol/L    Chloride 100 96 - 112 mmol/L    Co2 39 (H) 20 - 33 mmol/L    Glucose 210 (H) 65 - 99 mg/dL    Bun 36 (H) 8 - 22 mg/dL    Creatinine 0.49 (L) 0.50 - 1.40 mg/dL    Calcium 9.7 8.5 - 10.5 mg/dL    Anion Gap 5.0 0.0 - 11.9   ESTIMATED GFR    Collection Time: 10/21/18  3:20 AM   Result Value Ref Range    GFR If African American >60 >60 mL/min/1.73 m 2    GFR If Non African American >60 >60 mL/min/1.73 m 2   ACCU-CHEK GLUCOSE    Collection Time: 10/21/18  6:09 AM   Result Value Ref Range    Glucose - Accu-Ck 128 (H) 65 - 99 mg/dL       Cardiac Imaging and Procedures Review:    EKG and telemetry tracings personally reviewed    Impression and Medical Decision Making:  Principal Problem:    Acute hypoxemic respiratory failure (HCC) POA: Yes  Active Problems:    Pulmonary edema POA: Yes    Pericardial effusion POA: Yes      Overview: Fluid protein suggesting exudate but all other cytology and chemistry was       apparently lost.    Pleural effusion POA: Yes      Overview: Chemistry suggesting transudate    HTN (hypertension) POA: Yes    History of non-Hodgkin's lymphoma POA: Yes      Overview: Nonhodgkins lymphoma  2000, treated with CHOP and MTX but never radiation       therapy    Hypotension POA: No    Hypothyroidism POA: Yes    Tobacco dependence POA: Yes    Hypokalemia POA: No    Hypomagnesemia POA: No    Hypernatremia POA: No    Dysphagia POA: Yes  Resolved Problems:    Hypophosphatemia POA: No    Acute encephalopathy POA: Yes      Assessment and Plan    Pericardial Effusion  No clinical change in cardiac status.  -Etiology is still unclear. Suspecting possible malignancy.  May be related to previous chemotherapy with CHOP and MTX.  -Patient  presented with large pericardial effusion. EKG with very low voltage on admission.  -Underwent drainage 10/1. 1000 cc of bloody pericardial fluid was obtained. Pericardial drain removed. She likely developed pericardial decompression syndrome post pericardiocentesis. Grossly was bloody (some traumatic component). Cytology negative for malignant cells. Unlikely to be infectious.  -GENTRY negative, RA elevated but CCP negative.  -Echo 10/11: small effusion without hemodynamic compromise  -Echo 10/16:  Ef 55%, small loculated pericardial effusion wo evidence of hemodynamic compromise.  Effusion mildly worsened in comparison to prior study.    Plan:  -Pericardial biopsy recommended for further workup but by declined by patient and .  -Ok with diuresis with furosemide if needed.   -Recommend palliative care involvement for discussion regarding goals of care.   -Recommend monitoring strict ins and outs.  -Repeat echo ordered and pending.    Pleural Effusion  Recurrent. Currently stable.  -Right sided thoracentesis on 10/1. 1.7 liters have been removed.   -Left sided thoracentesis 10/13 with 650 cc removed.   -Right thoracentisis 10/15 with 750 cc removed.    -10/20 PA/L CXR showed blunting of costophrenic angles posteriorly.    -Plan: OK with diuresis if needed.  Continue to monitor electrolytes and renal function.    Pulmonary Edema vs pulmonary fibrosis  -Has been diuresed significantly since admission however subsequently became intravascularly dry; treated with IV fluids.  -She unfortunately likely developed pericardial decompression syndrome post pericardiocentesis.   -History of reaction to methotrexate and CHOP  -Plan:  OK with diuresis with Lasix if needed.  Continue to monitor electrolytes and renal function    Sinus Tachycardia  Resolved  -Has been normal sinus rhythm and rate during her hospitalization however now appears to have inappropriat tachycardia.   -Unclear etiology at this point. May be  contributed by the effusions and some anxiety component.  -Plan:  Will continue to monitor    Hypernatremia  Sodium slightly increased to 146. Asymptomatic.  -Monitor with BMP    Hypokalemia  Resolved  -Plan: recommend monitoring and repletion as needed.    Hypertension  -Holding outpatient HCTZ and amlodipine. Resume when BP stabilizes     DLD  -continue home simvastatin    It is my pleasure to participate in the care of Ms. Quigley.  Please do not hesitate to contact me with questions or concerns. Will continue to follow

## 2018-10-21 NOTE — PROGRESS NOTES
[facility] Hospitalist Progress Note    Date of Service: 10/21/2018    Chief Complaint    78 y.o. Female from [facility] admitted 9/28/2018 with shortness of breath and peripheral edema found to have significant pericardial effusion.    Pericardial effusion tapped 10/1  Intubated 10/2  Extubated 10/7      Interval Problem Update  10 /16 patient is more comfortable today than yesterday after thoracentesis. patient wants to be transferred to [place] area.  Contact transfer center to initiate another transfer since patient transfer request to [facility] has been declined.  Continue diuretics.  10/17 patient today is more pleasant.  However still we will not give any information in terms of review of system.  Family still wants to transfer to [place] for second opinion  10/18 patient still complains of sore throat.  Unable to swallow secretion.  Order CT neck to rule out airway compromise.  May need ENT evaluation.  Swallow evaluation tomorrow.  10/19 patient developed respiratory distress last night, start Lasix again, chest x-ray showing a little bit worsening signs of pleural effusion.  Will recheck tomorrow.  Also will repeat echocardiogram per cardiology recommendation tomorrow.  Patient was seen by ENT, need to continue work with speech therapy.  10/20 patient will be given 3 dose of Decadron 12 mg.  Apparently patient started feeling slightly improved.  Visited by family and patient is very pleasant today.  Chest x-ray today did not show reaccumulation of pleural effusion.  Echocardiogram still pending.  Patient more euvolemic today, discontinue Lasix.  Patient's family does not seem to be agreeable with each other in terms of transfer.  Patient's son and daughter today both do not want patient to be transferred however patient's other daughter like patient to be transferred to [place].  10/21 apparently patient today is very unhappy, refused also any question, refused to have echocardiogram, refused to  take medication.  Patient also self pull out tube feeding.  And refused to have it reinserted.  Patient also claims she wants to commit suicide.  Legal hold has to be placed.  Psychiatry will be consulted.  Pending speech evaluation tomorrow.    Patient said she just want to eat    Consultants/Specialty  Cardiology  Pulmonology  Neurology      Disposition  pending        Review of Systems   Unable to perform ROS: Mental status change      Physical Exam  Laboratory/Imaging   Hemodynamics  Temp (24hrs), Av.9 °C (98.5 °F), Min:36.7 °C (98.1 °F), Max:37.1 °C (98.8 °F)   Temperature: 37 °C (98.6 °F)  Pulse  Av.9  Min: 10  Max: 140   Blood Pressure : 115/55      Respiratory      Respiration: 16, Pulse Oximetry: 96 %     Work Of Breathing / Effort: Mild  RUL Breath Sounds: Rhonchi, RML Breath Sounds: Rhonchi, RLL Breath Sounds: Diminished, LORI Breath Sounds: Rhonchi, LLL Breath Sounds: Diminished    Fluids    Intake/Output Summary (Last 24 hours) at 10/21/18 1609  Last data filed at 10/21/18 0925   Gross per 24 hour   Intake             1020 ml   Output                0 ml   Net             1020 ml       Nutrition  No orders of the defined types were placed in this encounter.    Physical Exam   Constitutional: She appears well-developed and well-nourished. She appears distressed.   HENT:   Head: Normocephalic and atraumatic.   Right Ear: External ear normal.   Left Ear: External ear normal.   Neck: Normal range of motion. Neck supple. No JVD present.   Cardiovascular: Normal rate, regular rhythm and normal heart sounds.  Exam reveals no gallop and no friction rub.    tachycardia   Pulmonary/Chest: Effort normal. No respiratory distress. She has no wheezes. She has no rales.   Decreased bs at bases   Abdominal: Soft. Bowel sounds are normal. She exhibits no mass. There is no tenderness. There is no guarding.   Musculoskeletal: Normal range of motion. She exhibits no edema.   Neurological:   Awake    confused    Skin: Skin is warm and dry. No rash noted.   Psychiatric: She has a normal mood and affect. Thought content normal.   Nursing note and vitals reviewed.      Recent Labs      10/19/18   1009   WBC  12.1*   RBC  4.50   HEMOGLOBIN  12.6   HEMATOCRIT  40.7   MCV  90.4   MCH  28.0   MCHC  31.0*   RDW  65.8*   PLATELETCT  339   MPV  11.2     Recent Labs      10/19/18   1009  10/20/18   0721  10/21/18   0320   SODIUM  141  142  144   POTASSIUM  3.9  4.4  4.3   CHLORIDE  102  100  100   CO2  37*  32  39*   GLUCOSE  132*  155*  210*   BUN  25*  26*  36*   CREATININE  0.41*  0.36*  0.49*   CALCIUM  9.4  9.6  9.7                   DX-CHEST-2 VIEWS   Final Result      Lingular and left basilar opacity is increased and likely represents atelectasis.      Small hiatal hernia may be present.      Stable cardiomegaly.      Minimal blunting of the costophrenic angle posteriorly may be related to pleural thickening or trace pleural fluid.               DX-ABDOMEN FOR TUBE PLACEMENT   Final Result      Enteric tube projects over the stomach.         DX-CHEST-PORTABLE (1 VIEW)   Final Result      Increasing left midlung zone opacity could indicate consolidation charges atelectasis      Mild interstitial edema, similar to comparison      Stable cardiac silhouette enlargement      CT-SOFT TISSUE NECK WITH   Final Result      1.  No evidence of pharyngeal or laryngeal mass or evidence of adenopathy.      2.  Multiple small bilateral thyroid nodules.      DX-ABDOMEN FOR TUBE PLACEMENT   Final Result         1.  Nonspecific bowel gas pattern.   2.  Dobbhoff tube tip overlying the expected location of the pylorus or first duodenal segment.   3.  Cardiomegaly      DX-CHEST-LIMITED (1 VIEW)   Final Result         1.  Pulmonary edema and/or infiltrates are identified, which are stable since the prior exam.   2.  Dobbhoff tube is seen, may be coiled back within the stomach terminating above the upper margin of the film versus exterior tube  overlying the midline. The distal segment is not visualized. View of the abdomen would be required for definitive    evaluation of tube position.   3.  Cardiomegaly   4.  Atherosclerosis      EC-ECHOCARDIOGRAM LTD W/O CONT   Final Result      US-THORACENTESIS PUNCTURE RIGHT   Final Result      1. Ultrasound guided right sided diagnostic and therapeutic thoracentesis.      2. 750 mL of fluid withdrawn.      DX-CHEST-PORTABLE (1 VIEW)   Final Result      No pneumothorax status post right thoracentesis.   Small residual right pleural effusion and left lung base atelectasis.   Left perihilar/left lung base atelectasis/possible infiltration and small left pleural effusion.      DX-CHEST-PORTABLE (1 VIEW)   Final Result         1.  Pulmonary edema and/or infiltrates are identified, which are stable since the prior exam.      DX-ABDOMEN FOR TUBE PLACEMENT   Final Result      Feeding tube placement with the tip projecting over the distal stomach or duodenal bulb      US-THORACENTESIS PUNCTURE LEFT   Final Result      1. Ultrasound guided left sided diagnostic and therapeutic thoracentesis.      2. 650 mL of fluid withdrawn.      DX-CHEST-LIMITED (1 VIEW)   Final Result      No pneumothorax following left thoracentesis      DX-CHEST-PORTABLE (1 VIEW)   Final Result         Worsening large left pleural effusion with near collapsing of the left lung.      Moderate layering right pleural effusion, worse than prior as well.      DX-ABDOMEN FOR TUBE PLACEMENT   Final Result         Feeding tube with tip projecting over the expected area of the stomach.      DX-CHEST-PORTABLE (1 VIEW)   Final Result         1. No significant interval change.      EC-ECHOCARDIOGRAM LTD W/O CONT   Final Result      CT-CHEST (THORAX) WITH   Final Result      1.  Moderate to large bilateral pleural effusions with bilateral atelectasis.      2.  Moderate pericardial effusion.      3.  Patchy groundglass opacities within the residual aerated upper lobes  bilaterally.      4.  Fatty liver.      5.  Atherosclerotic vascular calcification.            DX-ABDOMEN FOR TUBE PLACEMENT   Final Result         1.  Nonspecific bowel gas pattern.   2.  Dobbhoff tube tip terminates overlying the expected location of the gastric body.   3.  Left lung base infiltrate and/or layering pleural effusion.      [id number] VIEW   Final Result         1.  Nonspecific bowel gas pattern.   2.  Dobbhoff tube tip terminates in the distal esophagus, recommend advancement.   3.  Bilateral lower lobe infiltrates and layering pleural effusions.   4.  Cardiomegaly.      DX-CHEST-LIMITED (1 VIEW)   Final Result      Bilateral pleural effusions with overlying atelectasis/consolidation are again seen.      Mild interstitial prominence may represent mild edema.      Atherosclerotic plaque.         DX-ABDOMEN FOR TUBE PLACEMENT   Final Result      Enteric tube projects over the distal stomach.         EC-ECHOCARDIOGRAM COMPLETE W/O CONT   Final Result      DX-CHEST-PORTABLE (1 VIEW)   Final Result      No significant interval change.      DX-CHEST-PORTABLE (1 VIEW)   Final Result      No significant interval change.      EC-ECHOCARDIOGRAM LTD W/O CONT   Final Result      DX-CHEST-PORTABLE (1 VIEW)   Final Result         1.  Pulmonary edema and/or infiltrates are identified, which are stable since the prior exam. Layering bilateral pleural effusions, greater on the left.   2.  Atherosclerosis               DX-CHEST-PORTABLE (1 VIEW)   Final Result         1.  Pulmonary edema and/or infiltrates are identified, which are stable since the prior exam.   2.  Small right pleural effusion   3.  Atherosclerosis            DX-CHEST-PORTABLE (1 VIEW)   Final Result         1.  Pulmonary edema and/or infiltrates are identified, which are stable since the prior exam.   2.  Small right pleural effusion   3.  Atherosclerosis         DX-CHEST-PORTABLE (1 VIEW)   Final Result         1.  Pulmonary edema and/or  infiltrates are identified, which are stable since the prior exam.   2.  Small right pleural effusion   3.  Atherosclerosis      DX-ABDOMEN FOR TUBE PLACEMENT   Final Result      Feeding tube placement with the tip projecting over the stomach body.      DX-ABDOMEN FOR TUBE PLACEMENT   Final Result      1.  Feeding tube appears looped within the stomach. The distal aspect of the tip is directed cephalad in the fundal region.      DX-CHEST-LIMITED (1 VIEW)   Final Result         1.  Pulmonary edema and/or infiltrates are identified, which are stable since the prior exam.   2.  Small right pleural effusion   3.  Atherosclerosis      DX-CHEST-LIMITED (1 VIEW)   Final Result         1.  Pulmonary edema and/or infiltrates are identified, which are stable since the prior exam.   2.  Atherosclerosis      DX-CHEST-PORTABLE (1 VIEW)   Final Result         1.  Pulmonary edema and/or infiltrates are identified, which are stable since the prior exam.   2.  Atherosclerosis      DX-CHEST-LIMITED (1 VIEW)   Final Result      1.  No pneumothorax identified status post right thoracentesis and pericardiocentesis.      2.  Small amount of pneumomediastinum is likely related to recent pericardiocentesis and drain placement      3.  Cardiomegaly      EC-ECHOCARDIOGRAM LTD W/O CONT   Final Result      DX-CHEST-PORTABLE (1 VIEW)   Final Result      1.  Evacuation large right pleural effusion status post thoracentesis.   2.  No definite right pneumothorax identified. Recommend interval follow-up chest x-ray.   3.  Small left pleural effusion and left perihilar/left lung base atelectasis.   4.  Findings discussed with the patient's nurse. Follow-up chest x-ray will be obtained in approximately 3:00 PM      US-THORACENTESIS PUNCTURE RIGHT   Final Result      1. Ultrasound guided right sided diagnostic and therapeutic thoracentesis.      2. 1700 mL of fluid withdrawn.      EC-ECHOCARDIOGRAM COMPLETE W/O CONT   Final Result       DX-CHEST-PORTABLE (1 VIEW)   Final Result         1.  Pulmonary edema and/or infiltrates are identified, which are stable since the prior exam.   2.  Layering right pleural effusion, stable   3.  Cardiomegaly   4.  Atherosclerosis      DX-CHEST-2 VIEWS   Final Result      1.  Probable bilateral atelectasis      2.  Probable right pleural effusion which may be significant in size      3.  Limited assessment on one view portable radiography      OUTSIDE IMAGES-DX CHEST   Final Result      OUTSIDE IMAGES-DX CHEST   Final Result      EC-ECHOCARDIOGRAM LTD W/O CONT    (Results Pending)   EC-ECHOCARDIOGRAM LTD W/O CONT    (Results Pending)        Assessment/Plan     * Acute hypoxemic respiratory failure (HCC)- (present on admission)   Assessment & Plan    Extubated on 10/7/2018  Close monitor closely  Continue oxygen and RT protocol  Continue oxygen currently improving with Lasix, currently euvolemic cautiously use Lasix.  As needed  Patient's breathing condition improved        Pleural effusion- (present on admission)   Overview    Chemistry suggesting transudate     Assessment & Plan        Patient is currently euvolemic and no signs of worsening Of pleural effusion or pericardial effusion  Discontinue diuretics    Last Thoracentesis on left , done on 10/13    Right side 10/15 tolerated very well and feeling better currently with decreased requirement of oxygen  No signs of infection or malignancy from fluid analysis  Repeat chest x-ray clear.        Pericardial effusion- (present on admission)   Overview    Fluid protein suggesting exudate but all other cytology and chemistry was apparently lost.     Assessment & Plan    Cardiology following   Status post pericardial drain placement  Drain removed on 10/3/2018  Cytology negative fluid cultures negative  GENTRY negative RA factor positive CCP negative    off steroids .. Ineffective and stopped    Etiology is unclear and patient may eventually need pericardial biopsy for  definitive diagnosis -> surgery is aware however at this moment patient wants to be transferred to [facility] for regional referral center for second opinion.  Transfer center notified and working on this transfer.  cytology results negative for malignancy   Likely need to be biopsy to establish definitive diagnosis   repeat echocardiogram pending, patient refused today per        Pulmonary edema- (present on admission)   Assessment & Plan    Monitor fluid status   As needed Lasix, currently pulmonary edema resolved      Hypotension   Assessment & Plan    Relative adrenal insufficiency    Appears resolved for now            History of non-Hodgkin's lymphoma- (present on admission)   Overview    Nonhodgkins lymphoma  2000, treated with CHOP and MTX but never radiation therapy     Assessment & Plan    Hx of         HTN (hypertension)- (present on admission)   Assessment & Plan    All bp medications held  Cont to monitor        Dysphagia- (present on admission)   Assessment & Plan    Continue with speech therapy   Continue tube feeding, patient also continued to have cough, start patient on cough syrup and also throat spray for comfort.  Currently still complains of sore throat  May need prolonged time to recover to regain swallow function  CT neck evaluation (-)  ENT recommend Decadron 12 mg 3 times total.  Will finish by the end of today.  Patient tolerated.        Hypernatremia   Assessment & Plan    water flushes , 300 cc q6          Hypomagnesemia   Assessment & Plan    Repleted        Hypokalemia   Assessment & Plan    Replace today and BID        Tobacco dependence- (present on admission)   Assessment & Plan    - Smoking cessation education provided  - Nicotine patch          Hypothyroidism- (present on admission)   Assessment & Plan    Continue levothyroxine          Quality-Core Measures   Reviewed items::  EKG reviewed, Labs reviewed, Medications reviewed and Radiology images reviewed  Almonte catheter::   No Almonte  DVT prophylaxis pharmacological::  Heparin  DVT prophylaxis - mechanical:  SCDs  Ulcer Prophylaxis::  Not indicated    I have discussed with RN and CM and SW and other consultants about patient's plan.      Suicidal ideation:  -Likely due to frustration of medical condition.  Consult psychiatry and put patient on legal hold.

## 2018-10-21 NOTE — PROGRESS NOTES
1:1 sitter at bedside. All personal belongings removed room/ Call light and phone also removed. Pt currently in bed. No needs at this time.

## 2018-10-22 NOTE — PROGRESS NOTES
0715: Bedside report received.  Pt resting in bed. SI precautions in place with 1:1 sitter at bedside.  Pt denies any pain or needs at this time.  Bed locked in lowest position.  SLP notified of pt removal of cortrak--will come to reevaluate today.    0740:  Pt more cooperative this morning than yesterday--allowing CNA to obtain VS.  Echo tech at bedside--completed.    0830: Cards at bedside.  Updated that pts  is coming around noon today and wants to speak to MD --OK to call Aristides Bellamy @ [phone] when  arrives.    0915: Psych at bedside.    1030: PT at bedside --OOB to shower and for ambulation.    1145:  at bedside, cards at bedside.    1320: Legal hold discontinued per psych.    1800: Pt tolerated dinner very well --100% intake.  Up to chair with  for numerous hours, very pleasant.

## 2018-10-22 NOTE — PROGRESS NOTES
Bedside report received. Pt refusing care at this time. Call light within reach, fall precautions in place. 1:1 sitter at bedside. Legal hold in place.

## 2018-10-22 NOTE — THERAPY
"Occupational Therapy Treatment completed with focus on ADLs and ADL transfers.  Functional Status:  CGA seated shower, CGA UB dressing, SBA LB dressing, CGA standing grooming- brushed hair, CGA functional mobility w/ FWW  Plan of Care: Will benefit from Occupational Therapy 3 times per week  Discharge Recommendations:  Equipment Will Continue to Assess for Equipment Needs. Post-acute therapy Discharge to a transitional care facility for continued skilled therapy services.    See \"Rehab Therapy-Acute\" Patient Summary Report for complete documentation.     Pt seen for OT tx on this date. Although still confused, pt was easily redirected and oriented on this date. Pt performed seated shower w/ occasional tactile cues for balance and verbal cues for sequencing.  Pt continues to make progress towards acute OT goals as she now requires SBA to don socks and performed shower txf (more difficult than toilet) w/ CGA . Will continue to see pt for Acute OT services to facilitate increased independence w/ BADLs.     "

## 2018-10-22 NOTE — PROGRESS NOTES
[facility] Cardiology Follow-up Consult Note  Consulting physician: Lia Ann M.D.    Chief Complaint   Patient presents with   • Shortness of Breath     3 weeks on and off   • Peripheral Edema     started 3 weeks ago in her feet and is now up to her thighs     ID:  79 yo female from [place], [place] with history of non-Hodgkin's lympoma status post chemotherapy with CHOP and MTX that presented 9/28 from an outside facility for shortness of breath.  Found bilateral pleural effusion and pericardial effusion.  10/1, percardiocentesis with 1000cc bloody pericardial fluid drained.  She has received right and left thoracenteses for recurrent pleural effusions.  Cardiology consulted to aid in evaluation/management of pericardial effusion of unknown cause.      Interim Events:  -Overnight:  Patient slept well  -Mental status: Continues to be Irritable and refusing exam.  Mood sometimes helped with the presence of family.    -Telemetry:  Yesterday at 6pm, sinus  -Vitals:  Afebrile, saturating in the low 90s on room air.  -Pericardial effusion:  Repeat echocardiogram performed this morning and showed no change.  -Pleural effusions: Oxygen demand improving.  Pleural effusions mostly stable based on last CXR 10/20.  -Family visit:  Spoke with  for 15 mins today. I explained to him that both pericardial effusion and pleural effusion had not returned and are stable.  He expressed concern that his wife required a more complete autoimmune workup.  I explained to him initial workup was not indicative of an autoimmune disorder:  Normal CRP, GENTRY.  RF was positive, but CCP was negative.      *Given patient stability of pericardial effusion and pleural effusion, cardiology signing off.    -Disposition:   indicates desire to hold any invasive procedures while at [facility].  He would like to transfer her to a hospital in the [place] area.  Per Benjamin Moody RN, insurance will not pay for transfer to [facility],  because there is no clear need for higher level of care.      Review of Systems   Constitutional: Negative for chills and fever.   Respiratory: Positive for sputum production. Negative for shortness of breath.    Cardiovascular: Negative for chest pain and palpitations.     Past medical, surgical, social, and family history reviewed and unchanged from admission except as noted in assessment and plan.    Medications: Reviewed in MAR  Current Facility-Administered Medications   Medication Dose Frequency Provider Last Rate Last Dose   • racepinephrine (MICRONEFRIN) 2.25 % nebulizer solution 0.5 mL  0.5 mL Q4H PRN (RT) Muhammad K Gondal, M.D.   0.5 mL at 10/17/18 1218   • guaiFENesin (ROBITUSSIN) 100 MG/5ML solution 200 mg  10 mL Q6HRS PRN Lia Ann M.D.   200 mg at 10/21/18 0604   • sore throat spray (CHLORASEPTIC) 1 Spray  1 Spray Q2HRS PRN Lia Ann M.D.   1 Spray at 10/19/18 0458   • haloperidol lactate (HALDOL) injection 1 mg  1 mg Q6HRS PRN Ricardo Torres M.D.   1 mg at 10/21/18 1502   • insulin regular (HUMULIN R) injection 2-9 Units  2-9 Units Q6HRS Ricardo Torres M.D.   Stopped at 10/21/18 0600    And   • glucose 4 g chewable tablet 16 g  16 g Q15 MIN PRN Ricardo Torres M.D.        And   • dextrose 50% (D50W) injection 25 mL  25 mL Q15 MIN PRN Ricardo Torres M.D.       • Pharmacy Consult: Enteral tube feeding - review meds/change route/product selection   PRN Ricardo Torres M.D.       • senna-docusate (PERICOLACE or SENOKOT S) 8.6-50 MG per tablet 2 Tab  2 Tab BID Ricardo Torres M.D.   Stopped at 10/13/18 0600    And   • polyethylene glycol/lytes (MIRALAX) PACKET 1 Packet  1 Packet QDAY PRN Ricardo Torres M.D.        And   • magnesium hydroxide (MILK OF MAGNESIA) suspension 30 mL  30 mL QDAY PRN Ricardo Torres M.D.        And   • bisacodyl (DULCOLAX) suppository 10 mg  10 mg QDAY PRN Ricardo Torres M.D.       • oxyCODONE immediate-release (ROXICODONE) tablet 5 mg  5 mg Q4HRS PRN Ricardo ALEXANDER  DALY Torres   5 mg at 10/16/18 0336    Or   • oxyCODONE immediate-release (ROXICODONE) tablet 2.5 mg  2.5 mg Q4HRS PRN Ricardo Torres M.D.       • levothyroxine (SYNTHROID) tablet 88 mcg  88 mcg AM ES Ricardo Torres M.D.   Stopped at 10/22/18 0600   • diphenhydrAMINE (BENADRYL) tablet/capsule 25 mg  25 mg HS PRN Ricardo Torres M.D.   25 mg at 10/20/18 1944   • acetaminophen (TYLENOL) tablet 650 mg  650 mg Q4HRS PRN Ricardo Torres M.D.   650 mg at 10/15/18 2021    Or   • acetaminophen (TYLENOL) suppository 650 mg  650 mg Q4HRS PRN Ricardo Torres M.D.       • simvastatin (ZOCOR) tablet 20 mg  20 mg Q EVENING Ricardo Torres M.D.   Stopped at 10/21/18 1800   • gabapentin (NEURONTIN) capsule 300 mg  300 mg BID Ricardo Torres M.D.   Stopped at 10/21/18 1800   • ondansetron (ZOFRAN) syringe/vial injection 4 mg  4 mg Q4HRS PRN Ricardo Torres M.D.   4 mg at 10/10/18 1339   • fentaNYL (SUBLIMAZE) injection  mcg   mcg Q HOUR PRN Jarvis Arnold M.D.   50 mcg at 10/07/18 1650   • racepinephrine (MICRONEFRIN) 2.25 % nebulizer solution 0.5 mL  0.5 mL Q4H PRN (RT) Jarvis Arnold M.D.   0.5 mL at 10/08/18 0737   • artificial tears 1.4 % ophthalmic solution 1 Drop  1 Drop Q2HRS PRN Jarvis Arnold M.D.   1 Drop at 10/11/18 2117   • heparin injection 5,000 Units  5,000 Units Q8HRS Néstor Guillen M.D.   5,000 Units at 10/21/18 0604   • Respiratory Care per Protocol   Continuous RT Alvaro Perez M.D.       • ipratropium-albuterol (DUONEB) nebulizer solution  3 mL Q2HRS PRN (RT) Jarvis Arnold M.D.   3 mL at 10/13/18 1804   • labetalol (NORMODYNE,TRANDATE) injection 10 mg  10 mg Q30 MIN PRN Constantin Self M.D.       • nicotine (NICODERM) 14 MG/24HR 14 mg  14 mg Daily-0600 Hazel Cruz M.D.   14 mg at 10/21/18 0604    And   • nicotine polacrilex (NICORETTE) 2 MG piece 2 mg  2 mg Q HOUR PRN Hazel Cruz M.D.         Last reviewed on 9/29/2018  4:47 PM by Jaylin ALEXANDER  "[place], PhT  No Known Allergies    Physical Exam  Body mass index is 21.49 kg/m². Blood pressure 114/55, pulse 91, temperature 36.9 °C (98.5 °F), resp. rate 16, height 1.575 m (5' 2.01\"), weight 53.3 kg (117 lb 8.1 oz), SpO2 93 %, not currently breastfeeding.   Vitals:    10/21/18 1900 10/21/18 2000 10/22/18 0441 10/22/18 0716   BP: 102/71  119/58 114/55   Pulse: 72  88 91   Resp: 17  16 16   Temp: 36.5 °C (97.7 °F)   36.9 °C (98.5 °F)   SpO2: 94%  93% 93%   Weight:  53.3 kg (117 lb 8.1 oz)     Height:        Oxygen Therapy:  Pulse Oximetry: 93 %, O2 (LPM): 0, O2 Delivery: None (Room Air)    Physical Exam   Constitutional: She is oriented to person, place, and time. She appears well-developed. No distress.   Slightly disheveled appearing.  No apparent distress.   HENT:   Head: Normocephalic and atraumatic.   Right Ear: External ear normal.   Left Ear: External ear normal.   Nose: Nose normal.   Mouth/Throat: Oropharynx is clear and moist. No oropharyngeal exudate.   Cortrak in place   Eyes: Pupils are equal, round, and reactive to light. Conjunctivae and EOM are normal. Right eye exhibits no discharge. Left eye exhibits no discharge. No scleral icterus.   Neck: Neck supple. No JVD present. No tracheal deviation present. No thyromegaly present.   Cardiovascular: Normal rate, regular rhythm, normal heart sounds and intact distal pulses.  Exam reveals no gallop and no friction rub.    No murmur heard.  Pulmonary/Chest: Effort normal. No respiratory distress. She has no wheezes. She has no rales.   Coarse upper air way sounds   Abdominal: Soft. Bowel sounds are normal. She exhibits no distension. There is no tenderness. There is no rebound and no guarding.   Musculoskeletal: She exhibits no edema, tenderness or deformity.   Lymphadenopathy:     She has no cervical adenopathy.   Neurological: She is alert and oriented to person, place, and time. No cranial nerve deficit or sensory deficit. She exhibits normal muscle " tone.   Skin: Skin is warm and dry. No rash noted. She is not diaphoretic. No erythema. No pallor.   Psychiatric: Her behavior is normal. Judgment and thought content normal.   Dysphoric mood   Nursing note and vitals reviewed.        Labs (personally reviewed and notable for):   Recent Results (from the past 24 hour(s))   Comp Metabolic Panel (CMP) - Every Monday    Collection Time: 10/22/18  3:06 AM   Result Value Ref Range    Sodium 145 135 - 145 mmol/L    Potassium 3.5 (L) 3.6 - 5.5 mmol/L    Chloride 105 96 - 112 mmol/L    Co2 33 20 - 33 mmol/L    Anion Gap 7.0 0.0 - 11.9    Glucose 86 65 - 99 mg/dL    Bun 28 (H) 8 - 22 mg/dL    Creatinine 0.35 (L) 0.50 - 1.40 mg/dL    Calcium 9.2 8.5 - 10.5 mg/dL    AST(SGOT) 21 12 - 45 U/L    ALT(SGPT) 32 2 - 50 U/L    Alkaline Phosphatase 55 30 - 99 U/L    Total Bilirubin 0.5 0.1 - 1.5 mg/dL    Albumin 3.1 (L) 3.2 - 4.9 g/dL    Total Protein 5.9 (L) 6.0 - 8.2 g/dL    Globulin 2.8 1.9 - 3.5 g/dL    A-G Ratio 1.1 g/dL   ESTIMATED GFR    Collection Time: 10/22/18  3:06 AM   Result Value Ref Range    GFR If African American >60 >60 mL/min/1.73 m 2    GFR If Non African American >60 >60 mL/min/1.73 m 2   EC-ECHOCARDIOGRAM LTD W/O CONT    Collection Time: 10/22/18  9:41 AM   Result Value Ref Range    Left Ventrical Ejection Fraction 60        Cardiac Imaging and Procedures Review:    EKG and telemetry tracings personally reviewed    Impression and Medical Decision Making:  Principal Problem:    Acute hypoxemic respiratory failure (HCC) POA: Yes  Active Problems:    Pulmonary edema POA: Yes    Pericardial effusion POA: Yes      Overview: Fluid protein suggesting exudate but all other cytology and chemistry was       apparently lost.    Pleural effusion POA: Yes      Overview: Chemistry suggesting transudate    HTN (hypertension) POA: Yes    History of non-Hodgkin's lymphoma POA: Yes      Overview: Nonhodgkins lymphoma  2000, treated with CHOP and MTX but never radiation        therapy    Hypotension POA: No    Hypothyroidism POA: Yes    Tobacco dependence POA: Yes    Hypokalemia POA: No    Hypomagnesemia POA: No    Hypernatremia POA: No    Dysphagia POA: Yes  Resolved Problems:    Hypophosphatemia POA: No    Acute encephalopathy POA: Yes      Assessment and Plan    Pericardial Effusion  No clinical change in cardiac status.  -Etiology is still unclear. Suspecting possible malignancy.  May be related to previous chemotherapy with CHOP and MTX.  -Patient presented with large pericardial effusion. EKG with very low voltage on admission.  -Underwent drainage 10/1. 1000 cc of bloody pericardial fluid was obtained. Pericardial drain removed. She likely developed pericardial decompression syndrome post pericardiocentesis. Grossly was bloody (some traumatic component). Cytology negative for malignant cells. Unlikely to be infectious.  -GENTRY negative, RA elevated but CCP negative.  -Echo 10/11: small effusion without hemodynamic compromise  -Echo 10/16:  Ef 55%, small loculated pericardial effusion wo evidence of hemodynamic compromise.  Effusion mildly worsened in comparison to prior study.  -Echo 10/22 showed EF 60 and small loculated pericardial effusion.  Incomparison to the study on 10/16, there has been an improvement in pericardial effusion.    -Pericardial biopsy was recommended for further workup but by declined by patient and .  Cardiology agrees that a biopsy would have limited utility in diagnosis of pericardial effusion.      Plan:  -Given stability/improvement in pericardial effusion.  Cardiology signing off.  -Recommend follow up in 1-2 months with either repeat echocardiogram or chest xray.    Pleural Effusion  Recurrent. Currently stable.  -Right sided thoracentesis on 10/1. 1.7 liters have been removed.   -Left sided thoracentesis 10/13 with 650 cc removed.   -Right thoracentisis 10/15 with 750 cc removed.    -10/20 PA/L CXR showed blunting of costophrenic angles  posteriorly.    -Plan: OK with diuresis if needed.  Continue to monitor electrolytes and renal function.    Pulmonary Edema vs pulmonary fibrosis  -Has been diuresed significantly since admission however subsequently became intravascularly dry; treated with IV fluids.  -She unfortunately likely developed pericardial decompression syndrome post pericardiocentesis.   -History of reaction to methotrexate and CHOP  -Plan:  OK with diuresis with Lasix if needed.  Continue to monitor electrolytes and renal function    Sinus Tachycardia  Resolved  -Has been normal sinus rhythm and rate during her hospitalization however now appears to have inappropriat tachycardia.   -Unclear etiology at this point. May be contributed by the effusions and some anxiety component.  -Plan:  Will continue to monitor    Hypernatremia  Sodium slightly increased to 146. Asymptomatic.  -Monitor with BMP    Hypokalemia  Resolved  -Plan: recommend monitoring and repletion as needed.    Hypertension  -Holding outpatient HCTZ and amlodipine. Resume when BP stabilizes     DLD  -continue home simvastatin    It is my pleasure to participate in the care of Ms. Quigley.  Please do not hesitate to contact me with questions or concerns. Will continue to follow

## 2018-10-22 NOTE — PSYCHIATRY
"BRIEF PSYCHIATRIC CONSULT NOTE: patient seen - continues to be irritable and was disoriented during exam - Pt does NOT have capacity to make medical decisions at this time. Legal hold may be discontinued at this time as pt made \"suicidal statements\" that were made out of frustration and pt denies (and does not appear) to have any real intent to what was said.- full note to follow.  -Legal Hold: Discontinued - Voluntary   -Sitter:yes  -Restrictions:   -phone:supervised - at nursing discretion    -visitors:supervised - at nursing discretion    -personal items: no   -finger foods required: no   -personal clothes: no  -Orders Placed: routine  -Plan:continue to follow       "

## 2018-10-22 NOTE — PROGRESS NOTES
[facility] Hospitalist Progress Note    Date of Service: 10/22/2018    Chief Complaint    78 y.o. Female from [facility] admitted 9/28/2018 with shortness of breath and peripheral edema found to have significant pericardial effusion.    Pericardial effusion tapped 10/1  Intubated 10/2  Extubated 10/7      Interval Problem Update  78 y.o. Female from [facility] admitted 9/28/2018 with shortness of breath and peripheral edema found to have significant pericardial effusion.  Patient was seen by cardiology and Pericardial effusion tapped 10/1.  Post patient and the patient was Intubated 10/2 and remained ICU for close monitoring and treatment.  Patient was successfully extubated on 10/7.  Patient's pericardial effusion showing no signs of infection.  Patient was also noted to have bilateral pleural effusion patient was then underwent thoracentesis and the fluid analysis showing no signs of infection.  Patient was seen by infectious disease specialist and pulmonologist.  Patient multiple pleural effusion and pericardial effusion Etiology is unclear but looks to be autoimmune.  Patient was then consulted by rheumatology and recommended to be on steroid however without significant improvement.  Cardiologist put patient on diuretics and recommend patient to be had a pericardial biopsy to make the diagnosis however patient refused and patient's family also refused.  Patient developed dysphagia post extubation and was on tube feeding.  Patient gradually recovered however tube feeding was remaining for quite a while.  ENT physician was consulted and recommended short-term of high-dose Decadron 12 mg 3 times daily for 3 times.  Patient tolerated this treatment and the patient's tube feeding was pulled out by herself on 10/21. 10/22 patient underwent reevaluation by speech evaluation and patient swallow function significantly improved and was restarted dysphagia diet.  Patient condition has been stabilized and euvolemic.       10/22 patient currently stable, passed a speech swallow evaluation and restart on diet.  Patient is pleasant.  Need home O2, PT OT and speech therapy at home with home health.  Patient will be monitored overnight again and possible discharge tomorrow.  Consultants/Specialty  Cardiology  Pulmonology  Neurology      Disposition  Home possible tmr        Review of Systems   Unable to perform ROS: Mental status change      Physical Exam  Laboratory/Imaging   Hemodynamics  Temp (24hrs), Av.7 °C (98.1 °F), Min:36.5 °C (97.7 °F), Max:36.9 °C (98.5 °F)   Temperature: 36.9 °C (98.5 °F)  Pulse  Av.8  Min: 10  Max: 140   Blood Pressure : 114/55      Respiratory      Respiration: 16, Pulse Oximetry: 93 %     Work Of Breathing / Effort: Mild  RUL Breath Sounds: Rhonchi, RML Breath Sounds: Rhonchi, RLL Breath Sounds: Rhonchi, LORI Breath Sounds: Rhonchi, LLL Breath Sounds: Clear;Diminished    Fluids    Intake/Output Summary (Last 24 hours) at 10/22/18 1343  Last data filed at 10/21/18 1700   Gross per 24 hour   Intake                0 ml   Output                0 ml   Net                0 ml       Nutrition  Orders Placed This Encounter   Procedures   • Diet Order Full Liquid     Standing Status:   Standing     Number of Occurrences:   1     Order Specific Question:   Diet:     Answer:   Full Liquid [11]     Order Specific Question:   Consistency/Fluid modifications:     Answer:   Nectar Thick [2]     Order Specific Question:   Miscellaneous modifications:     Answer:   SLP - 1:1 Supervision by Nursing [21]     Physical Exam   Constitutional: She appears well-developed and well-nourished. No distress.   HENT:   Head: Normocephalic and atraumatic.   Right Ear: External ear normal.   Left Ear: External ear normal.   Eyes: Pupils are equal, round, and reactive to light. Conjunctivae and EOM are normal.   Neck: Normal range of motion. Neck supple. No JVD present.   Cardiovascular: Normal rate, regular rhythm and normal  heart sounds.  Exam reveals no gallop.    No murmur heard.  tachycardia   Pulmonary/Chest: Effort normal. No respiratory distress. She has no rales. She exhibits no tenderness.   Decreased bs at bases   Abdominal: Soft. Bowel sounds are normal. She exhibits no mass. There is no tenderness. There is no guarding.   Musculoskeletal: Normal range of motion. She exhibits no edema.   Neurological:   Awake    confused   Skin: Skin is warm and dry. No rash noted.   Psychiatric: She has a normal mood and affect. Thought content normal.   Nursing note and vitals reviewed.          Recent Labs      10/20/18   0721  10/21/18   0320  10/22/18   0306   SODIUM  142  144  145   POTASSIUM  4.4  4.3  3.5*   CHLORIDE  100  100  105   CO2  32  39*  33   GLUCOSE  155*  210*  86   BUN  26*  36*  28*   CREATININE  0.36*  0.49*  0.35*   CALCIUM  9.6  9.7  9.2                   EC-ECHOCARDIOGRAM LTD W/O CONT   Final Result      DX-CHEST-2 VIEWS   Final Result      Lingular and left basilar opacity is increased and likely represents atelectasis.      Small hiatal hernia may be present.      Stable cardiomegaly.      Minimal blunting of the costophrenic angle posteriorly may be related to pleural thickening or trace pleural fluid.               DX-ABDOMEN FOR TUBE PLACEMENT   Final Result      Enteric tube projects over the stomach.         DX-CHEST-PORTABLE (1 VIEW)   Final Result      Increasing left midlung zone opacity could indicate consolidation charges atelectasis      Mild interstitial edema, similar to comparison      Stable cardiac silhouette enlargement      CT-SOFT TISSUE NECK WITH   Final Result      1.  No evidence of pharyngeal or laryngeal mass or evidence of adenopathy.      2.  Multiple small bilateral thyroid nodules.      DX-ABDOMEN FOR TUBE PLACEMENT   Final Result         1.  Nonspecific bowel gas pattern.   2.  Dobbhoff tube tip overlying the expected location of the pylorus or first duodenal segment.   3.  Cardiomegaly       DX-CHEST-LIMITED (1 VIEW)   Final Result         1.  Pulmonary edema and/or infiltrates are identified, which are stable since the prior exam.   2.  Dobbhoff tube is seen, may be coiled back within the stomach terminating above the upper margin of the film versus exterior tube overlying the midline. The distal segment is not visualized. View of the abdomen would be required for definitive    evaluation of tube position.   3.  Cardiomegaly   4.  Atherosclerosis      EC-ECHOCARDIOGRAM LTD W/O CONT   Final Result      US-THORACENTESIS PUNCTURE RIGHT   Final Result      1. Ultrasound guided right sided diagnostic and therapeutic thoracentesis.      2. 750 mL of fluid withdrawn.      DX-CHEST-PORTABLE (1 VIEW)   Final Result      No pneumothorax status post right thoracentesis.   Small residual right pleural effusion and left lung base atelectasis.   Left perihilar/left lung base atelectasis/possible infiltration and small left pleural effusion.      DX-CHEST-PORTABLE (1 VIEW)   Final Result         1.  Pulmonary edema and/or infiltrates are identified, which are stable since the prior exam.      DX-ABDOMEN FOR TUBE PLACEMENT   Final Result      Feeding tube placement with the tip projecting over the distal stomach or duodenal bulb      US-THORACENTESIS PUNCTURE LEFT   Final Result      1. Ultrasound guided left sided diagnostic and therapeutic thoracentesis.      2. 650 mL of fluid withdrawn.      DX-CHEST-LIMITED (1 VIEW)   Final Result      No pneumothorax following left thoracentesis      DX-CHEST-PORTABLE (1 VIEW)   Final Result         Worsening large left pleural effusion with near collapsing of the left lung.      Moderate layering right pleural effusion, worse than prior as well.      DX-ABDOMEN FOR TUBE PLACEMENT   Final Result         Feeding tube with tip projecting over the expected area of the stomach.      DX-CHEST-PORTABLE (1 VIEW)   Final Result         1. No significant interval change.       EC-ECHOCARDIOGRAM LTD W/O CONT   Final Result      CT-CHEST (THORAX) WITH   Final Result      1.  Moderate to large bilateral pleural effusions with bilateral atelectasis.      2.  Moderate pericardial effusion.      3.  Patchy groundglass opacities within the residual aerated upper lobes bilaterally.      4.  Fatty liver.      5.  Atherosclerotic vascular calcification.            DX-ABDOMEN FOR TUBE PLACEMENT   Final Result         1.  Nonspecific bowel gas pattern.   2.  Dobbhoff tube tip terminates overlying the expected location of the gastric body.   3.  Left lung base infiltrate and/or layering pleural effusion.      [id number] VIEW   Final Result         1.  Nonspecific bowel gas pattern.   2.  Dobbhoff tube tip terminates in the distal esophagus, recommend advancement.   3.  Bilateral lower lobe infiltrates and layering pleural effusions.   4.  Cardiomegaly.      DX-CHEST-LIMITED (1 VIEW)   Final Result      Bilateral pleural effusions with overlying atelectasis/consolidation are again seen.      Mild interstitial prominence may represent mild edema.      Atherosclerotic plaque.         DX-ABDOMEN FOR TUBE PLACEMENT   Final Result      Enteric tube projects over the distal stomach.         EC-ECHOCARDIOGRAM COMPLETE W/O CONT   Final Result      DX-CHEST-PORTABLE (1 VIEW)   Final Result      No significant interval change.      DX-CHEST-PORTABLE (1 VIEW)   Final Result      No significant interval change.      EC-ECHOCARDIOGRAM LTD W/O CONT   Final Result      DX-CHEST-PORTABLE (1 VIEW)   Final Result         1.  Pulmonary edema and/or infiltrates are identified, which are stable since the prior exam. Layering bilateral pleural effusions, greater on the left.   2.  Atherosclerosis               DX-CHEST-PORTABLE (1 VIEW)   Final Result         1.  Pulmonary edema and/or infiltrates are identified, which are stable since the prior exam.   2.  Small right pleural effusion   3.  Atherosclerosis             DX-CHEST-PORTABLE (1 VIEW)   Final Result         1.  Pulmonary edema and/or infiltrates are identified, which are stable since the prior exam.   2.  Small right pleural effusion   3.  Atherosclerosis         DX-CHEST-PORTABLE (1 VIEW)   Final Result         1.  Pulmonary edema and/or infiltrates are identified, which are stable since the prior exam.   2.  Small right pleural effusion   3.  Atherosclerosis      DX-ABDOMEN FOR TUBE PLACEMENT   Final Result      Feeding tube placement with the tip projecting over the stomach body.      DX-ABDOMEN FOR TUBE PLACEMENT   Final Result      1.  Feeding tube appears looped within the stomach. The distal aspect of the tip is directed cephalad in the fundal region.      DX-CHEST-LIMITED (1 VIEW)   Final Result         1.  Pulmonary edema and/or infiltrates are identified, which are stable since the prior exam.   2.  Small right pleural effusion   3.  Atherosclerosis      DX-CHEST-LIMITED (1 VIEW)   Final Result         1.  Pulmonary edema and/or infiltrates are identified, which are stable since the prior exam.   2.  Atherosclerosis      DX-CHEST-PORTABLE (1 VIEW)   Final Result         1.  Pulmonary edema and/or infiltrates are identified, which are stable since the prior exam.   2.  Atherosclerosis      DX-CHEST-LIMITED (1 VIEW)   Final Result      1.  No pneumothorax identified status post right thoracentesis and pericardiocentesis.      2.  Small amount of pneumomediastinum is likely related to recent pericardiocentesis and drain placement      3.  Cardiomegaly      EC-ECHOCARDIOGRAM LTD W/O CONT   Final Result      DX-CHEST-PORTABLE (1 VIEW)   Final Result      1.  Evacuation large right pleural effusion status post thoracentesis.   2.  No definite right pneumothorax identified. Recommend interval follow-up chest x-ray.   3.  Small left pleural effusion and left perihilar/left lung base atelectasis.   4.  Findings discussed with the patient's nurse. Follow-up chest  x-ray will be obtained in approximately 3:00 PM      US-THORACENTESIS PUNCTURE RIGHT   Final Result      1. Ultrasound guided right sided diagnostic and therapeutic thoracentesis.      2. 1700 mL of fluid withdrawn.      EC-ECHOCARDIOGRAM COMPLETE W/O CONT   Final Result      DX-CHEST-PORTABLE (1 VIEW)   Final Result         1.  Pulmonary edema and/or infiltrates are identified, which are stable since the prior exam.   2.  Layering right pleural effusion, stable   3.  Cardiomegaly   4.  Atherosclerosis      DX-CHEST-2 VIEWS   Final Result      1.  Probable bilateral atelectasis      2.  Probable right pleural effusion which may be significant in size      3.  Limited assessment on one view portable radiography      OUTSIDE IMAGES-DX CHEST   Final Result      OUTSIDE IMAGES-DX CHEST   Final Result      EC-ECHOCARDIOGRAM LTD W/O CONT    (Results Pending)        Assessment/Plan     * Acute hypoxemic respiratory failure (HCC)- (present on admission)   Assessment & Plan    Extubated on 10/7/2018  Close monitor closely  Continue oxygen and RT protocol  Continue oxygen currently improving with Lasix, currently euvolemic cautiously use Lasix.  As needed  Patient's breathing condition improved  Continue oxygen.        Pleural effusion- (present on admission)   Overview    Chemistry suggesting transudate     Assessment & Plan        Patient is currently euvolemic and no signs of worsening Of pleural effusion or pericardial effusion  Discontinue diuretics    Last Thoracentesis on left , done on 10/13    Right side 10/15 tolerated very well and feeling better currently with decreased requirement of oxygen  No signs of infection or malignancy from fluid analysis  Repeat chest x-ray clear no signs of pleural effusion reaccumulation.        Pericardial effusion- (present on admission)   Overview    Fluid protein suggesting exudate but all other cytology and chemistry was apparently lost.     Assessment & Plan    Cardiology  following   Status post pericardial drain placement  Drain removed on 10/3/2018  Cytology negative fluid cultures negative  GENTRY negative RA factor positive CCP negative    off steroids .. Ineffective and stopped    Etiology is unclear and patient may eventually need pericardial biopsy for definitive diagnosis -> surgery is aware however at this moment patient wants to be transferred to [facility] for regional referral center for second opinion.  Transfer center notified and working on this transfer.  cytology results negative for malignancy   Likely need to be biopsy to establish definitive diagnosis   repeat echocardiogram pending, patient refused today  Family does not want to pursue biopsy at this moment.  Family wants patient to be discharged home possible tomorrow.      Pulmonary edema- (present on admission)   Assessment & Plan    Monitor fluid status   As needed Lasix, currently pulmonary edema resolved      Hypotension   Assessment & Plan    Relative adrenal insufficiency  Appears resolved for now  Close monitor          History of non-Hodgkin's lymphoma- (present on admission)   Overview    Nonhodgkins lymphoma  2000, treated with CHOP and MTX but never radiation therapy     Assessment & Plan    Hx of         HTN (hypertension)- (present on admission)   Assessment & Plan    All bp medications held  Cont to monitor        Dysphagia- (present on admission)   Assessment & Plan    Continue with speech therapy   Patient passed swallow evaluation today and restart diet significant improvement  CT neck evaluation (-)  ENT recommend Decadron 12 mg 3 times total.  Finished steroid.  Continue working with speech therapy      Hypernatremia   Assessment & Plan    water flushes , 300 cc q6          Hypomagnesemia   Assessment & Plan    Repleted        Hypokalemia   Assessment & Plan    Replace today and BID        Tobacco dependence- (present on admission)   Assessment & Plan    - Smoking cessation education  provided  - Nicotine patch          Hypothyroidism- (present on admission)   Assessment & Plan    Continue levothyroxine          Quality-Core Measures   Reviewed items::  EKG reviewed, Labs reviewed, Medications reviewed and Radiology images reviewed  Almonte catheter::  No Almonte  DVT prophylaxis pharmacological::  Heparin  DVT prophylaxis - mechanical:  SCDs  Ulcer Prophylaxis::  Not indicated    I have discussed with RN and CM and SW and other consultants about patient's plan.      Suicidal ideation:  -Likely due to frustration of medical condition.  Legal hold discontinued by psych

## 2018-10-22 NOTE — CARE PLAN
Problem: Communication  Goal: The ability to communicate needs accurately and effectively will improve  Outcome: PROGRESSING AS EXPECTED  Educated pt on calling for assistance. Pt verbalizes understanding, unwilling to cooperate with staff at this time.    Problem: Safety  Goal: Will remain free from injury  Outcome: PROGRESSING AS EXPECTED  Bed in lowest position and locked. Pt wearing non-slip socks. Bed alarm in use. 1:1 sitter at bedside for legal hold. All items unnecessary for medical treatment removed from room.

## 2018-10-22 NOTE — THERAPY
"Speech Language Therapy dysphagia treatment completed.   Functional Status:  The patient was seen for dysphagia therapy this date following cortrak displacment. Patient was awake, alert and oriented to self only with continued confusion noted. The patient was sitting upright in bedside chair with sitter and  present. The patient was able to complete sustained nasal sounds this session with improved voice quality although remains slightly hoarse and decreased intensity. The patient was given PO trials of ice chips which results in immediate cough response. The patient consumed NTL and puree trials with delayed cough  x2 with noted head in extension during swallow. With cues to modulate bite/sip size and keep head in neutral position, the patient had no further episodes of throat clearing or coughing. Discussed at length with patient's  regarding results of FEES and current swallow function.  declined repeat diagnostic at this time as patient \"will not have another tube replaced.\"  elected to begin safest PO diet of NTFL despite risks.     Recommendations: 1) begin safest PO diet of NTFL despite risks. 2) Standby supervision required.     Plan of Care: Will benefit from Speech Therapy 3 times per week.    Post-Acute Therapy: Discharge to a transitional care facility for continued skilled therapy services.    See \"Rehab Therapy-Acute\" Patient Summary Report for complete documentation.     "

## 2018-10-23 NOTE — DIETARY
Nutrition Services:  Brief Update    Pt previously on tube feedings.  Pt pulled Cortrak; refused replacement.  Tube feedings are off.  Swallow evaluation by SLP 10/22 and 10/23; recommended continued NPO w/ Cortrak.  Nectar thick, full liquid diet recommended by SLP as safest PO diet.  Pt eating despite aspiration risk.  Per ADL flow sheet, PO <25 - 100%.  Boost Plus supplements added with thickener BID to encourage PO and ensure adequate intake.    Recommendations/Plan:  1. Advance diet as tolerated per SLP/MD.  2. Encourage intake of meals and supplements.  3. Document intake of all meals and supplements as % taken in ADLs to provide interdisciplinary communication across all shifts.   4. Monitor weight.  5. Nutrition rep will continue to see patient for ongoing meal and snack preferences.

## 2018-10-23 NOTE — PROGRESS NOTES
[facility] Hospitalist Progress Note    Date of Service: 10/23/2018    Chief Complaint    78 y.o. Female from [facility] admitted 9/28/2018 with shortness of breath and peripheral edema found to have significant pericardial effusion.    Pericardial effusion tapped 10/1  Intubated 10/2  Extubated 10/7      Interval Problem Update  78 y.o. Female from [facility] admitted 9/28/2018 with shortness of breath and peripheral edema found to have significant pericardial effusion.  Patient was seen by cardiology and Pericardial effusion tapped 10/1.  Post patient and the patient was Intubated 10/2 and remained ICU for close monitoring and treatment.  Patient was successfully extubated on 10/7.  Patient's pericardial effusion showing no signs of infection.  Patient was also noted to have bilateral pleural effusion patient was then underwent thoracentesis and the fluid analysis showing no signs of infection.  Patient was seen by infectious disease specialist and pulmonologist.  Patient multiple pleural effusion and pericardial effusion Etiology is unclear but looks to be autoimmune.  Patient was then consulted by rheumatology and recommended to be on steroid however without significant improvement.  Cardiologist put patient on diuretics and recommend patient to be had a pericardial biopsy to make the diagnosis however patient refused and patient's family also refused.  Patient developed dysphagia post extubation and was on tube feeding.  Patient gradually recovered however tube feeding was remaining for quite a while.  ENT physician was consulted and recommended short-term of high-dose Decadron 12 mg 3 times daily for 3 times.  Patient tolerated this treatment and the patient's tube feeding was pulled out by herself on 10/21. 10/22 patient underwent reevaluation by speech evaluation and patient swallow function significantly improved and was restarted dysphagia diet.  Patient condition has been stabilized and euvolemic.       10/23 -patient transferred from telemetry to medical floor.  Patient agitated and refusing to answer questions this morning.  Patient's  at bedside, plan of care discussed with them and all questions answered.  Patient's  informed that PT/OT, speech are recommending skilled nursing facility.  Patient's  states that he would like to talk to social work for different options and would prefer to have the patient go to [place] or [place] for skilled nursing facility.      Consultants/Specialty  Cardiology  Pulmonology  Neurology      Disposition  TBD, SNF         Review of Systems   Unable to perform ROS: Mental status change      Physical Exam  Laboratory/Imaging   Hemodynamics  Temp (24hrs), Av.7 °C (98.1 °F), Min:36.2 °C (97.1 °F), Max:37.2 °C (99 °F)   Temperature: 36.6 °C (97.9 °F)  Pulse  Av.8  Min: 10  Max: 140   Blood Pressure : 124/67      Respiratory      Respiration: 16, Pulse Oximetry: 95 %     Work Of Breathing / Effort: Mild  RUL Breath Sounds: Rhonchi, RML Breath Sounds: Rhonchi, RLL Breath Sounds: Rhonchi, LORI Breath Sounds: Rhonchi, LLL Breath Sounds: Rhonchi    Fluids    Intake/Output Summary (Last 24 hours) at 10/23/18 1213  Last data filed at 10/22/18 2025   Gross per 24 hour   Intake              560 ml   Output                0 ml   Net              560 ml       Nutrition  Orders Placed This Encounter   Procedures   • Diet Order Full Liquid     Standing Status:   Standing     Number of Occurrences:   1     Order Specific Question:   Diet:     Answer:   Full Liquid [11]     Order Specific Question:   Consistency/Fluid modifications:     Answer:   Nectar Thick [2]     Order Specific Question:   Miscellaneous modifications:     Answer:   SLP - 1:1 Supervision by Nursing [21]     Physical Exam   Constitutional: She appears well-developed and well-nourished. No distress.   HENT:   Head: Normocephalic and atraumatic.   Right Ear: External ear normal.   Left Ear:  External ear normal.   Eyes: Conjunctivae and EOM are normal.   Neck: Normal range of motion. Neck supple. No JVD present.   Cardiovascular: Normal rate, regular rhythm and normal heart sounds.  Exam reveals no gallop.    No murmur heard.  Pulmonary/Chest: Effort normal. No respiratory distress. She has no rales.   Decreased bs at bases   Abdominal: Soft. Bowel sounds are normal. There is no tenderness. There is no guarding.   Musculoskeletal: Normal range of motion. She exhibits no edema.   Neurological:   Awake    confused   Skin: Skin is warm and dry. No rash noted. She is not diaphoretic.   Psychiatric: She has a normal mood and affect. Thought content normal. She is agitated.   Nursing note and vitals reviewed.          Recent Labs      10/21/18   0320  10/22/18   0306   SODIUM  144  145   POTASSIUM  4.3  3.5*   CHLORIDE  100  105   CO2  39*  33   GLUCOSE  210*  86   BUN  36*  28*   CREATININE  0.49*  0.35*   CALCIUM  9.7  9.2                   EC-ECHOCARDIOGRAM LTD W/O CONT   Final Result      DX-CHEST-2 VIEWS   Final Result      Lingular and left basilar opacity is increased and likely represents atelectasis.      Small hiatal hernia may be present.      Stable cardiomegaly.      Minimal blunting of the costophrenic angle posteriorly may be related to pleural thickening or trace pleural fluid.               DX-ABDOMEN FOR TUBE PLACEMENT   Final Result      Enteric tube projects over the stomach.         DX-CHEST-PORTABLE (1 VIEW)   Final Result      Increasing left midlung zone opacity could indicate consolidation charges atelectasis      Mild interstitial edema, similar to comparison      Stable cardiac silhouette enlargement      CT-SOFT TISSUE NECK WITH   Final Result      1.  No evidence of pharyngeal or laryngeal mass or evidence of adenopathy.      2.  Multiple small bilateral thyroid nodules.      DX-ABDOMEN FOR TUBE PLACEMENT   Final Result         1.  Nonspecific bowel gas pattern.   2.  Dobbhoff  tube tip overlying the expected location of the pylorus or first duodenal segment.   3.  Cardiomegaly      DX-CHEST-LIMITED (1 VIEW)   Final Result         1.  Pulmonary edema and/or infiltrates are identified, which are stable since the prior exam.   2.  Dobbhoff tube is seen, may be coiled back within the stomach terminating above the upper margin of the film versus exterior tube overlying the midline. The distal segment is not visualized. View of the abdomen would be required for definitive    evaluation of tube position.   3.  Cardiomegaly   4.  Atherosclerosis      EC-ECHOCARDIOGRAM LTD W/O CONT   Final Result      US-THORACENTESIS PUNCTURE RIGHT   Final Result      1. Ultrasound guided right sided diagnostic and therapeutic thoracentesis.      2. 750 mL of fluid withdrawn.      DX-CHEST-PORTABLE (1 VIEW)   Final Result      No pneumothorax status post right thoracentesis.   Small residual right pleural effusion and left lung base atelectasis.   Left perihilar/left lung base atelectasis/possible infiltration and small left pleural effusion.      DX-CHEST-PORTABLE (1 VIEW)   Final Result         1.  Pulmonary edema and/or infiltrates are identified, which are stable since the prior exam.      DX-ABDOMEN FOR TUBE PLACEMENT   Final Result      Feeding tube placement with the tip projecting over the distal stomach or duodenal bulb      US-THORACENTESIS PUNCTURE LEFT   Final Result      1. Ultrasound guided left sided diagnostic and therapeutic thoracentesis.      2. 650 mL of fluid withdrawn.      DX-CHEST-LIMITED (1 VIEW)   Final Result      No pneumothorax following left thoracentesis      DX-CHEST-PORTABLE (1 VIEW)   Final Result         Worsening large left pleural effusion with near collapsing of the left lung.      Moderate layering right pleural effusion, worse than prior as well.      DX-ABDOMEN FOR TUBE PLACEMENT   Final Result         Feeding tube with tip projecting over the expected area of the stomach.       DX-CHEST-PORTABLE (1 VIEW)   Final Result         1. No significant interval change.      EC-ECHOCARDIOGRAM LTD W/O CONT   Final Result      CT-CHEST (THORAX) WITH   Final Result      1.  Moderate to large bilateral pleural effusions with bilateral atelectasis.      2.  Moderate pericardial effusion.      3.  Patchy groundglass opacities within the residual aerated upper lobes bilaterally.      4.  Fatty liver.      5.  Atherosclerotic vascular calcification.            DX-ABDOMEN FOR TUBE PLACEMENT   Final Result         1.  Nonspecific bowel gas pattern.   2.  Dobbhoff tube tip terminates overlying the expected location of the gastric body.   3.  Left lung base infiltrate and/or layering pleural effusion.      BG-STCNCYG-3 VIEW   Final Result         1.  Nonspecific bowel gas pattern.   2.  Dobbhoff tube tip terminates in the distal esophagus, recommend advancement.   3.  Bilateral lower lobe infiltrates and layering pleural effusions.   4.  Cardiomegaly.      DX-CHEST-LIMITED (1 VIEW)   Final Result      Bilateral pleural effusions with overlying atelectasis/consolidation are again seen.      Mild interstitial prominence may represent mild edema.      Atherosclerotic plaque.         DX-ABDOMEN FOR TUBE PLACEMENT   Final Result      Enteric tube projects over the distal stomach.         EC-ECHOCARDIOGRAM COMPLETE W/O CONT   Final Result      DX-CHEST-PORTABLE (1 VIEW)   Final Result      No significant interval change.      DX-CHEST-PORTABLE (1 VIEW)   Final Result      No significant interval change.      EC-ECHOCARDIOGRAM LTD W/O CONT   Final Result      DX-CHEST-PORTABLE (1 VIEW)   Final Result         1.  Pulmonary edema and/or infiltrates are identified, which are stable since the prior exam. Layering bilateral pleural effusions, greater on the left.   2.  Atherosclerosis               DX-CHEST-PORTABLE (1 VIEW)   Final Result         1.  Pulmonary edema and/or infiltrates are identified, which are  stable since the prior exam.   2.  Small right pleural effusion   3.  Atherosclerosis            DX-CHEST-PORTABLE (1 VIEW)   Final Result         1.  Pulmonary edema and/or infiltrates are identified, which are stable since the prior exam.   2.  Small right pleural effusion   3.  Atherosclerosis         DX-CHEST-PORTABLE (1 VIEW)   Final Result         1.  Pulmonary edema and/or infiltrates are identified, which are stable since the prior exam.   2.  Small right pleural effusion   3.  Atherosclerosis      DX-ABDOMEN FOR TUBE PLACEMENT   Final Result      Feeding tube placement with the tip projecting over the stomach body.      DX-ABDOMEN FOR TUBE PLACEMENT   Final Result      1.  Feeding tube appears looped within the stomach. The distal aspect of the tip is directed cephalad in the fundal region.      DX-CHEST-LIMITED (1 VIEW)   Final Result         1.  Pulmonary edema and/or infiltrates are identified, which are stable since the prior exam.   2.  Small right pleural effusion   3.  Atherosclerosis      DX-CHEST-LIMITED (1 VIEW)   Final Result         1.  Pulmonary edema and/or infiltrates are identified, which are stable since the prior exam.   2.  Atherosclerosis      DX-CHEST-PORTABLE (1 VIEW)   Final Result         1.  Pulmonary edema and/or infiltrates are identified, which are stable since the prior exam.   2.  Atherosclerosis      DX-CHEST-LIMITED (1 VIEW)   Final Result      1.  No pneumothorax identified status post right thoracentesis and pericardiocentesis.      2.  Small amount of pneumomediastinum is likely related to recent pericardiocentesis and drain placement      3.  Cardiomegaly      EC-ECHOCARDIOGRAM LTD W/O CONT   Final Result      DX-CHEST-PORTABLE (1 VIEW)   Final Result      1.  Evacuation large right pleural effusion status post thoracentesis.   2.  No definite right pneumothorax identified. Recommend interval follow-up chest x-ray.   3.  Small left pleural effusion and left perihilar/left  lung base atelectasis.   4.  Findings discussed with the patient's nurse. Follow-up chest x-ray will be obtained in approximately 3:00 PM      US-THORACENTESIS PUNCTURE RIGHT   Final Result      1. Ultrasound guided right sided diagnostic and therapeutic thoracentesis.      2. 1700 mL of fluid withdrawn.      EC-ECHOCARDIOGRAM COMPLETE W/O CONT   Final Result      DX-CHEST-PORTABLE (1 VIEW)   Final Result         1.  Pulmonary edema and/or infiltrates are identified, which are stable since the prior exam.   2.  Layering right pleural effusion, stable   3.  Cardiomegaly   4.  Atherosclerosis      DX-CHEST-2 VIEWS   Final Result      1.  Probable bilateral atelectasis      2.  Probable right pleural effusion which may be significant in size      3.  Limited assessment on one view portable radiography      OUTSIDE IMAGES-DX CHEST   Final Result      OUTSIDE IMAGES-DX CHEST   Final Result      EC-ECHOCARDIOGRAM LTD W/O CONT    (Results Pending)        Assessment/Plan     * Acute hypoxemic respiratory failure (HCC)- (present on admission)   Assessment & Plan    Extubated on 10/7/2018  Close monitor closely  Continue oxygen and RT protocol  Continue oxygen currently improving with Lasix, currently euvolemic cautiously use Lasix.  As needed  Patient's breathing condition improved  Continue oxygen.        Pleural effusion- (present on admission)   Overview    Chemistry suggesting transudate     Assessment & Plan        Patient is currently euvolemic and no signs of worsening Of pleural effusion or pericardial effusion  Discontinue diuretics    Last Thoracentesis on left , done on 10/13    Right side 10/15 tolerated very well and feeling better currently with decreased requirement of oxygen  No signs of infection or malignancy from fluid analysis  Repeat chest x-ray clear no signs of pleural effusion reaccumulation.        Pericardial effusion- (present on admission)   Overview    Fluid protein suggesting exudate but all  other cytology and chemistry was apparently lost.     Assessment & Plan    Cardiology following   Status post pericardial drain placement  Drain removed on 10/3/2018  Cytology negative fluid cultures negative  GENTRY negative RA factor positive CCP negative    off steroids .. Ineffective and stopped    Etiology is unclear and patient may eventually need pericardial biopsy for definitive diagnosis -> surgery is aware however at this moment patient wants to be transferred to [facility] for [facility] referral center for second opinion.  Transfer center notified and working on this transfer.  cytology results negative for malignancy   Likely need to be biopsy to establish definitive diagnosis   Repeat Echo shows decrease in size of pericardial effusion  Family does not want to pursue biopsy at this moment.       Pulmonary edema- (present on admission)   Assessment & Plan    Monitor fluid status   As needed Lasix, currently pulmonary edema resolved      Hypotension   Assessment & Plan    Relative adrenal insufficiency  Appears resolved for now  Close monitor          History of non-Hodgkin's lymphoma- (present on admission)   Overview    Nonhodgkins lymphoma  2000, treated with CHOP and MTX but never radiation therapy     Assessment & Plan    Hx of         HTN (hypertension)- (present on admission)   Assessment & Plan    All bp medications held  Cont to monitor        Dysphagia- (present on admission)   Assessment & Plan    Continue with speech therapy   Patient passed swallow evaluation today and restart diet significant improvement  CT neck evaluation (-)  ENT recommend Decadron 12 mg 3 times total.  Finished steroid.  Continue working with speech therapy      Hypernatremia   Assessment & Plan    water flushes , 300 cc q6          Hypomagnesemia   Assessment & Plan    Repleted        Hypokalemia   Assessment & Plan    Replace today         Tobacco dependence- (present on admission)   Assessment & Plan    - Smoking  cessation education provided  - Nicotine patch          Hypothyroidism- (present on admission)   Assessment & Plan    Continue levothyroxine          Quality-Core Measures   Reviewed items::  Labs reviewed and Medications reviewed  Almonte catheter::  No Almonte  DVT prophylaxis pharmacological::  Heparin  DVT prophylaxis - mechanical:  SCDs  Ulcer Prophylaxis::  Not indicated           Suicidal ideation:  -Likely due to frustration of medical condition.  Legal hold discontinued by psych

## 2018-10-23 NOTE — PSYCHIATRY
"PSYCHIATRIC FOLLOW UP:    Reason for Admission: Hypoxia  Legal hold status:  Voluntary   Psychiatric Supervising Attending:  Melanie Duggan MD     HPI:  79yo female admitted 9/28/2018 with shortness of breath and peripheral edema found to have significant pericardial effusion. Etiology for pericardial effusion remains unclear, but pt now medically stable. Pt was initially consulted during this hospital stay by psychiatry on 10/13/2018 for capacity to make medical decisions. Psychiatry was requested to see pt again now for her being placed on a legal hold last night. Per nursing, pt became very agitated, smashed her [employer] tablet pulled out her cortrak and stated that she wanted to die. Pt remains somewhat irritable today but was able to express that she was not actually suicidal but had made those statements out of frustration over her long hospital stay and without having an answer as to why she was developing recurrent pericardial effusions. Pt currently denies any suicidal or homicidal ideation, plan or intent. Pt appears to be somewhat better cognitively but is still disoriented to where she is and what exactly her medical situation entails. Pt continues to be unable to appreciate her current medical condition, and is unable to have a thoughtful conversation regarding the risks vs benefits of continued treatments/diagnostic procedures.      Psychiatric Examination: observed phenomenon:  Vitals:/79   Pulse 99   Temp 37.1 °C (98.7 °F)   Resp 18   Ht 1.575 m (5' 2.01\")   Wt 53.3 kg (117 lb 8.1 oz)   SpO2 95%   Breastfeeding? No   BMI 21.49 kg/m²   Musculoskeletal: no psychomotor agitation or retardation; no tremors  Appearance: thin elderly female, appears stated age, fair hygiene and grooming, wearing hospital attire  Behavior: irritable, reluctantly cooperative, poor eye contact.   Thought Process: Goal directed, future oriented  Abnormal Thoughts/Psychosis: Unable to assess secondary to pt " "condition   Associations: Unable to assess secondary to pt condition  Speech: spontaneous, regular rate, rhythm, tone, and volume; no stuttering or slurring of words  Language: fluent in English  Mood/Affect:\"I'm fine\"; affect is restricted, incongruent to stated mood  SI/HI: Denies SI and HI  Attention: distractible   Memory: Deficits in immediate and recent recall  Orientation: Alert and oriented to self only   Fund of Knowledge: Adequate   Insight and Judgment: poor/poor    Lab results/tests:  Recent Labs      10/20/18   0721  10/21/18   0320  10/22/18   0306   SODIUM  142  144  145   POTASSIUM  4.4  4.3  3.5*   CHLORIDE  100  100  105   CO2  32  39*  33   BUN  26*  36*  28*   CREATININE  0.36*  0.49*  0.35*   CALCIUM  9.6  9.7  9.2       Recent Labs      10/20/18   0721  10/21/18   0320  10/22/18   0306   ALTSGPT   --    --   32   ASTSGOT   --    --   21   ALKPHOSPHAT   --    --   55   TBILIRUBIN   --    --   0.5   GLUCOSE  155*  210*  86       No results for input(s): RBC, HEMOGLOBIN, HEMATOCRIT, PLATELETCT, PROTHROMBTM, APTT, INR, IRON, FERRITIN, TOTIRONBC in the last 72 hours.    Recent Labs      10/22/18   0306   ASTSGOT  21   ALTSGPT  32   ALKPHOSPHAT  55   TBILIRUBIN  0.5         Assessment:  Pt appears to still be somewhat delirious, likely secondary to her multiple medical conditions and lengthy hospital stay. She appears to be improving cognitively since the last time I saw her initially 9 days ago, but still lacks capacity to make medical decisions at this time. Pt however, does not appear to be suicidal and can have her legal hold discontinued, as it is not needed for a pt who is delirious and makes suicidal statements in the setting of being acutely frustrated.          Plan:  -Discontinue legal hold  -Pt does NOT have capacity to make medical decisions at this time  -Recommend adding low dose haldol (2.5mg PO QHS) for continued agitation and difficulty with clear thinking. However, this may not be " needed if pt is planning to discharge tomorrow.   -Case discussed with Dr. Duggan  -Will follow

## 2018-10-23 NOTE — THERAPY
"Occupational Therapy Treatment completed with focus on ADLs, ADL transfers, patient education, caregiver training and cognition.  Functional Status:  Pt was seen for Occupational Therapy treatment today, see Therapy Kardex for details.  Treatment included education in breath control with activity and at rest, self pacing techs and energy conservation for pain management. Educated pt in safety awareness techs as well.  Psychosocial intervention addressed.  present for family training, Pt needed increased encouragement to participate in attempting self care tasks stating, \"I know how to dress myself; \"I can put on my own pants, socks\". Pt came to EOB with SBA.  Pt is impulsive demo poor safety awareness. Pt attempted to don socks became extremely frustrated and stated, \"I'm not going to do this\". Pt trying to don sock inside out and upside down. When asked to try again, pt threw the socks aside and said, \"I'm not going to\". Pt was Total A to don socks today. Pt donned pants with Min A up to thighs and CGA/ Min A up over hips standing. Pt was given FWW for support. Pt became angry and pushed FWW away and began to be verbally aggressive/abusive. Pt stated, \"I can walk and I don't need to show you\". Pt's  also stated, pt \"can walk\".   Informed pt to please demo her level of ability. Pt refused and got BTB.  Pt refused all other self care tasks including demo toilet transfers. Pt also demonstrated  Min A for stabilizing standing balance with FWW and to take 3 steps  for Ambulating ADL's. Pt is confused and easy to become angry pushing off clip board and towels from bedside table. Pt was left up in bed , call light in reach,  in room and nursing is aware. RN/CM/SW updated on OT treatment findings and recommendations . Family training is on going. Continue Occupational Therapy services as per plan as pt is willing to attempt/participate.   Plan of Care: Will benefit from Occupational Therapy 3 times per " "week  Discharge Recommendations:  Equipment Will Continue to Assess for Equipment Needs. Post-acute therapy Discharge to a transitional care facility for continued skilled therapy.  TBD.     See \"Rehab Therapy-Acute\" Patient Summary Report for complete documentation.   "

## 2018-10-23 NOTE — PROGRESS NOTES
"Pt needed to go to the bathroom. She is a 1 assist. She was grabbing furniture throughout the entire walking encounter to help her to get to and from the bathroom. Pt also became very verbally aggressive when RN tried to help her, she yelled at nurse to \"get out and F*uck off\". RN made she that the bed/chair alarm was on when exiting the room. Will continue to monitor.   "

## 2018-10-23 NOTE — THERAPY
"Speech Language Therapy dysphagia treatment completed.   Functional Status:  The patient was seen for dysphagia therapy this date. The patient was awake, alert and seen during his NTFL meal tray. Patient repositioned upright in bed for meal. The patient consumed NTFL meal items with no overt s/s of aspiration during initial portion of meal. Patient began to have increased throat clearing and increased coughing as PO progressed. Cues to have patient complete double swallow strategy resulted in patient becoming agitated and attempting to throw spoon at this clinician. Patient was reminded of FEES results and SLP recommendations. Patient refused education and reported \"you made that all up, that was then this is now.\" Patient could not verbalize understanding of FEES results or aspiration risk this session. At this time, patient continues to present with s/s highly concerning for penetration/aspiration given recent FEES results.     Recommendations: 1)   NPO/TF. Patient's  continues to state he does not want cortrak replaced. \"Safest\" Po alimentation remains NTFL with swallow strategies if patient able to follow. Aspiration risk remains high. 2) Consider cognitive evaluation given persistent confusion over course of hospital stay.      Plan of Care: Will benefit from Speech Therapy 3 times per week.    Post-Acute Therapy: Discharge to an inpatient transitional care facility for continued skilled therapy services.    See \"Rehab Therapy-Acute\" Patient Summary Report for complete documentation.     "

## 2018-10-23 NOTE — PROGRESS NOTES
1100: Pts  came to unit to see pt-- updated that pt was transferred to [place] [address] overnight and was informed that pt was stable and that is why she was transferred.  Apologies given that  was not called to be updated on pt location over night.

## 2018-10-23 NOTE — PROGRESS NOTES
"Pt  was at the beside, he wanted to discuss pt discharge plan. He said \"the hospitalist said that she could go home today\". RN and MD Pineda went into the pt room together to tell pt  that therapy is recommending SNF. The  agreed to meet with SW about SNF placement.   "

## 2018-10-23 NOTE — PROGRESS NOTES
Anticipated Discharge Disposition: skilled    Action: Met at bedside with pt’s  Cameron Gomes ([phone]). Therapies (PT,OT,SLP) are recommending skilled level of care and he is agreeable to that. He requests referral be sent to [facility] in [place] since that is by their home and he also requests referrals be sent to the [place] area. Contacted SS/CM offices at [facility], and [facility] in [place] to obtain a list of the SNFs they refer to. List given to Latisha WINTERS to send referral to the [facility] that are in [place].      Requested Samantha with PT do updated PT notes since last note was on 10/18/18.     Legal hold discontinued by psych.      Barriers to Discharge: needs SNF in [place] or [facility].     Plan: referrals in progress to [place] and [place].

## 2018-10-23 NOTE — DISCHARGE PLANNING
Agency/Facility Name: [facility]  Spoke To: Tara [facility]  Outcome: Faxed referral over to them and they will bring it to [place] who is admissions for the [facility], their closest version of a SNF.    Received Choice form at 1410  Agency/Facility Name: [facility]  Referral sent per Choice form @ 1413     @1500  Agency/Facility Name: [facility]  Spoke To: Tara  Outcome: Did not receive referral, re-faxing to specified fax # ([phone]).    @2783  Faxes all failed to send to [facility], re-faxed manually.

## 2018-10-23 NOTE — PROGRESS NOTES
· 2 RN skin check complete.   · Dried scab to right earlobe  · Scattered abdominal bruising  · Scattered bruising to bilateral arms  · Small amounts of scabbing to right shin and foot  · Tegaderm and gauze dressing to right chest  · Small area of blanching pink on the dorsal left foot  · The following interventions in place: pressure redistribution mattress, pillows for support, pt repositioning self frequently.

## 2018-10-23 NOTE — CARE PLAN
Problem: Nutritional:  Goal: Achieve adequate nutritional intake  Patient will consume 50% of meals  Outcome: PROGRESSING AS EXPECTED  PO avg 50% of meals documented thus far.

## 2018-10-23 NOTE — PROGRESS NOTES
"Pt is awake. She refused to answer orientation questions, she stated \"these are stupid and Im not going to answer them\". While she was sleeping her O2 was at 87%. 1LNC was applied sats went up to 95%. Pt was able to ambulate to the bathroom with standby, hand held assist. VSS. She denies pain at this time. She is resting in bed watching TV.   "

## 2018-10-23 NOTE — CARE PLAN
Problem: Communication  Goal: The ability to communicate needs accurately and effectively will improve  Outcome: PROGRESSING SLOWER THAN EXPECTED  Pt unable to communicate needs. Unable to hold a relevant conversation about her medical care.     Problem: Safety  Goal: Will remain free from falls  Outcome: PROGRESSING SLOWER THAN EXPECTED  Pt still displaying impulsive behaviors. Discussed safety, does not demonstrate understanding. Will reinforce safety precautions.

## 2018-10-23 NOTE — PROGRESS NOTES
Pt transferred to room 611:2. Report received from tele 8 RN. Pt alert and oriented to self, place, time, and general situation, but still seems to be confused and forgetful. She states that she knows she is in the hospital, but is asking about where she can find a stove to do some cooking. No c/o pain or SOB. Resting comfortably with no distress. Updated pt on her new room location. Bed locked and in lowest position, bed alarm on, pt wearing non slip socks.

## 2018-10-23 NOTE — PROGRESS NOTES
Assumed care of pt. She is alert, smiling, and oriented x3. Discussed POC with pt and family at bedside. Pt denies any questions at this time. Discussed safety with the pt. She verbalizes understanding of importance of calling before attempting to get up. Bed is locked in lowest position and call light/personal belongings are within reach.

## 2018-10-23 NOTE — CARE PLAN
Problem: Safety  Goal: Will remain free from injury  Outcome: PROGRESSING AS EXPECTED  Bed alarm is on. Pt uses hand held assist to the bathroom     Problem: Pain Management  Goal: Pain level will decrease to patient's comfort goal  Outcome: PROGRESSING AS EXPECTED  No complaints of pain

## 2018-10-24 NOTE — PROGRESS NOTES
Pt is AOX3, much more pleasant this morning than yesterday. She is on room air. She denies pain this morning. She had a BM last night per chart. Bed alarm is on. VSS. Will continue to monitor.

## 2018-10-24 NOTE — THERAPY
"Physical Therapy Treatment completed.   Bed Mobility:  Supine to Sit: Minimal Assist  Transfers: Sit to Stand: Minimal Assist  Gait: Level Of Assist: Contact Guard Assist with Front-Wheel Walker       Plan of Care: Will benefit from Physical Therapy 4 times per week  Discharge Recommendations: Equipment: Will Continue to Assess for Equipment Needs.     See \"Rehab Therapy-Acute\" Patient Summary Report for complete documentation.     Pt presenting more alert and motivated to participate. Pt stating she is normally very active and wants to \"work on getting stronger because I'm not where I should be.\" Pt does require a lot of time to initiate mobility. She is unable to perform most mobility w/out assistance. Pt does state that her  \"does too much so I don't get as strong as I should.\" Pt was able to verbalize safety techniques however doesn't demonstrate them during mobility. Pt open to using FWW however still attempts to furniture crawl w/ it. Pt is limited by generalized weakness and is a high fall risk. She is currently not at a functional level for DC home. As per initial eval, pt will benefit from post acute therapy.  "

## 2018-10-24 NOTE — DISCHARGE PLANNING
Agency/Facility Name: [facility]  Spoke To: Richard  Outcome: No open beds right now.    RN EMMA Bonilla informed.

## 2018-10-24 NOTE — CARE PLAN
Problem: Safety  Goal: Will remain free from falls  Outcome: PROGRESSING AS EXPECTED  No falls this shift, patient impulsive, bed alarm on, non skid socks on and room free of hazards.     Problem: Mobility  Goal: Risk for activity intolerance will decrease  Outcome: PROGRESSING AS EXPECTED  Patient up to BR, hand holding. Patient denied any SOB with exertion

## 2018-10-24 NOTE — DISCHARGE PLANNING
Anticipated Discharge Disposition: skilled in [place]    Action: Spoke to Shaunna ([phone]) at [facility] and she has accepted pt. This CM told her that the family's 1st choice is [facility]. Await their review of the referral which was sent today. Will review with .     Barriers to Discharge: need accepting snf, transportation to [place].    Plan: await accepting SNFs.

## 2018-10-24 NOTE — DISCHARGE PLANNING
Late entry for 10/23/18  Anticipated Discharge Disposition: skilled    Action: Met at bedside with pt’s  Cameron Gomes ([phone]). Therapies (PT,OT,SLP) are recommending skilled level of care and he is agreeable to that. He requests referral be sent to [facility] in [place] since that is by their home and he also requests referrals be sent to the [place] area. Contacted SS/CM offices at [place], [facility], and [facility] in [place] to obtain a list of the SNFs they refer to. List given to Latisha WINTERS to send referral to the [facility] that are in [place].      Requested Samantha with PT do updated PT notes since last note was on 10/18/18.     Legal hold discontinued by psych.      Barriers to Discharge: needs SNF in [place] or [facility].     Plan: referrals in progress to [place] and [place].

## 2018-10-24 NOTE — DISCHARGE PLANNING
Agency/Facility Name: [facility]  Outcome: Left message for admissions, awaiting call back.

## 2018-10-24 NOTE — DISCHARGE PLANNING
Agency/Facility Name: [facility]  Outcome: Patient Accepted    Agency/Facility Name: [facility]  Spoke To: Irais  Outcome: Patient declined due to suicidal intentions.

## 2018-10-24 NOTE — PROGRESS NOTES
[facility] Hospitalist Progress Note    Date of Service: 10/24/2018    Chief Complaint    78 y.o. Female from [facility] admitted 9/28/2018 with shortness of breath and peripheral edema found to have significant pericardial effusion.    Pericardial effusion tapped 10/1  Intubated 10/2  Extubated 10/7      Interval Problem Update  78 y.o. Female from [facility] admitted 9/28/2018 with shortness of breath and peripheral edema found to have significant pericardial effusion.  Patient was seen by cardiology and Pericardial effusion tapped 10/1.  Post patient and the patient was Intubated 10/2 and remained ICU for close monitoring and treatment.  Patient was successfully extubated on 10/7.  Patient's pericardial effusion showing no signs of infection.  Patient was also noted to have bilateral pleural effusion patient was then underwent thoracentesis and the fluid analysis showing no signs of infection.  Patient was seen by infectious disease specialist and pulmonologist.  Patient multiple pleural effusion and pericardial effusion Etiology is unclear but looks to be autoimmune.  Patient was then consulted by rheumatology and recommended to be on steroid however without significant improvement.  Cardiologist put patient on diuretics and recommend patient to be had a pericardial biopsy to make the diagnosis however patient refused and patient's family also refused.  Patient developed dysphagia post extubation and was on tube feeding.  Patient gradually recovered however tube feeding was remaining for quite a while.  ENT physician was consulted and recommended short-term of high-dose Decadron 12 mg 3 times daily for 3 times.  Patient tolerated this treatment and the patient's tube feeding was pulled out by herself on 10/21. 10/22 patient underwent reevaluation by speech evaluation and patient swallow function significantly improved and was restarted dysphagia diet.  Patient condition has been stabilized and euvolemic.   "    10/23 -patient transferred from telemetry to medical floor.  Patient agitated and refusing to answer questions this morning.  Patient's  at bedside, plan of care discussed with them and all questions answered.  Patient's  informed that PT/OT, speech are recommending skilled nursing facility.  Patient's  states that he would like to talk to social work for different options and would prefer to have the patient go to [place] or [place] for skilled nursing facility.    10/24 -patient cooperative and pleasant this morning.  Alert awake oriented x2.  Continues to have some intermittent confusion.  She states that \"the fluid was never there, you guys made it up.\"  Attempted to educate patient about her pericardial effusion and pleural effusion but patient unwilling to listen.      Consultants/Specialty  Cardiology  Pulmonology  Neurology      Disposition  TBD, SNF referral sent, acceptance pending.        Review of Systems   Unable to perform ROS: Mental status change      Physical Exam  Laboratory/Imaging   Hemodynamics  Temp (24hrs), Av.5 °C (97.7 °F), Min:36.4 °C (97.6 °F), Max:36.7 °C (98 °F)   Temperature: 36.4 °C (97.6 °F)  Pulse  Av.8  Min: 10  Max: 140   Blood Pressure : 116/72      Respiratory      Respiration: 18, Pulse Oximetry: 92 %     Work Of Breathing / Effort: Mild  RUL Breath Sounds: Rhonchi, RML Breath Sounds: Rhonchi, RLL Breath Sounds: Rhonchi, LORI Breath Sounds: Rhonchi, LLL Breath Sounds: Rhonchi    Fluids    Intake/Output Summary (Last 24 hours) at 10/24/18 1150  Last data filed at 10/24/18 1100   Gross per 24 hour   Intake              488 ml   Output                0 ml   Net              488 ml       Nutrition  Orders Placed This Encounter   Procedures   • Diet Order Full Liquid     Standing Status:   Standing     Number of Occurrences:   1     Order Specific Question:   Diet:     Answer:   Full Liquid [11]     Order Specific Question:   Consistency/Fluid " modifications:     Answer:   Nectar Thick [2]     Order Specific Question:   Miscellaneous modifications:     Answer:   SLP - 1:1 Supervision by Nursing [21]     Physical Exam   Constitutional: She appears well-developed and well-nourished. No distress.   HENT:   Head: Normocephalic and atraumatic.   Right Ear: External ear normal.   Left Ear: External ear normal.   Eyes: Conjunctivae and EOM are normal.   Neck: Normal range of motion. Neck supple. No JVD present.   Cardiovascular: Normal rate, regular rhythm and normal heart sounds.  Exam reveals no gallop.    No murmur heard.  Pulmonary/Chest: Effort normal. No stridor. No respiratory distress.   Decreased bs at bases   Abdominal: Soft. Bowel sounds are normal. There is no tenderness. There is no guarding.   Musculoskeletal: Normal range of motion. She exhibits no edema.   Neurological:   Awake, oriented X 2     Skin: Skin is warm and dry. No rash noted. She is not diaphoretic.   Psychiatric: She has a normal mood and affect. Thought content normal. She is agitated.   Nursing note and vitals reviewed.          Recent Labs      10/22/18   0306   SODIUM  145   POTASSIUM  3.5*   CHLORIDE  105   CO2  33   GLUCOSE  86   BUN  28*   CREATININE  0.35*   CALCIUM  9.2                   EC-ECHOCARDIOGRAM LTD W/O CONT   Final Result      DX-CHEST-2 VIEWS   Final Result      Lingular and left basilar opacity is increased and likely represents atelectasis.      Small hiatal hernia may be present.      Stable cardiomegaly.      Minimal blunting of the costophrenic angle posteriorly may be related to pleural thickening or trace pleural fluid.               DX-ABDOMEN FOR TUBE PLACEMENT   Final Result      Enteric tube projects over the stomach.         DX-CHEST-PORTABLE (1 VIEW)   Final Result      Increasing left midlung zone opacity could indicate consolidation charges atelectasis      Mild interstitial edema, similar to comparison      Stable cardiac silhouette enlargement       CT-SOFT TISSUE NECK WITH   Final Result      1.  No evidence of pharyngeal or laryngeal mass or evidence of adenopathy.      2.  Multiple small bilateral thyroid nodules.      DX-ABDOMEN FOR TUBE PLACEMENT   Final Result         1.  Nonspecific bowel gas pattern.   2.  Dobbhoff tube tip overlying the expected location of the pylorus or first duodenal segment.   3.  Cardiomegaly      DX-CHEST-LIMITED (1 VIEW)   Final Result         1.  Pulmonary edema and/or infiltrates are identified, which are stable since the prior exam.   2.  Dobbhoff tube is seen, may be coiled back within the stomach terminating above the upper margin of the film versus exterior tube overlying the midline. The distal segment is not visualized. View of the abdomen would be required for definitive    evaluation of tube position.   3.  Cardiomegaly   4.  Atherosclerosis      EC-ECHOCARDIOGRAM LTD W/O CONT   Final Result      US-THORACENTESIS PUNCTURE RIGHT   Final Result      1. Ultrasound guided right sided diagnostic and therapeutic thoracentesis.      2. 750 mL of fluid withdrawn.      DX-CHEST-PORTABLE (1 VIEW)   Final Result      No pneumothorax status post right thoracentesis.   Small residual right pleural effusion and left lung base atelectasis.   Left perihilar/left lung base atelectasis/possible infiltration and small left pleural effusion.      DX-CHEST-PORTABLE (1 VIEW)   Final Result         1.  Pulmonary edema and/or infiltrates are identified, which are stable since the prior exam.      DX-ABDOMEN FOR TUBE PLACEMENT   Final Result      Feeding tube placement with the tip projecting over the distal stomach or duodenal bulb      US-THORACENTESIS PUNCTURE LEFT   Final Result      1. Ultrasound guided left sided diagnostic and therapeutic thoracentesis.      2. 650 mL of fluid withdrawn.      DX-CHEST-LIMITED (1 VIEW)   Final Result      No pneumothorax following left thoracentesis      DX-CHEST-PORTABLE (1 VIEW)   Final Result          Worsening large left pleural effusion with near collapsing of the left lung.      Moderate layering right pleural effusion, worse than prior as well.      DX-ABDOMEN FOR TUBE PLACEMENT   Final Result         Feeding tube with tip projecting over the expected area of the stomach.      DX-CHEST-PORTABLE (1 VIEW)   Final Result         1. No significant interval change.      EC-ECHOCARDIOGRAM LTD W/O CONT   Final Result      CT-CHEST (THORAX) WITH   Final Result      1.  Moderate to large bilateral pleural effusions with bilateral atelectasis.      2.  Moderate pericardial effusion.      3.  Patchy groundglass opacities within the residual aerated upper lobes bilaterally.      4.  Fatty liver.      5.  Atherosclerotic vascular calcification.            DX-ABDOMEN FOR TUBE PLACEMENT   Final Result         1.  Nonspecific bowel gas pattern.   2.  Dobbhoff tube tip terminates overlying the expected location of the gastric body.   3.  Left lung base infiltrate and/or layering pleural effusion.      [id number] VIEW   Final Result         1.  Nonspecific bowel gas pattern.   2.  Dobbhoff tube tip terminates in the distal esophagus, recommend advancement.   3.  Bilateral lower lobe infiltrates and layering pleural effusions.   4.  Cardiomegaly.      DX-CHEST-LIMITED (1 VIEW)   Final Result      Bilateral pleural effusions with overlying atelectasis/consolidation are again seen.      Mild interstitial prominence may represent mild edema.      Atherosclerotic plaque.         DX-ABDOMEN FOR TUBE PLACEMENT   Final Result      Enteric tube projects over the distal stomach.         EC-ECHOCARDIOGRAM COMPLETE W/O CONT   Final Result      DX-CHEST-PORTABLE (1 VIEW)   Final Result      No significant interval change.      DX-CHEST-PORTABLE (1 VIEW)   Final Result      No significant interval change.      EC-ECHOCARDIOGRAM LTD W/O CONT   Final Result      DX-CHEST-PORTABLE (1 VIEW)   Final Result         1.  Pulmonary edema and/or  infiltrates are identified, which are stable since the prior exam. Layering bilateral pleural effusions, greater on the left.   2.  Atherosclerosis               DX-CHEST-PORTABLE (1 VIEW)   Final Result         1.  Pulmonary edema and/or infiltrates are identified, which are stable since the prior exam.   2.  Small right pleural effusion   3.  Atherosclerosis            DX-CHEST-PORTABLE (1 VIEW)   Final Result         1.  Pulmonary edema and/or infiltrates are identified, which are stable since the prior exam.   2.  Small right pleural effusion   3.  Atherosclerosis         DX-CHEST-PORTABLE (1 VIEW)   Final Result         1.  Pulmonary edema and/or infiltrates are identified, which are stable since the prior exam.   2.  Small right pleural effusion   3.  Atherosclerosis      DX-ABDOMEN FOR TUBE PLACEMENT   Final Result      Feeding tube placement with the tip projecting over the stomach body.      DX-ABDOMEN FOR TUBE PLACEMENT   Final Result      1.  Feeding tube appears looped within the stomach. The distal aspect of the tip is directed cephalad in the fundal region.      DX-CHEST-LIMITED (1 VIEW)   Final Result         1.  Pulmonary edema and/or infiltrates are identified, which are stable since the prior exam.   2.  Small right pleural effusion   3.  Atherosclerosis      DX-CHEST-LIMITED (1 VIEW)   Final Result         1.  Pulmonary edema and/or infiltrates are identified, which are stable since the prior exam.   2.  Atherosclerosis      DX-CHEST-PORTABLE (1 VIEW)   Final Result         1.  Pulmonary edema and/or infiltrates are identified, which are stable since the prior exam.   2.  Atherosclerosis      DX-CHEST-LIMITED (1 VIEW)   Final Result      1.  No pneumothorax identified status post right thoracentesis and pericardiocentesis.      2.  Small amount of pneumomediastinum is likely related to recent pericardiocentesis and drain placement      3.  Cardiomegaly      EC-ECHOCARDIOGRAM LTD W/O CONT   Final  Result      DX-CHEST-PORTABLE (1 VIEW)   Final Result      1.  Evacuation large right pleural effusion status post thoracentesis.   2.  No definite right pneumothorax identified. Recommend interval follow-up chest x-ray.   3.  Small left pleural effusion and left perihilar/left lung base atelectasis.   4.  Findings discussed with the patient's nurse. Follow-up chest x-ray will be obtained in approximately 3:00 PM      US-THORACENTESIS PUNCTURE RIGHT   Final Result      1. Ultrasound guided right sided diagnostic and therapeutic thoracentesis.      2. 1700 mL of fluid withdrawn.      EC-ECHOCARDIOGRAM COMPLETE W/O CONT   Final Result      DX-CHEST-PORTABLE (1 VIEW)   Final Result         1.  Pulmonary edema and/or infiltrates are identified, which are stable since the prior exam.   2.  Layering right pleural effusion, stable   3.  Cardiomegaly   4.  Atherosclerosis      DX-CHEST-2 VIEWS   Final Result      1.  Probable bilateral atelectasis      2.  Probable right pleural effusion which may be significant in size      3.  Limited assessment on one view portable radiography      OUTSIDE IMAGES-DX CHEST   Final Result      OUTSIDE IMAGES-DX CHEST   Final Result      EC-ECHOCARDIOGRAM LTD W/O CONT    (Results Pending)        Assessment/Plan     * Acute hypoxemic respiratory failure (HCC)- (present on admission)   Assessment & Plan    Extubated on 10/7/2018  Close monitor closely  Continue oxygen and RT protocol  Continue oxygen currently improving with Lasix, currently euvolemic cautiously use Lasix.  As needed  Patient's breathing condition improved  Continue oxygen.        Pleural effusion- (present on admission)   Overview    Chemistry suggesting transudate     Assessment & Plan        Patient is currently euvolemic and no signs of worsening Of pleural effusion or pericardial effusion  Discontinue diuretics    Last Thoracentesis on left , done on 10/13    Right side 10/15 tolerated very well and feeling better  currently with decreased requirement of oxygen  No signs of infection or malignancy from fluid analysis  Repeat chest x-ray clear no signs of pleural effusion reaccumulation.        Pericardial effusion- (present on admission)   Overview    Fluid protein suggesting exudate but all other cytology and chemistry was apparently lost.     Assessment & Plan    Cardiology following   Status post pericardial drain placement  Drain removed on 10/3/2018  Cytology negative fluid cultures negative  GENTRY negative RA factor positive CCP negative    off steroids .. Ineffective and stopped    Etiology is unclear and patient may eventually need pericardial biopsy for definitive diagnosis -> surgery is aware however at this moment patient wants to be transferred to [facility] for regional referral center for second opinion.  Transfer center notified and working on this transfer.  cytology results negative for malignancy   Likely need to be biopsy to establish definitive diagnosis   Repeat Echo shows decrease in size of pericardial effusion  Family does not want to pursue biopsy at this moment.       Pulmonary edema- (present on admission)   Assessment & Plan    Monitor fluid status   As needed Lasix, currently pulmonary edema resolved      Hypotension   Assessment & Plan    Relative adrenal insufficiency  Appears resolved for now  Close monitor          History of non-Hodgkin's lymphoma- (present on admission)   Overview    Nonhodgkins lymphoma  2000, treated with CHOP and MTX but never radiation therapy     Assessment & Plan    Hx of         HTN (hypertension)- (present on admission)   Assessment & Plan    All bp medications held  Cont to monitor        Dysphagia- (present on admission)   Assessment & Plan    Continue with speech therapy   Patient passed swallow evaluation today and restart diet significant improvement  CT neck evaluation (-)  ENT recommend Decadron 12 mg 3 times total.  Finished steroid.  Continue working with  speech therapy      Hypernatremia   Assessment & Plan    water flushes , 300 cc q6          Hypomagnesemia   Assessment & Plan    Repleted        Hypokalemia   Assessment & Plan    Replace today         Tobacco dependence- (present on admission)   Assessment & Plan    - Smoking cessation education provided  - Nicotine patch          Hypothyroidism- (present on admission)   Assessment & Plan    Continue levothyroxine          Quality-Core Measures   Reviewed items::  Labs reviewed and Medications reviewed  Almonte catheter::  No Almonte  DVT prophylaxis pharmacological::  Heparin  DVT prophylaxis - mechanical:  SCDs  Ulcer Prophylaxis::  Not indicated           Suicidal ideation:  -Likely due to frustration of medical condition.  Legal hold discontinued by psych    No change to assessment and plan

## 2018-10-24 NOTE — DISCHARGE PLANNING
Agency/Facility Name: [facility] [place]  Spoke To: Glo  Outcome: Patient declined due to needed PT/OT/ST, they only provide PT.    Agency/Facility Name: [facility]  Outcome: Left Message, awaiting call back.    Agency/Facility Name: [facility]  Spoke To: Hung up without giving name  Outcome: Fax for admissions is [phone].    Agency/Facility Name: [facility]  Spoke To: Shaunna  Outcome: Patient Accepted.

## 2018-10-24 NOTE — DISCHARGE PLANNING
Anticipated Discharge Disposition: skilled    Action: Met with  at bedside. He requests a referral be sent to [facility] in [place]. Info given to Latisha WINTERS to send referral. Pt cooperated with PT today and that note will be included in the referrals.   We rec'd a denial from [facility]    Barriers to Discharge: finding an accepting snf in [place]    Plan: await snf referrals in progress to find an accepting facility.

## 2018-10-25 NOTE — PROGRESS NOTES
"Assumed care of patient at change of shift.  During change of shift report, patient (pt) resting in bed, on room air, with bed alarm in place.  Shortly after report, this RN ambulated this patient with a hand held assist, and pt was \"looking for a place to have a cigarette.\"  Pt reoriented and education was provided.  During assessment, pt A&Ox2, disoriented to time and situation.  Redness noted to pt's right great toe, and Dr. Pineda notified.      Spouse, Shekhar, came to pt's bedside shortly before breakfast.  Patient and spouse both refuse for a  to be placed on pt, and this education regarding concern for >90% oxygenation were explained.  Pt and spouse continued to refuse.  During 1:1 feed with CNA, the CNA notified this RN of frequent coughing after every swallow.  When the CNA left to find this RN, the  continued to feed the pt despite the CNA's instruction to wait for the nurse, and so the CNA removed the tray.  This RN called speech, and Shaunna from speech came to assess bedside.      Per speech and per MD, the patient will be allowed to continue PO intake despite current symptoms.  The patient, and Shekhar, spouse,  have both verbalized understanding of the concern for aspiration with the patient's PO intake with the staff's concerns.    At 1145, this RN entered the patient's room and the pt was sleeping at about a 25 degree HOB angle, and pt looked pale.  O2 reading taken of 84%.  1L O2 resumed via NC with pulse ox reading at 93%.   still refused.      "

## 2018-10-25 NOTE — DISCHARGE PLANNING
Agency/Facility Name: [facility]: [place]  Outcome: Requesting Current Pt notes and last 5 days of Nurse notes. Those have been faxed to [facility] [place].    Agency/Facility Name: [facility]  Spoke To: Mariel Orlando  Outcome: Patient declined from all [facility] facilities due to behaviors and unable to care for her in her current condition.    JERRY Prince informed.

## 2018-10-25 NOTE — DISCHARGE PLANNING
Agency/Facility Name: [facility]  Outcome: Left Message awaiting call back.    @0930  Agency/Facility Name: [facility]  Outcome:No answer, left message awaiting call back.    @1000  Agency/Facility Name: [facility]  Spoke To: Richard  Outcome: No beds available.    Agency/Facility Name: [facility]  Outcome: Patient declined due to care exceeds capacity.

## 2018-10-25 NOTE — DISCHARGE PLANNING
Medical Social Work  Patient's spouse is requesting patient go to [facility]: [place].  Patient stated his daughter has done reviews and this facility and [facility] both have very good reviews.  Choice sent to [facility].

## 2018-10-25 NOTE — DISCHARGE PLANNING
Received Choice form at 0920  Agency/Facility Name: [facility]: [place]  Referral sent per Choice form @ 0884     @9451  Agency/Facility Name: [facility] :[place]  Spoke To: Noman  Outcome: Referral under review, noman will call back with acceptance/denial.

## 2018-10-25 NOTE — CARE PLAN
Problem: Safety  Goal: Will remain free from injury  Outcome: PROGRESSING AS EXPECTED      Problem: Psychosocial Needs:  Goal: Level of anxiety will decrease  Outcome: PROGRESSING AS EXPECTED

## 2018-10-25 NOTE — THERAPY
"Speech Language Therapy dysphagia treatment completed.   Functional Status:  Patient seen this date for dysphagia tx session, per RN and CNA request. Patient noted to be coughing significantly on NTFL breakfast tray by CNA and RN and both unsure of SLP recs and POC. Patient awake, alert, and more cooperative today, with  present at bedside. Patient noted to be self-suctioning oral cavity prior to PO trials. Vocal quality still hoarse with inhalatory stridor at times, weak, and with decreased intensity. Patient consumed PO trials of NTL via cup sip, purees, and pudding. Patient presented with s/sx of aspiration, evidenced by coughing, throat clearing, and wet/gurgly vocal quality on approximately 80% of PO trials. Patient required frequent oral suctioning via [place] to clear vocal quality and improve respiratory function. Laryngeal elevation palpated as weak and initiation of swallow trigger was delayed. Patient was given swallow strategies and precautions to sit HOB to 90 degrees, initiate double swallows on EVERY bite/sip, utilize slow feeding rate, and small bites/sips to minimize aspiration risks. At this time, recommend patient downgrade to NPO with replacement of Cortrak for supplemental nutrition, as patient remains at high risk for aspiration. Discussed SLP results/recs with , RN, CNA, and MD. Patient is not appropriate for MBS at this time d/t no decrease in s/sx of aspiration at the bedside, but  interested in MBS when appropriate. Patient will continue NTFL diet despite aspiration risks, per MD, as  \"doesn't want the tube in.\"     Recommendations: At this time, recommend patient downgrade to NPO with replacement of Cortrak for supplemental nutrition, as patient remains at high risk for aspiration. Discussed SLP results/recs with , RN, CNA, and MD. Patient is not appropriate for MBS at this time d/t no decrease in s/sx of aspiration at the bedside, but  interested " "in MBS when appropriate. Patient will continue NTFL diet despite aspiration risks, per MD, as  \"doesn't want the tube in.\"   Plan of Care: Will benefit from Speech Therapy 3 times per week  Post-Acute Therapy: Discharge to an inpatient transitional care facility for continued speech therapy services.    See \"Rehab Therapy-Acute\" Patient Summary Report for complete documentation.     "

## 2018-10-25 NOTE — PROGRESS NOTES
[facility] Hospitalist Progress Note    Date of Service: 10/25/2018    Chief Complaint    78 y.o. Female from [facility] admitted 9/28/2018 with shortness of breath and peripheral edema found to have significant pericardial effusion.    Pericardial effusion tapped 10/1  Intubated 10/2  Extubated 10/7      Interval Problem Update  78 y.o. Female from [facility] admitted 9/28/2018 with shortness of breath and peripheral edema found to have significant pericardial effusion.  Patient was seen by cardiology and Pericardial effusion tapped 10/1.  Post patient and the patient was Intubated 10/2 and remained ICU for close monitoring and treatment.  Patient was successfully extubated on 10/7.  Patient's pericardial effusion showing no signs of infection.  Patient was also noted to have bilateral pleural effusion patient was then underwent thoracentesis and the fluid analysis showing no signs of infection.  Patient was seen by infectious disease specialist and pulmonologist.  Patient multiple pleural effusion and pericardial effusion Etiology is unclear but looks to be autoimmune.  Patient was then consulted by rheumatology and recommended to be on steroid however without significant improvement.  Cardiologist put patient on diuretics and recommend patient to be had a pericardial biopsy to make the diagnosis however patient refused and patient's family also refused.  Patient developed dysphagia post extubation and was on tube feeding.  Patient gradually recovered however tube feeding was remaining for quite a while.  ENT physician was consulted and recommended short-term of high-dose Decadron 12 mg 3 times daily for 3 times.  Patient tolerated this treatment and the patient's tube feeding was pulled out by herself on 10/21. 10/22 patient underwent reevaluation by speech evaluation and patient swallow function significantly improved and was restarted dysphagia diet.  Patient condition has been stabilized and euvolemic.   "    10/23 -patient transferred from telemetry to medical floor.  Patient agitated and refusing to answer questions this morning.  Patient's  at bedside, plan of care discussed with them and all questions answered.  Patient's  informed that PT/OT, speech are recommending skilled nursing facility.  Patient's  states that he would like to talk to social work for different options and would prefer to have the patient go to [facility] or [facility] for skilled nursing facility.    10/24 -patient cooperative and pleasant this morning.  Alert awake oriented x2.  Continues to have some intermittent confusion.  She states that \"the fluid was never there, you guys made it up.\"  Attempted to educate patient about her pericardial effusion and pleural effusion but patient unwilling to listen.    10/25 -patient resting in bed, no acute events overnight.  Patient's  at bedside who states that they would like patient to go to a different skilled nursing facilities as they have heard good reviews.  Social work informed and will send referral to [facility] skilled [facility].  Plan of care was discussed patient's  at bedside and all questions were answered.      Consultants/Specialty  Cardiology  Pulmonology  Neurology      Disposition  TBD, SNF referral sent, acceptance pending.        Review of Systems   Unable to perform ROS: Mental status change      Physical Exam  Laboratory/Imaging   Hemodynamics  Temp (24hrs), Av.8 °C (98.2 °F), Min:36.5 °C (97.7 °F), Max:37.1 °C (98.8 °F)   Temperature: 37.1 °C (98.8 °F)  Pulse  Av.9  Min: 10  Max: 140   Blood Pressure : 146/66      Respiratory      Respiration: 18, Pulse Oximetry: 91 %     Work Of Breathing / Effort: Mild  RUL Breath Sounds: Rhonchi, RML Breath Sounds: Rhonchi, RLL Breath Sounds: Rhonchi, LORI Breath Sounds: Rhonchi, LLL Breath Sounds: Rhonchi    Fluids    Intake/Output Summary (Last 24 hours) at 10/25/18 1050  Last data filed at " 10/25/18 0900   Gross per 24 hour   Intake              386 ml   Output                0 ml   Net              386 ml       Nutrition  Orders Placed This Encounter   Procedures   • Diet Order Full Liquid     Standing Status:   Standing     Number of Occurrences:   1     Order Specific Question:   Diet:     Answer:   Full Liquid [11]     Order Specific Question:   Consistency/Fluid modifications:     Answer:   Nectar Thick [2]     Order Specific Question:   Miscellaneous modifications:     Answer:   SLP - 1:1 Supervision by Nursing [21]     Physical Exam   Constitutional: She appears well-developed and well-nourished. No distress.   HENT:   Head: Normocephalic and atraumatic.   Right Ear: External ear normal.   Left Ear: External ear normal.   Eyes: Conjunctivae and EOM are normal.   Neck: Normal range of motion. Neck supple.   Cardiovascular: Normal rate, regular rhythm and normal heart sounds.    No murmur heard.  Pulmonary/Chest: Effort normal. No stridor. No respiratory distress.   Decreased bs at bases   Abdominal: Soft. Bowel sounds are normal. There is no tenderness. There is no guarding.   Musculoskeletal: Normal range of motion. She exhibits no edema.   Neurological:   Awake, oriented X 2     Skin: Skin is warm and dry. No rash noted. She is not diaphoretic.   Psychiatric: She has a normal mood and affect. Thought content normal. She is agitated.   Nursing note and vitals reviewed.                            EC-ECHOCARDIOGRAM LTD W/O CONT   Final Result      DX-CHEST-2 VIEWS   Final Result      Lingular and left basilar opacity is increased and likely represents atelectasis.      Small hiatal hernia may be present.      Stable cardiomegaly.      Minimal blunting of the costophrenic angle posteriorly may be related to pleural thickening or trace pleural fluid.               DX-ABDOMEN FOR TUBE PLACEMENT   Final Result      Enteric tube projects over the stomach.         DX-CHEST-PORTABLE (1 VIEW)   Final  Result      Increasing left midlung zone opacity could indicate consolidation charges atelectasis      Mild interstitial edema, similar to comparison      Stable cardiac silhouette enlargement      CT-SOFT TISSUE NECK WITH   Final Result      1.  No evidence of pharyngeal or laryngeal mass or evidence of adenopathy.      2.  Multiple small bilateral thyroid nodules.      DX-ABDOMEN FOR TUBE PLACEMENT   Final Result         1.  Nonspecific bowel gas pattern.   2.  Dobbhoff tube tip overlying the expected location of the pylorus or first duodenal segment.   3.  Cardiomegaly      DX-CHEST-LIMITED (1 VIEW)   Final Result         1.  Pulmonary edema and/or infiltrates are identified, which are stable since the prior exam.   2.  Dobbhoff tube is seen, may be coiled back within the stomach terminating above the upper margin of the film versus exterior tube overlying the midline. The distal segment is not visualized. View of the abdomen would be required for definitive    evaluation of tube position.   3.  Cardiomegaly   4.  Atherosclerosis      EC-ECHOCARDIOGRAM LTD W/O CONT   Final Result      US-THORACENTESIS PUNCTURE RIGHT   Final Result      1. Ultrasound guided right sided diagnostic and therapeutic thoracentesis.      2. 750 mL of fluid withdrawn.      DX-CHEST-PORTABLE (1 VIEW)   Final Result      No pneumothorax status post right thoracentesis.   Small residual right pleural effusion and left lung base atelectasis.   Left perihilar/left lung base atelectasis/possible infiltration and small left pleural effusion.      DX-CHEST-PORTABLE (1 VIEW)   Final Result         1.  Pulmonary edema and/or infiltrates are identified, which are stable since the prior exam.      DX-ABDOMEN FOR TUBE PLACEMENT   Final Result      Feeding tube placement with the tip projecting over the distal stomach or duodenal bulb      US-THORACENTESIS PUNCTURE LEFT   Final Result      1. Ultrasound guided left sided diagnostic and therapeutic  thoracentesis.      2. 650 mL of fluid withdrawn.      DX-CHEST-LIMITED (1 VIEW)   Final Result      No pneumothorax following left thoracentesis      DX-CHEST-PORTABLE (1 VIEW)   Final Result         Worsening large left pleural effusion with near collapsing of the left lung.      Moderate layering right pleural effusion, worse than prior as well.      DX-ABDOMEN FOR TUBE PLACEMENT   Final Result         Feeding tube with tip projecting over the expected area of the stomach.      DX-CHEST-PORTABLE (1 VIEW)   Final Result         1. No significant interval change.      EC-ECHOCARDIOGRAM LTD W/O CONT   Final Result      CT-CHEST (THORAX) WITH   Final Result      1.  Moderate to large bilateral pleural effusions with bilateral atelectasis.      2.  Moderate pericardial effusion.      3.  Patchy groundglass opacities within the residual aerated upper lobes bilaterally.      4.  Fatty liver.      5.  Atherosclerotic vascular calcification.            DX-ABDOMEN FOR TUBE PLACEMENT   Final Result         1.  Nonspecific bowel gas pattern.   2.  Dobbhoff tube tip terminates overlying the expected location of the gastric body.   3.  Left lung base infiltrate and/or layering pleural effusion.      [id number] VIEW   Final Result         1.  Nonspecific bowel gas pattern.   2.  Dobbhoff tube tip terminates in the distal esophagus, recommend advancement.   3.  Bilateral lower lobe infiltrates and layering pleural effusions.   4.  Cardiomegaly.      DX-CHEST-LIMITED (1 VIEW)   Final Result      Bilateral pleural effusions with overlying atelectasis/consolidation are again seen.      Mild interstitial prominence may represent mild edema.      Atherosclerotic plaque.         DX-ABDOMEN FOR TUBE PLACEMENT   Final Result      Enteric tube projects over the distal stomach.         EC-ECHOCARDIOGRAM COMPLETE W/O CONT   Final Result      DX-CHEST-PORTABLE (1 VIEW)   Final Result      No significant interval change.       DX-CHEST-PORTABLE (1 VIEW)   Final Result      No significant interval change.      EC-ECHOCARDIOGRAM LTD W/O CONT   Final Result      DX-CHEST-PORTABLE (1 VIEW)   Final Result         1.  Pulmonary edema and/or infiltrates are identified, which are stable since the prior exam. Layering bilateral pleural effusions, greater on the left.   2.  Atherosclerosis               DX-CHEST-PORTABLE (1 VIEW)   Final Result         1.  Pulmonary edema and/or infiltrates are identified, which are stable since the prior exam.   2.  Small right pleural effusion   3.  Atherosclerosis            DX-CHEST-PORTABLE (1 VIEW)   Final Result         1.  Pulmonary edema and/or infiltrates are identified, which are stable since the prior exam.   2.  Small right pleural effusion   3.  Atherosclerosis         DX-CHEST-PORTABLE (1 VIEW)   Final Result         1.  Pulmonary edema and/or infiltrates are identified, which are stable since the prior exam.   2.  Small right pleural effusion   3.  Atherosclerosis      DX-ABDOMEN FOR TUBE PLACEMENT   Final Result      Feeding tube placement with the tip projecting over the stomach body.      DX-ABDOMEN FOR TUBE PLACEMENT   Final Result      1.  Feeding tube appears looped within the stomach. The distal aspect of the tip is directed cephalad in the fundal region.      DX-CHEST-LIMITED (1 VIEW)   Final Result         1.  Pulmonary edema and/or infiltrates are identified, which are stable since the prior exam.   2.  Small right pleural effusion   3.  Atherosclerosis      DX-CHEST-LIMITED (1 VIEW)   Final Result         1.  Pulmonary edema and/or infiltrates are identified, which are stable since the prior exam.   2.  Atherosclerosis      DX-CHEST-PORTABLE (1 VIEW)   Final Result         1.  Pulmonary edema and/or infiltrates are identified, which are stable since the prior exam.   2.  Atherosclerosis      DX-CHEST-LIMITED (1 VIEW)   Final Result      1.  No pneumothorax identified status post right  thoracentesis and pericardiocentesis.      2.  Small amount of pneumomediastinum is likely related to recent pericardiocentesis and drain placement      3.  Cardiomegaly      EC-ECHOCARDIOGRAM LTD W/O CONT   Final Result      DX-CHEST-PORTABLE (1 VIEW)   Final Result      1.  Evacuation large right pleural effusion status post thoracentesis.   2.  No definite right pneumothorax identified. Recommend interval follow-up chest x-ray.   3.  Small left pleural effusion and left perihilar/left lung base atelectasis.   4.  Findings discussed with the patient's nurse. Follow-up chest x-ray will be obtained in approximately 3:00 PM      US-THORACENTESIS PUNCTURE RIGHT   Final Result      1. Ultrasound guided right sided diagnostic and therapeutic thoracentesis.      2. 1700 mL of fluid withdrawn.      EC-ECHOCARDIOGRAM COMPLETE W/O CONT   Final Result      DX-CHEST-PORTABLE (1 VIEW)   Final Result         1.  Pulmonary edema and/or infiltrates are identified, which are stable since the prior exam.   2.  Layering right pleural effusion, stable   3.  Cardiomegaly   4.  Atherosclerosis      DX-CHEST-2 VIEWS   Final Result      1.  Probable bilateral atelectasis      2.  Probable right pleural effusion which may be significant in size      3.  Limited assessment on one view portable radiography      OUTSIDE IMAGES-DX CHEST   Final Result      OUTSIDE IMAGES-DX CHEST   Final Result      EC-ECHOCARDIOGRAM LTD W/O CONT    (Results Pending)        Assessment/Plan     * Acute hypoxemic respiratory failure (HCC)- (present on admission)   Assessment & Plan    Extubated on 10/7/2018  Close monitor closely  Continue oxygen and RT protocol  Continue oxygen currently improving with Lasix, currently euvolemic cautiously use Lasix.  As needed  Patient's breathing condition improved  Continue oxygen.        Pleural effusion- (present on admission)   Overview    Chemistry suggesting transudate     Assessment & Plan        Patient is currently  euvolemic and no signs of worsening Of pleural effusion or pericardial effusion  Discontinue diuretics    Last Thoracentesis on left , done on 10/13    Right side 10/15 tolerated very well and feeling better currently with decreased requirement of oxygen  No signs of infection or malignancy from fluid analysis  Repeat chest x-ray clear no signs of pleural effusion reaccumulation.        Pericardial effusion- (present on admission)   Overview    Fluid protein suggesting exudate but all other cytology and chemistry was apparently lost.     Assessment & Plan    Cardiology following   Status post pericardial drain placement  Drain removed on 10/3/2018  Cytology negative fluid cultures negative  GENTRY negative RA factor positive CCP negative    off steroids .. Ineffective and stopped    Etiology is unclear and patient may eventually need pericardial biopsy for definitive diagnosis -> surgery is aware however at this moment patient wants to be transferred to [facility] for regional referral center for second opinion.  Transfer center notified and working on this transfer.  cytology results negative for malignancy   Likely need to be biopsy to establish definitive diagnosis   Repeat Echo shows decrease in size of pericardial effusion  Family does not want to pursue biopsy at this moment.       Pulmonary edema- (present on admission)   Assessment & Plan    Monitor fluid status   As needed Lasix, currently pulmonary edema resolved      Hypotension   Assessment & Plan    Relative adrenal insufficiency  Appears resolved for now  Close monitor          History of non-Hodgkin's lymphoma- (present on admission)   Overview    Nonhodgkins lymphoma  2000, treated with CHOP and MTX but never radiation therapy     Assessment & Plan    Hx of         HTN (hypertension)- (present on admission)   Assessment & Plan    All bp medications held  Cont to monitor        Dysphagia- (present on admission)   Assessment & Plan    Continue with  speech therapy   Patient passed swallow evaluation today and restart diet significant improvement  CT neck evaluation (-)  ENT recommend Decadron 12 mg 3 times total.  Finished steroid.  Continue working with speech therapy      Hypernatremia   Assessment & Plan    Resolved        Hypomagnesemia   Assessment & Plan    Repleted        Hypokalemia   Assessment & Plan    Resolved        Tobacco dependence- (present on admission)   Assessment & Plan    - Smoking cessation education provided  - Nicotine patch          Hypothyroidism- (present on admission)   Assessment & Plan    Continue levothyroxine          Quality-Core Measures   Reviewed items::  Labs reviewed and Medications reviewed  Almonte catheter::  No Almonte  DVT prophylaxis pharmacological::  Heparin  DVT prophylaxis - mechanical:  SCDs  Ulcer Prophylaxis::  Not indicated  Assessed for rehabilitation services:  Patient was assess for and/or received rehabilitation services during this hospitalization           Suicidal ideation:  -Likely due to frustration of medical condition.  Legal hold discontinued by psych    No change to assessment and plan

## 2018-10-26 NOTE — FACE TO FACE
Face to Face Note  -  Durable Medical Equipment    Nelida Pineda M.D. - NPI: [id number]  I certify that this patient is under my care and that they have had a durable medical equipment(DME)face to face encounter by myself that meets the physician DME face-to-face encounter requirements with this patient on:    Date of encounter:   Patient:                    MRN:                       YOB: 2017  Erin Quigley  [id number]  1940     The encounter with the patient was in whole, or in part, for the following medical condition, which is the primary reason for durable medical equipment:  Cardiac Disease    I certify that, based on my findings, the following durable medical equipment is medically necessary:  Oxygen and Walkers.    HOME O2 Saturation Measurements:(Values must be present for Home Oxygen orders)  Room air sat at rest: 84  Room air sat with amb: 86  With liters of O2: 3.5, O2 sat at rest with O2: 99  With Liters of O2: 3.5, O2 sat with amb with O2 : 96  Is the patient mobile?: Yes    My Clinical findings support the need for the above equipment due to:  Abnormal Gait, Hypoxia    Supporting Symptoms: Pericardial and Pleural Effusion     ------------------------------------------------------------------------------------------------------------------    Face to Face Supporting Documentation - Home Health    The encounter with this patient was in whole or in part the primary reason for home health admission.    Date of encounter:   Patient:                    MRN:                       YOB: 2017  Erin Quigley  [id number]  1940     Home health to see patient for:  Skilled Nursing care for assessment, interventions & education, Medical social work consult, Home health aide, Physical Therapy evaluation and treatment, Occupational therapy evaluation and treatment and Speech Language Pathology evaluation and treatment    Skilled need for:  New Onset Medical  Diagnosis Pleural and Pericardial Effusion    Skilled nursing interventions to include:  Comment: Medication Management    Homebound evidenced status by:  Need the aid of supportive devices such as crutches, canes, wheelchairs or walkers. Leaving home must require a considerable and taxing effort. There must exist a normal inability to leave the home.    Community Physician to provide follow up care: Jay Sanchez M.D.     Optional Interventions    Wound information & treatment:    Home Infusion Therapy orders:    Line/Drain/Airway:    I certify the face to face encounter for this home care referral meets the CMS requirements and the encounter/clinical assessment with the patient was, in whole, or in part, for the medical condition(s) listed above, which is the primary reason for home health care. Based on my clinical findings: the service(s) are medically necessary, support the need for home health care, and the homebound criteria are met.  I certify that this patient has had a face to face encounter by myself.  Nelida Pineda M.D. - NPI: [id number]    *Debility, frailty and advanced age in the absence of an acute deterioration or exacerbation of a condition do not qualify a patient for home health.

## 2018-10-26 NOTE — PROGRESS NOTES
Bed alarm went off and found pt sitting edge of bed.  Pt took off oxygen and  sensor.  O2 sat 93% on RA.  Pt refused to wear O2 and sensor.  Pt also asked to get back massage.  Gave back rub and tried to convince pt to wear at lease sensor but pt continues to refuse.  Gave morning medications with thickened coffee.

## 2018-10-26 NOTE — CARE PLAN
Problem: Respiratory:  Goal: Respiratory status will improve    Intervention: Administer and titrate oxygen therapy  2L o2.  Sat >90%      Problem: Safety  Goal: Will remain free from falls    Intervention: Assess risk factors for falls  Continues to have bed alarm

## 2018-10-26 NOTE — PROGRESS NOTES
Pt's  came out earlier of night shift that pt didn't get dinner.  Dinner tray found at nurse's station.  Dinner tray given to pt's  and apologized.  RN went into pt's room about 2100 and tried to do assessment.  Pt refused to answer questions.  Pt requested to leave her alone.  Pt stated that she is going to sleep.  RN made sure pt has strip and built in bed alarm on.  Pt has call light within reach.  About 2130, pt's daughter, Iman, called to find out what happen to dinner.  RN apologized about incident.  Daughter also claimed that pt is losing weight.  Daughter requested to talk to pt but pt was already in sleeping.  Will order dietary consult and dietary comment so primary RN notify of meal delivery.

## 2018-10-26 NOTE — PROGRESS NOTES
This RN was charting in front of pt's room and heard something drop.  RN went into pt's room and found she dropped call light.  RN asked pt if she needs anything and pt denied needs.  As of RN walking out, pt said that she just pee.  While cleaning, pt was wheezing and weak cough noted.   connected and O2 sat 93% on 2L O2.  RT called for treatment.

## 2018-10-26 NOTE — PROGRESS NOTES
Had a lengthy conversation with daughter Iman. Daughter expresses frustration with many things regarding care. Of note, daughter is frustrated overall with communication between disciplines. Tonight, pt did not get her dinner until 2000 as tray had been left at nurse's station (as pt requires 1:1 supervision). This upset pt's daughter as she feels her mother has been losing weight lately and it's not being addressed. Told daughter that boost supplements have been ordered to be delivered with every meal.     Pt daughter also asking what autoimmune diseases her mother has been tested for. Pt's daughter is INSISTENT that her mother be tested for  1. Hashimoto's disease  2. Celiac disease   3. Polyendocrine syndrome type 2 (APS 2)  4. UTI (possible cause of ams?)     Daughter also wants endocrinology on board in general. Will pass on information to day charge, will ensure that information is passed to day primary RN.

## 2018-10-26 NOTE — PROGRESS NOTES
Woke up confused.  Looking for her cat.  When RN told her she is in hospital, she got upset and said that she know but pt continued to look for her cat.  Tried to reassure and reorient but pt got more upset.  Pt asked this RN to leave her alone.  Stayed outside until pt fell asleep.

## 2018-10-26 NOTE — PROGRESS NOTES
RT came up and assessed pt.  RT recommended albuterol inhaler prn.  RN went to give inhaler but pt refused.  Pt adamant about taking inhaler at this time.

## 2018-10-26 NOTE — DISCHARGE PLANNING
Anticipated Discharge Disposition: Home with H/H    Action: informed that the family has decided on a new d/c plan, home with H/H and O2.  PC to [facility], verified they do cover area. If accepted soonest they could see patient is Friday of next week.  Suggested [facility] for O2 [phone].  PC to [facility], message left  Faxed Choice to [facility] for H/H      Barriers to Discharge: H/H acceptance and O2 provider    Plan: follow up with O2.

## 2018-10-26 NOTE — DIETARY
"Nutrition note:  Received consult for \"pt's daughter claimed that pt has been losing weight since she came into hospital\".  Attempted to meet with family to discuss, but no one there.    Pt is on a NTFL diet per SLP, although SLP recommends NPO with Cortrak.  Pt is refusing Cortrak.    According to bed scale weights, pt has lost 21 kg (29%) since admit. This is excessive; however, wt loss is inevitable/unavoidable with prolonged hospitalization d/t inactivity and loss of LBM.    RD met with pt.  Pt dislikes Boost Plus supplement.  Offered Boost VHC supplement (Very High Calorie) as pt can almost meet all of her needs with just two cartons per day but pt replied \"I am not going to drink any of that stuff\".  Offered pt other foods/beverages/snacks and pt said \"I will not eat it!\".  Asked pt if there was anything I could get her and she said \"no!\".  Would recommend Cortrak for nutrition support but pt declining.  RD intervention quite limited at this time.    "

## 2018-10-26 NOTE — THERAPY
"Physical Therapy Treatment completed.   Bed Mobility:  Supine to Sit:  (NT up in BR w/ CNA)  Transfers: Sit to Stand: Minimal Assist (From lower surface)  Gait: Level Of Assist: Contact Guard Assist with Front-Wheel Walker       Plan of Care: Will benefit from Physical Therapy 4 times per week  Discharge Recommendations: Equipment: Will Continue to Assess for Equipment Needs.     See \"Rehab Therapy-Acute\" Patient Summary Report for complete documentation.     Pt presenting more agitated however was willing to do more than previous tx. Pt was able to increase ambulation distances and kept her hands on the FWW. She does require cues to  her L foot once fatigued as she begins to drag it. Pt required Mikayla to stand from a lower surface height but was able to perform from bed w/out assist. Pt declining stairs even though she has 4 to enter home. Pt continues to be a high fall risk and will require 24/7 SPV.  "

## 2018-10-26 NOTE — DISCHARGE PLANNING
Received Choice form at [phone]  Agency/Facility Name: [facility]  Referral sent per Choice form @ [phone]

## 2018-10-26 NOTE — PROGRESS NOTES
Patient's daughter Iman requesting call. Attempted to contact daughter     [phone]     Voicemail was left.      Plan of care was discussed with patient's  at bedside and all questions/concerns were addressed.

## 2018-10-26 NOTE — CARE PLAN
Problem: Knowledge Deficit  Goal: Knowledge of disease process/condition, treatment plan, diagnostic tests, and medications will improve    Intervention: Explain information regarding disease process/condition, treatment plan, diagnostic tests, and medications and document in education  Explained staff concerns for swallowing; MD and speech involved, and pt and spouse educated regarding risks of suspected aspiration      Problem: Discharge Barriers/Planning  Goal: Patient's continuum of care needs will be met    Intervention: Collaborate with Transitional Care Team and Interdisciplinary Team to meet discharge needs  DENIA Prince involved with spouse's placement questions

## 2018-10-26 NOTE — PROGRESS NOTES
[facility] Hospitalist Progress Note    Date of Service: 10/26/2018    Chief Complaint    78 y.o. Female from [facility] admitted 9/28/2018 with shortness of breath and peripheral edema found to have significant pericardial effusion.    Pericardial effusion tapped 10/1  Intubated 10/2  Extubated 10/7      Interval Problem Update  78 y.o. Female from [facility] admitted 9/28/2018 with shortness of breath and peripheral edema found to have significant pericardial effusion.  Patient was seen by cardiology and Pericardial effusion tapped 10/1.  Post patient and the patient was Intubated 10/2 and remained ICU for close monitoring and treatment.  Patient was successfully extubated on 10/7.  Patient's pericardial effusion showing no signs of infection.  Patient was also noted to have bilateral pleural effusion patient was then underwent thoracentesis and the fluid analysis showing no signs of infection.  Patient was seen by infectious disease specialist and pulmonologist.  Patient multiple pleural effusion and pericardial effusion Etiology is unclear but looks to be autoimmune.  Patient was then consulted by rheumatology and recommended to be on steroid however without significant improvement.  Cardiologist put patient on diuretics and recommend patient to be had a pericardial biopsy to make the diagnosis however patient refused and patient's family also refused.  Patient developed dysphagia post extubation and was on tube feeding.  Patient gradually recovered however tube feeding was remaining for quite a while.  ENT physician was consulted and recommended short-term of high-dose Decadron 12 mg 3 times daily for 3 times.  Patient tolerated this treatment and the patient's tube feeding was pulled out by herself on 10/21. 10/22 patient underwent reevaluation by speech evaluation and patient swallow function significantly improved and was restarted dysphagia diet.  Patient condition has been stabilized and euvolemic.   "    10/23 -patient transferred from telemetry to medical floor.  Patient agitated and refusing to answer questions this morning.  Patient's  at bedside, plan of care discussed with them and all questions answered.  Patient's  informed that PT/OT, speech are recommending skilled nursing facility.  Patient's  states that he would like to talk to social work for different options and would prefer to have the patient go to [place] or [place] for skilled nursing facility.    10/24 -patient cooperative and pleasant this morning.  Alert awake oriented x2.  Continues to have some intermittent confusion.  She states that \"the fluid was never there, you guys made it up.\"  Attempted to educate patient about her pericardial effusion and pleural effusion but patient unwilling to listen.    10/25 -patient resting in bed, no acute events overnight.  Patient's  at bedside who states that they would like patient to go to a different skilled nursing facilities as they have heard good reviews.  Social work informed and will send referral to [place] skilled nursing [facility].  Plan of care was discussed patient's  at bedside and all questions were answered.    10/26 -patient alert awake oriented x2 this morning.  She is disoriented to place and time/date.  Patient agitated overnight and removing her supplemental oxygen refusing pulse oximeter.  Patient's  at bedside this morning requesting to take patient home more than skilled nursing facility.  Initially he wants to take her today.  We discussed if he left today would be AGAINST MEDICAL ADVICE as she requires supplemental oxygen and home health, both of which have not been set up.  Risks of leaving AGAINST MEDICAL ADVICE including respiratory depression, MI, stroke and death were discussed with the patient's .  Patient's  is alert awake oriented x4 at the time of discussion and understood the risks.  He later is agreeable to " staying but no longer wants the patient to go to skilled nursing facility and prefers to take the patient home with home health.  Social work assisting    Consultants/Specialty  Cardiology  Pulmonology  Neurology      Disposition  TBD, skilled nursing facility versus home health        Review of Systems   Constitutional: Negative.    HENT: Negative for hearing loss.    Respiratory: Negative for cough, shortness of breath and stridor.    Cardiovascular: Negative for chest pain and palpitations.   Gastrointestinal: Negative for abdominal pain and nausea.   Genitourinary: Negative.    Skin: Negative.    Neurological: Negative for headaches.   Psychiatric/Behavioral: Negative for depression. The patient is nervous/anxious.       Physical Exam  Laboratory/Imaging   Hemodynamics  Temp (24hrs), Av.7 °C (98 °F), Min:36.5 °C (97.7 °F), Max:36.9 °C (98.5 °F)   Temperature: 36.6 °C (97.9 °F)  Pulse  Av.9  Min: 10  Max: 140   Blood Pressure : 113/58      Respiratory      Respiration: 20, Pulse Oximetry: 92 %, O2 Daily Delivery Respiratory : Silicone Nasal Cannula     Work Of Breathing / Effort: Mild  RUL Breath Sounds: Transmitted Upper Airway Sound, RML Breath Sounds: Diminished, RLL Breath Sounds: Diminished, LORI Breath Sounds: Transmitted Upper Airway Sound, LLL Breath Sounds: Diminished    Fluids    Intake/Output Summary (Last 24 hours) at 10/26/18 1119  Last data filed at 10/26/18 0550   Gross per 24 hour   Intake              460 ml   Output                0 ml   Net              460 ml       Nutrition  Orders Placed This Encounter   Procedures   • Diet Order Full Liquid     Standing Status:   Standing     Number of Occurrences:   1     Order Specific Question:   Diet:     Answer:   Full Liquid [11]     Order Specific Question:   Consistency/Fluid modifications:     Answer:   Nectar Thick [2]     Order Specific Question:   Miscellaneous modifications:     Answer:   SLP - 1:1 Supervision by Nursing [21]      Physical Exam   Constitutional: She appears well-developed and well-nourished. No distress.   HENT:   Head: Normocephalic and atraumatic.   Right Ear: External ear normal.   Left Ear: External ear normal.   Eyes: Conjunctivae and EOM are normal.   Neck: Normal range of motion. Neck supple.   Cardiovascular: Normal rate, regular rhythm and normal heart sounds.    No murmur heard.  Pulmonary/Chest: Effort normal. No stridor. No respiratory distress.   Decreased bs at bases   Abdominal: Soft. Bowel sounds are normal. There is no tenderness. There is no guarding.   Musculoskeletal: Normal range of motion. She exhibits no edema.   Neurological:   Awake, oriented X 2     Skin: Skin is warm and dry. No rash noted. She is not diaphoretic.   Psychiatric: Her mood appears anxious. She is agitated. Thought content is delusional.   Nursing note and vitals reviewed.      Recent Labs      10/26/18   0234   WBC  9.7   RBC  4.38   HEMOGLOBIN  12.3   HEMATOCRIT  39.6   MCV  90.4   MCH  28.1   MCHC  31.1*   RDW  67.0*   PLATELETCT  258   MPV  11.1     Recent Labs      10/26/18   0234   SODIUM  143   POTASSIUM  3.7   CHLORIDE  107   CO2  29   GLUCOSE  89   BUN  12   CREATININE  0.38*   CALCIUM  8.8                   EC-ECHOCARDIOGRAM LTD W/O CONT   Final Result      DX-CHEST-2 VIEWS   Final Result      Lingular and left basilar opacity is increased and likely represents atelectasis.      Small hiatal hernia may be present.      Stable cardiomegaly.      Minimal blunting of the costophrenic angle posteriorly may be related to pleural thickening or trace pleural fluid.               DX-ABDOMEN FOR TUBE PLACEMENT   Final Result      Enteric tube projects over the stomach.         DX-CHEST-PORTABLE (1 VIEW)   Final Result      Increasing left midlung zone opacity could indicate consolidation charges atelectasis      Mild interstitial edema, similar to comparison      Stable cardiac silhouette enlargement      CT-SOFT TISSUE NECK WITH    Final Result      1.  No evidence of pharyngeal or laryngeal mass or evidence of adenopathy.      2.  Multiple small bilateral thyroid nodules.      DX-ABDOMEN FOR TUBE PLACEMENT   Final Result         1.  Nonspecific bowel gas pattern.   2.  Dobbhoff tube tip overlying the expected location of the pylorus or first duodenal segment.   3.  Cardiomegaly      DX-CHEST-LIMITED (1 VIEW)   Final Result         1.  Pulmonary edema and/or infiltrates are identified, which are stable since the prior exam.   2.  Dobbhoff tube is seen, may be coiled back within the stomach terminating above the upper margin of the film versus exterior tube overlying the midline. The distal segment is not visualized. View of the abdomen would be required for definitive    evaluation of tube position.   3.  Cardiomegaly   4.  Atherosclerosis      EC-ECHOCARDIOGRAM LTD W/O CONT   Final Result      US-THORACENTESIS PUNCTURE RIGHT   Final Result      1. Ultrasound guided right sided diagnostic and therapeutic thoracentesis.      2. 750 mL of fluid withdrawn.      DX-CHEST-PORTABLE (1 VIEW)   Final Result      No pneumothorax status post right thoracentesis.   Small residual right pleural effusion and left lung base atelectasis.   Left perihilar/left lung base atelectasis/possible infiltration and small left pleural effusion.      DX-CHEST-PORTABLE (1 VIEW)   Final Result         1.  Pulmonary edema and/or infiltrates are identified, which are stable since the prior exam.      DX-ABDOMEN FOR TUBE PLACEMENT   Final Result      Feeding tube placement with the tip projecting over the distal stomach or duodenal bulb      US-THORACENTESIS PUNCTURE LEFT   Final Result      1. Ultrasound guided left sided diagnostic and therapeutic thoracentesis.      2. 650 mL of fluid withdrawn.      DX-CHEST-LIMITED (1 VIEW)   Final Result      No pneumothorax following left thoracentesis      DX-CHEST-PORTABLE (1 VIEW)   Final Result         Worsening large left  pleural effusion with near collapsing of the left lung.      Moderate layering right pleural effusion, worse than prior as well.      DX-ABDOMEN FOR TUBE PLACEMENT   Final Result         Feeding tube with tip projecting over the expected area of the stomach.      DX-CHEST-PORTABLE (1 VIEW)   Final Result         1. No significant interval change.      EC-ECHOCARDIOGRAM LTD W/O CONT   Final Result      CT-CHEST (THORAX) WITH   Final Result      1.  Moderate to large bilateral pleural effusions with bilateral atelectasis.      2.  Moderate pericardial effusion.      3.  Patchy groundglass opacities within the residual aerated upper lobes bilaterally.      4.  Fatty liver.      5.  Atherosclerotic vascular calcification.            DX-ABDOMEN FOR TUBE PLACEMENT   Final Result         1.  Nonspecific bowel gas pattern.   2.  Dobbhoff tube tip terminates overlying the expected location of the gastric body.   3.  Left lung base infiltrate and/or layering pleural effusion.      EM-OFVYMLS-3 VIEW   Final Result         1.  Nonspecific bowel gas pattern.   2.  Dobbhoff tube tip terminates in the distal esophagus, recommend advancement.   3.  Bilateral lower lobe infiltrates and layering pleural effusions.   4.  Cardiomegaly.      DX-CHEST-LIMITED (1 VIEW)   Final Result      Bilateral pleural effusions with overlying atelectasis/consolidation are again seen.      Mild interstitial prominence may represent mild edema.      Atherosclerotic plaque.         DX-ABDOMEN FOR TUBE PLACEMENT   Final Result      Enteric tube projects over the distal stomach.         EC-ECHOCARDIOGRAM COMPLETE W/O CONT   Final Result      DX-CHEST-PORTABLE (1 VIEW)   Final Result      No significant interval change.      DX-CHEST-PORTABLE (1 VIEW)   Final Result      No significant interval change.      EC-ECHOCARDIOGRAM LTD W/O CONT   Final Result      DX-CHEST-PORTABLE (1 VIEW)   Final Result         1.  Pulmonary edema and/or infiltrates are  identified, which are stable since the prior exam. Layering bilateral pleural effusions, greater on the left.   2.  Atherosclerosis               DX-CHEST-PORTABLE (1 VIEW)   Final Result         1.  Pulmonary edema and/or infiltrates are identified, which are stable since the prior exam.   2.  Small right pleural effusion   3.  Atherosclerosis            DX-CHEST-PORTABLE (1 VIEW)   Final Result         1.  Pulmonary edema and/or infiltrates are identified, which are stable since the prior exam.   2.  Small right pleural effusion   3.  Atherosclerosis         DX-CHEST-PORTABLE (1 VIEW)   Final Result         1.  Pulmonary edema and/or infiltrates are identified, which are stable since the prior exam.   2.  Small right pleural effusion   3.  Atherosclerosis      DX-ABDOMEN FOR TUBE PLACEMENT   Final Result      Feeding tube placement with the tip projecting over the stomach body.      DX-ABDOMEN FOR TUBE PLACEMENT   Final Result      1.  Feeding tube appears looped within the stomach. The distal aspect of the tip is directed cephalad in the fundal region.      DX-CHEST-LIMITED (1 VIEW)   Final Result         1.  Pulmonary edema and/or infiltrates are identified, which are stable since the prior exam.   2.  Small right pleural effusion   3.  Atherosclerosis      DX-CHEST-LIMITED (1 VIEW)   Final Result         1.  Pulmonary edema and/or infiltrates are identified, which are stable since the prior exam.   2.  Atherosclerosis      DX-CHEST-PORTABLE (1 VIEW)   Final Result         1.  Pulmonary edema and/or infiltrates are identified, which are stable since the prior exam.   2.  Atherosclerosis      DX-CHEST-LIMITED (1 VIEW)   Final Result      1.  No pneumothorax identified status post right thoracentesis and pericardiocentesis.      2.  Small amount of pneumomediastinum is likely related to recent pericardiocentesis and drain placement      3.  Cardiomegaly      EC-ECHOCARDIOGRAM LTD W/O CONT   Final Result       DX-CHEST-PORTABLE (1 VIEW)   Final Result      1.  Evacuation large right pleural effusion status post thoracentesis.   2.  No definite right pneumothorax identified. Recommend interval follow-up chest x-ray.   3.  Small left pleural effusion and left perihilar/left lung base atelectasis.   4.  Findings discussed with the patient's nurse. Follow-up chest x-ray will be obtained in approximately 3:00 PM      US-THORACENTESIS PUNCTURE RIGHT   Final Result      1. Ultrasound guided right sided diagnostic and therapeutic thoracentesis.      2. 1700 mL of fluid withdrawn.      EC-ECHOCARDIOGRAM COMPLETE W/O CONT   Final Result      DX-CHEST-PORTABLE (1 VIEW)   Final Result         1.  Pulmonary edema and/or infiltrates are identified, which are stable since the prior exam.   2.  Layering right pleural effusion, stable   3.  Cardiomegaly   4.  Atherosclerosis      DX-CHEST-2 VIEWS   Final Result      1.  Probable bilateral atelectasis      2.  Probable right pleural effusion which may be significant in size      3.  Limited assessment on one view portable radiography      OUTSIDE IMAGES-DX CHEST   Final Result      OUTSIDE IMAGES-DX CHEST   Final Result           Assessment/Plan     * Acute hypoxemic respiratory failure (HCC)- (present on admission)   Assessment & Plan    Extubated on 10/7/2018  Close monitor closely  Continue oxygen and RT protocol  Continue oxygen currently improving with Lasix, currently euvolemic cautiously use Lasix.  As needed  Patient's breathing condition improved  Continue oxygen.        Pleural effusion- (present on admission)   Overview    Chemistry suggesting transudate     Assessment & Plan        Patient is currently euvolemic and no signs of worsening Of pleural effusion or pericardial effusion  Discontinue diuretics    Last Thoracentesis on left , done on 10/13    Right side 10/15 tolerated very well and feeling better currently with decreased requirement of oxygen  No signs of infection  or malignancy from fluid analysis  Repeat chest x-ray clear no signs of pleural effusion reaccumulation.        Pericardial effusion- (present on admission)   Overview    Fluid protein suggesting exudate but all other cytology and chemistry was apparently lost.     Assessment & Plan    Cardiology following   Status post pericardial drain placement  Drain removed on 10/3/2018  Cytology negative fluid cultures negative  GENTRY negative RA factor positive CCP negative    off steroids .. Ineffective and stopped    Etiology is unclear and patient may eventually need pericardial biopsy for definitive diagnosis -> surgery is aware however at this moment patient wants to be transferred to [facility] for regional referral center for second opinion.  Transfer center notified and working on this transfer.  cytology results negative for malignancy   Likely need to be biopsy to establish definitive diagnosis   Repeat Echo shows decrease in size of pericardial effusion  Family does not want to pursue biopsy at this moment.       Pulmonary edema- (present on admission)   Assessment & Plan    Monitor fluid status   As needed Lasix, currently pulmonary edema resolved      Hypotension   Assessment & Plan    Relative adrenal insufficiency  Appears resolved for now  Close monitor          History of non-Hodgkin's lymphoma- (present on admission)   Overview    Nonhodgkins lymphoma  2000, treated with CHOP and MTX but never radiation therapy     Assessment & Plan    Hx of         HTN (hypertension)- (present on admission)   Assessment & Plan    All bp medications held  Cont to monitor        Dysphagia- (present on admission)   Assessment & Plan    Continue with speech therapy   Patient passed swallow evaluation today and restart diet significant improvement  CT neck evaluation (-)  ENT recommend Decadron 12 mg 3 times total.  Finished steroid.  Continue working with speech therapy      Hypernatremia   Assessment & Plan    Resolved         Hypomagnesemia   Assessment & Plan    Repleted        Hypokalemia   Assessment & Plan    Resolved        Tobacco dependence- (present on admission)   Assessment & Plan    - Smoking cessation education provided  - Nicotine patch          Hypothyroidism- (present on admission)   Assessment & Plan    Continue levothyroxine          Quality-Core Measures   Reviewed items::  Labs reviewed and Medications reviewed  Almonte catheter::  No Almonte  DVT prophylaxis pharmacological::  Heparin  DVT prophylaxis - mechanical:  SCDs  Ulcer Prophylaxis::  Not indicated  Assessed for rehabilitation services:  Patient was assess for and/or received rehabilitation services during this hospitalization           Suicidal ideation:  -Likely due to frustration of medical condition.  Legal hold discontinued by psych    No change to assessment and plan

## 2018-10-27 NOTE — PROGRESS NOTES
"Late entry:    Pt has been agitated and noncompliant since initially assessed this morning. Refusing to answer assessment questions, taking oxygen and  on and off despite education. Pt's  is difficult to work w/ as well. Pt is very openly mad at , Isacc, and this males Isacc upset at staff. Threatened to leave AMA late this morning even after education about pt's need for home oxygen and recommended SNF placement by PT/OT.  continues to change his mind about where he wants his wife to go after hospital. He even had a discussion w/ hospitalist after threatening AMA to just put pt in restraints and give her haldol if she wouldn't comply. Restraints ordered was placed, but this RN didn't need to apply. Pt was compliant w/ oxygen for the most of the day. Once pt's daughter, Rachana, came onto unit, she was able to talk pt into accepting a breathing treatment for her stridor, allowing staff to reattach , and get vital signs.    Pt however still refusing much of her full liquid diet. Pt believes she is coughing because of allergies. Does not accept any education regarding her aspirating. She will use suction but not properly, allowed this RN to suction back of her throat to educate her daughter Rachana once. Rachana told this RN that she and her father have concluded the best POC for her mother would be taking her home on home oxygen w/ home health involved. Dr. Pineda and  updated w/ family's decision.    Around 1500 another of pt's daughter, Iman called, demanding to talk w/ hospitalist. This RN told her she could take her phone number and pass it along to Dr. Pineda. Iman told this RN that she needed Dr. Pineda's email. Told Iman this was not information we were at liberty to give her but she could ask Dr. Pineda if and when they talked. Iman was very abrasive and threatening on the phone. \"I'm going to administration and will most likely be taking legal action.\" Unsure what Iman was even " referring to. Charge RN notified. Dr. Pineda paged regarding Iman's phone call. Edwin tried to call her multiple times but was unable to connect.

## 2018-10-27 NOTE — PROGRESS NOTES
[facility] Hospitalist Progress Note    Date of Service: 10/27/2018    Chief Complaint    78 y.o. Female from [facility] admitted 9/28/2018 with shortness of breath and peripheral edema found to have significant pericardial effusion.    Pericardial effusion tapped 10/1  Intubated 10/2  Extubated 10/7      Interval Problem Update  78 y.o. Female from [facility] admitted 9/28/2018 with shortness of breath and peripheral edema found to have significant pericardial effusion.  Patient was seen by cardiology and Pericardial effusion tapped 10/1.  Post patient and the patient was Intubated 10/2 and remained ICU for close monitoring and treatment.  Patient was successfully extubated on 10/7.  Patient's pericardial effusion showing no signs of infection.  Patient was also noted to have bilateral pleural effusion patient was then underwent thoracentesis and the fluid analysis showing no signs of infection.  Patient was seen by infectious disease specialist and pulmonologist.  Patient multiple pleural effusion and pericardial effusion Etiology is unclear but looks to be autoimmune.  Patient was then consulted by rheumatology and recommended to be on steroid however without significant improvement.  Cardiologist put patient on diuretics and recommend patient to be had a pericardial biopsy to make the diagnosis however patient refused and patient's family also refused.  Patient developed dysphagia post extubation and was on tube feeding.  Patient gradually recovered however tube feeding was remaining for quite a while.  ENT physician was consulted and recommended short-term of high-dose Decadron 12 mg 3 times daily for 3 times.  Patient tolerated this treatment and the patient's tube feeding was pulled out by herself on 10/21. 10/22 patient underwent reevaluation by speech evaluation and patient swallow function significantly improved and was restarted dysphagia diet.  Patient condition has been stabilized and euvolemic.   "    10/23 -patient transferred from telemetry to medical floor.  Patient agitated and refusing to answer questions this morning.  Patient's  at bedside, plan of care discussed with them and all questions answered.  Patient's  informed that PT/OT, speech are recommending skilled nursing facility.  Patient's  states that he would like to talk to social work for different options and would prefer to have the patient go to [facility] or [facility] for skilled nursing facility.    10/24 -patient cooperative and pleasant this morning.  Alert awake oriented x2.  Continues to have some intermittent confusion.  She states that \"the fluid was never there, you guys made it up.\"  Attempted to educate patient about her pericardial effusion and pleural effusion but patient unwilling to listen.    10/25 -patient resting in bed, no acute events overnight.  Patient's  at bedside who states that they would like patient to go to a different skilled nursing facilities as they have heard good reviews.  Social work informed and will send referral to [facility] skilled nursing [facility].  Plan of care was discussed patient's  at bedside and all questions were answered.    10/26 -patient alert awake oriented x2 this morning.  She is disoriented to place and time/date.  Patient agitated overnight and removing her supplemental oxygen refusing pulse oximeter.  Patient's  at bedside this morning requesting to take patient home more than skilled nursing facility.  Initially he wants to take her today.  We discussed if he left today would be AGAINST MEDICAL ADVICE as she requires supplemental oxygen and home health, both of which have not been set up.  Risks of leaving AGAINST MEDICAL ADVICE including respiratory depression, MI, stroke and death were discussed with the patient's .  Patient's  is alert awake oriented x4 at the time of discussion and understood the risks.  He later is agreeable to " staying but no longer wants the patient to go to skilled nursing facility and prefers to take the patient home with home health.  Social work assisting    10/27 - No acute events overnight.  Patient resting in bed, denies any complaints this morning.  Patient's family prefers that she go home with home health rather than skilled nursing facility.  Referral placed.  Social work assisting    Consultants/Specialty  Cardiology  Pulmonology  Neurology      Disposition  TBD, home health, pt accepted to  but O2 set up pending. SW assisting.        Review of Systems   Constitutional: Negative.    HENT: Negative for hearing loss.    Respiratory: Negative for cough, shortness of breath and stridor.    Cardiovascular: Negative for chest pain.   Gastrointestinal: Negative for abdominal pain and nausea.   Genitourinary: Negative.    Musculoskeletal: Negative for joint pain.   Skin: Negative.    Neurological: Negative for headaches.   Psychiatric/Behavioral: The patient is nervous/anxious.       Physical Exam  Laboratory/Imaging   Hemodynamics  Temp (24hrs), Av.7 °C (98 °F), Min:36.4 °C (97.5 °F), Max:36.9 °C (98.4 °F)   Temperature: 36.7 °C (98 °F)  Pulse  Av.9  Min: 10  Max: 140   Blood Pressure : 126/88      Respiratory      Respiration: 20, Pulse Oximetry: 96 %, O2 Daily Delivery Respiratory : Silicone Nasal Cannula     Work Of Breathing / Effort: Moderate;Shallow;Increased Work of Breathing  RUL Breath Sounds: Stridor, RML Breath Sounds: Stridor, RLL Breath Sounds: Stridor, LORI Breath Sounds: Stridor, LLL Breath Sounds: Stridor    Fluids    Intake/Output Summary (Last 24 hours) at 10/27/18 0943  Last data filed at 10/26/18 2020   Gross per 24 hour   Intake              780 ml   Output                0 ml   Net              780 ml       Nutrition  Orders Placed This Encounter   Procedures   • Diet Order Full Liquid     Standing Status:   Standing     Number of Occurrences:   1     Order Specific Question:   Diet:      Answer:   Full Liquid [11]     Order Specific Question:   Consistency/Fluid modifications:     Answer:   Nectar Thick [2]     Order Specific Question:   Miscellaneous modifications:     Answer:   SLP - 1:1 Supervision by Nursing [21]     Physical Exam   Constitutional: She appears well-developed and well-nourished. No distress.   HENT:   Head: Normocephalic and atraumatic.   Right Ear: External ear normal.   Left Ear: External ear normal.   Eyes: Conjunctivae and EOM are normal.   Neck: Normal range of motion. Neck supple.   Cardiovascular: Normal rate, regular rhythm and normal heart sounds.    No murmur heard.  Pulmonary/Chest: Effort normal. No stridor. No respiratory distress.   Decreased bs at bases   Abdominal: Soft. Bowel sounds are normal. She exhibits no distension. There is no tenderness.   Musculoskeletal: Normal range of motion. She exhibits no edema.   Neurological:   Awake, oriented X 2     Skin: Skin is warm and dry. No rash noted. She is not diaphoretic.   Psychiatric: Her mood appears anxious. She is agitated. Thought content is delusional.   Nursing note and vitals reviewed.      Recent Labs      10/26/18   0234   WBC  9.7   RBC  4.38   HEMOGLOBIN  12.3   HEMATOCRIT  39.6   MCV  90.4   MCH  28.1   MCHC  31.1*   RDW  67.0*   PLATELETCT  258   MPV  11.1     Recent Labs      10/26/18   0234   SODIUM  143   POTASSIUM  3.7   CHLORIDE  107   CO2  29   GLUCOSE  89   BUN  12   CREATININE  0.38*   CALCIUM  8.8                   EC-ECHOCARDIOGRAM LTD W/O CONT   Final Result      DX-CHEST-2 VIEWS   Final Result      Lingular and left basilar opacity is increased and likely represents atelectasis.      Small hiatal hernia may be present.      Stable cardiomegaly.      Minimal blunting of the costophrenic angle posteriorly may be related to pleural thickening or trace pleural fluid.               DX-ABDOMEN FOR TUBE PLACEMENT   Final Result      Enteric tube projects over the stomach.          DX-CHEST-PORTABLE (1 VIEW)   Final Result      Increasing left midlung zone opacity could indicate consolidation charges atelectasis      Mild interstitial edema, similar to comparison      Stable cardiac silhouette enlargement      CT-SOFT TISSUE NECK WITH   Final Result      1.  No evidence of pharyngeal or laryngeal mass or evidence of adenopathy.      2.  Multiple small bilateral thyroid nodules.      DX-ABDOMEN FOR TUBE PLACEMENT   Final Result         1.  Nonspecific bowel gas pattern.   2.  Dobbhoff tube tip overlying the expected location of the pylorus or first duodenal segment.   3.  Cardiomegaly      DX-CHEST-LIMITED (1 VIEW)   Final Result         1.  Pulmonary edema and/or infiltrates are identified, which are stable since the prior exam.   2.  Dobbhoff tube is seen, may be coiled back within the stomach terminating above the upper margin of the film versus exterior tube overlying the midline. The distal segment is not visualized. View of the abdomen would be required for definitive    evaluation of tube position.   3.  Cardiomegaly   4.  Atherosclerosis      EC-ECHOCARDIOGRAM LTD W/O CONT   Final Result      US-THORACENTESIS PUNCTURE RIGHT   Final Result      1. Ultrasound guided right sided diagnostic and therapeutic thoracentesis.      2. 750 mL of fluid withdrawn.      DX-CHEST-PORTABLE (1 VIEW)   Final Result      No pneumothorax status post right thoracentesis.   Small residual right pleural effusion and left lung base atelectasis.   Left perihilar/left lung base atelectasis/possible infiltration and small left pleural effusion.      DX-CHEST-PORTABLE (1 VIEW)   Final Result         1.  Pulmonary edema and/or infiltrates are identified, which are stable since the prior exam.      DX-ABDOMEN FOR TUBE PLACEMENT   Final Result      Feeding tube placement with the tip projecting over the distal stomach or duodenal bulb      US-THORACENTESIS PUNCTURE LEFT   Final Result      1. Ultrasound guided left  sided diagnostic and therapeutic thoracentesis.      2. 650 mL of fluid withdrawn.      DX-CHEST-LIMITED (1 VIEW)   Final Result      No pneumothorax following left thoracentesis      DX-CHEST-PORTABLE (1 VIEW)   Final Result         Worsening large left pleural effusion with near collapsing of the left lung.      Moderate layering right pleural effusion, worse than prior as well.      DX-ABDOMEN FOR TUBE PLACEMENT   Final Result         Feeding tube with tip projecting over the expected area of the stomach.      DX-CHEST-PORTABLE (1 VIEW)   Final Result         1. No significant interval change.      EC-ECHOCARDIOGRAM LTD W/O CONT   Final Result      CT-CHEST (THORAX) WITH   Final Result      1.  Moderate to large bilateral pleural effusions with bilateral atelectasis.      2.  Moderate pericardial effusion.      3.  Patchy groundglass opacities within the residual aerated upper lobes bilaterally.      4.  Fatty liver.      5.  Atherosclerotic vascular calcification.            DX-ABDOMEN FOR TUBE PLACEMENT   Final Result         1.  Nonspecific bowel gas pattern.   2.  Dobbhoff tube tip terminates overlying the expected location of the gastric body.   3.  Left lung base infiltrate and/or layering pleural effusion.      [id number] VIEW   Final Result         1.  Nonspecific bowel gas pattern.   2.  Dobbhoff tube tip terminates in the distal esophagus, recommend advancement.   3.  Bilateral lower lobe infiltrates and layering pleural effusions.   4.  Cardiomegaly.      DX-CHEST-LIMITED (1 VIEW)   Final Result      Bilateral pleural effusions with overlying atelectasis/consolidation are again seen.      Mild interstitial prominence may represent mild edema.      Atherosclerotic plaque.         DX-ABDOMEN FOR TUBE PLACEMENT   Final Result      Enteric tube projects over the distal stomach.         EC-ECHOCARDIOGRAM COMPLETE W/O CONT   Final Result      DX-CHEST-PORTABLE (1 VIEW)   Final Result      No significant  interval change.      DX-CHEST-PORTABLE (1 VIEW)   Final Result      No significant interval change.      EC-ECHOCARDIOGRAM LTD W/O CONT   Final Result      DX-CHEST-PORTABLE (1 VIEW)   Final Result         1.  Pulmonary edema and/or infiltrates are identified, which are stable since the prior exam. Layering bilateral pleural effusions, greater on the left.   2.  Atherosclerosis               DX-CHEST-PORTABLE (1 VIEW)   Final Result         1.  Pulmonary edema and/or infiltrates are identified, which are stable since the prior exam.   2.  Small right pleural effusion   3.  Atherosclerosis            DX-CHEST-PORTABLE (1 VIEW)   Final Result         1.  Pulmonary edema and/or infiltrates are identified, which are stable since the prior exam.   2.  Small right pleural effusion   3.  Atherosclerosis         DX-CHEST-PORTABLE (1 VIEW)   Final Result         1.  Pulmonary edema and/or infiltrates are identified, which are stable since the prior exam.   2.  Small right pleural effusion   3.  Atherosclerosis      DX-ABDOMEN FOR TUBE PLACEMENT   Final Result      Feeding tube placement with the tip projecting over the stomach body.      DX-ABDOMEN FOR TUBE PLACEMENT   Final Result      1.  Feeding tube appears looped within the stomach. The distal aspect of the tip is directed cephalad in the fundal region.      DX-CHEST-LIMITED (1 VIEW)   Final Result         1.  Pulmonary edema and/or infiltrates are identified, which are stable since the prior exam.   2.  Small right pleural effusion   3.  Atherosclerosis      DX-CHEST-LIMITED (1 VIEW)   Final Result         1.  Pulmonary edema and/or infiltrates are identified, which are stable since the prior exam.   2.  Atherosclerosis      DX-CHEST-PORTABLE (1 VIEW)   Final Result         1.  Pulmonary edema and/or infiltrates are identified, which are stable since the prior exam.   2.  Atherosclerosis      DX-CHEST-LIMITED (1 VIEW)   Final Result      1.  No pneumothorax identified  status post right thoracentesis and pericardiocentesis.      2.  Small amount of pneumomediastinum is likely related to recent pericardiocentesis and drain placement      3.  Cardiomegaly      EC-ECHOCARDIOGRAM LTD W/O CONT   Final Result      DX-CHEST-PORTABLE (1 VIEW)   Final Result      1.  Evacuation large right pleural effusion status post thoracentesis.   2.  No definite right pneumothorax identified. Recommend interval follow-up chest x-ray.   3.  Small left pleural effusion and left perihilar/left lung base atelectasis.   4.  Findings discussed with the patient's nurse. Follow-up chest x-ray will be obtained in approximately 3:00 PM      US-THORACENTESIS PUNCTURE RIGHT   Final Result      1. Ultrasound guided right sided diagnostic and therapeutic thoracentesis.      2. 1700 mL of fluid withdrawn.      EC-ECHOCARDIOGRAM COMPLETE W/O CONT   Final Result      DX-CHEST-PORTABLE (1 VIEW)   Final Result         1.  Pulmonary edema and/or infiltrates are identified, which are stable since the prior exam.   2.  Layering right pleural effusion, stable   3.  Cardiomegaly   4.  Atherosclerosis      DX-CHEST-2 VIEWS   Final Result      1.  Probable bilateral atelectasis      2.  Probable right pleural effusion which may be significant in size      3.  Limited assessment on one view portable radiography      OUTSIDE IMAGES-DX CHEST   Final Result      OUTSIDE IMAGES-DX CHEST   Final Result           Assessment/Plan     * Acute hypoxemic respiratory failure (HCC)- (present on admission)   Assessment & Plan    Extubated on 10/7/2018  Close monitor closely  Continue oxygen and RT protocol  Continue oxygen currently improving with Lasix, currently euvolemic cautiously use Lasix.  As needed  Patient's breathing condition improved  Continue oxygen.        Pleural effusion- (present on admission)   Overview    Chemistry suggesting transudate     Assessment & Plan        Patient is currently euvolemic and no signs of worsening  Of pleural effusion or pericardial effusion  Discontinue diuretics    Last Thoracentesis on left , done on 10/13    Right side 10/15 tolerated very well and feeling better currently with decreased requirement of oxygen  No signs of infection or malignancy from fluid analysis  Repeat chest x-ray clear no signs of pleural effusion reaccumulation.        Pericardial effusion- (present on admission)   Overview    Fluid protein suggesting exudate but all other cytology and chemistry was apparently lost.     Assessment & Plan    Cardiology following   Status post pericardial drain placement  Drain removed on 10/3/2018  Cytology negative fluid cultures negative  GENTRY negative RA factor positive CCP negative    off steroids .. Ineffective and stopped    Etiology is unclear and patient may eventually need pericardial biopsy for definitive diagnosis -> surgery is aware however at this moment patient wants to be transferred to [facility] for [facility] referral center for second opinion.  Transfer center notified and working on this transfer.  cytology results negative for malignancy   Likely need to be biopsy to establish definitive diagnosis   Repeat Echo shows decrease in size of pericardial effusion  Family does not want to pursue biopsy at this moment.       Pulmonary edema- (present on admission)   Assessment & Plan    Monitor fluid status   As needed Lasix, currently pulmonary edema resolved      Hypotension   Assessment & Plan    Relative adrenal insufficiency  Appears resolved for now  Close monitor          History of non-Hodgkin's lymphoma- (present on admission)   Overview    Nonhodgkins lymphoma  2000, treated with CHOP and MTX but never radiation therapy     Assessment & Plan    Hx of         HTN (hypertension)- (present on admission)   Assessment & Plan    All bp medications held  Cont to monitor        Dysphagia- (present on admission)   Assessment & Plan    Continue with speech therapy   Patient passed  swallow evaluation today and restart diet significant improvement  CT neck evaluation (-)  ENT recommend Decadron 12 mg 3 times total.  Finished steroid.  Continue working with speech therapy      Hypernatremia   Assessment & Plan    Resolved        Hypomagnesemia   Assessment & Plan    Repleted        Hypokalemia   Assessment & Plan    Resolved        Tobacco dependence- (present on admission)   Assessment & Plan    - Smoking cessation education provided  - Nicotine patch          Hypothyroidism- (present on admission)   Assessment & Plan    Continue levothyroxine          Quality-Core Measures   Reviewed items::  Labs reviewed and Medications reviewed  Almonte catheter::  No Almonte  DVT prophylaxis pharmacological::  Heparin  DVT prophylaxis - mechanical:  SCDs  Ulcer Prophylaxis::  Not indicated  Assessed for rehabilitation services:  Patient was assess for and/or received rehabilitation services during this hospitalization           Suicidal ideation:  -Likely due to frustration of medical condition.  Legal hold discontinued by psych    No change to assessment and plan

## 2018-10-27 NOTE — PROGRESS NOTES
"Assumed care of pt. Pt refusing to answer orientation questions. When asked if pt would allow assessment, she initially said \"no.\" When asked why, pt stated \"you can listen to me but no needles.\" Reassured pt that there would be no medication involved during assessment, including medication administered by needles. Pt agreeable. Pt is currently accompanied by family who is sitting at bedside. All needs appear to be met at this time. Currently pending d/c home with HH and o2 per SW notes. Call light within reach. Fall precautions and hourly rounding in place.  "

## 2018-10-28 NOTE — DISCHARGE PLANNING
LSW spoke with patient and  at bedside regarding d/c plan and new O2 set up. Per patient and , they are not sure which provider services their area and are open to options. Patient's  signed choice for any available DME company.   LSW spoke with [facility] who suggested [facility], [facility], and [facility].   LSW faxed choice to [facility]

## 2018-10-28 NOTE — PROGRESS NOTES
Received report and assumed patient care at 1900.     A&Ox2, reporting no pain, 2.5 Liters of O2 on via nasal canula with an oxygen saturation of 97.  Family at bedside. All needs met at this time.     Patients bed alarm is on, bed is locked and in lowest position, call light and bedside table are within reach, treaded slipper socks are on, and hourly rounding in place.

## 2018-10-28 NOTE — CARE PLAN
Problem: Respiratory:  Goal: Respiratory status will improve    Intervention: Educate and encourage incentive spirometry usage  RT has been encouraging and motivating pt to use IS multiple times today, pt complying well      Problem: Bowel/Gastric:  Goal: Normal bowel function is maintained or improved  Outcome: PROGRESSING AS EXPECTED  Pt had a BM today after 3 days of no BM and refusing stool softners

## 2018-10-28 NOTE — DISCHARGE PLANNING
Received order for home oxygen.  Spoke with robert Hartley need corrected order.  Per JERRY Gross she will have choice compelated for [facility] with [facility].

## 2018-10-28 NOTE — CARE PLAN
Problem: Respiratory:  Goal: Respiratory status will improve  Outcome: PROGRESSING AS EXPECTED  Pt on 1L oxygen, nasal canula, saturation 97%. Pt tolerating well.

## 2018-10-28 NOTE — PROGRESS NOTES
[facility] Hospitalist Progress Note    Date of Service: 10/28/2018    Chief Complaint    78 y.o. Female from [facility] admitted 9/28/2018 with shortness of breath and peripheral edema found to have significant pericardial effusion.    Pericardial effusion tapped 10/1  Intubated 10/2  Extubated 10/7      Interval Problem Update  78 y.o. Female from [facility] admitted 9/28/2018 with shortness of breath and peripheral edema found to have significant pericardial effusion.  Patient was seen by cardiology and Pericardial effusion tapped 10/1.  Post patient and the patient was Intubated 10/2 and remained ICU for close monitoring and treatment.  Patient was successfully extubated on 10/7.  Patient's pericardial effusion showing no signs of infection.  Patient was also noted to have bilateral pleural effusion patient was then underwent thoracentesis and the fluid analysis showing no signs of infection.  Patient was seen by infectious disease specialist and pulmonologist.  Patient multiple pleural effusion and pericardial effusion Etiology is unclear but looks to be autoimmune.  Patient was then consulted by rheumatology and recommended to be on steroid however without significant improvement.  Cardiologist put patient on diuretics and recommend patient to be had a pericardial biopsy to make the diagnosis however patient refused and patient's family also refused.  Patient developed dysphagia post extubation and was on tube feeding.  Patient gradually recovered however tube feeding was remaining for quite a while.  ENT physician was consulted and recommended short-term of high-dose Decadron 12 mg 3 times daily for 3 times.  Patient tolerated this treatment and the patient's tube feeding was pulled out by herself on 10/21. 10/22 patient underwent reevaluation by speech evaluation and patient swallow function significantly improved and was restarted dysphagia diet.  Patient condition has been stabilized and euvolemic.   "    10/23 -patient transferred from telemetry to medical floor.  Patient agitated and refusing to answer questions this morning.  Patient's  at bedside, plan of care discussed with them and all questions answered.  Patient's  informed that PT/OT, speech are recommending skilled nursing facility.  Patient's  states that he would like to talk to social work for different options and would prefer to have the patient go to [place] or [place] for skilled nursing facility.    10/24 -patient cooperative and pleasant this morning.  Alert awake oriented x2.  Continues to have some intermittent confusion.  She states that \"the fluid was never there, you guys made it up.\"  Attempted to educate patient about her pericardial effusion and pleural effusion but patient unwilling to listen.    10/25 -patient resting in bed, no acute events overnight.  Patient's  at bedside who states that they would like patient to go to a different skilled nursing facilities as they have heard good reviews.  Social work informed and will send referral to [place] skilled nursing [facility].  Plan of care was discussed patient's  at bedside and all questions were answered.    10/26 -patient alert awake oriented x2 this morning.  She is disoriented to place and time/date.  Patient agitated overnight and removing her supplemental oxygen refusing pulse oximeter.  Patient's  at bedside this morning requesting to take patient home more than skilled nursing facility.  Initially he wants to take her today.  We discussed if he left today would be AGAINST MEDICAL ADVICE as she requires supplemental oxygen and home health, both of which have not been set up.  Risks of leaving AGAINST MEDICAL ADVICE including respiratory depression, MI, stroke and death were discussed with the patient's .  Patient's  is alert awake oriented x4 at the time of discussion and understood the risks.  He later is agreeable to " staying but no longer wants the patient to go to skilled nursing facility and prefers to take the patient home with home health.  Social work assisting    10/27 - No acute events overnight.  Patient resting in bed, denies any complaints this morning.  Patient's family prefers that she go home with home health rather than skilled nursing facility.  Referral placed.  Social work assisting    10/28 -patient resting in bed, O2 requirements decreased from 2L to1 L presently.  Patient's  at bedside.  No acute events overnight per    Consultants/Specialty  Cardiology  Pulmonology  Neurology      Disposition  TBD, home health, pt accepted to  but O2 set up pending. SW assisting.        Review of Systems   Constitutional: Negative.    HENT: Negative for hearing loss.    Respiratory: Negative for shortness of breath and stridor.    Cardiovascular: Negative for chest pain.   Gastrointestinal: Negative for abdominal pain.   Genitourinary: Negative.    Skin: Negative.    Neurological: Negative for headaches.   Psychiatric/Behavioral: The patient is nervous/anxious.       Physical Exam  Laboratory/Imaging   Hemodynamics  Temp (24hrs), Av.7 °C (98.1 °F), Min:36.4 °C (97.5 °F), Max:36.9 °C (98.5 °F)   Temperature: 36.7 °C (98 °F)  Pulse  Av.1  Min: 10  Max: 140 Heart Rate (Monitored): 97  Blood Pressure : 100/64      Respiratory      Respiration: 18, Pulse Oximetry: 96 %, O2 Daily Delivery Respiratory : Silicone Nasal Cannula     PEP/CPT Method: Positive Airway Pressure Device, Work Of Breathing / Effort: Mild  RUL Breath Sounds: Clear, RML Breath Sounds: Diminished, RLL Breath Sounds: Diminished, LORI Breath Sounds: Clear, LLL Breath Sounds: Diminished    Fluids    Intake/Output Summary (Last 24 hours) at 10/28/18 0913  Last data filed at 10/28/18 0920   Gross per 24 hour   Intake              980 ml   Output                0 ml   Net              980 ml       Nutrition  Orders Placed This Encounter   Procedures    • Diet Order Full Liquid     Standing Status:   Standing     Number of Occurrences:   1     Order Specific Question:   Diet:     Answer:   Full Liquid [11]     Order Specific Question:   Consistency/Fluid modifications:     Answer:   Nectar Thick [2]     Order Specific Question:   Miscellaneous modifications:     Answer:   SLP - 1:1 Supervision by Nursing [21]     Physical Exam   Constitutional: She appears well-developed and well-nourished. No distress.   Chronically ill appearing    HENT:   Head: Normocephalic and atraumatic.   Right Ear: External ear normal.   Left Ear: External ear normal.   Eyes: Conjunctivae and EOM are normal.   Neck: Normal range of motion. Neck supple.   Cardiovascular: Normal rate, regular rhythm and normal heart sounds.    No murmur heard.  Pulmonary/Chest: Effort normal. No stridor. No respiratory distress.   Decreased bs at bases   Abdominal: Soft. Bowel sounds are normal. There is no tenderness.   Musculoskeletal: Normal range of motion. She exhibits no edema.   Skin: Skin is warm and dry. No rash noted. She is not diaphoretic.   Psychiatric: Her mood appears anxious. She is agitated. Thought content is delusional.   Nursing note and vitals reviewed.      Recent Labs      10/26/18   0234   WBC  9.7   RBC  4.38   HEMOGLOBIN  12.3   HEMATOCRIT  39.6   MCV  90.4   MCH  28.1   MCHC  31.1*   RDW  67.0*   PLATELETCT  258   MPV  11.1     Recent Labs      10/26/18   0234   SODIUM  143   POTASSIUM  3.7   CHLORIDE  107   CO2  29   GLUCOSE  89   BUN  12   CREATININE  0.38*   CALCIUM  8.8                   EC-ECHOCARDIOGRAM LTD W/O CONT   Final Result      DX-CHEST-2 VIEWS   Final Result      Lingular and left basilar opacity is increased and likely represents atelectasis.      Small hiatal hernia may be present.      Stable cardiomegaly.      Minimal blunting of the costophrenic angle posteriorly may be related to pleural thickening or trace pleural fluid.               DX-ABDOMEN FOR TUBE  PLACEMENT   Final Result      Enteric tube projects over the stomach.         DX-CHEST-PORTABLE (1 VIEW)   Final Result      Increasing left midlung zone opacity could indicate consolidation charges atelectasis      Mild interstitial edema, similar to comparison      Stable cardiac silhouette enlargement      CT-SOFT TISSUE NECK WITH   Final Result      1.  No evidence of pharyngeal or laryngeal mass or evidence of adenopathy.      2.  Multiple small bilateral thyroid nodules.      DX-ABDOMEN FOR TUBE PLACEMENT   Final Result         1.  Nonspecific bowel gas pattern.   2.  Dobbhoff tube tip overlying the expected location of the pylorus or first duodenal segment.   3.  Cardiomegaly      DX-CHEST-LIMITED (1 VIEW)   Final Result         1.  Pulmonary edema and/or infiltrates are identified, which are stable since the prior exam.   2.  Dobbhoff tube is seen, may be coiled back within the stomach terminating above the upper margin of the film versus exterior tube overlying the midline. The distal segment is not visualized. View of the abdomen would be required for definitive    evaluation of tube position.   3.  Cardiomegaly   4.  Atherosclerosis      EC-ECHOCARDIOGRAM LTD W/O CONT   Final Result      US-THORACENTESIS PUNCTURE RIGHT   Final Result      1. Ultrasound guided right sided diagnostic and therapeutic thoracentesis.      2. 750 mL of fluid withdrawn.      DX-CHEST-PORTABLE (1 VIEW)   Final Result      No pneumothorax status post right thoracentesis.   Small residual right pleural effusion and left lung base atelectasis.   Left perihilar/left lung base atelectasis/possible infiltration and small left pleural effusion.      DX-CHEST-PORTABLE (1 VIEW)   Final Result         1.  Pulmonary edema and/or infiltrates are identified, which are stable since the prior exam.      DX-ABDOMEN FOR TUBE PLACEMENT   Final Result      Feeding tube placement with the tip projecting over the distal stomach or duodenal bulb       US-THORACENTESIS PUNCTURE LEFT   Final Result      1. Ultrasound guided left sided diagnostic and therapeutic thoracentesis.      2. 650 mL of fluid withdrawn.      DX-CHEST-LIMITED (1 VIEW)   Final Result      No pneumothorax following left thoracentesis      DX-CHEST-PORTABLE (1 VIEW)   Final Result         Worsening large left pleural effusion with near collapsing of the left lung.      Moderate layering right pleural effusion, worse than prior as well.      DX-ABDOMEN FOR TUBE PLACEMENT   Final Result         Feeding tube with tip projecting over the expected area of the stomach.      DX-CHEST-PORTABLE (1 VIEW)   Final Result         1. No significant interval change.      EC-ECHOCARDIOGRAM LTD W/O CONT   Final Result      CT-CHEST (THORAX) WITH   Final Result      1.  Moderate to large bilateral pleural effusions with bilateral atelectasis.      2.  Moderate pericardial effusion.      3.  Patchy groundglass opacities within the residual aerated upper lobes bilaterally.      4.  Fatty liver.      5.  Atherosclerotic vascular calcification.            DX-ABDOMEN FOR TUBE PLACEMENT   Final Result         1.  Nonspecific bowel gas pattern.   2.  Dobbhoff tube tip terminates overlying the expected location of the gastric body.   3.  Left lung base infiltrate and/or layering pleural effusion.      [id number] VIEW   Final Result         1.  Nonspecific bowel gas pattern.   2.  Dobbhoff tube tip terminates in the distal esophagus, recommend advancement.   3.  Bilateral lower lobe infiltrates and layering pleural effusions.   4.  Cardiomegaly.      DX-CHEST-LIMITED (1 VIEW)   Final Result      Bilateral pleural effusions with overlying atelectasis/consolidation are again seen.      Mild interstitial prominence may represent mild edema.      Atherosclerotic plaque.         DX-ABDOMEN FOR TUBE PLACEMENT   Final Result      Enteric tube projects over the distal stomach.         EC-ECHOCARDIOGRAM COMPLETE W/O CONT    Final Result      DX-CHEST-PORTABLE (1 VIEW)   Final Result      No significant interval change.      DX-CHEST-PORTABLE (1 VIEW)   Final Result      No significant interval change.      EC-ECHOCARDIOGRAM LTD W/O CONT   Final Result      DX-CHEST-PORTABLE (1 VIEW)   Final Result         1.  Pulmonary edema and/or infiltrates are identified, which are stable since the prior exam. Layering bilateral pleural effusions, greater on the left.   2.  Atherosclerosis               DX-CHEST-PORTABLE (1 VIEW)   Final Result         1.  Pulmonary edema and/or infiltrates are identified, which are stable since the prior exam.   2.  Small right pleural effusion   3.  Atherosclerosis            DX-CHEST-PORTABLE (1 VIEW)   Final Result         1.  Pulmonary edema and/or infiltrates are identified, which are stable since the prior exam.   2.  Small right pleural effusion   3.  Atherosclerosis         DX-CHEST-PORTABLE (1 VIEW)   Final Result         1.  Pulmonary edema and/or infiltrates are identified, which are stable since the prior exam.   2.  Small right pleural effusion   3.  Atherosclerosis      DX-ABDOMEN FOR TUBE PLACEMENT   Final Result      Feeding tube placement with the tip projecting over the stomach body.      DX-ABDOMEN FOR TUBE PLACEMENT   Final Result      1.  Feeding tube appears looped within the stomach. The distal aspect of the tip is directed cephalad in the fundal region.      DX-CHEST-LIMITED (1 VIEW)   Final Result         1.  Pulmonary edema and/or infiltrates are identified, which are stable since the prior exam.   2.  Small right pleural effusion   3.  Atherosclerosis      DX-CHEST-LIMITED (1 VIEW)   Final Result         1.  Pulmonary edema and/or infiltrates are identified, which are stable since the prior exam.   2.  Atherosclerosis      DX-CHEST-PORTABLE (1 VIEW)   Final Result         1.  Pulmonary edema and/or infiltrates are identified, which are stable since the prior exam.   2.  Atherosclerosis       DX-CHEST-LIMITED (1 VIEW)   Final Result      1.  No pneumothorax identified status post right thoracentesis and pericardiocentesis.      2.  Small amount of pneumomediastinum is likely related to recent pericardiocentesis and drain placement      3.  Cardiomegaly      EC-ECHOCARDIOGRAM LTD W/O CONT   Final Result      DX-CHEST-PORTABLE (1 VIEW)   Final Result      1.  Evacuation large right pleural effusion status post thoracentesis.   2.  No definite right pneumothorax identified. Recommend interval follow-up chest x-ray.   3.  Small left pleural effusion and left perihilar/left lung base atelectasis.   4.  Findings discussed with the patient's nurse. Follow-up chest x-ray will be obtained in approximately 3:00 PM      US-THORACENTESIS PUNCTURE RIGHT   Final Result      1. Ultrasound guided right sided diagnostic and therapeutic thoracentesis.      2. 1700 mL of fluid withdrawn.      EC-ECHOCARDIOGRAM COMPLETE W/O CONT   Final Result      DX-CHEST-PORTABLE (1 VIEW)   Final Result         1.  Pulmonary edema and/or infiltrates are identified, which are stable since the prior exam.   2.  Layering right pleural effusion, stable   3.  Cardiomegaly   4.  Atherosclerosis      DX-CHEST-2 VIEWS   Final Result      1.  Probable bilateral atelectasis      2.  Probable right pleural effusion which may be significant in size      3.  Limited assessment on one view portable radiography      OUTSIDE IMAGES-DX CHEST   Final Result      OUTSIDE IMAGES-DX CHEST   Final Result           Assessment/Plan     * Acute hypoxemic respiratory failure (HCC)- (present on admission)   Assessment & Plan    Extubated on 10/7/2018  Close monitor closely  Continue oxygen and RT protocol  Continue oxygen currently improving with Lasix, currently euvolemic cautiously use Lasix.  As needed  Patient's breathing condition improved  Continue oxygen.        Pleural effusion- (present on admission)   Overview    Chemistry suggesting transudate      Assessment & Plan        Patient is currently euvolemic and no signs of worsening Of pleural effusion or pericardial effusion  Discontinue diuretics    Last Thoracentesis on left , done on 10/13    Right side 10/15 tolerated very well and feeling better currently with decreased requirement of oxygen  No signs of infection or malignancy from fluid analysis  Repeat chest x-ray clear no signs of pleural effusion reaccumulation.        Pericardial effusion- (present on admission)   Overview    Fluid protein suggesting exudate but all other cytology and chemistry was apparently lost.     Assessment & Plan    Cardiology following   Status post pericardial drain placement  Drain removed on 10/3/2018  Cytology negative fluid cultures negative  GENTRY negative RA factor positive CCP negative    off steroids .. Ineffective and stopped    Etiology is unclear and patient may eventually need pericardial biopsy for definitive diagnosis -> surgery is aware however at this moment patient wants to be transferred to [facility] for regional referral center for second opinion.  Transfer center notified and working on this transfer.  cytology results negative for malignancy   Likely need to be biopsy to establish definitive diagnosis   Repeat Echo shows decrease in size of pericardial effusion  Family does not want to pursue biopsy at this moment.       Pulmonary edema- (present on admission)   Assessment & Plan    Monitor fluid status   As needed Lasix, currently pulmonary edema resolved      Hypotension   Assessment & Plan    Relative adrenal insufficiency  Appears resolved for now  Close monitor          History of non-Hodgkin's lymphoma- (present on admission)   Overview    Nonhodgkins lymphoma  2000, treated with CHOP and MTX but never radiation therapy     Assessment & Plan    Hx of         HTN (hypertension)- (present on admission)   Assessment & Plan    All bp medications held  Cont to monitor        Dysphagia- (present on  admission)   Assessment & Plan    Continue with speech therapy   Patient passed swallow evaluation today and restart diet significant improvement  CT neck evaluation (-)  ENT recommend Decadron 12 mg 3 times total.  Finished steroid.  Continue working with speech therapy      Hypernatremia   Assessment & Plan    Resolved        Hypomagnesemia   Assessment & Plan    Repleted        Hypokalemia   Assessment & Plan    Resolved        Tobacco dependence- (present on admission)   Assessment & Plan    - Smoking cessation education provided  - Nicotine patch          Hypothyroidism- (present on admission)   Assessment & Plan    Continue levothyroxine          Quality-Core Measures   Reviewed items::  Labs reviewed and Medications reviewed  [place] catheter::  No [place]  DVT prophylaxis pharmacological::  Heparin  DVT prophylaxis - mechanical:  SCDs  Ulcer Prophylaxis::  Not indicated  Assessed for rehabilitation services:  Patient was assess for and/or received rehabilitation services during this hospitalization           Suicidal ideation:  -Likely due to frustration of medical condition.  Legal hold discontinued by psych    No change to assessment and plan

## 2018-10-28 NOTE — PROGRESS NOTES
Assumed care at 0715. Pt on 1L oxygen, nasal canula, saturation 97%. Assessment performed. Pt in good spirits wanting to get up and move around more. Bed locked, in lowest position, call light and personal belongings within reach, instructed to call for assistance.

## 2018-10-28 NOTE — CARE PLAN
Problem: Safety  Goal: Will remain free from falls  Outcome: PROGRESSING AS EXPECTED  Bed alarm is on, bed is locked and in lowest position, call light and bedside table are within reach, treaded slipper socks are on, and hourly rounding is in place.     Problem: Skin Integrity  Goal: Risk for impaired skin integrity will decrease  Outcome: NOT MET

## 2018-10-28 NOTE — DISCHARGE PLANNING
Per patient and , patient has a walker at home already. Patient refused choice to send to traction.

## 2018-10-29 PROBLEM — I31.39 PERICARDIAL EFFUSION: Status: RESOLVED | Noted: 2018-01-01 | Resolved: 2018-01-01

## 2018-10-29 PROBLEM — J81.1 PULMONARY EDEMA: Status: RESOLVED | Noted: 2018-01-01 | Resolved: 2018-01-01

## 2018-10-29 PROBLEM — J90 PLEURAL EFFUSION: Status: RESOLVED | Noted: 2018-01-01 | Resolved: 2018-01-01

## 2018-10-29 PROBLEM — E83.42 HYPOMAGNESEMIA: Status: RESOLVED | Noted: 2018-01-01 | Resolved: 2018-01-01

## 2018-10-29 PROBLEM — E87.6 HYPOKALEMIA: Status: RESOLVED | Noted: 2018-01-01 | Resolved: 2018-01-01

## 2018-10-29 PROBLEM — E87.0 HYPERNATREMIA: Status: RESOLVED | Noted: 2018-01-01 | Resolved: 2018-01-01

## 2018-10-29 PROBLEM — I95.9 HYPOTENSION: Status: RESOLVED | Noted: 2018-01-01 | Resolved: 2018-01-01

## 2018-10-29 PROBLEM — J96.01 ACUTE HYPOXEMIC RESPIRATORY FAILURE (HCC): Status: RESOLVED | Noted: 2018-01-01 | Resolved: 2018-01-01

## 2018-10-29 NOTE — DISCHARGE SUMMARY
Discharge Summary    CHIEF COMPLAINT ON ADMISSION  Chief Complaint   Patient presents with   • Shortness of Breath     3 weeks on and off   • Peripheral Edema     started 3 weeks ago in her feet and is now up to her thighs       Reason for Admission  ems     Admission Date  9/28/2018    CODE STATUS  Full Code    HPI & HOSPITAL COURSE  Please see H&P dictated by Dr. Cruz for further details. This is a 78 y.o. female here with shortness of breath secondary to pericardial and pleural effusion.  Patient was transferred from outside facility for evaluation by cardiology.  Cardiology was consulted and patient had a pericardiocentesis.  Post procedure patient was noted to have acute hypoxemic respiratory failure and was intubated and transferred to the ICU.  Critical care was consulted to help manage patient's ventilator settings.  Patient was successfully extubated and monitored closely on telemetry.  Patient's pericardial effusion showed no signs of infection.  She also was noted to have bilateral pleural effusion underwent thoracentesis.  Patient's pleural effusion was suggestive of transudative fluid.  Patient's fluid analysis did not show signs of infection.  She was seen by infectious disease as well as pulmonology during her hospital stay.  Etiology of her multiple pleural and pericardial effusion are unknown but felt to be autoimmune in nature.  Patient's GENTRY was negative, CCP was negative.  Patient's rheumatoid arthritis factor was positive per she was also seen by rheumatology did steroid course but patient did not have significant improvement and steroids were discontinued.  Cardiology recommended pericardial biopsy to help further with diagnosing but patient's  who is the POA refused.  Patient had a repeat echocardiogram during her hospital stay which showed decrease in size of her pericardial effusion.  Post extubation patient was noted to have dysphagia and was evaluated by speech therapy.  Tube feeds  were recommended but patient DPOAE refused and put her on a modified diet.  He understood the risks including aspiration.  ENT was also consulted who recommended high-dose Decadron 12 mg 3 times a day for 3 days.  She completed her course during her hospital stay.    During her hospital stay complained of suicidal ideation and was placed on legal hold and evaluated by psychiatry.  Patient's legal hold was discontinued and she denied any suicidal/homicidal ideations.    She was evaluated by physical and Occupational Therapy during her hospital stay and skilled nursing facility was recommended.  Patient's D POA refused and preferred to take patient home with home health.  Home health was set up and she is discharged home with .  Home health is unable to follow-up with the patient until November 5/2018.  Patient's  informed and would like the patient to be discharged today and he states that his daughter is trained caregiver who will take care of the patient until home health is able to establish with her.      Patient was on room air and did not require supplemental oxygen at the time of discharge       Therefore, she is discharged in good and stable condition to home with organized home healthcare and close outpatient follow-up.    The patient met 2-midnight criteria for an inpatient stay at the time of discharge.    Discharge Date  10/29/18    FOLLOW UP ITEMS POST DISCHARGE  PCP in 1 week  Cardiology in 1-2-weeks    DISCHARGE DIAGNOSES  Principal Problem (Resolved):    Acute hypoxemic respiratory failure (HCC) POA: Yes  Active Problems:    HTN (hypertension) POA: Yes    History of non-Hodgkin's lymphoma POA: Yes      Overview: Nonhodgkins lymphoma  2000, treated with CHOP and MTX but never radiation       therapy    Hypothyroidism POA: Yes    Tobacco dependence POA: Yes    Dysphagia POA: Yes  Resolved Problems:    Pulmonary edema POA: Yes    Pericardial effusion POA: Yes      Overview: Fluid protein  suggesting exudate but all other cytology and chemistry was       apparently lost.    Pleural effusion POA: Yes      Overview: Chemistry suggesting transudate    Hypotension POA: No    Hypokalemia POA: No    Hypomagnesemia POA: No    Hypophosphatemia POA: No    Acute encephalopathy POA: Yes    Hypernatremia POA: No      FOLLOW UP  No future appointments.  No follow-up provider specified.    MEDICATIONS ON DISCHARGE     Medication List      CONTINUE taking these medications      Instructions   albuterol 108 (90 Base) MCG/ACT Aers inhalation aerosol   Inhale 2 Puffs by mouth 4 times a day.  Dose:  2 Puff     amLODIPine 5 MG Tabs  Commonly known as:  NORVASC   Take 5 mg by mouth every day.  Dose:  5 mg     gabapentin 300 MG Caps  Commonly known as:  NEURONTIN   Take 300 mg by mouth 2 Times a Day.  Dose:  300 mg     hydroCHLOROthiazide 25 MG Tabs  Commonly known as:  HYDRODIURIL   Take 12.5 mg by mouth every day.  Dose:  12.5 mg     levothyroxine 88 MCG Tabs  Commonly known as:  SYNTHROID   Take 88 mcg by mouth Every morning on an empty stomach.  Dose:  88 mcg     oxymetazoline (icn)  0.025%  Commonly known as:  AFRIN   Spray 1 Spray in nose 1 time daily as needed.  Dose:  1 Spray     raNITidine 150 MG Tabs  Commonly known as:  ZANTAC   Take 150 mg by mouth 2 times a day.  Dose:  150 mg     simvastatin 20 MG Tabs  Commonly known as:  ZOCOR   Take 20 mg by mouth every evening.  Dose:  20 mg            Allergies  No Known Allergies    DIET  Orders Placed This Encounter   Procedures   • Diet Order Full Liquid     Standing Status:   Standing     Number of Occurrences:   1     Order Specific Question:   Diet:     Answer:   Full Liquid [11]     Order Specific Question:   Consistency/Fluid modifications:     Answer:   Nectar Thick [2]     Order Specific Question:   Miscellaneous modifications:     Answer:   SLP - 1:1 Supervision by Nursing [21]       ACTIVITY  As tolerated.  Weight bearing as tolerated    CONSULTATIONS    [facility] - ENT  Dr. Caputo - Pulmonology  Dr. Lepe - Cardiology   Dr. Noriega - [facility] - Neurology  Dr. Cain - Surgery     PROCEDURES  Arterial Line Placement   Bronchoscopy with bronchoalveolar lavage  Central line insertion  Endotracheal intubation  Pericardiocentesis    LABORATORY  Lab Results   Component Value Date    SODIUM 141 10/29/2018    POTASSIUM 3.6 10/29/2018    CHLORIDE 105 10/29/2018    CO2 29 10/29/2018    GLUCOSE 108 (H) 10/29/2018    BUN 17 10/29/2018    CREATININE 0.30 (L) 10/29/2018        Lab Results   Component Value Date    WBC 9.7 10/26/2018    HEMOGLOBIN 12.3 10/26/2018    HEMATOCRIT 39.6 10/26/2018    PLATELETCT 258 10/26/2018      This dictation was created using voice recognition software. The accuracy of the dictation is limited to the abilities of the software. I expect there may be some errors of grammar and possibly content.    Total time of the discharge process exceeds 43 minutes.

## 2018-10-29 NOTE — CARE PLAN
Problem: Respiratory:  Goal: Respiratory status will improve  Outcome: PROGRESSING AS EXPECTED      Problem: Safety  Goal: Will remain free from falls  Outcome: PROGRESSING AS EXPECTED  Bed alarm is on, bed is locked and in lowest position, call light and bedside table are within reach, treaded slipper socks are on, and hourly rounding is in place.

## 2018-10-29 NOTE — DISCHARGE PLANNING
Agency/Facility Name: [facility]  Spoke To: Mia  Outcome: Referral under review, Mia will call with acceptance/denial.    @1005  Agency/Facility Name: [facility]  Spoke To: Mia  Outcome: Referral still under review.    @1030  Agency/Facility Name: [facility]  Spoke To: Mia  Outcome: Patient accepted and can be seen tomorrow if she is out, otherwise not till Monday Nov 5th. Informed [facility] of patients discharge today.    JERRY Prince informed.

## 2018-10-29 NOTE — THERAPY
"Speech Language Therapy dysphagia treatment completed.   Functional Status:  Patient seen this date for dysphagia tx session pending discharge home with home health today. Patient currently on NTFL diet despite aspiration risks. Patient awake, alert, and intermittently uncooperative with tx session and still verbally abusive to  during tx session. Patient consumed PO trials of NTL via cup sip, purees, and sips of thins via cup. Patient presented with intermittent throat clear on PO trials of all consistencies, as well as gravely, hoarse vocal quality with minimal inhalatory stridor noted, all of which is concerning for penetration/aspiration. Laryngeal elevation palpated as weak. Initiation of swallow trigger was timely. Patient was instructed on swallow precautions to reduce bite/sip size, modulate rate, utilize two effortful swallows per bite/sip, and self-monitor vocal quality. Patient resistant to education, stating \"I don't want to talk about my death\" despite being educated on dysphagia and risks for pneumonia. Patient's , Isacc, present during entire tx session and educated on dysphagia, s/sx of aspiration, risks for pneumonia, SLP recs, and results of FEES/ENT report regarding VF impairments.  verbalized understanding of education. Patient and  were given handouts on dysphagia, safest diet of Dys1/NTL diet DESPITE aspiration risks to minimize risks for aspiration, where to purchase thickener w/ no RX needed, to thicken liquids to nectar consistency as they are safer than thin liquids, what foods are recommended on safest diet despite risks, and exercises for pharyngeal, laryngeal, and VF function and proper technique and frequency of each. All handouts given to patient's  and all questions answered with extensive education provided. At this time, recommend patient continue NPO, but patient/ are choosing for patient to eat despite known aspiration risks, thus; will " "continue NTFL diet despite risks. Patient's  also educated on contacting PCP to obtain outpatient SLP services for therapy and/or MBS when/if patient is agreeable. RN aware. SLP is following if patient does not d/c.     Recommendations: At this time, recommend patient continue NPO, but patient/ are choosing for patient to eat despite known aspiration risks, thus; will continue NTFL diet despite risks.   Plan of Care: Will benefit from Speech Therapy 3 times per week   Post-Acute Therapy: Discharge to an inpatient transitional care facility for continued speech therapy services.     See \"Rehab Therapy-Acute\" Patient Summary Report for complete documentation.     "

## 2018-10-29 NOTE — DISCHARGE PLANNING
Per LSW Suresh patient no longer need oxygen.  Call placed to Latonya at [facility], requested she cancel request.

## 2018-10-29 NOTE — DISCHARGE PLANNING
Agency/Facility Name: [facility]  Spoke To: Ltaonya  Outcome:  They have accepted patient on service, and will call [employer] have have them deliver a transport tank for patient to return home.  Per Latonya she has requested the patients  call just before leaving on when they are on there way so they can have a  at there home to set-up equipment when they arrive.  JERRY Prince advised.

## 2018-10-29 NOTE — PROGRESS NOTES
Pt's daughter Iman called this RN upset about pt's month long stay in the hospital, stating that the doctor needs to figure out what is going on with her and do all the tests. Iman said that Dr. Pineda never called her back reguarding patient, but this RN informed her that it was documented that Dr. Pineda had left a note stating that there was a call made and a voicemail left with a phone number that Iman did confirm was hers.  Iman then proceeded to tell this RN that Dr. Pineda lied and she did not get a call. At this point, Iman was not satisfied with this RNs information and requested to talk to the charge RN.  Charge RN and Iman chatted on phone, and much of the same information was once again relayed. Iman hung up on Charge RN stating that she would just follow up with the doctor instead.  Iman proceeded to call this RN back an hour later with a sweet disposition, and requested to talk to the pt.  Pt proceeded to yell at Iman on phone and accuse her  of keeping the children from her, and then she hung up on Iman.  Iman then called this RN again and was stating that it was maybe time that they look into alternative services for the pt.  At this time, Iman had calmed down and ended the phone call requesting this RN tell the pt that she loves her and she hopes to see her soon (once Iman's ear infection is gone).

## 2018-10-29 NOTE — PROGRESS NOTES
Pt very agitated and irritable today. Refused most assessments, being very rude to staff and her . Refused to eat her breakfast as she hates her full liquid, nectar thick diet.    OT walked w/ pt this morning, performed another walking 02 eval w/ much difficulty as pt was swearing and taking off the  censor. Evaluation eventually obtained and it appears pt does not need oxygen. She has been WNL many times w/ RA. It is no longer being ordered for her home discharge.    As pt's oxygen needs are decreasing/done discussed speech re-eval prior to DC. Urgent consult has been ordered as pt and family eager to leave. Will page ST regarding need.

## 2018-10-29 NOTE — PROGRESS NOTES
Pt left after 1300 w/ transport. Voided a few hours prior, no IV access, belongings collected and taken to pt's car w/ . Discharge paperwork signed by and taken by . Explained outpt appointments and needed referrals.    Speech came to reassess prior to DC. Pt's  very receptive, speech exercises, new diet food and thickener information given w/ handout.

## 2018-10-29 NOTE — DISCHARGE INSTRUCTIONS
Discharge Instructions    Discharged to home by car with relative. Discharged via wheelchair, hospital escort: Yes.  Special equipment needed: Not Applicable    Be sure to schedule a follow-up appointment with your primary care doctor or any specialists as instructed.     Discharge Plan:   Diet Plan: Discussed  Activity Level: Discussed  Smoking Cessation Offered: Patient Refused  Confirmed Follow up Appointment: Appointment Scheduled  Confirmed Symptoms Management: Discussed  Medication Reconciliation Updated: Yes  Influenza Vaccine Indication: Indicated: 65 years and older  Influenza Vaccine Given - only chart on this line when given: Influenza Vaccine Given (See MAR)    I understand that a diet low in cholesterol, fat, and sodium is recommended for good health. Unless I have been given specific instructions below for another diet, I accept this instruction as my diet prescription.   Other diet: Full liquid; Nectar thick    Special Instructions: None    · Is patient discharged on Warfarin / Coumadin?   No     Depression / Suicide Risk    As you are discharged from this [facility] facility, it is important to learn how to keep safe from harming yourself.    Recognize the warning signs:  · Abrupt changes in personality, positive or negative- including increase in energy   · Giving away possessions  · Change in eating patterns- significant weight changes-  positive or negative  · Change in sleeping patterns- unable to sleep or sleeping all the time   · Unwillingness or inability to communicate  · Depression  · Unusual sadness, discouragement and loneliness  · Talk of wanting to die  · Neglect of personal appearance   · Rebelliousness- reckless behavior  · Withdrawal from people/activities they love  · Confusion- inability to concentrate     If you or a loved one observes any of these behaviors or has concerns about self-harm, here's what you can do:  · Talk about it- your feelings and reasons for harming  yourself  · Remove any means that you might use to hurt yourself (examples: pills, rope, extension cords, firearm)  · Get professional help from the community (Mental Health, Substance Abuse, psychological counseling)  · Do not be alone:Call your Safe Contact- someone whom you trust who will be there for you.  · Call your local CRISIS HOTLINE 720-8978 or 230-000-7414  · Call your local Children's Mobile Crisis Response Team [place] (704) 317-3161 or www.[employer]  · Call the toll free National Suicide Prevention Hotlines   · National Suicide Prevention Lifeline 885-340-CYRE (7316)  · National Hope Line Network 800-SUICIDE (302-6491)

## 2018-10-29 NOTE — PROGRESS NOTES
At beginning of shift, pt was verbally aggressive with staff. Yelling that everyone is holding her here against her will. Pt refused her O2 or keep pulse ox on.  Pt did allow this RN during change of shift to check her O2 saturation, and patient was at 97% on room air.      Pt went on to refuse her assessment and refuse her nicotine patch to be removed.     Patients bed alarm is on, bed is locked and in lowest position, call light and bedside table are within reach, treaded slipper socks are on, and hourly rounding in place.

## 2018-10-29 NOTE — PROGRESS NOTES
"At 1600 pt became very agitated. Pt yelling at  and staff. Pt states \"I just want to see my children and you keep stopping me! You aren't touching me you bitch! (to RN)\". Pt got out of bed on own to use bathroom and refused to have assistance, pt held on to furniture to walk back and forth. Pt pulling off oxygen and refusing to wear pulse ox. Without oxygen on the pt had a saturation of 93-97%, pt not showing any signs of respiratory distress. Pt refusing medications. RN provided education. Pt continues to refuse medications and pulse ox.   "

## 2018-10-29 NOTE — CARE PLAN
Problem: Discharge Barriers/Planning  Goal: Patient's continuum of care needs will be met    Intervention: Assess potential discharge barriers on admission and throughout hospital stay  Awaiting speech re-evaluation for possible diet upgrade prior to discharge  Intervention: Involve patient and significant other/support system in setting and prioritizing goals for hospital stay and discharge  Pt has been accepted by a home health care service near [facility], they are unable to see pt till next Monday. Pt's  is fine with taking pt home today as their daughter (certified caretaker) Rachana will be able to care for her.

## 2018-10-29 NOTE — THERAPY
"Occupational Therapy Treatment completed with focus on ADLs, ADL transfers, patient education and cognition.  Functional Status:  Pt seen today for OT treatment. She was cooperative, intermittently irritable but easily redirected. Perseverating on going home. She needed supv for bed mobility. Supv to don tshirt and underwear/pants. She was easily distracted during dressing, and attempted to don two pairs of underwear needing min cueing for sequencing. She was able to perform functional mobility with FWW and SBA-CGA. She is quick to move and impulsive, unaware of environmental obstacles. She is near baseline   Plan of Care: Will benefit from Occupational Therapy 1 times per week  Discharge Recommendations:  Equipment Front-Wheel Walker, Shower Chair and Will Continue to Assess for Equipment Needs. Recommend home health and 24/7 close supervision during ADL routine and functional mobility.    See \"Rehab Therapy-Acute\" Patient Summary Report for complete documentation.   "

## 2018-11-02 NOTE — DOCUMENTATION QUERY
DOCUMENTATION QUERY    PROVIDERS: Please select “Cosign w/ note”to reply to query.    To better represent the severity of illness of your patient, please review the following information and exercise your independent professional judgment in responding to this query.     Pulmonary edema is documented in the History and Physical, Progress Notes and Discharge Summary. Based upon the clinical findings, risk factors, and treatment, can this diagnosis be further specified?    • Acute Pulmonary Edema, Cardiac Related (if applicable, please specify CHF type and acuity of heart failure)    • Acute Pulmonary Edema, Non-Cardiogenic    • Chronic Pulmonary Edema, Cardiac Related (if applicable, please specify CHF type and acuity of heart failure)    • Chronic Pulmonary Edema, Non-Cardiogenic    • Other explanation of clinical findings (please specify)    • Unable to determine        The medical record reflects the following:   Clinical Findings  Edema of bilateral lower extremeties    BNP on admit 61    EF on 10/1 Echo 70%    Per Hospitalist progress notes 9/29 - 10/1:  Concern for underlying, undiagnosed right heart failure especially given her likely COPD    Per H&P:  Additionally, she may also have undiagnosed CHF.    Per Dr. Robert's Consult:  The patient was found with CHF, and just extubated yesterday       Treatment  IV Lasix  Thoracentesis x 3   Risk Factors  Smoker  Rheumatoid arthritis   Hx non-Hodgkins lymphoma   Location within medical record  History and Physical, Progress Notes and Discharge Summary     Thank you,     Suzanna Damico  HIM Coder  [phone]

## 2019-01-01 ENCOUNTER — APPOINTMENT (OUTPATIENT)
Dept: RADIOLOGY | Facility: MEDICAL CENTER | Age: 79
DRG: 987 | End: 2019-01-01
Attending: HOSPITALIST
Payer: MEDICARE

## 2019-01-01 ENCOUNTER — APPOINTMENT (OUTPATIENT)
Dept: CARDIOLOGY | Facility: MEDICAL CENTER | Age: 79
DRG: 987 | End: 2019-01-01
Attending: INTERNAL MEDICINE
Payer: MEDICARE

## 2019-01-01 ENCOUNTER — APPOINTMENT (OUTPATIENT)
Dept: RADIOLOGY | Facility: MEDICAL CENTER | Age: 79
DRG: 987 | End: 2019-01-01
Attending: INTERNAL MEDICINE
Payer: MEDICARE

## 2019-01-01 ENCOUNTER — APPOINTMENT (OUTPATIENT)
Dept: RADIOLOGY | Facility: MEDICAL CENTER | Age: 79
DRG: 987 | End: 2019-01-01
Attending: RADIOLOGY
Payer: MEDICARE

## 2019-01-01 ENCOUNTER — APPOINTMENT (OUTPATIENT)
Dept: CARDIOLOGY | Facility: MEDICAL CENTER | Age: 79
DRG: 987 | End: 2019-01-01
Attending: EMERGENCY MEDICINE
Payer: MEDICARE

## 2019-01-01 VITALS
WEIGHT: 134.7 LBS | DIASTOLIC BLOOD PRESSURE: 81 MMHG | SYSTOLIC BLOOD PRESSURE: 113 MMHG | HEIGHT: 63 IN | TEMPERATURE: 98.8 F | BODY MASS INDEX: 23.87 KG/M2

## 2019-01-01 PROBLEM — G93.40 ACUTE ENCEPHALOPATHY: Status: ACTIVE | Noted: 2019-01-01

## 2019-01-01 PROBLEM — C85.90 RECURRENT LYMPHOMA (HCC): Status: ACTIVE | Noted: 2019-01-01

## 2019-01-01 PROBLEM — E78.5 DYSLIPIDEMIA: Status: ACTIVE | Noted: 2019-01-01

## 2019-01-01 PROBLEM — I31.39 PERICARDIAL EFFUSION: Status: ACTIVE | Noted: 2019-01-01

## 2019-01-01 LAB
ALBUMIN SERPL BCP-MCNC: 2.8 G/DL (ref 3.2–4.9)
ALBUMIN SERPL BCP-MCNC: 2.9 G/DL (ref 3.2–4.9)
ALBUMIN/GLOB SERPL: 1.1 G/DL
ALBUMIN/GLOB SERPL: 1.2 G/DL
ALP SERPL-CCNC: 113 U/L (ref 30–99)
ALP SERPL-CCNC: 142 U/L (ref 30–99)
ALT SERPL-CCNC: 594 U/L (ref 2–50)
ALT SERPL-CCNC: 811 U/L (ref 2–50)
AMMONIA PLAS-SCNC: 53 UMOL/L (ref 11–45)
AMMONIA PLAS-SCNC: 59 UMOL/L (ref 11–45)
ANION GAP SERPL CALC-SCNC: 11 MMOL/L (ref 0–11.9)
ANION GAP SERPL CALC-SCNC: 13 MMOL/L (ref 0–11.9)
AST SERPL-CCNC: 223 U/L (ref 12–45)
AST SERPL-CCNC: 645 U/L (ref 12–45)
BACTERIA UR CULT: NORMAL
BASE EXCESS BLDA CALC-SCNC: -2 MMOL/L (ref -4–3)
BASOPHILS # BLD AUTO: 0.1 % (ref 0–1.8)
BASOPHILS # BLD: 0.02 K/UL (ref 0–0.12)
BILIRUB SERPL-MCNC: 0.4 MG/DL (ref 0.1–1.5)
BILIRUB SERPL-MCNC: 0.5 MG/DL (ref 0.1–1.5)
BODY TEMPERATURE: 36.3 CENTIGRADE
BUN SERPL-MCNC: 46 MG/DL (ref 8–22)
BUN SERPL-MCNC: 51 MG/DL (ref 8–22)
CALCIUM SERPL-MCNC: 7.8 MG/DL (ref 8.5–10.5)
CALCIUM SERPL-MCNC: 8.3 MG/DL (ref 8.5–10.5)
CHLORIDE SERPL-SCNC: 100 MMOL/L (ref 96–112)
CHLORIDE SERPL-SCNC: 101 MMOL/L (ref 96–112)
CO2 SERPL-SCNC: 22 MMOL/L (ref 20–33)
CO2 SERPL-SCNC: 24 MMOL/L (ref 20–33)
CREAT SERPL-MCNC: 1 MG/DL (ref 0.5–1.4)
CREAT SERPL-MCNC: 1.24 MG/DL (ref 0.5–1.4)
EOSINOPHIL # BLD AUTO: 0 K/UL (ref 0–0.51)
EOSINOPHIL NFR BLD: 0 % (ref 0–6.9)
ERYTHROCYTE [DISTWIDTH] IN BLOOD BY AUTOMATED COUNT: 53.7 FL (ref 35.9–50)
ERYTHROCYTE [DISTWIDTH] IN BLOOD BY AUTOMATED COUNT: 59.7 FL (ref 35.9–50)
GLOBULIN SER CALC-MCNC: 2.5 G/DL (ref 1.9–3.5)
GLOBULIN SER CALC-MCNC: 2.5 G/DL (ref 1.9–3.5)
GLUCOSE SERPL-MCNC: 152 MG/DL (ref 65–99)
GLUCOSE SERPL-MCNC: 183 MG/DL (ref 65–99)
HAV IGM SERPL QL IA: NEGATIVE
HBV CORE IGM SER QL: NEGATIVE
HBV SURFACE AG SER QL: NEGATIVE
HCO3 BLDA-SCNC: 24 MMOL/L (ref 17–25)
HCT VFR BLD AUTO: 32.7 % (ref 37–47)
HCT VFR BLD AUTO: 37.4 % (ref 37–47)
HCV AB SER QL: NEGATIVE
HGB BLD-MCNC: 10.6 G/DL (ref 12–16)
HGB BLD-MCNC: 11.2 G/DL (ref 12–16)
IMM GRANULOCYTES # BLD AUTO: 0.18 K/UL (ref 0–0.11)
IMM GRANULOCYTES NFR BLD AUTO: 1.1 % (ref 0–0.9)
INHALED O2 FLOW RATE: 15 L/MIN (ref 2–10)
LACTATE BLD-SCNC: 1.4 MMOL/L (ref 0.5–2)
LACTATE BLD-SCNC: 1.6 MMOL/L (ref 0.5–2)
LACTATE BLD-SCNC: 2.8 MMOL/L (ref 0.5–2)
LACTATE BLD-SCNC: 3.3 MMOL/L (ref 0.5–2)
LACTATE BLD-SCNC: 3.4 MMOL/L (ref 0.5–2)
LV EJECT FRACT  99904: 50
LV EJECT FRACT  99904: 55
LV EJECT FRACT MOD 2C 99903: 61.3
LV EJECT FRACT MOD 4C 99902: 59.1
LV EJECT FRACT MOD BP 99901: 60.67
LYMPHOCYTES # BLD AUTO: 0.77 K/UL (ref 1–4.8)
LYMPHOCYTES NFR BLD: 4.7 % (ref 22–41)
MCH RBC QN AUTO: 29.8 PG (ref 27–33)
MCH RBC QN AUTO: 30.3 PG (ref 27–33)
MCHC RBC AUTO-ENTMCNC: 29.9 G/DL (ref 33.6–35)
MCHC RBC AUTO-ENTMCNC: 32.4 G/DL (ref 33.6–35)
MCV RBC AUTO: 93.4 FL (ref 81.4–97.8)
MCV RBC AUTO: 99.5 FL (ref 81.4–97.8)
MONOCYTES # BLD AUTO: 1.24 K/UL (ref 0–0.85)
MONOCYTES NFR BLD AUTO: 7.5 % (ref 0–13.4)
NEUTROPHILS # BLD AUTO: 14.24 K/UL (ref 2–7.15)
NEUTROPHILS NFR BLD: 86.6 % (ref 44–72)
NRBC # BLD AUTO: 0.03 K/UL
NRBC BLD-RTO: 0.2 /100 WBC
PATHOLOGY CONSULT NOTE: NORMAL
PCO2 BLDA: 40.5 MMHG (ref 26–37)
PCO2 TEMP ADJ BLDA: 39.3 MMHG (ref 26–37)
PH BLDA: 7.38 [PH] (ref 7.4–7.5)
PH TEMP ADJ BLDA: 7.39 [PH] (ref 7.4–7.5)
PLATELET # BLD AUTO: 228 K/UL (ref 164–446)
PLATELET # BLD AUTO: 318 K/UL (ref 164–446)
PMV BLD AUTO: 10.5 FL (ref 9–12.9)
PMV BLD AUTO: 10.6 FL (ref 9–12.9)
PO2 BLDA: 46.2 MMHG (ref 64–87)
PO2 TEMP ADJ BLDA: 44 MMHG (ref 64–87)
POTASSIUM SERPL-SCNC: 3.8 MMOL/L (ref 3.6–5.5)
POTASSIUM SERPL-SCNC: 4.4 MMOL/L (ref 3.6–5.5)
PROT SERPL-MCNC: 5.3 G/DL (ref 6–8.2)
PROT SERPL-MCNC: 5.4 G/DL (ref 6–8.2)
RBC # BLD AUTO: 3.5 M/UL (ref 4.2–5.4)
RBC # BLD AUTO: 3.76 M/UL (ref 4.2–5.4)
SAO2 % BLDA: 77.3 % (ref 93–99)
SIGNIFICANT IND 70042: NORMAL
SITE SITE: NORMAL
SODIUM SERPL-SCNC: 135 MMOL/L (ref 135–145)
SODIUM SERPL-SCNC: 136 MMOL/L (ref 135–145)
SOURCE SOURCE: NORMAL
TROPONIN I SERPL-MCNC: 0.07 NG/ML (ref 0–0.04)
TROPONIN I SERPL-MCNC: 0.13 NG/ML (ref 0–0.04)
WBC # BLD AUTO: 16.5 K/UL (ref 4.8–10.8)
WBC # BLD AUTO: 16.6 K/UL (ref 4.8–10.8)

## 2019-01-01 PROCEDURE — 700105 HCHG RX REV CODE 258: Performed by: HOSPITALIST

## 2019-01-01 PROCEDURE — 71045 X-RAY EXAM CHEST 1 VIEW: CPT

## 2019-01-01 PROCEDURE — 99223 1ST HOSP IP/OBS HIGH 75: CPT | Performed by: HOSPITALIST

## 2019-01-01 PROCEDURE — 770022 HCHG ROOM/CARE - ICU (200)

## 2019-01-01 PROCEDURE — 99233 SBSQ HOSP IP/OBS HIGH 50: CPT | Performed by: HOSPITALIST

## 2019-01-01 PROCEDURE — 83605 ASSAY OF LACTIC ACID: CPT

## 2019-01-01 PROCEDURE — 700117 HCHG RX CONTRAST REV CODE 255: Performed by: INTERNAL MEDICINE

## 2019-01-01 PROCEDURE — 94640 AIRWAY INHALATION TREATMENT: CPT

## 2019-01-01 PROCEDURE — 93325 DOPPLER ECHO COLOR FLOW MAPG: CPT

## 2019-01-01 PROCEDURE — 88305 TISSUE EXAM BY PATHOLOGIST: CPT

## 2019-01-01 PROCEDURE — 88184 FLOWCYTOMETRY/ TC 1 MARKER: CPT

## 2019-01-01 PROCEDURE — 700102 HCHG RX REV CODE 250 W/ 637 OVERRIDE(OP): Performed by: HOSPITALIST

## 2019-01-01 PROCEDURE — 99291 CRITICAL CARE FIRST HOUR: CPT | Performed by: INTERNAL MEDICINE

## 2019-01-01 PROCEDURE — 84484 ASSAY OF TROPONIN QUANT: CPT

## 2019-01-01 PROCEDURE — 770001 HCHG ROOM/CARE - MED/SURG/GYN PRIV*

## 2019-01-01 PROCEDURE — 88342 IMHCHEM/IMCYTCHM 1ST ANTB: CPT

## 2019-01-01 PROCEDURE — A9270 NON-COVERED ITEM OR SERVICE: HCPCS | Performed by: HOSPITALIST

## 2019-01-01 PROCEDURE — 93308 TTE F-UP OR LMTD: CPT | Mod: 26 | Performed by: INTERNAL MEDICINE

## 2019-01-01 PROCEDURE — 80053 COMPREHEN METABOLIC PANEL: CPT

## 2019-01-01 PROCEDURE — 93306 TTE W/DOPPLER COMPLETE: CPT | Mod: 26 | Performed by: INTERNAL MEDICINE

## 2019-01-01 PROCEDURE — 51798 US URINE CAPACITY MEASURE: CPT

## 2019-01-01 PROCEDURE — 700111 HCHG RX REV CODE 636 W/ 250 OVERRIDE (IP): Performed by: INTERNAL MEDICINE

## 2019-01-01 PROCEDURE — 0WBF3ZX EXCISION OF ABDOMINAL WALL, PERCUTANEOUS APPROACH, DIAGNOSTIC: ICD-10-PCS | Performed by: RADIOLOGY

## 2019-01-01 PROCEDURE — 4410569 US-THORACENTESIS PUNCTURE

## 2019-01-01 PROCEDURE — 99232 SBSQ HOSP IP/OBS MODERATE 35: CPT | Performed by: INTERNAL MEDICINE

## 2019-01-01 PROCEDURE — 88360 TUMOR IMMUNOHISTOCHEM/MANUAL: CPT

## 2019-01-01 PROCEDURE — 71046 X-RAY EXAM CHEST 2 VIEWS: CPT

## 2019-01-01 PROCEDURE — C1751 CATH, INF, PER/CENT/MIDLINE: HCPCS

## 2019-01-01 PROCEDURE — 36556 INSERT NON-TUNNEL CV CATH: CPT

## 2019-01-01 PROCEDURE — 700111 HCHG RX REV CODE 636 W/ 250 OVERRIDE (IP)

## 2019-01-01 PROCEDURE — 99999 EC-ECHOCARDIOGRAM LTD W/O CONT: CPT | Performed by: INTERNAL MEDICINE

## 2019-01-01 PROCEDURE — 700111 HCHG RX REV CODE 636 W/ 250 OVERRIDE (IP): Performed by: HOSPITALIST

## 2019-01-01 PROCEDURE — 93306 TTE W/DOPPLER COMPLETE: CPT

## 2019-01-01 PROCEDURE — 88341 IMHCHEM/IMCYTCHM EA ADD ANTB: CPT | Mod: 91

## 2019-01-01 PROCEDURE — 88341 IMHCHEM/IMCYTCHM EA ADD ANTB: CPT

## 2019-01-01 PROCEDURE — 770020 HCHG ROOM/CARE - TELE (206)

## 2019-01-01 PROCEDURE — 85027 COMPLETE CBC AUTOMATED: CPT

## 2019-01-01 PROCEDURE — 0W9B3ZZ DRAINAGE OF LEFT PLEURAL CAVITY, PERCUTANEOUS APPROACH: ICD-10-PCS | Performed by: RADIOLOGY

## 2019-01-01 PROCEDURE — 71275 CT ANGIOGRAPHY CHEST: CPT

## 2019-01-01 PROCEDURE — 88185 FLOWCYTOMETRY/TC ADD-ON: CPT | Mod: 91

## 2019-01-01 PROCEDURE — 36620 INSERTION CATHETER ARTERY: CPT

## 2019-01-01 PROCEDURE — 700102 HCHG RX REV CODE 250 W/ 637 OVERRIDE(OP): Performed by: INTERNAL MEDICINE

## 2019-01-01 PROCEDURE — 36415 COLL VENOUS BLD VENIPUNCTURE: CPT

## 2019-01-01 PROCEDURE — 85025 COMPLETE CBC W/AUTO DIFF WBC: CPT

## 2019-01-01 PROCEDURE — 99222 1ST HOSP IP/OBS MODERATE 55: CPT | Performed by: INTERNAL MEDICINE

## 2019-01-01 PROCEDURE — 82140 ASSAY OF AMMONIA: CPT

## 2019-01-01 PROCEDURE — A9270 NON-COVERED ITEM OR SERVICE: HCPCS | Performed by: INTERNAL MEDICINE

## 2019-01-01 PROCEDURE — 76604 US EXAM CHEST: CPT

## 2019-01-01 PROCEDURE — 82803 BLOOD GASES ANY COMBINATION: CPT

## 2019-01-01 PROCEDURE — 700101 HCHG RX REV CODE 250

## 2019-01-01 PROCEDURE — 99233 SBSQ HOSP IP/OBS HIGH 50: CPT | Performed by: INTERNAL MEDICINE

## 2019-01-01 PROCEDURE — 76942 ECHO GUIDE FOR BIOPSY: CPT

## 2019-01-01 RX ORDER — LORAZEPAM 2 MG/ML
1 CONCENTRATE ORAL
Status: DISCONTINUED | OUTPATIENT
Start: 2019-01-01 | End: 2019-01-01

## 2019-01-01 RX ORDER — OXYCODONE HYDROCHLORIDE 5 MG/1
2.5 TABLET ORAL
Status: DISCONTINUED | OUTPATIENT
Start: 2019-01-01 | End: 2019-01-01

## 2019-01-01 RX ORDER — ONDANSETRON 2 MG/ML
8 INJECTION INTRAMUSCULAR; INTRAVENOUS EVERY 8 HOURS PRN
Status: DISCONTINUED | OUTPATIENT
Start: 2019-01-01 | End: 2019-01-01 | Stop reason: HOSPADM

## 2019-01-01 RX ORDER — LEVOTHYROXINE SODIUM 88 UG/1
88 TABLET ORAL
Status: DISCONTINUED | OUTPATIENT
Start: 2019-01-01 | End: 2019-01-01

## 2019-01-01 RX ORDER — ONDANSETRON 2 MG/ML
4 INJECTION INTRAMUSCULAR; INTRAVENOUS EVERY 4 HOURS PRN
Status: DISCONTINUED | OUTPATIENT
Start: 2019-01-01 | End: 2019-01-01

## 2019-01-01 RX ORDER — LORAZEPAM 2 MG/ML
1 CONCENTRATE ORAL
Status: DISCONTINUED | OUTPATIENT
Start: 2019-01-01 | End: 2019-01-01 | Stop reason: HOSPADM

## 2019-01-01 RX ORDER — ONDANSETRON 4 MG/1
4 TABLET, ORALLY DISINTEGRATING ORAL EVERY 4 HOURS PRN
Status: DISCONTINUED | OUTPATIENT
Start: 2019-01-01 | End: 2019-01-01

## 2019-01-01 RX ORDER — IPRATROPIUM BROMIDE AND ALBUTEROL SULFATE 2.5; .5 MG/3ML; MG/3ML
3 SOLUTION RESPIRATORY (INHALATION)
Status: DISCONTINUED | OUTPATIENT
Start: 2019-01-01 | End: 2019-01-01

## 2019-01-01 RX ORDER — ALBUTEROL SULFATE 90 UG/1
2 AEROSOL, METERED RESPIRATORY (INHALATION) 4 TIMES DAILY
Status: DISCONTINUED | OUTPATIENT
Start: 2019-01-01 | End: 2019-01-01

## 2019-01-01 RX ORDER — MORPHINE SULFATE 4 MG/ML
2 INJECTION, SOLUTION INTRAMUSCULAR; INTRAVENOUS
Status: DISCONTINUED | OUTPATIENT
Start: 2019-01-01 | End: 2019-01-01

## 2019-01-01 RX ORDER — MORPHINE SULFATE 100 MG/5ML
10 SOLUTION ORAL
Status: DISCONTINUED | OUTPATIENT
Start: 2019-01-01 | End: 2019-01-01 | Stop reason: HOSPADM

## 2019-01-01 RX ORDER — AMLODIPINE BESYLATE 5 MG/1
5 TABLET ORAL DAILY
Status: DISCONTINUED | OUTPATIENT
Start: 2019-01-01 | End: 2019-01-01

## 2019-01-01 RX ORDER — FUROSEMIDE 10 MG/ML
20 INJECTION INTRAMUSCULAR; INTRAVENOUS ONCE
Status: COMPLETED | OUTPATIENT
Start: 2019-01-01 | End: 2019-01-01

## 2019-01-01 RX ORDER — AMOXICILLIN 250 MG
2 CAPSULE ORAL 2 TIMES DAILY
Status: DISCONTINUED | OUTPATIENT
Start: 2019-01-01 | End: 2019-01-01

## 2019-01-01 RX ORDER — ONDANSETRON 4 MG/1
8 TABLET, ORALLY DISINTEGRATING ORAL EVERY 8 HOURS PRN
Status: DISCONTINUED | OUTPATIENT
Start: 2019-01-01 | End: 2019-01-01 | Stop reason: HOSPADM

## 2019-01-01 RX ORDER — MORPHINE SULFATE 10 MG/ML
10 INJECTION, SOLUTION INTRAMUSCULAR; INTRAVENOUS
Status: DISCONTINUED | OUTPATIENT
Start: 2019-01-01 | End: 2019-01-01 | Stop reason: HOSPADM

## 2019-01-01 RX ORDER — OXYCODONE HYDROCHLORIDE 5 MG/1
5 TABLET ORAL EVERY 4 HOURS PRN
Status: DISCONTINUED | OUTPATIENT
Start: 2019-01-01 | End: 2019-01-01

## 2019-01-01 RX ORDER — ATROPINE SULFATE 10 MG/ML
2 SOLUTION/ DROPS OPHTHALMIC EVERY 4 HOURS PRN
Status: DISCONTINUED | OUTPATIENT
Start: 2019-01-01 | End: 2019-01-01 | Stop reason: HOSPADM

## 2019-01-01 RX ORDER — SODIUM CHLORIDE 9 MG/ML
INJECTION, SOLUTION INTRAVENOUS CONTINUOUS
Status: DISCONTINUED | OUTPATIENT
Start: 2019-01-01 | End: 2019-01-01

## 2019-01-01 RX ORDER — RANITIDINE 150 MG/1
150 TABLET ORAL 2 TIMES DAILY
Status: DISCONTINUED | OUTPATIENT
Start: 2019-01-01 | End: 2019-01-01

## 2019-01-01 RX ORDER — BISACODYL 10 MG
10 SUPPOSITORY, RECTAL RECTAL
Status: DISCONTINUED | OUTPATIENT
Start: 2019-01-01 | End: 2019-01-01

## 2019-01-01 RX ORDER — SIMVASTATIN 20 MG
20 TABLET ORAL EVERY EVENING
Status: DISCONTINUED | OUTPATIENT
Start: 2019-01-01 | End: 2019-01-01

## 2019-01-01 RX ORDER — MORPHINE SULFATE 10 MG/ML
5 INJECTION, SOLUTION INTRAMUSCULAR; INTRAVENOUS
Status: DISCONTINUED | OUTPATIENT
Start: 2019-01-01 | End: 2019-01-01 | Stop reason: HOSPADM

## 2019-01-01 RX ORDER — ACETAMINOPHEN 325 MG/1
650 TABLET ORAL EVERY 6 HOURS PRN
Status: DISCONTINUED | OUTPATIENT
Start: 2019-01-01 | End: 2019-01-01

## 2019-01-01 RX ORDER — FUROSEMIDE 10 MG/ML
INJECTION INTRAMUSCULAR; INTRAVENOUS
Status: COMPLETED
Start: 2019-01-01 | End: 2019-01-01

## 2019-01-01 RX ORDER — POLYETHYLENE GLYCOL 3350 17 G/17G
1 POWDER, FOR SOLUTION ORAL
Status: DISCONTINUED | OUTPATIENT
Start: 2019-01-01 | End: 2019-01-01

## 2019-01-01 RX ORDER — GABAPENTIN 300 MG/1
300 CAPSULE ORAL 2 TIMES DAILY
Status: DISCONTINUED | OUTPATIENT
Start: 2019-01-01 | End: 2019-01-01

## 2019-01-01 RX ORDER — HYDROCHLOROTHIAZIDE 25 MG/1
12.5 TABLET ORAL DAILY
Status: DISCONTINUED | OUTPATIENT
Start: 2019-01-01 | End: 2019-01-01

## 2019-01-01 RX ORDER — GABAPENTIN 100 MG/1
100 CAPSULE ORAL DAILY
Status: DISCONTINUED | OUTPATIENT
Start: 2019-01-01 | End: 2019-01-01

## 2019-01-01 RX ORDER — LIDOCAINE HYDROCHLORIDE 10 MG/ML
INJECTION, SOLUTION INFILTRATION; PERINEURAL
Status: COMPLETED
Start: 2019-01-01 | End: 2019-01-01

## 2019-01-01 RX ORDER — SCOLOPAMINE TRANSDERMAL SYSTEM 1 MG/1
1 PATCH, EXTENDED RELEASE TRANSDERMAL
Status: DISCONTINUED | OUTPATIENT
Start: 2019-01-01 | End: 2019-01-01 | Stop reason: HOSPADM

## 2019-01-01 RX ORDER — LORAZEPAM 2 MG/ML
0.5 INJECTION INTRAMUSCULAR
Status: DISCONTINUED | OUTPATIENT
Start: 2019-01-01 | End: 2019-01-01 | Stop reason: HOSPADM

## 2019-01-01 RX ORDER — OXYCODONE HCL 5 MG/5 ML
5 SOLUTION, ORAL ORAL
Status: DISCONTINUED | OUTPATIENT
Start: 2019-01-01 | End: 2019-01-01 | Stop reason: HOSPADM

## 2019-01-01 RX ORDER — GABAPENTIN 300 MG/1
300 CAPSULE ORAL EVERY EVENING
Status: DISCONTINUED | OUTPATIENT
Start: 2019-01-01 | End: 2019-01-01

## 2019-01-01 RX ORDER — OXYCODONE HYDROCHLORIDE 5 MG/1
5 TABLET ORAL
Status: DISCONTINUED | OUTPATIENT
Start: 2019-01-01 | End: 2019-01-01

## 2019-01-01 RX ORDER — FAMOTIDINE 20 MG/1
20 TABLET, FILM COATED ORAL DAILY
Status: DISCONTINUED | OUTPATIENT
Start: 2019-01-01 | End: 2019-01-01

## 2019-01-01 RX ORDER — LORAZEPAM 2 MG/ML
1 INJECTION INTRAMUSCULAR
Status: DISCONTINUED | OUTPATIENT
Start: 2019-01-01 | End: 2019-01-01

## 2019-01-01 RX ADMIN — LORAZEPAM 0.5 MG: 2 INJECTION INTRAMUSCULAR; INTRAVENOUS at 17:00

## 2019-01-01 RX ADMIN — MORPHINE SULFATE 5 MG: 10 INJECTION INTRAVENOUS at 08:44

## 2019-01-01 RX ADMIN — ALBUTEROL SULFATE 2 PUFF: 90 AEROSOL, METERED RESPIRATORY (INHALATION) at 09:00

## 2019-01-01 RX ADMIN — SIMVASTATIN 20 MG: 20 TABLET, FILM COATED ORAL at 17:02

## 2019-01-01 RX ADMIN — GABAPENTIN 100 MG: 100 CAPSULE ORAL at 05:35

## 2019-01-01 RX ADMIN — ALBUTEROL SULFATE 2 PUFF: 90 AEROSOL, METERED RESPIRATORY (INHALATION) at 21:00

## 2019-01-01 RX ADMIN — SODIUM CHLORIDE: 9 INJECTION, SOLUTION INTRAVENOUS at 05:33

## 2019-01-01 RX ADMIN — FAMOTIDINE 20 MG: 20 TABLET ORAL at 06:01

## 2019-01-01 RX ADMIN — AMLODIPINE BESYLATE 5 MG: 5 TABLET ORAL at 05:35

## 2019-01-01 RX ADMIN — LEVOTHYROXINE SODIUM 88 MCG: 88 TABLET ORAL at 05:35

## 2019-01-01 RX ADMIN — SCOPALAMINE 1 PATCH: 1 PATCH, EXTENDED RELEASE TRANSDERMAL at 23:45

## 2019-01-01 RX ADMIN — STANDARDIZED SENNA CONCENTRATE AND DOCUSATE SODIUM 2 TABLET: 8.6; 5 TABLET, FILM COATED ORAL at 17:09

## 2019-01-01 RX ADMIN — HYDROCHLOROTHIAZIDE 12.5 MG: 25 TABLET ORAL at 06:39

## 2019-01-01 RX ADMIN — AMLODIPINE BESYLATE 5 MG: 5 TABLET ORAL at 06:38

## 2019-01-01 RX ADMIN — LIDOCAINE HYDROCHLORIDE: 10 INJECTION, SOLUTION INFILTRATION; PERINEURAL at 12:11

## 2019-01-01 RX ADMIN — GABAPENTIN 100 MG: 100 CAPSULE ORAL at 06:01

## 2019-01-01 RX ADMIN — FAMOTIDINE 20 MG: 20 TABLET ORAL at 05:35

## 2019-01-01 RX ADMIN — HYDROCHLOROTHIAZIDE 12.5 MG: 25 TABLET ORAL at 05:34

## 2019-01-01 RX ADMIN — FUROSEMIDE 20 MG: 10 INJECTION INTRAMUSCULAR; INTRAVENOUS at 23:40

## 2019-01-01 RX ADMIN — MORPHINE SULFATE 5 MG: 10 INJECTION INTRAVENOUS at 16:33

## 2019-01-01 RX ADMIN — LEVOTHYROXINE SODIUM 88 MCG: 88 TABLET ORAL at 06:37

## 2019-01-01 RX ADMIN — LORAZEPAM 0.5 MG: 2 INJECTION INTRAMUSCULAR; INTRAVENOUS at 01:39

## 2019-01-01 RX ADMIN — SODIUM CHLORIDE: 9 INJECTION, SOLUTION INTRAVENOUS at 21:09

## 2019-01-01 RX ADMIN — STANDARDIZED SENNA CONCENTRATE AND DOCUSATE SODIUM 2 TABLET: 8.6; 5 TABLET, FILM COATED ORAL at 05:50

## 2019-01-01 RX ADMIN — ALBUTEROL SULFATE 2 PUFF: 90 AEROSOL, METERED RESPIRATORY (INHALATION) at 12:39

## 2019-01-01 RX ADMIN — ALBUTEROL SULFATE 2 PUFF: 90 AEROSOL, METERED RESPIRATORY (INHALATION) at 17:12

## 2019-01-01 RX ADMIN — LEVOTHYROXINE SODIUM 88 MCG: 88 TABLET ORAL at 06:01

## 2019-01-01 RX ADMIN — GABAPENTIN 300 MG: 300 CAPSULE ORAL at 17:09

## 2019-01-01 RX ADMIN — SODIUM CHLORIDE: 9 INJECTION, SOLUTION INTRAVENOUS at 06:39

## 2019-01-01 RX ADMIN — FUROSEMIDE 20 MG: 10 INJECTION, SOLUTION INTRAMUSCULAR; INTRAVENOUS at 23:40

## 2019-01-01 RX ADMIN — ALBUTEROL SULFATE 2 PUFF: 90 AEROSOL, METERED RESPIRATORY (INHALATION) at 08:33

## 2019-01-01 RX ADMIN — FAMOTIDINE 20 MG: 20 TABLET ORAL at 06:38

## 2019-01-01 RX ADMIN — ALBUTEROL SULFATE 2 PUFF: 90 AEROSOL, METERED RESPIRATORY (INHALATION) at 14:30

## 2019-01-01 RX ADMIN — GABAPENTIN 300 MG: 300 CAPSULE ORAL at 17:02

## 2019-01-01 RX ADMIN — ALBUTEROL SULFATE 2 PUFF: 90 AEROSOL, METERED RESPIRATORY (INHALATION) at 17:02

## 2019-01-01 RX ADMIN — SIMVASTATIN 20 MG: 20 TABLET, FILM COATED ORAL at 17:09

## 2019-01-01 RX ADMIN — OXYCODONE HYDROCHLORIDE 5 MG: 5 TABLET ORAL at 03:39

## 2019-01-01 RX ADMIN — IOHEXOL 75 ML: 350 INJECTION, SOLUTION INTRAVENOUS at 08:49

## 2019-01-01 ASSESSMENT — PAIN SCALES - GENERAL
PAINLEVEL_OUTOF10: 0
PAINLEVEL_OUTOF10: 8
PAINLEVEL_OUTOF10: 0
PAINLEVEL_OUTOF10: 8
PAINLEVEL_OUTOF10: 0

## 2019-01-01 ASSESSMENT — ENCOUNTER SYMPTOMS
PSYCHIATRIC NEGATIVE: 1
NAUSEA: 0
FATIGUE: 1
SPUTUM PRODUCTION: 0
SHORTNESS OF BREATH: 1
CONFUSION: 0
FEVER: 0
SHORTNESS OF BREATH: 1
NEUROLOGICAL NEGATIVE: 1
SHORTNESS OF BREATH: 1
MUSCULOSKELETAL NEGATIVE: 1
LOSS OF CONSCIOUSNESS: 0
COUGH: 1
PALPITATIONS: 0
SORE THROAT: 0
HEADACHES: 0
HEADACHES: 0
ABDOMINAL PAIN: 1
VOMITING: 0
COUGH: 0
UNEXPECTED WEIGHT CHANGE: 1
WHEEZING: 0
NUMBNESS: 0
WEAKNESS: 1
ABDOMINAL PAIN: 0
CHILLS: 0
NAUSEA: 1
SPEECH DIFFICULTY: 0
DYSPHORIC MOOD: 0
BACK PAIN: 0
CONSTIPATION: 1
BLOOD IN STOOL: 0
AGITATION: 0
FEVER: 0
FLANK PAIN: 0
WEAKNESS: 1
FALLS: 0
ABDOMINAL PAIN: 1
NERVOUS/ANXIOUS: 0
ANAL BLEEDING: 0
DIZZINESS: 0
CARDIOVASCULAR NEGATIVE: 1
PALPITATIONS: 0
DIAPHORESIS: 0
BLOOD IN STOOL: 0
VOMITING: 0
PSYCHIATRIC NEGATIVE: 1
EYES NEGATIVE: 1
WOUND: 0
COUGH: 0
WHEEZING: 0
CHILLS: 0
CONSTITUTIONAL NEGATIVE: 1
BRUISES/BLEEDS EASILY: 0

## 2019-01-01 ASSESSMENT — COGNITIVE AND FUNCTIONAL STATUS - GENERAL
DAILY ACTIVITIY SCORE: 15
HELP NEEDED FOR BATHING: A LITTLE
MOVING TO AND FROM BED TO CHAIR: A LITTLE
STANDING UP FROM CHAIR USING ARMS: A LOT
PERSONAL GROOMING: A LITTLE
CLIMB 3 TO 5 STEPS WITH RAILING: A LOT
DRESSING REGULAR UPPER BODY CLOTHING: A LOT
MOVING FROM LYING ON BACK TO SITTING ON SIDE OF FLAT BED: A LITTLE
DRESSING REGULAR LOWER BODY CLOTHING: A LOT
WALKING IN HOSPITAL ROOM: A LOT
TOILETING: A LOT
SUGGESTED CMS G CODE MODIFIER MOBILITY: CK
EATING MEALS: A LITTLE
SUGGESTED CMS G CODE MODIFIER DAILY ACTIVITY: CK
MOBILITY SCORE: 16

## 2019-01-01 ASSESSMENT — PATIENT HEALTH QUESTIONNAIRE - PHQ9
SUM OF ALL RESPONSES TO PHQ9 QUESTIONS 1 AND 2: 0
1. LITTLE INTEREST OR PLEASURE IN DOING THINGS: NOT AT ALL
SUM OF ALL RESPONSES TO PHQ9 QUESTIONS 1 AND 2: 0
2. FEELING DOWN, DEPRESSED, IRRITABLE, OR HOPELESS: NOT AT ALL
1. LITTLE INTEREST OR PLEASURE IN DOING THINGS: NOT AT ALL
2. FEELING DOWN, DEPRESSED, IRRITABLE, OR HOPELESS: NOT AT ALL

## 2019-01-01 NOTE — PROGRESS NOTES
PHYSICIAN: DR MARTIN  TECH: CAYDEN    PROCEDURE: ULTRASOUND GUIDE THORACENTESIS     PT CAME DOWN TO ULTRASOUND ROOM 1 FOR HER PROCEDURE. 900 ML OF FLUID WAS REMOVED FROM PT LT THORAX. NO FLUID WAS SENT TO LAB. PT APPEARED TO BE IN A STABLE CONDITION FOR HER PROCEDURE. VITALS WERE MONITORED BEFORE, DURING AND AFTER HER PROCEDURE. XRAY WAS TAKEN AFTER PROCEDURE IN ULTRASOUND ROOM BEFORE PT WENT BACK TO HER ROOM.

## 2019-01-01 NOTE — PROGRESS NOTES
2 RN Skin Check with Edna      Generalized skin intact no areas of concern  Some bilateral scabs to lower legs (Pt has a cat)  Bilateral heels are red, blanching and boggy    Will continue to monitor

## 2019-01-01 NOTE — PROGRESS NOTES
Patient was seen and examined by my colleague after midnight. Please refer to the H&P done by Dr. Stearns on 1/1/2018 for more details.    I personally saw and examined the patient today.  She is in no acute distress, stepdaughter is at the bedside.  This morning the patient slid to the floor, witnessed by stepdaughter.  No loss of consciousness or head trauma.  Patient denies any pain.  She is neurologically intact and vital signs are stable.    # History of lymphoma  # Pericardial effusion-suspect that this is a malignant effusion  # Metastatic disease- IR biopsy pending  # Shortness of breath- related to pericardial effusion and large left pleural effusion.  Plan for thoracentesis on the left today.  Cardiology following for management of pericardial effusion.

## 2019-01-01 NOTE — ED NOTES
[facility] pt became very SOB after sitting in the commode and SPO2 dropped down to the mid  80's. Pt placed back in bed immediately and and sat upright, oxygen increased to 5 lt nc. Pt performed deep breathing exercises and returned to baseline respiratory status after a short time. Pt now resting on 3 lt nc and stating \"I am breathing much better.\" Will continue to monitor closely.   "

## 2019-01-01 NOTE — ASSESSMENT & PLAN NOTE
Suspect malignant given the rapid recurrence (did recently have ICU admission requiring intubation and pericardiocentesis in Oct 2018).    - cardiology following, greatly appreciate  Echo shows infilatrative process; percardialcintesis unlikely to make any difference

## 2019-01-01 NOTE — ED PROVIDER NOTES
ED Provider Note    Scribed for Yayo Freire M.D. by Jenifer Koo. 12/31/2018, 8:38 PM.    Primary care provider: Jay Sanchez M.D.  Means of arrival: ambulance  History obtained from: patient  History limited by: none    CHIEF COMPLAINT  Chief Complaint   Patient presents with   • Shortness of Breath       HPI  Erin Quigley is a 78 y.o. female who presents to the Emergency Department for evaluation of abdominal pain onset today. She also reports some shortness of breath onset a couple of weeks ago that worsened today and prompted her to call EMS. Additionally, patient reports some dizziness for the past couple of days. She endorses a past medical history COPD and is on oxygen at night. Patient states she had a normal bowel movement today. Patient reports a recent endoscopy for ulcers. She denies a productive cough, fever, diarrhea, blood in stool, melena, dysuria, burning with urination, leg swelling, or history of blood clots.     REVIEW OF SYSTEMS  Pertinent positives include shortness of breath, abdominal pain, dizziness. Pertinent negatives include productive cough, fever, diarrhea, blood in stool, melena, dysuria, burning with urination, leg swelling. All other systems negative.    PAST MEDICAL HISTORY   has a past medical history of Arthritis; Cancer (HCC); Hypertension; and Unspecified disorder of thyroid.    SURGICAL HISTORY   has a past surgical history that includes gyn surgery; other; cervical fusion posterior (10/2/2009); and cervical laminectomy posterior (10/2/2009).    SOCIAL HISTORY  Social History   Substance Use Topics   • Smoking status: Former Smoker     Types: Cigarettes     Quit date: 9/12/2018   • Smokeless tobacco: Never Used   • Alcohol use Yes      Comment: occ      History   Drug Use No       FAMILY HISTORY  None    CURRENT MEDICATIONS  Current Outpatient Prescriptions on File Prior to Encounter   Medication Sig Dispense Refill   • raNITidine (ZANTAC) 150 MG Tab Take 150  "mg by mouth 2 times a day.     • simvastatin (ZOCOR) 20 MG Tab Take 20 mg by mouth every evening.     • amLODIPine (NORVASC) 5 MG Tab Take 5 mg by mouth every day.     • hydroCHLOROthiazide (HYDRODIURIL) 25 MG Tab Take 12.5 mg by mouth every day.     • gabapentin (NEURONTIN) 300 MG Cap Take 300 mg by mouth 2 Times a Day.     • albuterol 108 (90 Base) MCG/ACT Aero Soln inhalation aerosol Inhale 2 Puffs by mouth 4 times a day.     • levothyroxine (SYNTHROID) 88 MCG Tab Take 88 mcg by mouth Every morning on an empty stomach.     • oxymetazoline, icn,  0.025% (AFRIN) Spray 1 Spray in nose 1 time daily as needed.         ALLERGIES  No Known Allergies    PHYSICAL EXAM  VITAL SIGNS: /90   Pulse 82   Temp 36.1 °C (97 °F) (Temporal)   Resp 18   Ht 1.6 m (5' 3\")   Wt 52.2 kg (115 lb)   SpO2 95%   BMI 20.37 kg/m²     Constitutional: Well developed, Well nourished, mild distress.   HENT: Normocephalic, Atraumatic, Oropharynx moist.   Eyes:  Pale conjunctiva, No discharge.   Cardiovascular: Normal heart rate, Normal rhythm, No murmurs, equal pulses.   Pulmonary: Diminished breath sounds bilaterally with occasional wheeze., No rales, No rhonchi.  Chest: No chest wall tenderness or deformity.   Abdomen: Tenderness over left lower quadrant with slight guarding. Soft, No masses, no rebound, no guarding.   Back: Left sided CVA tenderness.  Musculoskeletal: No major deformities noted, No tenderness.   Skin: Pale. No erythema, No rash.   Neurologic: Alert & oriented x 3, Normal motor function,  No focal deficits noted.   Psychiatric: Affect normal, Judgment normal, Mood normal.     LABS  Results for orders placed or performed during the hospital encounter of 12/31/18   CBC WITH DIFFERENTIAL   Result Value Ref Range    WBC 16.5 (H) 4.8 - 10.8 K/uL    RBC 3.87 (L) 4.20 - 5.40 M/uL    Hemoglobin 11.7 (L) 12.0 - 16.0 g/dL    Hematocrit 36.3 (L) 37.0 - 47.0 %    MCV 93.8 81.4 - 97.8 fL    MCH 30.2 27.0 - 33.0 pg    MCHC 32.2 " (L) 33.6 - 35.0 g/dL    RDW 54.0 (H) 35.9 - 50.0 fL    Platelet Count 462 (H) 164 - 446 K/uL    MPV 11.3 9.0 - 12.9 fL    Neutrophils-Polys 80.20 (H) 44.00 - 72.00 %    Lymphocytes 6.90 (L) 22.00 - 41.00 %    Monocytes 10.60 0.00 - 13.40 %    Eosinophils 0.10 0.00 - 6.90 %    Basophils 0.30 0.00 - 1.80 %    Immature Granulocytes 1.90 (H) 0.00 - 0.90 %    Nucleated RBC 0.20 /100 WBC    Neutrophils (Absolute) 13.20 (H) 2.00 - 7.15 K/uL    Lymphs (Absolute) 1.13 1.00 - 4.80 K/uL    Monos (Absolute) 1.74 (H) 0.00 - 0.85 K/uL    Eos (Absolute) 0.02 0.00 - 0.51 K/uL    Baso (Absolute) 0.05 0.00 - 0.12 K/uL    Immature Granulocytes (abs) 0.31 (H) 0.00 - 0.11 K/uL    NRBC (Absolute) 0.03 K/uL   COMP METABOLIC PANEL   Result Value Ref Range    Sodium 134 (L) 135 - 145 mmol/L    Potassium 4.3 3.6 - 5.5 mmol/L    Chloride 91 (L) 96 - 112 mmol/L    Co2 25 20 - 33 mmol/L    Anion Gap 18.0 (H) 0.0 - 11.9    Glucose 119 (H) 65 - 99 mg/dL    Bun 43 (H) 8 - 22 mg/dL    Creatinine 1.21 0.50 - 1.40 mg/dL    Calcium 8.9 8.5 - 10.5 mg/dL    AST(SGOT) 1420 (H) 12 - 45 U/L    ALT(SGPT) 982 (H) 2 - 50 U/L    Alkaline Phosphatase 157 (H) 30 - 99 U/L    Total Bilirubin 0.9 0.1 - 1.5 mg/dL    Albumin 3.4 3.2 - 4.9 g/dL    Total Protein 6.1 6.0 - 8.2 g/dL    Globulin 2.7 1.9 - 3.5 g/dL    A-G Ratio 1.3 g/dL   TROPONIN   Result Value Ref Range    Troponin I 0.17 (H) 0.00 - 0.04 ng/mL   LACTIC ACID   Result Value Ref Range    Lactic Acid 4.5 (HH) 0.5 - 2.0 mmol/L   COD (ADULT)   Result Value Ref Range    ABO Grouping Only O     Rh Grouping Only POS     Antibody Screen-Cod NEG    ABO AND RH CONFIRMATION   Result Value Ref Range    ABO Confirm O     Second Rh Group POS    URINALYSIS   Result Value Ref Range    Color DK Yellow     Character Cloudy (A)     Specific Gravity 1.031 <1.035    Ph 5.0 5.0 - 8.0    Glucose Negative Negative mg/dL    Ketones Trace (A) Negative mg/dL    Protein 300 (A) Negative mg/dL    Bilirubin Negative Negative     Urobilinogen, Urine 1.0 Negative    Nitrite Negative Negative    Leukocyte Esterase Negative Negative    Occult Blood Negative Negative    Micro Urine Req Microscopic    ESTIMATED GFR   Result Value Ref Range    GFR If  52 (A) >60 mL/min/1.73 m 2    GFR If Non  43 (A) >60 mL/min/1.73 m 2   PROTHROMBIN TIME (INR)   Result Value Ref Range    PT 22.7 (H) 12.0 - 14.6 sec    INR 1.99 (H) 0.87 - 1.13   APTT   Result Value Ref Range    APTT 25.4 24.7 - 36.0 sec   URINE MICROSCOPIC (W/UA)   Result Value Ref Range    WBC 2-5 /hpf    RBC Rare /hpf    Bacteria Negative None /hpf    Epithelial Cells Few /hpf    Mucous Threads Rare /hpf    Amorphous Crystal Present /hpf    Hyaline Cast 11-20 (A) /lpf   EKG   Result Value Ref Range    Report       [facility] Emergency Dept.    Test Date:  2018  Pt Name:    AVEL COLLAZO            Department: ER  MRN:        [id number]                      Room:        22  Gender:     Female                       Technician: [id number]  :        [id number]                   Requested By:ER TRIAGE PROTOCOL  Order #:    [id number]                    Reading MD:    Measurements  Intervals                                Axis  Rate:       111                          P:          37  NM:         176                          QRS:        147  QRSD:       86                           T:          -21  QT:         267  QTc:        363    Interpretive Statements  SINUS TACHYCARDIA  RUN OF VENTRICULAR PREMATURE COMPLEXES  LATERAL INFARCT, AGE INDETERMINATE  PROBABLE ANTEROSEPTAL INFARCT, OLD  BORDERLINE T ABNORMALITIES, INFERIOR LEADS  Compared to ECG 10/08/2018 11:17:40  Ventricular premature complex(es) now present  Myocardial infarct finding now present  T-wave abnormality still  present       All labs reviewed by me.    EKG  12 Lead EKG interpreted by me shows a normal sinus rhythm at a rate of 111. Axis normal. No ST elevations. No T wave  inversions. Low voltage. New Q waves in 1 and AVL with T wave flattening diffusely. Final impression: New Q waves in lateral leads, diffuse T wave abnormality unchanged, low voltage unchanged from previous EKG. Possible lateral MI compared to previous.    RADIOLOGY  EC-ECHOCARDIOGRAM LTD W/O CONT         CT-ABDOMEN-PELVIS WITH   Final Result      1.  Small-to-moderate bilateral pleural effusions with adjacent compressive atelectasis.      2.  Large pericardial effusion with pericardial enhancement, possibly malignant. There is resultant narrowing of the visualized pulmonary veins      3.  Multiple intra-abdominal and subcutaneous masses are consistent with metastatic adenopathy or lymphoma. Retroperitoneal mass narrow the common hepatic and superior mesenteric arteries.      4.  Heterogeneous hepatic attenuation is likely multifactorial. There is likely an element of passive congestion as well as narrowing of the main portal vein secondary to hepatic masses.      5.  Gastric wall thickening and small bowel wall thickening, concerning for lymphomatous involvement.      6.  Atherosclerosis.         Comment: Results discussed with Dr. Freire at 11:15 PM      DX-CHEST-PORTABLE (1 VIEW)   Final Result      1.  Large left and small right pleural effusions.      2.  Stable cardiomegaly        The radiologist's interpretation of all radiological studies have been reviewed by me.    COURSE & MEDICAL DECISION MAKING  Pertinent Labs & Imaging studies reviewed. (See chart for details)    8:27 PM - Ordered EKG to evaluate her symptoms.     8:38 PM - Patient seen and examined at bedside. The differential diagnoses include but are not limited to: asthma, myocardial infarction, CHF.     10:26 PM - I asked patient about Tylenol and alcohol use and patient states she only takes 1-2 Tylenol per day, at max 4 Tylenol. Patient reports drinking about 2 glasses of wine a day.    Initially discussed the case with ANTHONY Do.  They would  admit the patient.    2330 discussed pericardial effusion with Dr. Badillo, cardiology he recommended a stat echo.  2345 ANTHONY Do informs me that the patient does not want that education service for her doctor.  Discussed the case with Dr. Stearns hospitalist he will admit    CRITICAL CARE  I provided critical care services, which included medication orders, frequent reevaluations of the patient's condition and response to treatment, ordering and reviewing test results, and discussing the case with various consultants.  The critical care time associated with the care of the patient was 40 minutes. Review chart for interventions. This time is exclusive of any other billable procedures.         Medical Decision Making: At this point time it appears patient likely has recurrence of her lymphoma.  This appears to be invading her liver causing elevated enzymes as well as pericardial effusion and pleural effusions which I think are causing her shortness of breath.  Cardiology has been consulted  DISPOSITION:  Patient will be admitted to Dr. Stearns in critical condition.      FINAL IMPRESSION  1. Elevated troponin    2. Acute exacerbation of chronic obstructive pulmonary disease (COPD) (HCC)    3. Transaminitis    4. Sepsis, due to unspecified organism (HCC)    5. Pericardial effusion    6. Pleural effusion    7. Liver masses          Jenifer NASH (Nathan), am scribing for, and in the presence of, Yayo Freire M.D.    Electronically signed by: Jenifer Koo (Nathan), 12/31/2018    Yayo NASH M.D. personally performed the services described in this documentation, as scribed by Jenifer Koo in my presence, and it is both accurate and complete. C.  .  The note accurately reflects work and decisions made by me.  Yayo Freire  1/1/2019  1:56 AM

## 2019-01-01 NOTE — PROGRESS NOTES
"Pt put on call light to use the restroom but shortly after could not hold it, attempted to get oob by herself and \"slid to her knees\" per daughter at bedside. Pt was on her knees leaning against the bed when this RN and CNA Mariusz walked into the room. Pt states she did not hit her head. Denies pain anywhere at this time. Skin intact, no injuries noted. Post fall form completed, notified charge RASTA Sanchez and MD Teresa. No new orders at this time. Fall education rediscussed with daughter and pt. Bed alarm on, high fall risk precautions in place  "

## 2019-01-01 NOTE — CARE PLAN
Problem: Safety  Goal: Will remain free from injury    Intervention: Educate patient and significant other/support system about adaptive mobility strategies and safe transfers  Safety/fall education completed with pt and step daughter at bedside      Problem: Infection  Goal: Will remain free from infection    Intervention: Assess signs and symptoms of infection  WBC elevated but no clinical s/s of infection noted  Intervention: Implement standard precautions and perform hand washing before and after patient contact  Standard precautions maintained during pt care

## 2019-01-01 NOTE — PROGRESS NOTES
Assumed care of Pt from ED Pt arrived to the unit via gurney escorted by ACLS RN on [place]. Vital signs stable. Assessment complete, A&Ox 4, no signs of distress noted at this time. Tele monitor in place, monitor room notified  ST on monitor. Pt denies pain. Pt is oriented to the unit, call light, and phone system. Fall precautions in place. Call light in reach. Bed is locked in lowest position. Pt is educated regarding fall precautions and the importance for calling staff for assistance. Pt denies any additional needs at this time. Will continue to monitor.

## 2019-01-01 NOTE — ASSESSMENT & PLAN NOTE
Normotensive to borderline soft.   - on HCTZ and amlodipine, will discontinue amlodipine  - holding parameters

## 2019-01-01 NOTE — CONSULTS
"Reason of Consult: Pericardial effusion    Consulting Physician: Aristides Bob MD    Referring physician: Yayo Freire MD ER physician    CHIEF complaint: \"Abdomen hurting\" and \"feeling so tired\"    HPI:  The patient is a 78-year-old female from [facility] with a remote history of non-Hodgkin's lymphoma 1998 treated with chemotherapy, hypertension, hypothyroidism with relatively recent hospitalization in 9/2018 for symptomatic bilateral pleural effusions and a large pericardial effusion with cardiac tamponade requiring bilateral thoracenteses and a pericardiocentesis with removal of 1000 cc of bloody pericardial effusion with none of the fluid demonstrating evidence of malignancy who now returns with at least a 1 week history of progressive abdominal pain, nausea with dry heaves, profound fatigue, anorexia, dizziness, severe shortness of breath, chills but no fever or sweats.    Since admission abdominal CT scan was obtained demonstrating small to moderate bilateral pleural effusions, large pericardial effusion suspicious for pericardial malignancy, along with findings indicative of extensive malignancy.    During the patient's 9/2018 hospitalization it had been recommended that she get a pleural or pericardial biopsy however the patient's  refused.    Past Medical History:   Diagnosis Date   • Arthritis    • Cancer (HCC)     Nonhodgkins lymphoma  2000   • Hypertension    • Unspecified disorder of thyroid     hypothyroid       Social History     Social History   • Marital status:      Spouse name: N/A   • Number of children: N/A   • Years of education: N/A     Occupational History   • Not on file.     Social History Main Topics   • Smoking status: Former Smoker     Types: Cigarettes     Quit date: 9/12/2018   • Smokeless tobacco: Never Used   • Alcohol use Yes      Comment: occ   • Drug use: No   • Sexual activity: Not on file     Other Topics Concern   • Not on file     Social " "History Narrative   • No narrative on file       No current facility-administered medications on file prior to encounter.      Current Outpatient Prescriptions on File Prior to Encounter   Medication Sig Dispense Refill   • raNITidine (ZANTAC) 150 MG Tab Take 150 mg by mouth 2 times a day.     • simvastatin (ZOCOR) 20 MG Tab Take 20 mg by mouth every evening.     • amLODIPine (NORVASC) 5 MG Tab Take 5 mg by mouth every day.     • hydroCHLOROthiazide (HYDRODIURIL) 25 MG Tab Take 12.5 mg by mouth every day.     • gabapentin (NEURONTIN) 300 MG Cap Take 300 mg by mouth 2 Times a Day.     • albuterol 108 (90 Base) MCG/ACT Aero Soln inhalation aerosol Inhale 2 Puffs by mouth 4 times a day.     • levothyroxine (SYNTHROID) 88 MCG Tab Take 88 mcg by mouth Every morning on an empty stomach.         No current facility-administered medications for this encounter.        Last reviewed on 12/31/2018 11:34 PM by Sugey Vidal    Patient has no known allergies.    History reviewed. No pertinent family history.    ROS: As per HPI all other systems reviewed and negative stroke, hematemesis or melena.    Physical Exam   Blood pressure 110/90, pulse (!) 109, temperature 36.1 °C (97 °F), temperature source Temporal, resp. rate 19, height 1.6 m (5' 3\"), weight 52.2 kg (115 lb), SpO2 94 %, not currently breastfeeding.    Constitutional: Frail.  Elderly.  Tachypneic.     HENT: Normocephalic and atraumatic. No scleral icterus.   Neck: No JVD present.  Jugular venous pressure does not appear to be elevated.  Cardiovascular: Normal rate. Exam reveals no gallop and no friction rub. No murmur heard.  Trace pulses.  Pulmonary/Chest: Marked decrease breath sounds with no distinct wheezes rales or rhonchi  Abdominal: Protuberant.  At least 2 small and moderate size subcutaneous nodular masses in the subxiphoid and right upper quadrant area.  Musculoskeletal:  Pulses present.  Diffuse atrophy.  Generalized weakness.  Extremities: Exhibits mild " bilateral lower extremity pretibial edema. No clubbing or cyanosis.   Skin: Skin is warm and dry.   Neuro: Non-focal, CN 2-12 intact grossly    No intake or output data in the 24 hours ending 01/01/19 0047    Recent Labs      12/31/18 2040   WBC  16.5*   RBC  3.87*   HEMOGLOBIN  11.7*   HEMATOCRIT  36.3*   MCV  93.8   MCH  30.2   MCHC  32.2*   RDW  54.0*   PLATELETCT  462*   MPV  11.3     Recent Labs      12/31/18 2040   SODIUM  134*   POTASSIUM  4.3   CHLORIDE  91*   CO2  25   GLUCOSE  119*   BUN  43*   CREATININE  1.21   CALCIUM  8.9     Recent Labs      12/31/18 2040   APTT  25.4   INR  1.99*         Recent Labs      12/31/18 2040   TROPONINI  0.17*           EKG (12/31/2018):  I have personally reviewed the EKG this visit and discussed with the patient. It shows sinus tachycardia, rate 114.  Suspect limb lead reversal.  Incomplete right bundle branch block.    Imaging reviewed    ABDOMINAL CT SCAN 12/31/2018  1.  Small-to-moderate bilateral pleural effusions with adjacent compressive atelectasis.    2.  Large pericardial effusion with pericardial enhancement, possibly malignant. There is resultant narrowing of the visualized pulmonary veins    3.  Multiple intra-abdominal and subcutaneous masses are consistent with metastatic adenopathy or lymphoma. Retroperitoneal mass narrow the common hepatic and superior mesenteric arteries.    4.  Heterogeneous hepatic attenuation is likely multifactorial. There is likely an element of passive congestion as well as narrowing of the main portal vein secondary to hepatic masses.    5.  Gastric wall thickening and small bowel wall thickening, concerning for lymphomatous involvement.    6.  Atherosclerosis.    Impressions:  Pericardial effusion.  Recurrent.  Pleural effusions.  Bilateral.  Recurrent.  Respiratory failure.  Abdominal pain related to suspected recurrent metastatic malignancy.  Abnormal abdominal CT scan consistent with lymphoma or metastatic disease  involving the liver with intra-abdominal spread.  Acute liver failure.  Acute renal failure.  Lactic/metabolic acidosis.  Leukocytosis.  Hypothyroidism.  Pericardiocentesis 10/1/2018 for pericardial tamponade.  Bilateral thoracenteses 9/28/2018.    Recommendations and Discussion:  1.  STAT echocardiogram to assess hemodynamic status of pericardial effusion.  2.  I reviewed the echocardiogram images from 10/22/2018 which showed loculated small pericardial effusion in addition to significant concentric left ventricular hypertrophy suggestive of a possible infiltrative process.  3.  Repeat EKG.  4.  I spoke with the patient and her daughter is at the bedside regarding her current cardiac status in the context of her current acute illness and in addition to the findings of suspicious recurrent malignancy on the abdominal CT scan.  5.  I will review the echocardiogram to assess the size and significance of the patient's pericardial effusion to determine whether or not she is a candidate for repeat pericardiocentesis for symptomatic relief.  6.  Bilateral thoracentesis for symptomatic relief.    Discussed with the referring physician Dr. Yayo Freire.

## 2019-01-01 NOTE — ED TRIAGE NOTES
"BIB EMS for SOB. Pt reports being SOB \"all day,\" while sitting in her recliner at home. Pt's SOB worsened prompting her to call EMS. Per EMS pt O2 sat was 90% on RA and after one duoneb tx and 2 lt nc improved to 95%, pt appears to be in NAD at this time but is very pale in color and states she has hx of anemia. Pt placed on cardiac monitor, bp cuff and pulse ox, ekg performed on arrival.   "

## 2019-01-01 NOTE — PROGRESS NOTES
"I spoke with the patient and her daughter regarding the results of the echocardiogram.  I explained that it would require a surgical operation to drain pericardial effusion and remove mass in the pericardial space which I explained I did not feel that she would probably survive and would not advised to have under the circumstances.  Though she is aware of the risk of recurrent \"malignancy\" after treatment of lymphoma or any cancer, she surprised at how rapid her condition has worsened though I did remind her that there was a strong suspicion at the time of her hospitalization in September with the bilateral pleural effusions and pericardial effusion.  They express an interest in finding out what the cancer process is and depending on their decision tissue could be easily obtained from the subcutaneous abdominal nodules though her prognosis is extremely grim.  "

## 2019-01-01 NOTE — ASSESSMENT & PLAN NOTE
H/o non-hodgkins lymphoma s/p treatment in remote past (~2000). Now concern for recurrence. Appears to have hepatic, abdominal and pericardial disease.  - s/p IR biopsy of abdominal wall nodule today, 1/2  - f/u pathology, explained that this will take several days

## 2019-01-01 NOTE — H&P
"Hospital Medicine History & Physical Note    Date of Service  1/1/2019    Primary Care Physician  Jay Sanchez M.D.    Consultants  Cardiology Dr Erazo    Code Status  Full code     Chief Complaint  Shortness of breath    History of Presenting Illness  78 y.o. female with history of lymphoma status post distant treatment, essential hypertension which is controlled, and hypothyroidism on replacement therapy, was apparently in her usual state of health until a few days prior to admission.  She began to have vague abdominal pain without exacerbating or alleviating factors.  On the day prior to admission she also had shortness of breath, this is made worse with any activities, and she presented to the emergency department for further evaluation of the same.  She currently denies headache, vision changes, chest pain, she has no diarrhea or constipation.  No other complaints.  Of note, she did ask if it was raining, and when I commented that it was not raining she did say \"Oh it just must be the leaves falling\"    Review of Systems  Review of Systems   Constitutional: Negative.    HENT: Negative.    Eyes: Negative.    Respiratory: Positive for shortness of breath. Negative for cough and sputum production.    Cardiovascular: Negative.    Gastrointestinal: Positive for abdominal pain.   Genitourinary: Negative.    Musculoskeletal: Negative.    Skin: Negative.    Neurological: Negative.    Endo/Heme/Allergies: Negative.    Psychiatric/Behavioral: Negative.        Past Medical History   has a past medical history of Arthritis; Cancer (HCC); Hypertension; and Unspecified disorder of thyroid.    Surgical History   has a past surgical history that includes gyn surgery; other; cervical fusion posterior (10/2/2009); and cervical laminectomy posterior (10/2/2009).     Family History  family history includes Cancer in her mother; No Known Problems in her father.     Social History   reports that she quit smoking about 3 months " ago. Her smoking use included Cigarettes. She has never used smokeless tobacco. She reports that she drinks alcohol. She reports that she does not use drugs.    Allergies  No Known Allergies    Medications  Prior to Admission Medications   Prescriptions Last Dose Informant Patient Reported? Taking?   albuterol 108 (90 Base) MCG/ACT Aero Soln inhalation aerosol 12/31/2018 at 0930 Patient Yes No   Sig: Inhale 2 Puffs by mouth 4 times a day.   amLODIPine (NORVASC) 5 MG Tab 12/31/2018 at 0930 Patient Yes No   Sig: Take 5 mg by mouth every day.   gabapentin (NEURONTIN) 300 MG Cap 12/31/2018 at 0930 Patient Yes No   Sig: Take 300 mg by mouth 2 Times a Day.   hydroCHLOROthiazide (HYDRODIURIL) 25 MG Tab 12/31/2018 at 0930 Patient Yes No   Sig: Take 12.5 mg by mouth every day.   levothyroxine (SYNTHROID) 88 MCG Tab 12/31/2018 at 0800 Patient Yes No   Sig: Take 88 mcg by mouth Every morning on an empty stomach.   raNITidine (ZANTAC) 150 MG Tab 12/31/2018 at 0930 Patient Yes No   Sig: Take 150 mg by mouth 2 times a day.   simvastatin (ZOCOR) 20 MG Tab 12/30/2018 at 1930 Patient Yes No   Sig: Take 20 mg by mouth every evening.      Facility-Administered Medications: None       Physical Exam  Temp:  [36.1 °C (97 °F)-36.2 °C (97.1 °F)] 36.2 °C (97.1 °F)  Pulse:  [] 105  Resp:  [12-26] 20  BP: (110-117)/(88-90) 117/88    Physical Exam   Constitutional: She is oriented to person, place, and time. She appears well-developed and well-nourished. No distress.   HENT:   Head: Normocephalic and atraumatic.   Eyes: Pupils are equal, round, and reactive to light. Conjunctivae are normal.   Neck: Normal range of motion. Neck supple. No tracheal deviation present. No thyromegaly present.   Cardiovascular: Normal rate, regular rhythm and normal heart sounds.  Exam reveals no gallop and no friction rub.    No murmur heard.  Pulmonary/Chest: Effort normal and breath sounds normal. No respiratory distress. She has no wheezes. She has no  rales.   Abdominal: Soft. Bowel sounds are normal. She exhibits no distension. There is no tenderness. There is no rebound.   Musculoskeletal: Normal range of motion. She exhibits no edema.   Lymphadenopathy:     She has no cervical adenopathy.   Neurological: She is alert and oriented to person, place, and time. No cranial nerve deficit.   Skin: Skin is warm and dry. She is not diaphoretic.   Psychiatric: She has a normal mood and affect.   Nursing note and vitals reviewed.      Laboratory:  Recent Labs      12/31/18 2040   WBC  16.5*   RBC  3.87*   HEMOGLOBIN  11.7*   HEMATOCRIT  36.3*   MCV  93.8   MCH  30.2   MCHC  32.2*   RDW  54.0*   PLATELETCT  462*   MPV  11.3     Recent Labs      12/31/18 2040   SODIUM  134*   POTASSIUM  4.3   CHLORIDE  91*   CO2  25   GLUCOSE  119*   BUN  43*   CREATININE  1.21   CALCIUM  8.9     Recent Labs      12/31/18 2040   ALTSGPT  982*   ASTSGOT  1420*   ALKPHOSPHAT  157*   TBILIRUBIN  0.9   GLUCOSE  119*     Recent Labs      12/31/18 2040   APTT  25.4   INR  1.99*             Recent Labs      12/31/18 2040   TROPONINI  0.17*       Urinalysis:    Recent Labs      12/31/18   2306   SPECGRAVITY  1.031   GLUCOSEUR  Negative   KETONES  Trace*   NITRITE  Negative   LEUKESTERAS  Negative   WBCURINE  2-5   RBCURINE  Rare   BACTERIA  Negative   EPITHELCELL  Few        Imaging:  EC-ECHOCARDIOGRAM LTD W/O CONT   Final Result      CT-ABDOMEN-PELVIS WITH   Final Result      1.  Small-to-moderate bilateral pleural effusions with adjacent compressive atelectasis.      2.  Large pericardial effusion with pericardial enhancement, possibly malignant. There is resultant narrowing of the visualized pulmonary veins      3.  Multiple intra-abdominal and subcutaneous masses are consistent with metastatic adenopathy or lymphoma. Retroperitoneal mass narrow the common hepatic and superior mesenteric arteries.      4.  Heterogeneous hepatic attenuation is likely multifactorial. There is likely an  element of passive congestion as well as narrowing of the main portal vein secondary to hepatic masses.      5.  Gastric wall thickening and small bowel wall thickening, concerning for lymphomatous involvement.      6.  Atherosclerosis.         Comment: Results discussed with Dr. Freire at 11:15 PM      DX-CHEST-PORTABLE (1 VIEW)   Final Result      1.  Large left and small right pleural effusions.      2.  Stable cardiomegaly            Assessment/Plan:  I anticipate this patient will require at least two midnights for appropriate medical management, necessitating inpatient admission.    * Recurrent lymphoma (HCC)   Assessment & Plan    History of the same, now with hepatic, and probable pericardial disease.  Patient to discuss with  code status and further plans for treatment or otherwise.      Essential hypertension- (present on admission)   Assessment & Plan    Controlled with current medication regimen.  Monitor.      Pericardial effusion   Assessment & Plan    Suspect malignant with recurrence (did recently have pericardiocentesis).  Appreciate cardiology reccomendations.  NPO for possible procedure.      Acute encephalopathy   Assessment & Plan    Suspect due to underlying lymphoma.  Monitor.      Dyslipidemia   Assessment & Plan    Continue current regimen.      Hypothyroidism- (present on admission)   Assessment & Plan    On replacement therapy          VTE prophylaxis: SCD

## 2019-01-02 NOTE — CARE PLAN
Problem: Pain Management  Goal: Pain level will decrease to patient's comfort goal  Outcome: PROGRESSING AS EXPECTED   01/01/19 4413   OTHER   Comfort Goal Comfort at Rest;Comfort with Movement;Sleep Comfortably       Problem: Respiratory:  Goal: Respiratory status will improve  Outcome: PROGRESSING AS EXPECTED

## 2019-01-02 NOTE — OR SURGEON
Immediate Post- Operative Note        PostOp Diagnosis: Hx lymphoma with abdominal wall nodules.       Procedure(s): US guided anterior abdominal wall mass biopsy.       Estimated Blood Loss: Less than 5 ml        Complications: None            1/2/2019     1215 PM     Shekhar Andrade

## 2019-01-02 NOTE — PROGRESS NOTES
Received report from NOC RN. Assumed care of patient at 0700. Patient A&Ox4. On 4L NC, no signs of respiratory distress. Patient denies pain at this time. POC discussed and agreed upon with patient. Call light and belongings within reach. Bed in lowest locked position. Upper side rails raised. Bed alarm on. Fall risk precautions in place. Hourly rounding in place. Will continue to monitor.

## 2019-01-02 NOTE — PROGRESS NOTES
IR RN note:    Site Marked and Confirmed with MD, patient and RN pre procedure   Abdominal wall BX non sedation  by MD Andrade assisted by RT/ CT Apolinar,Right mid abdomen access site;   x4 cores in formlain sent to lab    x1 cores in RPMI     Patient tolerated procedure, hemodynamically stable; pt awake and talking post procedure; report given to RASTA betts;   Patient to be transported to room via transport monitored then transferred care to report RN

## 2019-01-02 NOTE — OR SURGEON
Immediate Post OP Note    PROCEDURE:     Informed consent was obtained from the patient's . A timeout was taken. A left pleural effusion was localized with real-time ultrasound guidance. The left posterior chest wall was prepped and draped in a sterile manner. Following local anesthesia with 1% lidocaine, a 5 [place] Yueh pigtail catheter was advanced into the pleural space with trocar technique and 900 cc pleural fluid was drained. No sample was requested for laboratory analysis. The patient tolerated the procedure well without evidence of complication. A post thoracentesis chest radiograph is forthcoming.      1/1/2019 5:14 PM Jhony Donato M.D.

## 2019-01-02 NOTE — RESPIRATORY CARE
COPD EDUCATION by COPD CLINICAL EDUCATOR  1/2/2019 at 6:37 AM by Rehana Oglesby     Patient reviewed by COPD education team. Patient does not qualify for COPD program.

## 2019-01-02 NOTE — PROGRESS NOTES
2305: Paged MD; awaiting call back  2307: Updated MD on recent bladder scan results; new orders given

## 2019-01-02 NOTE — PROGRESS NOTES
Patient back from IR abd wall biopsy - dressing CDI. Monitor room notified. No changes in assessment at this time.

## 2019-01-02 NOTE — PROGRESS NOTES
Bedside report received from day RN; assumed pt care. Pt assessment complete. Pt A&Ox4 Reviewed plan of care with pt. Tele box on and rhythm verified. Chart and labs reviewed. Bed in lowest position, and 2 side rails up. Pt educated on call light; call light with in reach.

## 2019-01-02 NOTE — CARE PLAN
Problem: Infection  Goal: Will remain free from infection    Intervention: Implement standard precautions and perform hand washing before and after patient contact  Patient educated on hand hygiene and importance of cleanliness. Patient verbalizes understanding. RN performs appropriate hand hygiene and maintains aseptic technique when in contact with patient.      Problem: Knowledge Deficit  Goal: Knowledge of disease process/condition, treatment plan, diagnostic tests, and medications will improve    Intervention: Explain information regarding disease process/condition, treatment plan, diagnostic tests, and medications and document in education  Patient and family educated on POC, treatment plan, diagnostics tests, medications, NPO status, SCDs, and encouraged to ask questions regarding care. Patient and patient's  verbalize understanding, and are active participants in care. Reoriented to call light for assistance. Hourly rounding in place.

## 2019-01-02 NOTE — PROGRESS NOTES
[facility] Medicine Daily Progress Note    Date of Service  1/2/2019    Chief Complaint  78 y.o. female admitted 12/31/2018 with SOB.    Interval Problem Update  Awake, sitting up in bed and in no acute distress. Urinated today but no BM.  at the bedside.    Consultants/Specialty  Cardiology    Code Status  Full    Disposition  Pending medical clearance.    Review of Systems  Review of Systems   Constitutional: Positive for malaise/fatigue. Negative for chills and fever.   HENT: Negative for congestion.    Respiratory: Positive for shortness of breath. Negative for cough and wheezing.    Cardiovascular: Negative for chest pain, palpitations and leg swelling.   Gastrointestinal: Positive for abdominal pain, constipation and nausea. Negative for blood in stool and vomiting.   Genitourinary: Negative for dysuria and hematuria.   Musculoskeletal: Negative for falls.   Neurological: Positive for weakness. Negative for dizziness, loss of consciousness and headaches.   Psychiatric/Behavioral: Negative.    All other systems reviewed and are negative.       Physical Exam  Temp:  [36.3 °C (97.4 °F)-37.2 °C (99 °F)] 36.3 °C (97.4 °F)  Pulse:  [] 102  Resp:  [16-22] 20  BP: ()/(64-77) 113/77    Physical Exam   Constitutional: She is oriented to person, place, and time. She appears well-developed. No distress.   HENT:   Head: Normocephalic.   Mouth/Throat: Oropharynx is clear and moist.   Eyes: Pupils are equal, round, and reactive to light. EOM are normal. No scleral icterus.   Neck: Normal range of motion. No tracheal deviation present.   Cardiovascular: Regular rhythm and intact distal pulses.  Tachycardia present.    Pulmonary/Chest: Effort normal. No stridor. No respiratory distress. She has no wheezes.   Diminished breath sounds at the left base; rales bilaterally    Abdominal: Soft. Bowel sounds are normal. She exhibits no distension. There is no tenderness.   Abdominal nodules   Musculoskeletal: She  exhibits no edema or tenderness.   Neurological: She is alert and oriented to person, place, and time.   Skin: Skin is warm and dry. She is not diaphoretic.   Psychiatric: She has a normal mood and affect. Her behavior is normal.   Vitals reviewed.      Fluids    Intake/Output Summary (Last 24 hours) at 01/02/19 0719  Last data filed at 01/01/19 1700   Gross per 24 hour   Intake                0 ml   Output              250 ml   Net             -250 ml       Laboratory  Recent Labs      12/31/18 2040 01/01/19   2323   WBC  16.5*  16.5*   RBC  3.87*  3.50*   HEMOGLOBIN  11.7*  10.6*   HEMATOCRIT  36.3*  32.7*   MCV  93.8  93.4   MCH  30.2  30.3   MCHC  32.2*  32.4*   RDW  54.0*  53.7*   PLATELETCT  462*  318   MPV  11.3  10.6     Recent Labs      12/31/18 2040 01/01/19   2323   SODIUM  134*  135   POTASSIUM  4.3  4.4   CHLORIDE  91*  100   CO2  25  22   GLUCOSE  119*  183*   BUN  43*  51*   CREATININE  1.21  1.24   CALCIUM  8.9  7.8*     Recent Labs      12/31/18 2040   APTT  25.4   INR  1.99*               Imaging  IR-NEEDLE BX-MUSCLE   Final Result      Ultrasound-guided anterior abdominal wall nodule biopsy.      DX-CHEST-2 VIEWS   Final Result      Increasing opacification left lung base consistent with enlarging left pleural effusion.   Left lung base atelectasis/possible consolidation.   Right lung base atelectasis and small right pleural effusion.      US-THORACENTESIS PUNCTURE LEFT   Final Result      1. Ultrasound guided left sided therapeutic thoracentesis.      2. 900 mL of fluid withdrawn.      DX-CHEST-PORTABLE (1 VIEW)   Final Result      1.  Interval improvement of LEFT pleural fluid collection with improvement of associated consolidation.   2.  Mild worsening RIGHT lung base infiltrate or atelectasis.   3.  No pneumothorax.      EC-ECHOCARDIOGRAM LTD W/O CONT   Final Result      CT-ABDOMEN-PELVIS WITH   Final Result      1.  Small-to-moderate bilateral pleural effusions with adjacent  compressive atelectasis.      2.  Large pericardial effusion with pericardial enhancement, possibly malignant. There is resultant narrowing of the visualized pulmonary veins      3.  Multiple intra-abdominal and subcutaneous masses are consistent with metastatic adenopathy or lymphoma. Retroperitoneal mass narrow the common hepatic and superior mesenteric arteries.      4.  Heterogeneous hepatic attenuation is likely multifactorial. There is likely an element of passive congestion as well as narrowing of the main portal vein secondary to hepatic masses.      5.  Gastric wall thickening and small bowel wall thickening, concerning for lymphomatous involvement.      6.  Atherosclerosis.         Comment: Results discussed with Dr. Freire at 11:15 PM      DX-CHEST-PORTABLE (1 VIEW)   Final Result      1.  Large left and small right pleural effusions.      2.  Stable cardiomegaly      US-THORACENTESIS PUNCTURE LEFT    (Results Pending)        Assessment/Plan  * Acute hypoxemic respiratory failure (HCC)- (present on admission)   Assessment & Plan    Presented with SOB, found to have L >> R pleural effusion and pericardial effusion. Above is secondary to this.  - RT to follow  - continue with oxygen supplementation  - symptomatic relief with thoracentesis, will need to repeat tomorrow     Pericardial effusion- (present on admission)   Assessment & Plan    Suspect malignant given the rapid recurrence (did recently have ICU admission requiring intubation and pericardiocentesis in Oct 2018).    - cardiology following, greatly appreciate     Recurrent lymphoma (HCC)- (present on admission)   Assessment & Plan    H/o non-hodgkins lymphoma s/p treatment in remote past (~2000). Now concern for recurrence. Appears to have hepatic, abdominal and pericardial disease.  - s/p IR biopsy of abdominal wall nodule today, 1/2  - f/u pathology, explained that this will take several days  - will consult onc when pathology returns      Pleural effusion- (present on admission)   Assessment & Plan    Recurrent, bilateral pleural effusion. Small on the right, large on the left.  - s/p 900cc removed from left  - repeat CXR today shows re accumulation  - repeat for symptomatic control, will need to discuss possible  pleurex     Essential hypertension- (present on admission)   Assessment & Plan    Normotensive to borderline soft.   - on HCTZ and amlodipine, will discontinue amlodipine  - holding parameters     Acute encephalopathy- (present on admission)   Assessment & Plan    Suspect due to underlying lymphoma.  Has resolved and much more interactive today.     Dyslipidemia- (present on admission)   Assessment & Plan    Continue current regimen.      Hypothyroidism- (present on admission)   Assessment & Plan    On replacement therapy           VTE prophylaxis: SCDs

## 2019-01-03 PROBLEM — R79.89 RENAL AZOTEMIA: Status: ACTIVE | Noted: 2019-01-01

## 2019-01-03 PROBLEM — J98.11 ATELECTASIS: Status: ACTIVE | Noted: 2019-01-01

## 2019-01-03 PROBLEM — R73.9 HYPERGLYCEMIA: Status: ACTIVE | Noted: 2019-01-01

## 2019-01-03 PROBLEM — R79.89 ELEVATED LIVER FUNCTION TESTS: Status: ACTIVE | Noted: 2019-01-01

## 2019-01-03 NOTE — ASSESSMENT & PLAN NOTE
Bilateral presumed secondary to recurrent malignancy  Status post left thoracentesis for 900 cc several days ago  No studies done on this fluid?  Prior workup in October of last year nondiagnostic    Gaols are comfort

## 2019-01-03 NOTE — PROGRESS NOTES
MD Kinsey at bedside assessing pt. Stated pt. Will go to ICU at this time    Ordered 20mg of Lasix to be given

## 2019-01-03 NOTE — ASSESSMENT & PLAN NOTE
This may be primary process causing hypoxemia  CT scan abdomen from a couple days ago strongly suggestive  Aggressive pulmonary toilet  Patient not a good candidate for therapeutic bronchoscopy  Consider IPV therapy and mucolytic therapy  Trial I-S and PEP therapy first  Mobilize as tolerated  Serial chest x-ray  Clinically does not appear to be experiencing healthcare associated pneumonia, closely monitor!

## 2019-01-03 NOTE — PROGRESS NOTES
I was asked by Dr. Orona to see Erin Quigley  She is hospitalized for enlarging L pleural effusion and had thoracentesis 1/1 as well as biopsy of abdominal mass today. She also had pericardial effusion with mass in the heart and is seen by Cardiology; echo showed EF of 50%. She has a history of lymphoma and there is concern for recurrence. She also had L pleural effusion about 2 months ago and had studies done on it showing negative for malignant cells. After that initial thoracentesis it did not appear the pleural effusion recurred rapidly.    The CXR post thoracentesis this admission showed interval improvement. A CXR today at 1100 however showed enlarging left pleural effusion. She is planned for thoracentesis tomorrow.  Rapid was called because she was short of breath and hypoxic on 3LO2.   When I saw her she was somewhat tachypneic. Could be getting fatigued.    O: Afebrile, . Was hypoxic, now 91% on high flow 15LO2.  Dulness to left on auscultation.   at bedside.  A/P:  Acute on chronic respiratory failure with hypoxia.  Code status discussion.   Enlarging L pleural effusion    Ordered CXR, reviewed, showed same L pleural effusion  Reviewed ABG  Ordered Lasix x 1  Considered albumin however only mild hypoalbuminemia, likely will not make a difference at this point. Will consider it if blood pressure however becomes too low for diuretics. Discussed with Dr. Barba.  If the L pleural effusion recurs ra[idly after thoracentesis in the morning, chest tube or catheter drainage can be considered and consultation to Surgery for possible VATs depending on the natural course of this.  Transfer to ICU.   Discussed and reassured patient with . While  was hoping intubation can be avoided (she has been intubated before),  and patient at this time prefers to be FULL CODE.    I personally provided 35 minutes critical care time exclusive of time spent on procedures. Time includes  review of laboratory data, imaging, discussion with consultants and staff and monitoring respiratory status for repiratory failure for potential decompensation

## 2019-01-03 NOTE — PROGRESS NOTES
Hospitalist called at this time    ABG/labs ordered with CXR on the way    Will continue to monitor

## 2019-01-03 NOTE — ASSESSMENT & PLAN NOTE
Presented with SOB, found to have L >> R pleural effusion and pericardial effusion. Above is secondary to this.  - RT to follow  - continue with oxygen supplementation  Start liberal opiatew with any air hunger

## 2019-01-03 NOTE — ASSESSMENT & PLAN NOTE
May be intravascularly dry  Cautious hydration  X-ray does not suggest the need for forced diuresis  Probable tamponade physiology by echocardiogram, again no forced diuresis  Renal function improved on follow-up this a.m., okay to proceed with a CTA to rule out pulmonary embolus and reassess thoracic anatomy in particular pericardial disease process  Avoid nephrotoxins  Serial BMP  Renally dose medications as appropriate    Goals changed to comfort

## 2019-01-03 NOTE — DISCHARGE PLANNING
Care Transition Team Assessment    Information Source  Orientation : Disoriented to Time  Information Given By: Spouse  Informant's Name: Cameron Gomes  Who is responsible for making decisions for patient? : Spouse    Readmission Evaluation  Is this a readmission?: No    Elopement Risk  Legal Hold: No  Ambulatory or Self Mobile in Wheelchair: No-Not an Elopement Risk  Elopement Risk: Not at Risk for Elopement    Interdisciplinary Discharge Planning  Does Admitting Nurse Feel This Could be a Complex Discharge?: Yes  Primary Care Physician: Dr. Jay Slater  Lives with - Patient's Self Care Capacity: Spouse  Patient or legal guardian wants to designate a caregiver (see row info): Yes  Caregiver name: Cameron  Caregiver relationship to patient: Spouse  Support Systems: None  Housing / Facility: [address]  Do You Take your Prescribed Medications Regularly: Yes  Able to Return to Previous ADL's: No  Mobility Issues: Yes  Prior Services: Skilled Home Health Services  Assistance Needed: Yes  Durable Medical Equipment: Home Oxygen, Walker    Discharge Preparedness  What is your plan after discharge?: Uncertain - pending medical team collaboration  What are your discharge supports?: Spouse  Prior Functional Level: Needs Assist with Activities of Daily Living, Needs Assist with Medication Management, Uses Walker  Difficulity with ADLs: Dressing, Toileting, Walking  Difficulity with IADLs: Driving, Keeping track of finances, Managing medication, Shopping, Using the telephone or computer    Functional Assesment  Prior Functional Level: Needs Assist with Activities of Daily Living, Needs Assist with Medication Management, Uses Walker    Finances  Financial Barriers to Discharge: No  Prescription Coverage: Yes    Vision / Hearing Impairment  Vision Impairment : No  Right Eye Vision: Impaired, Wears Glasses  Left Eye Vision: Impaired, Wears Glasses  Hearing Impairment : No    Domestic Abuse  Have you ever been the victim of abuse  or violence?: No    Discharge Risks or Barriers  Discharge risks or barriers?: Complex medical needs  Patient risk factors: Complex medical needs

## 2019-01-03 NOTE — ASSESSMENT & PLAN NOTE
Presumed diagnosis  History of non-Hodgkin's lymphoma treated years ago with chemo therapy  Significant masses and adenopathy suggested on abdominal/pelvic CT and lower chest cuts also abnormal  Probable pericardial involvement  Cannot rule out pleural and lung parenchymal involvement  Status post IR biopsy of abdominal mass on 1/2, pathology pending  Prognosis very poor  Cardiology does not believe she would tolerate cardiac surgery for this, I agree    Goals changed to comfort

## 2019-01-03 NOTE — ASSESSMENT & PLAN NOTE
Recurrent, bilateral pleural effusion. Small on the right, large on the left.  - s/p 900cc removed from left  - repeat CXR today shows re accumulation  Likely malignant

## 2019-01-03 NOTE — CONSULTS
Critical Care Consultation    Date of consult: 1/3/2019    Referring Physician  Octavio Kinsey M.D.    Reason for Consultation  Acute hypoxemic respiratory failure    History of Presenting Illness  78 y.o. female who presented 12/31/2018 with shortness of breath.  Patient has known essential hypertension, distant history of non-Hodgkin's lymphoma and not too distant past she was intubated mechanically ventilated for Rester failure and had pericardiocentesis for significant pericardial effusion without a final diagnosis being made.  On admission patient found to have significant pleural effusion bilaterally and had a left-sided thoracentesis for 900 cc which did give her some relief.  Echocardiogram revealed large effusion and oblong masslike structure adjacent to right ventricle and Dr. Erazo felt like she had a malignancy and was not an operable candidate but patient initially requested full code.  There were cutaneous lesions noted as well as abdominal and retroperitoneal lesions noted by CT, patient had a needle aspiration biopsy yesterday by IR mid day and path is pending.  Patient had been tachycardic and borderline hypotensive at times the last couple of days.  Oxygenation had been acceptable on 5 L nasal cannula.  Patient acutely developed worsening hypoxemia late in the evening 1/2 and was transferred to [facility] for further care by on-call hospitalist.  Non-rebreather mask was weaned to high flow nasal cannula and she clinically improved.  She did have some chest pain that was pleuritic prior to transfer but is since resolved with oral narcotics.  Has had no cardiac ectopy.  She has a nonproductive cough without significant purulent secretions.  She has been afebrile.  Her white count has been elevated for several days.  Repeat thoracentesis was scheduled for this a.m.  Cardiology is evaluating for possible pericardiocentesis again.    When I arrived to see the patient she did have significant increased  work of breathing which apparently was improved per patient,  and nursing staff.  She liked high flow nasal cannula much better than the nonrebreather mask.  We reviewed CODE STATUS at great length with the patient and her  along with nursing staff the patient wants full treatment at this time but does not want CPR or intubation/mechanical ventilation.  She was on the ventilator 6 days did not tolerate it well with her last admission and does not want to do that again.    I spoke with cardiology at length and they agreed with stat echocardiogram and evaluation for worsening pericardial effusion and tampanode and need for urgent/emergent pericardiocentesis.  BMP pending and if renal function better stat CTA will be obtained to evaluate for pulmonary embolus as well as to assess for degree of atelectasis/effusion and better image cardiac structures for mass    Code Status  DNAR/DNI    Review of Systems  Review of Systems   Constitutional: Positive for fatigue and unexpected weight change (Large weight loss more than 25 pounds last several months). Negative for chills, diaphoresis and fever.   HENT: Negative for congestion, nosebleeds and sore throat.    Eyes: Negative for visual disturbance.   Respiratory: Positive for cough and shortness of breath. Negative for wheezing.         Nonproductive, some pleuritic chest pain prior to transfer   Cardiovascular: Positive for chest pain and leg swelling. Negative for palpitations.   Gastrointestinal: Negative for abdominal pain, anal bleeding, blood in stool, nausea and vomiting.   Genitourinary: Negative for dysuria, flank pain, hematuria and urgency.   Musculoskeletal: Negative for back pain.   Skin: Negative for rash and wound.   Neurological: Positive for weakness (Diffuse, nonfocal). Negative for syncope, speech difficulty, numbness and headaches.   Hematological: Does not bruise/bleed easily.   Psychiatric/Behavioral: Negative for agitation, confusion and  dysphoric mood. The patient is not nervous/anxious.        Past Medical History   has a past medical history of Arthritis; Cancer (HCC); Hypertension; and Unspecified disorder of thyroid.    Surgical History   has a past surgical history that includes gyn surgery; other; cervical fusion posterior (10/2/2009); and cervical laminectomy posterior (10/2/2009).    Family History  family history includes Cancer in her mother; No Known Problems in her father.    Social History   reports that she quit smoking about 3 months ago. Her smoking use included Cigarettes. She has never used smokeless tobacco. She reports that she drinks alcohol. She reports that she does not use drugs.    Medications  Home Medications     Reviewed by Sugey Vidal (Pharmacy Tech) on 12/31/18 at 2334  Med List Status: Complete   Medication Last Dose Status   albuterol 108 (90 Base) MCG/ACT Aero Soln inhalation aerosol 12/31/2018 Active   amLODIPine (NORVASC) 5 MG Tab 12/31/2018 Active   gabapentin (NEURONTIN) 300 MG Cap 12/31/2018 Active   hydroCHLOROthiazide (HYDRODIURIL) 25 MG Tab 12/31/2018 Active   levothyroxine (SYNTHROID) 88 MCG Tab 12/31/2018 Active   raNITidine (ZANTAC) 150 MG Tab 12/31/2018 Active   simvastatin (ZOCOR) 20 MG Tab 12/30/2018 Active              Current Facility-Administered Medications   Medication Dose Route Frequency Provider Last Rate Last Dose   • ipratropium-albuterol (DUONEB) nebulizer solution  3 mL Nebulization Q4H PRN (RT) Aristides Barba M.D.       • albuterol inhaler 2 Puff  2 Puff Inhalation 4X/DAY Sukumar Stearns M.D.   2 Puff at 01/03/19 0900   • hydroCHLOROthiazide (HYDRODIURIL) tablet 12.5 mg  12.5 mg Oral DAILY Sukumar Stearns M.D.   Stopped at 01/03/19 0600   • levothyroxine (SYNTHROID) tablet 88 mcg  88 mcg Oral AM ES Sukumar Stearns M.D.   88 mcg at 01/03/19 0601   • simvastatin (ZOCOR) tablet 20 mg  20 mg Oral Q EVENING Sukumar Stearns M.D.   20 mg at 01/02/19 1702   • NS infusion   Intravenous  Continuous Sukumar Stearns M.D.   Stopped at 01/02/19 5442   • ondansetron (ZOFRAN) syringe/vial injection 4 mg  4 mg Intravenous Q4HRS PRN Sukumar Stearns M.D.       • ondansetron (ZOFRAN ODT) dispertab 4 mg  4 mg Oral Q4HRS PRN Sukumar Stearns M.D.       • senna-docusate (PERICOLACE or SENOKOT S) 8.6-50 MG per tablet 2 Tab  2 Tab Oral BID Sukumar Stearns M.D.   2 Tab at 01/02/19 0550    And   • polyethylene glycol/lytes (MIRALAX) PACKET 1 Packet  1 Packet Oral QDAY PRN Sukumar Stearns M.D.        And   • magnesium hydroxide (MILK OF MAGNESIA) suspension 30 mL  30 mL Oral QDAY PRN Sukumar Stearns M.D.        And   • bisacodyl (DULCOLAX) suppository 10 mg  10 mg Rectal QDAY PRN Sukumar Stearns M.D.       • acetaminophen (TYLENOL) tablet 650 mg  650 mg Oral Q6HRS PRN Sukumar Stearns M.D.       • Respiratory Care per Protocol   Nebulization Continuous RT Sukumar Stearns M.D.       • oxyCODONE immediate-release (ROXICODONE) tablet 2.5 mg  2.5 mg Oral Q3HRS PRN Sukumar Stearns M.D.        And   • oxyCODONE immediate-release (ROXICODONE) tablet 5 mg  5 mg Oral Q3HRS PRN Sukumar Stearns M.D.   5 mg at 01/03/19 0339    And   • morphine (pf) 4 mg/ml injection 2 mg  2 mg Intravenous Q3HRS PRN Sukumar Stearns M.D.       • famotidine (PEPCID) tablet 20 mg  20 mg Oral DAILY Sukumar Stearns M.D.   20 mg at 01/03/19 0601   • gabapentin (NEURONTIN) capsule 300 mg  300 mg Oral Q EVENING Rula Teresa M.D.   300 mg at 01/02/19 1702   • gabapentin (NEURONTIN) capsule 100 mg  100 mg Oral DAILY Rula Teresa M.D.   100 mg at 01/03/19 0601       Allergies  No Known Allergies    Vital Signs last 24 hours  Temp:  [36.2 °C (97.2 °F)-37.1 °C (98.8 °F)] 37.1 °C (98.8 °F)  Pulse:  [] 107  Resp:  [12-26] 16  BP: ()/(64-84) 113/81    Physical Exam  Physical Exam   Constitutional: She is oriented to person, place, and time. She appears well-nourished. She appears distressed.   HENT:   Head: Normocephalic and atraumatic.    Mouth/Throat: Oropharynx is clear and moist.   Eyes: Pupils are equal, round, and reactive to light. EOM are normal. No scleral icterus.   Neck: Neck supple. JVD present. No thyromegaly present.   Cardiovascular: Regular rhythm and intact distal pulses.   No extrasystoles are present. Tachycardia present.  Exam reveals distant heart sounds. Exam reveals no gallop and no friction rub.    No murmur heard.  Pulmonary/Chest: Accessory muscle usage present. Tachypnea noted. She is in respiratory distress. She has decreased breath sounds in the right lower field and the left lower field. She has no wheezes. She has rales.   Abdominal: Soft. Bowel sounds are normal. She exhibits distension. There is no tenderness. There is no rigidity, no guarding, no CVA tenderness and negative Bojorquez's sign.   Neurological: She is alert and oriented to person, place, and time. She has normal strength. She displays no tremor. No cranial nerve deficit or sensory deficit. She exhibits normal muscle tone. GCS eye subscore is 4. GCS verbal subscore is 5. GCS motor subscore is 6.   Very appropriate, following well, answering all questions, both nurse and  agree she is oriented x4 and neurologically intact and competent at this time   Skin: She is not diaphoretic. No cyanosis. Nails show no clubbing.   Psychiatric: Judgment and thought content normal. Her mood appears not anxious. Her speech is not delayed. She is not agitated and not slowed. Cognition and memory are normal.   Vitals reviewed.      Fluids    Intake/Output Summary (Last 24 hours) at 01/03/19 0939  Last data filed at 01/03/19 0600   Gross per 24 hour   Intake              120 ml   Output              580 ml   Net             -460 ml       Laboratory  Recent Results (from the past 48 hour(s))   LACTIC ACID    Collection Time: 01/01/19  4:44 PM   Result Value Ref Range    Lactic Acid 3.3 (H) 0.5 - 2.0 mmol/L   TROPONIN    Collection Time: 01/01/19  4:44 PM   Result Value  Ref Range    Troponin I 0.13 (H) 0.00 - 0.04 ng/mL   CBC with Differential    Collection Time: 01/01/19 11:23 PM   Result Value Ref Range    WBC 16.5 (H) 4.8 - 10.8 K/uL    RBC 3.50 (L) 4.20 - 5.40 M/uL    Hemoglobin 10.6 (L) 12.0 - 16.0 g/dL    Hematocrit 32.7 (L) 37.0 - 47.0 %    MCV 93.4 81.4 - 97.8 fL    MCH 30.3 27.0 - 33.0 pg    MCHC 32.4 (L) 33.6 - 35.0 g/dL    RDW 53.7 (H) 35.9 - 50.0 fL    Platelet Count 318 164 - 446 K/uL    MPV 10.6 9.0 - 12.9 fL    Neutrophils-Polys 86.60 (H) 44.00 - 72.00 %    Lymphocytes 4.70 (L) 22.00 - 41.00 %    Monocytes 7.50 0.00 - 13.40 %    Eosinophils 0.00 0.00 - 6.90 %    Basophils 0.10 0.00 - 1.80 %    Immature Granulocytes 1.10 (H) 0.00 - 0.90 %    Nucleated RBC 0.20 /100 WBC    Neutrophils (Absolute) 14.24 (H) 2.00 - 7.15 K/uL    Lymphs (Absolute) 0.77 (L) 1.00 - 4.80 K/uL    Monos (Absolute) 1.24 (H) 0.00 - 0.85 K/uL    Eos (Absolute) 0.00 0.00 - 0.51 K/uL    Baso (Absolute) 0.02 0.00 - 0.12 K/uL    Immature Granulocytes (abs) 0.18 (H) 0.00 - 0.11 K/uL    NRBC (Absolute) 0.03 K/uL   COMP METABOLIC PANEL    Collection Time: 01/01/19 11:23 PM   Result Value Ref Range    Sodium 135 135 - 145 mmol/L    Potassium 4.4 3.6 - 5.5 mmol/L    Chloride 100 96 - 112 mmol/L    Co2 22 20 - 33 mmol/L    Anion Gap 13.0 (H) 0.0 - 11.9    Glucose 183 (H) 65 - 99 mg/dL    Bun 51 (H) 8 - 22 mg/dL    Creatinine 1.24 0.50 - 1.40 mg/dL    Calcium 7.8 (L) 8.5 - 10.5 mg/dL    AST(SGOT) 645 (H) 12 - 45 U/L    ALT(SGPT) 811 (H) 2 - 50 U/L    Alkaline Phosphatase 113 (H) 30 - 99 U/L    Total Bilirubin 0.4 0.1 - 1.5 mg/dL    Albumin 2.8 (L) 3.2 - 4.9 g/dL    Total Protein 5.3 (L) 6.0 - 8.2 g/dL    Globulin 2.5 1.9 - 3.5 g/dL    A-G Ratio 1.1 g/dL   LACTIC ACID    Collection Time: 01/01/19 11:23 PM   Result Value Ref Range    Lactic Acid 1.6 0.5 - 2.0 mmol/L   ESTIMATED GFR    Collection Time: 01/01/19 11:23 PM   Result Value Ref Range    GFR If  51 (A) >60 mL/min/1.73 m 2    GFR If Non   42 (A) >60 mL/min/1.73 m 2   LACTIC ACID    Collection Time: 01/02/19  3:30 AM   Result Value Ref Range    Lactic Acid 1.4 0.5 - 2.0 mmol/L   HISTOLOGY REQUEST    Collection Time: 01/02/19 12:40 PM   Result Value Ref Range    Pathology Request Sent to [facility]    LACTIC ACID    Collection Time: 01/02/19  9:41 PM   Result Value Ref Range    Lactic Acid 2.8 (H) 0.5 - 2.0 mmol/L   TROPONIN    Collection Time: 01/02/19  9:41 PM   Result Value Ref Range    Troponin I 0.07 (H) 0.00 - 0.04 ng/mL   ABG - LAB    Collection Time: 01/02/19 10:13 PM   Result Value Ref Range    Ph 7.38 (L) 7.40 - 7.50    Pco2 40.5 (H) 26.0 - 37.0 mmHg    Po2 46.2 (LL) 64.0 - 87.0 mmHg    O2 Saturation 77.3 (L) 93.0 - 99.0 %    Hco3 24 17 - 25 mmol/L    Base Excess -2 -4 - 3 mmol/L    Body Temp 36.3 Centigrade    O2 Therapy 15.0 (H) 2.0 - 10.0 L/min    Ph -TC 7.39 (L) 7.40 - 7.50    Pco2 -TC 39.3 (H) 26.0 - 37.0 mmHg    Po2 -TC 44.0 (LL) 64.0 - 87.0 mmHg   CBC WITHOUT DIFFERENTIAL    Collection Time: 01/03/19  4:00 AM   Result Value Ref Range    WBC 16.6 (H) 4.8 - 10.8 K/uL    RBC 3.76 (L) 4.20 - 5.40 M/uL    Hemoglobin 11.2 (L) 12.0 - 16.0 g/dL    Hematocrit 37.4 37.0 - 47.0 %    MCV 99.5 (H) 81.4 - 97.8 fL    MCH 29.8 27.0 - 33.0 pg    MCHC 29.9 (L) 33.6 - 35.0 g/dL    RDW 59.7 (H) 35.9 - 50.0 fL    Platelet Count 228 164 - 446 K/uL    MPV 10.5 9.0 - 12.9 fL   COMP METABOLIC PANEL    Collection Time: 01/03/19  4:00 AM   Result Value Ref Range    Sodium 136 135 - 145 mmol/L    Potassium 3.8 3.6 - 5.5 mmol/L    Chloride 101 96 - 112 mmol/L    Co2 24 20 - 33 mmol/L    Anion Gap 11.0 0.0 - 11.9    Glucose 152 (H) 65 - 99 mg/dL    Bun 46 (H) 8 - 22 mg/dL    Creatinine 1.00 0.50 - 1.40 mg/dL    Calcium 8.3 (L) 8.5 - 10.5 mg/dL    AST(SGOT) 223 (H) 12 - 45 U/L    ALT(SGPT) 594 (H) 2 - 50 U/L    Alkaline Phosphatase 142 (H) 30 - 99 U/L    Total Bilirubin 0.5 0.1 - 1.5 mg/dL    Albumin 2.9 (L) 3.2 - 4.9 g/dL    Total Protein 5.4 (L) 6.0 -  8.2 g/dL    Globulin 2.5 1.9 - 3.5 g/dL    A-G Ratio 1.2 g/dL   ESTIMATED GFR    Collection Time: 01/03/19  4:00 AM   Result Value Ref Range    GFR If African American >60 >60 mL/min/1.73 m 2    GFR If Non African American 54 (A) >60 mL/min/1.73 m 2   EC-ECHOCARDIOGRAM COMPLETE W/O CONT    Collection Time: 01/03/19  6:23 AM   Result Value Ref Range    Eject.Frac. MOD BP 60.67     Eject.Frac. MOD 4C 59.1     Eject.Frac. MOD 2C 61.3     Left Ventrical Ejection Fraction 55        Imaging  CT-CTA CHEST PULMONARY ARTERY W/ RECONS   Final Result         1. Segmental and subsegmental pulmonary emboli in the right lower lobe. Equivocal findings for right heart strain.   2. Ill-defined consolidations predominantly involving the left upper and lower lobes, suspicious for pneumonia.   3. Extensive lymphadenopathy extending from the lower neck to the upper abdomen, progressive since October 2018.   4. Loculated pericardial effusion and lymphadenopathy in the atrial ventricular groove, exhibiting mass effect on the heart. This is unchanged since recent abdominal CT.   5. Small-to-moderate bilateral effusions and associated atelectasis, stable since recent abdominal CT.   6. Unchanged gastric wall thickening.      Findings were discussed with Dr. Banegas on 1/3/2019 9:31 AM.      EC-ECHOCARDIOGRAM COMPLETE W/O CONT   Final Result      DX-CHEST-PORTABLE (1 VIEW)   Final Result      Stable chest with left basilar consolidation and/or atelectasis with small pleural fluid      Stable cardiac silhouette enlargement      IR-NEEDLE BX-MUSCLE   Final Result      Ultrasound-guided anterior abdominal wall nodule biopsy.      DX-CHEST-2 VIEWS   Final Result      Increasing opacification left lung base consistent with enlarging left pleural effusion.   Left lung base atelectasis/possible consolidation.   Right lung base atelectasis and small right pleural effusion.      US-THORACENTESIS PUNCTURE LEFT   Final Result      1. Ultrasound guided  left sided therapeutic thoracentesis.      2. 900 mL of fluid withdrawn.      DX-CHEST-PORTABLE (1 VIEW)   Final Result      1.  Interval improvement of LEFT pleural fluid collection with improvement of associated consolidation.   2.  Mild worsening RIGHT lung base infiltrate or atelectasis.   3.  No pneumothorax.      EC-ECHOCARDIOGRAM LTD W/O CONT   Final Result      CT-ABDOMEN-PELVIS WITH   Final Result      1.  Small-to-moderate bilateral pleural effusions with adjacent compressive atelectasis.      2.  Large pericardial effusion with pericardial enhancement, possibly malignant. There is resultant narrowing of the visualized pulmonary veins      3.  Multiple intra-abdominal and subcutaneous masses are consistent with metastatic adenopathy or lymphoma. Retroperitoneal mass narrow the common hepatic and superior mesenteric arteries.      4.  Heterogeneous hepatic attenuation is likely multifactorial. There is likely an element of passive congestion as well as narrowing of the main portal vein secondary to hepatic masses.      5.  Gastric wall thickening and small bowel wall thickening, concerning for lymphomatous involvement.      6.  Atherosclerosis.         Comment: Results discussed with Dr. Freire at 11:15 PM      DX-CHEST-PORTABLE (1 VIEW)   Final Result      1.  Large left and small right pleural effusions.      2.  Stable cardiomegaly      US-THORACENTESIS PUNCTURE LEFT    (Results Pending)   DX-CHEST-LIMITED (1 VIEW)    (Results Pending)       Assessment/Plan  * Acute hypoxemic respiratory failure (HCC)- (present on admission)   Assessment & Plan    RT protocols  Titrate O2  High flow nasal cannula  Not the best BiPAP candidate, consider to help by time for family to come in  Reviewed CODE STATUS, DNR/DNI  Incentive spirometry  Consider IPV  Bibasilar atelectasis significant component of this process  CT does not reveal recurrence of left-sided effusion, right sided thoracentesis may be of some help  and is already scheduled, this might help by time for family to arrive     Atelectasis   Assessment & Plan    This may be primary process causing hypoxemia  CT scan abdomen from a couple days ago strongly suggestive  Aggressive pulmonary toilet  Patient not a good candidate for therapeutic bronchoscopy  Consider IPV therapy and mucolytic therapy  Trial I-S and PEP therapy first  Mobilize as tolerated  Serial chest x-ray  Clinically does not appear to be experiencing healthcare associated pneumonia, closely monitor!     Pericardial effusion- (present on admission)   Assessment & Plan    History of prior pericardiocentesis and nondiagnostic workup  Recurrent effusion by echocardiogram, large, associated tamponade physiology  Suspect this is clinically worsening, stat repeat echocardiogram ordered  Reviewed case with cardiology, they will review echocardiogram and consider option of pericardiocentesis  Option is poor though if this is recurrent malignancy and we cannot treat underlying condition  CTA along with echocardiogram may better define findings seen on echocardiogram initially a few days ago     Recurrent lymphoma (HCC)- (present on admission)   Assessment & Plan    Presumed diagnosis  History of non-Hodgkin's lymphoma treated years ago with chemo therapy  Significant masses and adenopathy suggested on abdominal/pelvic CT and lower chest cuts also abnormal  Probable pericardial involvement  Cannot rule out pleural and lung parenchymal involvement  Status post IR biopsy of abdominal mass on 1/2, pathology pending  Prognosis very poor  Cardiology does not believe she would tolerate cardiac surgery for this, I agree     Pleural effusion- (present on admission)   Assessment & Plan    Bilateral presumed secondary to recurrent malignancy  Status post left thoracentesis for 900 cc several days ago  No studies done on this fluid?  Prior workup in October of last year nondiagnostic     Elevated liver function tests    Assessment & Plan    Presumed secondary to hepatic congestion and possible hepatic metastases  Improved transaminases  OT greater than PT pattern  Cannot rule out medication related process, doubt EtOH  Serial CMP  Avoid hepatotoxins  Monitor medication dosages that are hepatically cleared     Renal azotemia   Assessment & Plan    May be intravascularly dry  Cautious hydration  X-ray does not suggest the need for forced diuresis  Probable tamponade physiology by echocardiogram, again no forced diuresis  Renal function improved on follow-up this a.m., okay to proceed with a CTA to rule out pulmonary embolus and reassess thoracic anatomy in particular pericardial disease process  Avoid nephrotoxins  Serial BMP  Renally dose medications as appropriate     Hyperglycemia   Assessment & Plan    Secondary to above  Monitor blood sugar, may need to check fingersticks  Monitor for need for SSI     Essential hypertension- (present on admission)   Assessment & Plan    Continue home regimen for blood pressure as clinically appropriate     Dyslipidemia- (present on admission)   Assessment & Plan    Resume home regimen as clinically appropriate     Hypothyroidism- (present on admission)   Assessment & Plan    Resume home regimen as clinically appropriate         Discussed patient condition and risk of morbidity and/or mortality with Hospitalist, Family, RN, RT, Patient and cardiology.    The patient remains critically ill.  Critical care time = 55 minutes in directly providing and coordinating critical care and extensive data review.  No time overlap and excludes procedures.

## 2019-01-03 NOTE — DISCHARGE PLANNING
Care Transition Team Discharge Planning    1/3/19  Met with patient and  in room to discuss hospice care.  states at this point patient is as comfortable as can be. Patient is currently under comfort care.  states that the patients children are coming in from out of town today to say good bye to patient. At this point,  is declining [facility] as patient is comfortable. Patient's  states he expects death to be imminent and wishes to leave things as they are. Provided support for patient and will continue to remain supportive.

## 2019-01-03 NOTE — ASSESSMENT & PLAN NOTE
Presumed secondary to hepatic congestion and possible hepatic metastases  Improved transaminases  OT greater than PT pattern  Cannot rule out medication related process, doubt EtOH  Serial CMP  Avoid hepatotoxins  Monitor medication dosages that are hepatically cleared    Comfort measures only

## 2019-01-03 NOTE — ASSESSMENT & PLAN NOTE
RT protocols  Titrate O2  High flow nasal cannula  Not the best BiPAP candidate, consider to help by time for family to come in  Reviewed CODE STATUS, DNR/DNI  Incentive spirometry  Consider IPV  Bibasilar atelectasis significant component of this process  CT does not reveal recurrence of left-sided effusion, right sided thoracentesis may be of some help and is already scheduled, this might help by time for family to arrive

## 2019-01-03 NOTE — CARE PLAN
Problem: Venous Thromboembolism (VTW)/Deep Vein Thrombosis (DVT) Prevention:  Goal: Patient will participate in Venous Thrombosis (VTE)/Deep Vein Thrombosis (DVT)Prevention Measures  SCD's on.    Problem: Pain Management  Goal: Pain level will decrease to patient's comfort goal  Pt c/o upper abd pain this morning.     Problem: Respiratory:  Goal: Respiratory status will improve  Pt tolerating hi flow nasal cannula well, but is continuing to work at breathing.    Problem: Urinary Elimination:  Goal: Ability to reestablish a normal urinary elimination pattern will improve  Pt with good urine output after Lasix.    Problem: Psychosocial Needs:  Goal: Level of anxiety will decrease  Pt anxious most of night. Relieved by 's prescense.

## 2019-01-03 NOTE — ASSESSMENT & PLAN NOTE
History of prior pericardiocentesis and nondiagnostic workup  Recurrent effusion by echocardiogram, large, associated tamponade physiology  Suspect this is clinically worsening, stat repeat echocardiogram ordered  Reviewed case with cardiology, they will review echocardiogram and consider option of pericardiocentesis  Option is poor though if this is recurrent malignancy and we cannot treat underlying condition  CTA along with echocardiogram may better define findings seen on echocardiogram initially a few days ago    Small clots- treatment very unlikely to make a difference

## 2019-01-03 NOTE — PROGRESS NOTES
Hospital Medicine Daily Progress Note    Date of Service  1/3/2019    Chief Complaint  78 y.o. female admitted 12/31/2018 with SOB    Hospital Course    Hx of Lymhoma distantly, HTN, Hyporhyroidism.  Presented with abd pain and exertional dyspnea.  Initial imaging showing recurrence of her lymphoma with lesions in liver and possibly pericardial seeding as well       Interval Problem Update  ROS is limited by mentation and resp status    Dw pt's  at bedside x 35mins  DW Pulmonology and Cardiology    Consultants/Specialty  Pulmonology    Code Status  cardiology    Disposition  Transition to Hospice    Review of Systems  Review of Systems   Unable to perform ROS: Other        Physical Exam  Temp:  [36.2 °C (97.2 °F)-37.1 °C (98.8 °F)] 37.1 °C (98.8 °F)  Pulse:  [] 102  Resp:  [12-26] 21  BP: ()/(62-84) 113/81    Physical Exam   Constitutional: She appears well-developed and well-nourished. No distress.   HENT:   Head: Normocephalic and atraumatic.   Neck: No JVD present.   Cardiovascular: Normal rate and regular rhythm.    Pulmonary/Chest: No stridor. She is in respiratory distress. She has no wheezes. She has rales.   Abdominal: Soft. She exhibits mass. There is no tenderness. There is no rebound and no guarding.   Musculoskeletal: She exhibits no edema.   Neurological: She is alert.   Skin: Skin is warm and dry. No rash noted. She is not diaphoretic. There is pallor.   Psychiatric: She has a normal mood and affect. Thought content normal.   Nursing note and vitals reviewed.      Fluids    Intake/Output Summary (Last 24 hours) at 01/03/19 0539  Last data filed at 01/03/19 0400   Gross per 24 hour   Intake              120 ml   Output              535 ml   Net             -415 ml       Laboratory  Recent Labs      12/31/18   2040  01/01/19   2323  01/03/19   0400   WBC  16.5*  16.5*  16.6*   RBC  3.87*  3.50*  3.76*   HEMOGLOBIN  11.7*  10.6*  11.2*   HEMATOCRIT  36.3*  32.7*  37.4   MCV  93.8  93.4   99.5*   MCH  30.2  30.3  29.8   MCHC  32.2*  32.4*  29.9*   RDW  54.0*  53.7*  59.7*   PLATELETCT  462*  318  228   MPV  11.3  10.6  10.5     Recent Labs      12/31/18 2040 01/01/19   2323   SODIUM  134*  135   POTASSIUM  4.3  4.4   CHLORIDE  91*  100   CO2  25  22   GLUCOSE  119*  183*   BUN  43*  51*   CREATININE  1.21  1.24   CALCIUM  8.9  7.8*     Recent Labs      12/31/18 2040   APTT  25.4   INR  1.99*               Imaging  US-CHEST   Final Result      Small left pleural effusion. Case was discussed with Dr. Banegas on 1/3/2019 at approximately 10:00 AM. No thoracentesis is indicated.      DX-CHEST-LIMITED (1 VIEW)   Final Result      Interval worsening in left perihilar opacification is consistent with worsening pneumonia. Bibasilar opacities and small pleural effusions are unchanged.      CT-CTA CHEST PULMONARY ARTERY W/ RECONS   Final Result         1. Segmental and subsegmental pulmonary emboli in the right lower lobe. Equivocal findings for right heart strain.   2. Ill-defined consolidations predominantly involving the left upper and lower lobes, suspicious for pneumonia.   3. Extensive lymphadenopathy extending from the lower neck to the upper abdomen, progressive since October 2018.   4. Loculated pericardial effusion and lymphadenopathy in the atrial ventricular groove, exhibiting mass effect on the heart. This is unchanged since recent abdominal CT.   5. Small-to-moderate bilateral effusions and associated atelectasis, stable since recent abdominal CT.   6. Unchanged gastric wall thickening.      Findings were discussed with Dr. Banegas on 1/3/2019 9:31 AM.      EC-ECHOCARDIOGRAM COMPLETE W/O CONT   Final Result      DX-CHEST-PORTABLE (1 VIEW)   Final Result      Stable chest with left basilar consolidation and/or atelectasis with small pleural fluid      Stable cardiac silhouette enlargement      IR-NEEDLE BX-MUSCLE   Final Result      Ultrasound-guided anterior abdominal wall nodule biopsy.       DX-CHEST-2 VIEWS   Final Result      Increasing opacification left lung base consistent with enlarging left pleural effusion.   Left lung base atelectasis/possible consolidation.   Right lung base atelectasis and small right pleural effusion.      US-THORACENTESIS PUNCTURE LEFT   Final Result      1. Ultrasound guided left sided therapeutic thoracentesis.      2. 900 mL of fluid withdrawn.      DX-CHEST-PORTABLE (1 VIEW)   Final Result      1.  Interval improvement of LEFT pleural fluid collection with improvement of associated consolidation.   2.  Mild worsening RIGHT lung base infiltrate or atelectasis.   3.  No pneumothorax.      EC-ECHOCARDIOGRAM LTD W/O CONT   Final Result      CT-ABDOMEN-PELVIS WITH   Final Result      1.  Small-to-moderate bilateral pleural effusions with adjacent compressive atelectasis.      2.  Large pericardial effusion with pericardial enhancement, possibly malignant. There is resultant narrowing of the visualized pulmonary veins      3.  Multiple intra-abdominal and subcutaneous masses are consistent with metastatic adenopathy or lymphoma. Retroperitoneal mass narrow the common hepatic and superior mesenteric arteries.      4.  Heterogeneous hepatic attenuation is likely multifactorial. There is likely an element of passive congestion as well as narrowing of the main portal vein secondary to hepatic masses.      5.  Gastric wall thickening and small bowel wall thickening, concerning for lymphomatous involvement.      6.  Atherosclerosis.         Comment: Results discussed with Dr. Freire at 11:15 PM      DX-CHEST-PORTABLE (1 VIEW)   Final Result      1.  Large left and small right pleural effusions.      2.  Stable cardiomegaly           Assessment/Plan  * Acute hypoxemic respiratory failure (HCC)- (present on admission)   Assessment & Plan    Presented with SOB, found to have L >> R pleural effusion and pericardial effusion. Above is secondary to this.  - RT to follow  -  continue with oxygen supplementation  Start liberal opiatew with any air hunger     Pericardial effusion- (present on admission)   Assessment & Plan    Suspect malignant given the rapid recurrence (did recently have ICU admission requiring intubation and pericardiocentesis in Oct 2018).    - cardiology following, greatly appreciate  Echo shows infilatrative process; percardialcintesis unlikely to make any difference     Recurrent lymphoma (HCC)- (present on admission)   Assessment & Plan    H/o non-hodgkins lymphoma s/p treatment in remote past (~2000). Now concern for recurrence. Appears to have hepatic, abdominal and pericardial disease.  - s/p IR biopsy of abdominal wall nodule today, 1/2  - f/u pathology, explained that this will take several days       Pleural effusion- (present on admission)   Assessment & Plan    Recurrent, bilateral pleural effusion. Small on the right, large on the left.  - s/p 900cc removed from left  - repeat CXR today shows re accumulation  Likely malignant     Essential hypertension- (present on admission)   Assessment & Plan    Normotensive to borderline soft.   - on HCTZ and amlodipine, will discontinue amlodipine  - holding parameters     Acute encephalopathy   Assessment & Plan    Suspect due to underlying lymphoma.  Has resolved and much more interactive today.     Dyslipidemia- (present on admission)   Assessment & Plan    Continue current regimen.      Hypothyroidism- (present on admission)   Assessment & Plan    On replacement therapy           VTE prophylaxis: none

## 2019-01-03 NOTE — PROGRESS NOTES
Pt. Having respiratory distress at this time    Albuterol given at this time    Pt. Placed on non-rebreather at this time @ 15L

## 2019-01-03 NOTE — PROGRESS NOTES
Cardiology Follow Up Progress Note    Date of Service  1/3/2019    Attending Physician  Octavio Kinsey M.D.    Chief Complaint   Breathlessness abnormal echo with clear dense metastasis to the pericardium    HPI  Erin Quigley is a 78 y.o. female admitted 12/31/2018 with a history of non-Hodgkin's lymphoma back in 2000 who was admitted in September from [facility] for breathlessness edema and large pericardial effusion with high suspicion for recurrent cancer, patient at that point deferred biopsy.  She was intubated for respiratory failure and had a very sara course.  She was here for a month.    Of note she had suicidal ideation was placed on a legal hold and had psychiatric involved.  Her power of  made her decisions for her.    We are called back on case on New Year's Erbecca to evaluate her pericardial effusion.  My partner did so, not only did she have a significant pericardial effusion but there was a dense infiltrative disease across the entire pericardium, measuring up to 2 cm in thickness.    Further questions about the implications of this arose last night.  She is now in the intensive care unit on high flow oxygen.  Had a tap of her pleural effusions which were also large    Interim Events  Pleural tap in the intensive care unit on high flow oxygen  Had a biopsy of her abdominal wall in interventional radiology    Review of Systems  Review of Systems   Unable to perform ROS: Severe respiratory distress       Vital signs in last 24 hours  Temp:  [36.2 °C (97.2 °F)-37.1 °C (98.8 °F)] 37.1 °C (98.8 °F)  Pulse:  [] 107  Resp:  [12-26] 16  BP: ()/(64-84) 113/81    Physical Exam  Physical Exam   Constitutional:   On high flow o2, mod respiratory distressed, cannot perform full sentences   HENT:   Head: Normocephalic and atraumatic.   Eyes: Pupils are equal, round, and reactive to light. EOM are normal. No scleral icterus.   Neck: No thyromegaly present.   Scattered cutaneous  nodules noted across the sternum and neck   Cardiovascular: Exam reveals no gallop and no friction rub.    No murmur heard.  Sinus tachycardia   Pulmonary/Chest:   Diffuse bilateral rhonchi   Abdominal: Soft. She exhibits no mass. There is no tenderness.   Lymphadenopathy:     She has no cervical adenopathy.   Neurological: She exhibits normal muscle tone. Coordination normal.   No tremor or nystagmus   Skin: Skin is warm and dry.       Lab Review  Lab Results   Component Value Date/Time    WBC 16.6 (H) 01/03/2019 04:00 AM    RBC 3.76 (L) 01/03/2019 04:00 AM    HEMOGLOBIN 11.2 (L) 01/03/2019 04:00 AM    HEMATOCRIT 37.4 01/03/2019 04:00 AM    MCV 99.5 (H) 01/03/2019 04:00 AM    MCH 29.8 01/03/2019 04:00 AM    MCHC 29.9 (L) 01/03/2019 04:00 AM    MPV 10.5 01/03/2019 04:00 AM      Lab Results   Component Value Date/Time    SODIUM 136 01/03/2019 04:00 AM    POTASSIUM 3.8 01/03/2019 04:00 AM    CHLORIDE 101 01/03/2019 04:00 AM    CO2 24 01/03/2019 04:00 AM    GLUCOSE 152 (H) 01/03/2019 04:00 AM    BUN 46 (H) 01/03/2019 04:00 AM    CREATININE 1.00 01/03/2019 04:00 AM      Lab Results   Component Value Date/Time    ASTSGOT 223 (H) 01/03/2019 04:00 AM    ALTSGPT 594 (H) 01/03/2019 04:00 AM     Lab Results   Component Value Date/Time    CHOLSTRLTOT 126 10/01/2018 03:04 AM    LDL 69 10/01/2018 03:04 AM    HDL 43 10/01/2018 03:04 AM    TRIGLYCERIDE 80 10/04/2018 03:43 AM    TROPONINI 0.07 (H) 01/02/2019 09:41 PM             Cardiac Imaging and Procedures Review  EKG:  My personal interpretation of the EKG dated New Year's Rebecca is sinus tachycardia  Echocardiogram: Dense pericardial masses, 1 over the free wall of the right ventricle measuring more than 3 cm in thickness.  Doppler pattern strongly suggests constriction or infiltrative disease.  Effusion measuring approximately 1 cm in end diastole    Imaging  Chest X-Ray: Cardiomegaly, no infiltrates    Assessment/Plan    Probable widely metastatic recurrent cancer  Large  pleural effusion  Dense seeding of the pericardium, suspect metastatic disease.  Effusion as well noted  Psychiatric disease/delirium      -As discussed by my partner, palliation and comfort are in order  -Cardiology has nothing to offer - this disease would not be remedied or improved by drainage of the pericardial effusion.  Surgical thorascopy may also achieve this, would also be able to get pericardial tissue.  I do not think a diagnosis is sought for at this point.  -Prognosis is dismal    Discussed with the primary hospitalist;  I will find her  and discussed with him as well  Thank you for allowing me to participate in the care of this patient.  Please contact me with any questions.    Nina Goodrich M.D.   Cardiologist, [facility]  [phone]

## 2019-01-04 PROBLEM — Z51.5 HOSPICE CARE: Status: ACTIVE | Noted: 2019-01-01

## 2019-01-04 NOTE — ASSESSMENT & PLAN NOTE
Long dw pt's family at bedside x 55mins over 4 visits  Using oral concentrate and IV opiates as well as benzodiazepines  Cont O2 as long as it improves comfort

## 2019-01-04 NOTE — PROGRESS NOTES
RN in to assess patient. Pt noted to be labored breathing, tachycardic, and moaning. RN offered pain medication for comfort with education of comfort care and patient condition. Daughters declining any medication intervention at this time and requesting to see MD regarding patient pain and comfort regimen. Dr Godwin in to see patient and family

## 2019-01-04 NOTE — PROGRESS NOTES
Hospital Medicine Daily Progress Note    Date of Service  1/4/2019    Chief Complaint  78 y.o. female admitted 12/31/2018 with SOB    Hospital Course    Hx of Lymhoma distantly, HTN, Hyporhyroidism.  Presented with abd pain and exertional dyspnea.  Initial imaging showing recurrence of her lymphoma with lesions in liver and possibly pericardial seeding as well       Interval Problem Update  ROS is limited by mentation   Pt is resting comfortably    Dw pt's  and several other family members x 45 min's at the bedside  DW Pulmonology     Consultants/Specialty  Pulmonology    Code Status  Comfort care    Disposition  Hospice care: work with family to get her home if possible    Review of Systems  Review of Systems   Unable to perform ROS: Other        Physical Exam  Temp:  [37.1 °C (98.8 °F)] 37.1 °C (98.8 °F)  Pulse:  [] 111  Resp:  [7-25] 14    Physical Exam   Constitutional: She appears well-developed. No distress.   HENT:   Head: Normocephalic and atraumatic.   Neck: No JVD present.   Cardiovascular: Normal rate and regular rhythm.    Pulmonary/Chest: No stridor. She is in respiratory distress. She has no wheezes. She has rales.   Abdominal: Soft. She exhibits mass. There is no tenderness. There is no rebound and no guarding.   Musculoskeletal: She exhibits no edema.   Neurological:   Somnolent, rouses with dificulty   Skin: Skin is warm and dry. No rash noted. She is not diaphoretic. There is pallor.   Nursing note and vitals reviewed.      Fluids    Intake/Output Summary (Last 24 hours) at 01/04/19 0325  Last data filed at 01/04/19 0200   Gross per 24 hour   Intake              432 ml   Output              310 ml   Net              122 ml       Laboratory  Recent Labs      01/01/19   2323  01/03/19   0400   WBC  16.5*  16.6*   RBC  3.50*  3.76*   HEMOGLOBIN  10.6*  11.2*   HEMATOCRIT  32.7*  37.4   MCV  93.4  99.5*   MCH  30.3  29.8   MCHC  32.4*  29.9*   RDW  53.7*  59.7*   PLATELETCT  318  228   MPV   10.6  10.5     Recent Labs      01/01/19   2323  01/03/19   0400   SODIUM  135  136   POTASSIUM  4.4  3.8   CHLORIDE  100  101   CO2  22  24   GLUCOSE  183*  152*   BUN  51*  46*   CREATININE  1.24  1.00   CALCIUM  7.8*  8.3*                   Imaging  US-CHEST   Final Result      Small left pleural effusion. Case was discussed with Dr. Banegas on 1/3/2019 at approximately 10:00 AM. No thoracentesis is indicated.      DX-CHEST-LIMITED (1 VIEW)   Final Result      Interval worsening in left perihilar opacification is consistent with worsening pneumonia. Bibasilar opacities and small pleural effusions are unchanged.      CT-CTA CHEST PULMONARY ARTERY W/ RECONS   Final Result         1. Segmental and subsegmental pulmonary emboli in the right lower lobe. Equivocal findings for right heart strain.   2. Ill-defined consolidations predominantly involving the left upper and lower lobes, suspicious for pneumonia.   3. Extensive lymphadenopathy extending from the lower neck to the upper abdomen, progressive since October 2018.   4. Loculated pericardial effusion and lymphadenopathy in the atrial ventricular groove, exhibiting mass effect on the heart. This is unchanged since recent abdominal CT.   5. Small-to-moderate bilateral effusions and associated atelectasis, stable since recent abdominal CT.   6. Unchanged gastric wall thickening.      Findings were discussed with Dr. Banegas on 1/3/2019 9:31 AM.      EC-ECHOCARDIOGRAM COMPLETE W/O CONT   Final Result      DX-CHEST-PORTABLE (1 VIEW)   Final Result      Stable chest with left basilar consolidation and/or atelectasis with small pleural fluid      Stable cardiac silhouette enlargement      IR-NEEDLE BX-MUSCLE   Final Result      Ultrasound-guided anterior abdominal wall nodule biopsy.      DX-CHEST-2 VIEWS   Final Result      Increasing opacification left lung base consistent with enlarging left pleural effusion.   Left lung base atelectasis/possible consolidation.   Right  lung base atelectasis and small right pleural effusion.      US-THORACENTESIS PUNCTURE LEFT   Final Result      1. Ultrasound guided left sided therapeutic thoracentesis.      2. 900 mL of fluid withdrawn.      DX-CHEST-PORTABLE (1 VIEW)   Final Result      1.  Interval improvement of LEFT pleural fluid collection with improvement of associated consolidation.   2.  Mild worsening RIGHT lung base infiltrate or atelectasis.   3.  No pneumothorax.      EC-ECHOCARDIOGRAM LTD W/O CONT   Final Result      CT-ABDOMEN-PELVIS WITH   Final Result      1.  Small-to-moderate bilateral pleural effusions with adjacent compressive atelectasis.      2.  Large pericardial effusion with pericardial enhancement, possibly malignant. There is resultant narrowing of the visualized pulmonary veins      3.  Multiple intra-abdominal and subcutaneous masses are consistent with metastatic adenopathy or lymphoma. Retroperitoneal mass narrow the common hepatic and superior mesenteric arteries.      4.  Heterogeneous hepatic attenuation is likely multifactorial. There is likely an element of passive congestion as well as narrowing of the main portal vein secondary to hepatic masses.      5.  Gastric wall thickening and small bowel wall thickening, concerning for lymphomatous involvement.      6.  Atherosclerosis.         Comment: Results discussed with Dr. Freire at 11:15 PM      DX-CHEST-PORTABLE (1 VIEW)   Final Result      1.  Large left and small right pleural effusions.      2.  Stable cardiomegaly           Assessment/Plan  * Acute hypoxemic respiratory failure (HCC)- (present on admission)   Assessment & Plan    Presented with SOB, found to have L >> R pleural effusion and pericardial effusion. Above is secondary to this.  - RT to follow  - continue with oxygen supplementation  Start liberal opiatew with any air hunger     Pericardial effusion- (present on admission)   Assessment & Plan    Suspect malignant given the rapid recurrence  (did recently have ICU admission requiring intubation and pericardiocentesis in Oct 2018).    - cardiology following, greatly appreciate  Echo shows infilatrative process; percardialcintesis unlikely to make any difference     Recurrent lymphoma (HCC)- (present on admission)   Assessment & Plan    H/o non-hodgkins lymphoma s/p treatment in remote past (~2000). Now concern for recurrence. Appears to have hepatic, abdominal and pericardial disease.  - s/p IR biopsy of abdominal wall nodule today, 1/2  - f/u pathology, explained that this will take several days       Pleural effusion- (present on admission)   Assessment & Plan    Recurrent, bilateral pleural effusion. Small on the right, large on the left.  - s/p 900cc removed from left  - repeat CXR today shows re accumulation  Likely malignant     Essential hypertension- (present on admission)   Assessment & Plan    Normotensive to borderline soft.   - on HCTZ and amlodipine, will discontinue amlodipine  - holding parameters     Hospice care   Assessment & Plan    Long dw pt's family at bedside x 55mins over 4 visits  Using oral concentrate and IV opiates as well as benzodiazepines  Cont O2 as long as it improves comfort     Acute encephalopathy   Assessment & Plan    Suspect due to underlying lymphoma.  Has resolved and much more interactive today.     Dyslipidemia- (present on admission)   Assessment & Plan    Continue current regimen.      Hypothyroidism- (present on admission)   Assessment & Plan    On replacement therapy           VTE prophylaxis: none

## 2019-01-04 NOTE — CARE PLAN
Problem: Psychosocial needs  Goal: Anxiety reduction    Intervention: Stimuli reduction, calming techniques  Lights down. Door shut. Family in room for support.      Problem: Pain  Goal: Alleviation of Pain or a reduction in pain to the patient's comfort goal  Patient continues to decline pain medication.

## 2019-01-04 NOTE — RESPIRATORY CARE
Pt family with concerns on oxygen dropping on monitor, per RN.  Pt was on HHFO all day shift when status was changed to comfort care.    RN states family does not fully understand meaning of comfort care. Maintaining therapy as continued by prior shift, at this time.

## 2019-01-04 NOTE — PROGRESS NOTES
RN approached by patient spouse to decrease oxygen delivery. Pt taken off high flow nasal cannula and placed on 15L mask per family request. Monitor screen placed in sleep mode and family educated purpose is to allow them to be with patient at this time and not be interrupted by monitoring systems.  Family to call if patient appears distressed at all. Family acknowledged understanding.

## 2019-01-04 NOTE — PROGRESS NOTES
Critical Care Progress Note    Date of admission  12/31/2018    Chief Complaint  78 y.o. female admitted 12/31/2018 with SOB    Hospital Course  SOB, failure to thrive  Interval Problem Update  Reviewed last 24 hour events:  CT chest- small Pes  Echo- tumor surrounding heart/pericardium  No available procedures to effective treat pt  Goals of care changed to comfort measures only with hospice consult    Review of Systems  ROS     Vital Signs for last 24 hours   Temp:  [36.8 °C (98.3 °F)-37.1 °C (98.8 °F)] 37.1 °C (98.8 °F)  Pulse:  [] 39  Resp:  [7-26] 21  BP: ()/(64-84) 113/81    Hemodynamic parameters for last 24 hours       Respiratory       Physical Exam   Physical Exam   Constitutional:   Chronically ill, wasted appearing, exhausted   HENT:   Head: Normocephalic and atraumatic.   Eyes: Conjunctivae are normal.   Neck: Neck supple.   Cardiovascular: Normal rate and regular rhythm.    Pulmonary/Chest: Breath sounds normal.   Mildly labored respirations   Abdominal: Soft. Bowel sounds are normal.   Musculoskeletal: Normal range of motion.   Skin: Skin is warm and dry.       Medications  Current Facility-Administered Medications   Medication Dose Route Frequency Provider Last Rate Last Dose   • MD ALERT...adult comfort care   Other PRN Constantin Banegas M.D.       • atropine 1 % ophthalmic solution 2 Drop  2 Drop Sublingual Q4HRS PRN Constantin Banegas M.D.       • morphine (ROXANOL) 20 MG/ML oral conc 10 mg  10 mg Oral Q HOUR PRN Cosntantin Banegas M.D.        Or   • morphine (pf) 10 mg/mL injection 5 mg  5 mg Intravenous Q HOUR PRN Constantin Banegas M.D.        Or   • morphine (pf) 10 mg/mL injection 10 mg  10 mg Intravenous Q HOUR PRN Constantin Banegas M.D.       • ondansetron (ZOFRAN ODT) dispertab 8 mg  8 mg Oral Q8HRS PRN Constantin Banegas M.D.        Or   • ondansetron (ZOFRAN) syringe/vial injection 8 mg  8 mg Intravenous Q8HRS PRN Constantin Banegas M.D.       • LORazepam (ATIVAN) 2 MG/ML oral  conc 1 mg  1 mg Sublingual Q HOUR PRN Constantin Banegas M.D.        Or   • LORazepam (ATIVAN) injection 1 mg  1 mg Intravenous Q HOUR PRN Constantin Banegas M.D.           Fluids    Intake/Output Summary (Last 24 hours) at 01/03/19 1904  Last data filed at 01/03/19 1200   Gross per 24 hour   Intake              452 ml   Output              650 ml   Net             -198 ml       Laboratory  Recent Labs      01/02/19   2213   LJEBX48V  7.38*   [id number]  40.5*   [id number]  46.2*   [id number]  77.3*   ARTHCO3  24   B1CHDQTXX  15.0*   ARTBE  -2     Recent Labs      12/31/18 2040 01/01/19   1644  01/02/19   2141   TROPONINI  0.17*  0.13*  0.07*     Recent Labs      12/31/18 2040 01/01/19 2323 01/03/19   0400   SODIUM  134*  135  136   POTASSIUM  4.3  4.4  3.8   CHLORIDE  91*  100  101   CO2  25  22  24   BUN  43*  51*  46*   CREATININE  1.21  1.24  1.00   CALCIUM  8.9  7.8*  8.3*     Recent Labs      12/31/18 2040 01/01/19 2323 01/03/19   0400   ALTSGPT  982*  811*  594*   ASTSGOT  1420*  645*  223*   ALKPHOSPHAT  157*  113*  142*   TBILIRUBIN  0.9  0.4  0.5   GLUCOSE  119*  183*  152*     Recent Labs      12/31/18 2040 01/01/19 2323 01/03/19   0400   WBC  16.5*  16.5*  16.6*   NEUTSPOLYS  80.20*  86.60*   --    LYMPHOCYTES  6.90*  4.70*   --    MONOCYTES  10.60  7.50   --    EOSINOPHILS  0.10  0.00   --    BASOPHILS  0.30  0.10   --    ASTSGOT  1420*  645*  223*   ALTSGPT  982*  811*  594*   ALKPHOSPHAT  157*  113*  142*   TBILIRUBIN  0.9  0.4  0.5     Recent Labs      12/31/18 2040 01/01/19 2323 01/03/19   0400   RBC  3.87*  3.50*  3.76*   HEMOGLOBIN  11.7*  10.6*  11.2*   HEMATOCRIT  36.3*  32.7*  37.4   PLATELETCT  462*  318  228   PROTHROMBTM  22.7*   --    --    APTT  25.4   --    --    INR  1.99*   --    --        Imaging  X-Ray:  I have personally reviewed the images and compared with prior images.    Assessment/Plan  * Acute hypoxemic respiratory failure (HCC)- (present on admission)    Assessment & Plan    RT protocols  Titrate O2  High flow nasal cannula  Not the best BiPAP candidate, consider to help by time for family to come in  Reviewed CODE STATUS, DNR/DNI  Incentive spirometry  Consider IPV  Bibasilar atelectasis significant component of this process  CT does not reveal recurrence of left-sided effusion, right sided thoracentesis may be of some help and is already scheduled, this might help by time for family to arrive     Atelectasis   Assessment & Plan    This may be primary process causing hypoxemia  CT scan abdomen from a couple days ago strongly suggestive  Aggressive pulmonary toilet  Patient not a good candidate for therapeutic bronchoscopy  Consider IPV therapy and mucolytic therapy  Trial I-S and PEP therapy first  Mobilize as tolerated  Serial chest x-ray  Clinically does not appear to be experiencing healthcare associated pneumonia, closely monitor!     Pericardial effusion- (present on admission)   Assessment & Plan    History of prior pericardiocentesis and nondiagnostic workup  Recurrent effusion by echocardiogram, large, associated tamponade physiology  Suspect this is clinically worsening, stat repeat echocardiogram ordered  Reviewed case with cardiology, they will review echocardiogram and consider option of pericardiocentesis  Option is poor though if this is recurrent malignancy and we cannot treat underlying condition  CTA along with echocardiogram may better define findings seen on echocardiogram initially a few days ago    Small clots- treatment very unlikely to make a difference     Recurrent lymphoma (HCC)- (present on admission)   Assessment & Plan    Presumed diagnosis  History of non-Hodgkin's lymphoma treated years ago with chemo therapy  Significant masses and adenopathy suggested on abdominal/pelvic CT and lower chest cuts also abnormal  Probable pericardial involvement  Cannot rule out pleural and lung parenchymal involvement  Status post IR biopsy of  abdominal mass on 1/2, pathology pending  Prognosis very poor  Cardiology does not believe she would tolerate cardiac surgery for this, I agree    Goals changed to comfort     Pleural effusion- (present on admission)   Assessment & Plan    Bilateral presumed secondary to recurrent malignancy  Status post left thoracentesis for 900 cc several days ago  No studies done on this fluid?  Prior workup in October of last year nondiagnostic    Gaols are comfort     Elevated liver function tests   Assessment & Plan    Presumed secondary to hepatic congestion and possible hepatic metastases  Improved transaminases  OT greater than PT pattern  Cannot rule out medication related process, doubt EtOH  Serial CMP  Avoid hepatotoxins  Monitor medication dosages that are hepatically cleared    Comfort measures only     Renal azotemia   Assessment & Plan    May be intravascularly dry  Cautious hydration  X-ray does not suggest the need for forced diuresis  Probable tamponade physiology by echocardiogram, again no forced diuresis  Renal function improved on follow-up this a.m., okay to proceed with a CTA to rule out pulmonary embolus and reassess thoracic anatomy in particular pericardial disease process  Avoid nephrotoxins  Serial BMP  Renally dose medications as appropriate    Goals changed to comfort     Hyperglycemia   Assessment & Plan    Secondary to above  Monitor blood sugar, may need to check fingersticks  Monitor for need for SSI     Essential hypertension- (present on admission)   Assessment & Plan    Continue home regimen for blood pressure as clinically appropriate     Dyslipidemia- (present on admission)   Assessment & Plan    Resume home regimen as clinically appropriate     Hypothyroidism- (present on admission)   Assessment & Plan    Resume home regimen as clinically appropriate          VTE:  Heparin  Ulcer: Not Indicated  Lines: None    I have performed a physical exam and reviewed and updated ROS and Plan today  (1/3/2019). In review of yesterday's note (1/2/2019), there are no changes except as documented above.     Discussed patient condition and risk of morbidity and/or mortality with Hospitalist, Family, RN, RT, Charge nurse / hot rounds and Patient

## 2019-01-05 NOTE — CARE PLAN
Problem: Pain Management  Goal: Pain level will decrease to patient's comfort goal  Pt resting comfortably in bed. CPOT scale used to assess pain. Comfort measures in place     Problem: Psychosocial needs  Goal: Spiritual needs incorporated in hospitalization    Intervention: Encourage verbalization of feelings/concerns/expectations  Family educated extensively on comfort care process, all questions answered. Pt and family frequently checked on, family refusing any pain and anxiety medication. Scopolamine patch administered for excessive secretions, education given to family regarding this medication

## 2019-01-05 NOTE — DISCHARGE PLANNING
Medical Social Work     SW receive a page from the RN advising SW that the pt has  and family is requesting to speak to SW. SW met with the pt family at bedside. SW answered all questions and provided the difficult times packet and provided emotional support.     The pt family advised SW that there Mort[facility] choice is [facility]. DENIA called the NAM and advised her of the Mortuary choice.     Plan: SW will remain available for pt and family support.

## 2019-01-05 NOTE — PROGRESS NOTES
Pt. passed at 0315.  Family notified and currently at bedside.  Hospitalist and PMA notified.  2 RN pronounced sheet completed.  Donor services and [place]  informed.  [facility] of choice is [facility] in [place].

## 2019-01-05 NOTE — CARE PLAN
Problem: Psychosocial needs  Goal: Anxiety reduction  Family encouraged to express feelings, concerns, and anxieties.     Problem: Pain  Goal: Alleviation of Pain or a reduction in pain to the patient's comfort goal  RN collaborating with family for pain and comfort goals. Medication administered per MAR.

## 2019-01-06 LAB
BACTERIA BLD CULT: NORMAL
BACTERIA BLD CULT: NORMAL
SIGNIFICANT IND 70042: NORMAL
SIGNIFICANT IND 70042: NORMAL
SITE SITE: NORMAL
SITE SITE: NORMAL
SOURCE SOURCE: NORMAL
SOURCE SOURCE: NORMAL

## 2019-01-18 NOTE — DISCHARGE SUMMARY
Death Summary    Cause of Death  Respiratory failure due to pericardial and pleural effusions secondary to malignant etiology likely recurrence of the patient's previous history of lymphoma.    Comorbid Conditions at the Time of Death  Principal Problem:    Acute hypoxemic respiratory failure (HCC) POA: Yes  Active Problems:    Pleural effusion POA: Yes      Overview: Chemistry suggesting transudate    Recurrent lymphoma (HCC) POA: Yes    Pericardial effusion POA: Yes    Atelectasis POA: Unknown    Essential hypertension POA: Yes    Hyperglycemia POA: Unknown    Renal azotemia POA: Unknown    Elevated liver function tests POA: Unknown    Hypothyroidism POA: Yes    Dyslipidemia POA: Yes    Hospice care POA: Unknown  Resolved Problems:    * No resolved hospital problems. *      History of Presenting Illness and Hospital Course  This is a 78 y.o. female admitted 12/31/2018 with worsening respiratory status.    This 78-year-old female had a previously known history of lymphoma.  This had thought to be in remission.  She was admitted with worsening respiratory status, altered mentation, and abdominal pain.  Initial imaging demonstrated a pericardial effusion.  She had had a pericardial effusion several weeks prior to her admission and this was tapped but it rapidly returned.  The etiology was thought to be malignant.    Consultations were obtained from cardiology and pulmonology.  It was felt that given the etiology of the patient's effusions, her advanced age, and history of previous treatment, there were no good treatment options for her.  Further tabs of the effusions would likely minimally or not at all change the patient's course.  After several consultations with the patient's  and family and they decided to move towards comfort care.  The patient went on to pass at 315 on the morning of January 5, 2019.    Death Date: 01/05/19   Death Time: 0315         Pronounced By (RN1): Constantin Gordillo  Pronounced By  (RN2): Corky Chiang
